# Patient Record
Sex: FEMALE | Race: WHITE | NOT HISPANIC OR LATINO | Employment: OTHER
[De-identification: names, ages, dates, MRNs, and addresses within clinical notes are randomized per-mention and may not be internally consistent; named-entity substitution may affect disease eponyms.]

---

## 2023-08-07 LAB
APPEARANCE, URINE: CLEAR
BILIRUBIN, URINE: NEGATIVE
BLOOD, URINE: NEGATIVE
COLOR, URINE: YELLOW
GLUCOSE, URINE: NEGATIVE MG/DL
KETONES, URINE: NEGATIVE MG/DL
LEUKOCYTE ESTERASE, URINE: NEGATIVE
MUCUS, URINE: NORMAL /LPF
NITRITE, URINE: NEGATIVE
PH, URINE: 7 (ref 5–8)
PROTEIN, URINE: NEGATIVE MG/DL
RBC, URINE: 3 /HPF (ref 0–5)
SPECIFIC GRAVITY, URINE: 1.03 (ref 1–1.03)
SQUAMOUS EPITHELIAL CELLS, URINE: 4 /HPF
UROBILINOGEN, URINE: <2 MG/DL (ref 0–1.9)
WBC, URINE: 1 /HPF (ref 0–5)

## 2023-08-09 LAB — URINE CULTURE: ABNORMAL

## 2023-10-19 ENCOUNTER — HOSPITAL ENCOUNTER (EMERGENCY)
Age: 73
End: 2023-10-19
Payer: COMMERCIAL

## 2023-10-19 ENCOUNTER — APPOINTMENT (OUTPATIENT)
Dept: RADIOLOGY | Facility: HOSPITAL | Age: 73
DRG: 062 | End: 2023-10-19
Payer: MEDICARE

## 2023-10-19 ENCOUNTER — HOSPITAL ENCOUNTER (INPATIENT)
Facility: HOSPITAL | Age: 73
LOS: 3 days | Discharge: HOME | DRG: 062 | End: 2023-10-22
Attending: STUDENT IN AN ORGANIZED HEALTH CARE EDUCATION/TRAINING PROGRAM | Admitting: INTERNAL MEDICINE
Payer: MEDICARE

## 2023-10-19 DIAGNOSIS — I50.20 HFREF (HEART FAILURE WITH REDUCED EJECTION FRACTION) (MULTI): ICD-10-CM

## 2023-10-19 DIAGNOSIS — G45.9 TIA (TRANSIENT ISCHEMIC ATTACK): ICD-10-CM

## 2023-10-19 DIAGNOSIS — I63.9 CEREBRAL INFARCTION, UNSPECIFIED (MULTI): ICD-10-CM

## 2023-10-19 DIAGNOSIS — R42 DIZZINESS: Primary | ICD-10-CM

## 2023-10-19 DIAGNOSIS — I63.20 CEREBROVASCULAR ACCIDENT (CVA) DUE TO OCCLUSION OF PRECEREBRAL ARTERY (MULTI): ICD-10-CM

## 2023-10-19 LAB
ALBUMIN SERPL BCP-MCNC: 3.5 G/DL (ref 3.4–5)
ALP SERPL-CCNC: 59 U/L (ref 33–136)
ALT SERPL W P-5'-P-CCNC: 9 U/L (ref 7–45)
ANION GAP SERPL CALC-SCNC: 15 MMOL/L (ref 10–20)
APTT PPP: 28 SECONDS (ref 27–38)
AST SERPL W P-5'-P-CCNC: 9 U/L (ref 9–39)
BASOPHILS # BLD AUTO: 0.05 X10*3/UL (ref 0–0.1)
BASOPHILS NFR BLD AUTO: 0.6 %
BILIRUB SERPL-MCNC: 0.3 MG/DL (ref 0–1.2)
BUN SERPL-MCNC: 14 MG/DL (ref 6–23)
CALCIUM SERPL-MCNC: 8.4 MG/DL (ref 8.6–10.3)
CARDIAC TROPONIN I PNL SERPL HS: 14 NG/L (ref 0–13)
CHLORIDE SERPL-SCNC: 99 MMOL/L (ref 98–107)
CO2 SERPL-SCNC: 27 MMOL/L (ref 21–32)
CREAT SERPL-MCNC: 0.68 MG/DL (ref 0.5–1.05)
EOSINOPHIL # BLD AUTO: 0.18 X10*3/UL (ref 0–0.4)
EOSINOPHIL NFR BLD AUTO: 2.1 %
ERYTHROCYTE [DISTWIDTH] IN BLOOD BY AUTOMATED COUNT: 14.9 % (ref 11.5–14.5)
GFR SERPL CREATININE-BSD FRML MDRD: >90 ML/MIN/1.73M*2
GLUCOSE BLD MANUAL STRIP-MCNC: 174 MG/DL (ref 74–99)
GLUCOSE SERPL-MCNC: 168 MG/DL (ref 74–99)
HCT VFR BLD AUTO: 38.2 % (ref 36–46)
HGB BLD-MCNC: 11.8 G/DL (ref 12–16)
IMM GRANULOCYTES # BLD AUTO: 0.03 X10*3/UL (ref 0–0.5)
IMM GRANULOCYTES NFR BLD AUTO: 0.4 % (ref 0–0.9)
INR PPP: 1.6 (ref 0.9–1.1)
LYMPHOCYTES # BLD AUTO: 2.52 X10*3/UL (ref 0.8–3)
LYMPHOCYTES NFR BLD AUTO: 29.6 %
MCH RBC QN AUTO: 26.8 PG (ref 26–34)
MCHC RBC AUTO-ENTMCNC: 30.9 G/DL (ref 32–36)
MCV RBC AUTO: 87 FL (ref 80–100)
MONOCYTES # BLD AUTO: 0.66 X10*3/UL (ref 0.05–0.8)
MONOCYTES NFR BLD AUTO: 7.8 %
NEUTROPHILS # BLD AUTO: 5.06 X10*3/UL (ref 1.6–5.5)
NEUTROPHILS NFR BLD AUTO: 59.5 %
NRBC BLD-RTO: 0 /100 WBCS (ref 0–0)
PLATELET # BLD AUTO: 292 X10*3/UL (ref 150–450)
PMV BLD AUTO: 10.2 FL (ref 7.5–11.5)
POCT GLUCOSE: 173 MG/DL (ref 74–99)
POTASSIUM SERPL-SCNC: 3.9 MMOL/L (ref 3.5–5.3)
PROT SERPL-MCNC: 7 G/DL (ref 6.4–8.2)
PROTHROMBIN TIME: 17.7 SECONDS (ref 9.8–12.8)
RBC # BLD AUTO: 4.41 X10*6/UL (ref 4–5.2)
SODIUM SERPL-SCNC: 137 MMOL/L (ref 136–145)
WBC # BLD AUTO: 8.5 X10*3/UL (ref 4.4–11.3)

## 2023-10-19 PROCEDURE — 2550000001 HC RX 255 CONTRASTS: Performed by: PHYSICIAN ASSISTANT

## 2023-10-19 PROCEDURE — 36415 COLL VENOUS BLD VENIPUNCTURE: CPT | Performed by: PHYSICIAN ASSISTANT

## 2023-10-19 PROCEDURE — 3E03317 INTRODUCTION OF OTHER THROMBOLYTIC INTO PERIPHERAL VEIN, PERCUTANEOUS APPROACH: ICD-10-PCS | Performed by: STUDENT IN AN ORGANIZED HEALTH CARE EDUCATION/TRAINING PROGRAM

## 2023-10-19 PROCEDURE — 2020000001 HC ICU ROOM DAILY

## 2023-10-19 PROCEDURE — 82947 ASSAY GLUCOSE BLOOD QUANT: CPT

## 2023-10-19 PROCEDURE — 70498 CT ANGIOGRAPHY NECK: CPT | Performed by: RADIOLOGY

## 2023-10-19 PROCEDURE — 70450 CT HEAD/BRAIN W/O DYE: CPT | Performed by: STUDENT IN AN ORGANIZED HEALTH CARE EDUCATION/TRAINING PROGRAM

## 2023-10-19 PROCEDURE — 85730 THROMBOPLASTIN TIME PARTIAL: CPT | Performed by: PHYSICIAN ASSISTANT

## 2023-10-19 PROCEDURE — 84075 ASSAY ALKALINE PHOSPHATASE: CPT | Performed by: PHYSICIAN ASSISTANT

## 2023-10-19 PROCEDURE — 85610 PROTHROMBIN TIME: CPT | Performed by: PHYSICIAN ASSISTANT

## 2023-10-19 PROCEDURE — 70450 CT HEAD/BRAIN W/O DYE: CPT | Mod: MG

## 2023-10-19 PROCEDURE — 70496 CT ANGIOGRAPHY HEAD: CPT | Performed by: RADIOLOGY

## 2023-10-19 PROCEDURE — 85025 COMPLETE CBC W/AUTO DIFF WBC: CPT | Performed by: PHYSICIAN ASSISTANT

## 2023-10-19 PROCEDURE — 70498 CT ANGIOGRAPHY NECK: CPT | Mod: MG

## 2023-10-19 PROCEDURE — 96374 THER/PROPH/DIAG INJ IV PUSH: CPT

## 2023-10-19 PROCEDURE — 70496 CT ANGIOGRAPHY HEAD: CPT | Mod: MG

## 2023-10-19 PROCEDURE — 99285 EMERGENCY DEPT VISIT HI MDM: CPT | Performed by: STUDENT IN AN ORGANIZED HEALTH CARE EDUCATION/TRAINING PROGRAM

## 2023-10-19 PROCEDURE — 2500000004 HC RX 250 GENERAL PHARMACY W/ HCPCS (ALT 636 FOR OP/ED): Performed by: STUDENT IN AN ORGANIZED HEALTH CARE EDUCATION/TRAINING PROGRAM

## 2023-10-19 PROCEDURE — 84484 ASSAY OF TROPONIN QUANT: CPT | Performed by: PHYSICIAN ASSISTANT

## 2023-10-19 RX ORDER — ATORVASTATIN CALCIUM 80 MG/1
80 TABLET, FILM COATED ORAL NIGHTLY
Status: DISCONTINUED | OUTPATIENT
Start: 2023-10-19 | End: 2023-10-22 | Stop reason: HOSPADM

## 2023-10-19 RX ORDER — ACETAMINOPHEN 325 MG/1
650 TABLET ORAL ONCE
Status: COMPLETED | OUTPATIENT
Start: 2023-10-19 | End: 2023-10-19

## 2023-10-19 RX ORDER — LABETALOL HYDROCHLORIDE 5 MG/ML
10 INJECTION, SOLUTION INTRAVENOUS EVERY 10 MIN PRN
Status: ACTIVE | OUTPATIENT
Start: 2023-10-19 | End: 2023-10-21

## 2023-10-19 RX ADMIN — Medication 25 MG: at 21:50

## 2023-10-19 RX ADMIN — IOHEXOL 90 ML: 350 INJECTION, SOLUTION INTRAVENOUS at 21:14

## 2023-10-19 RX ADMIN — ACETAMINOPHEN 650 MG: 325 TABLET ORAL at 22:26

## 2023-10-19 ASSESSMENT — LIFESTYLE VARIABLES
EVER FELT BAD OR GUILTY ABOUT YOUR DRINKING: NO
HAVE YOU EVER FELT YOU SHOULD CUT DOWN ON YOUR DRINKING: NO
EVER HAD A DRINK FIRST THING IN THE MORNING TO STEADY YOUR NERVES TO GET RID OF A HANGOVER: NO
HAVE PEOPLE ANNOYED YOU BY CRITICIZING YOUR DRINKING: NO
REASON UNABLE TO ASSESS: YES

## 2023-10-19 ASSESSMENT — COLUMBIA-SUICIDE SEVERITY RATING SCALE - C-SSRS
1. IN THE PAST MONTH, HAVE YOU WISHED YOU WERE DEAD OR WISHED YOU COULD GO TO SLEEP AND NOT WAKE UP?: NO
6. HAVE YOU EVER DONE ANYTHING, STARTED TO DO ANYTHING, OR PREPARED TO DO ANYTHING TO END YOUR LIFE?: NO
2. HAVE YOU ACTUALLY HAD ANY THOUGHTS OF KILLING YOURSELF?: NO

## 2023-10-19 ASSESSMENT — PAIN SCALES - GENERAL
PAINLEVEL_OUTOF10: 0 - NO PAIN
PAINLEVEL_OUTOF10: 0 - NO PAIN

## 2023-10-19 ASSESSMENT — PAIN - FUNCTIONAL ASSESSMENT
PAIN_FUNCTIONAL_ASSESSMENT: 0-10
PAIN_FUNCTIONAL_ASSESSMENT: 0-10

## 2023-10-20 ENCOUNTER — APPOINTMENT (OUTPATIENT)
Dept: RADIOLOGY | Facility: HOSPITAL | Age: 73
DRG: 062 | End: 2023-10-20
Payer: MEDICARE

## 2023-10-20 ENCOUNTER — APPOINTMENT (OUTPATIENT)
Dept: CARDIOLOGY | Facility: HOSPITAL | Age: 73
DRG: 062 | End: 2023-10-20
Payer: MEDICARE

## 2023-10-20 LAB
ALBUMIN SERPL BCP-MCNC: 3.2 G/DL (ref 3.4–5)
ANION GAP SERPL CALC-SCNC: 12 MMOL/L (ref 10–20)
APTT PPP: 30 SECONDS (ref 27–38)
BUN SERPL-MCNC: 12 MG/DL (ref 6–23)
CALCIUM SERPL-MCNC: 8.4 MG/DL (ref 8.6–10.3)
CHLORIDE SERPL-SCNC: 103 MMOL/L (ref 98–107)
CHOLEST SERPL-MCNC: 113 MG/DL (ref 0–199)
CHOLESTEROL/HDL RATIO: 2.8
CO2 SERPL-SCNC: 26 MMOL/L (ref 21–32)
CREAT SERPL-MCNC: 0.51 MG/DL (ref 0.5–1.05)
EJECTION FRACTION APICAL 4 CHAMBER: 47.7
ERYTHROCYTE [DISTWIDTH] IN BLOOD BY AUTOMATED COUNT: 15 % (ref 11.5–14.5)
EST. AVERAGE GLUCOSE BLD GHB EST-MCNC: 154 MG/DL
GFR SERPL CREATININE-BSD FRML MDRD: >90 ML/MIN/1.73M*2
GLUCOSE BLD MANUAL STRIP-MCNC: 158 MG/DL (ref 74–99)
GLUCOSE BLD MANUAL STRIP-MCNC: 163 MG/DL (ref 74–99)
GLUCOSE BLD MANUAL STRIP-MCNC: 184 MG/DL (ref 74–99)
GLUCOSE BLD MANUAL STRIP-MCNC: 204 MG/DL (ref 74–99)
GLUCOSE SERPL-MCNC: 159 MG/DL (ref 74–99)
HBA1C MFR BLD: 7 %
HCT VFR BLD AUTO: 38.1 % (ref 36–46)
HDLC SERPL-MCNC: 40.1 MG/DL
HGB BLD-MCNC: 11.7 G/DL (ref 12–16)
INR PPP: 1.6 (ref 0.9–1.1)
LACTATE SERPL-SCNC: 0.8 MMOL/L (ref 0.4–2)
LDLC SERPL CALC-MCNC: 60 MG/DL
MAGNESIUM SERPL-MCNC: 1.65 MG/DL (ref 1.6–2.4)
MCH RBC QN AUTO: 26.4 PG (ref 26–34)
MCHC RBC AUTO-ENTMCNC: 30.7 G/DL (ref 32–36)
MCV RBC AUTO: 86 FL (ref 80–100)
NON HDL CHOLESTEROL: 73 MG/DL (ref 0–149)
NRBC BLD-RTO: 0 /100 WBCS (ref 0–0)
PHOSPHATE SERPL-MCNC: 3.6 MG/DL (ref 2.5–4.9)
PLATELET # BLD AUTO: 286 X10*3/UL (ref 150–450)
PMV BLD AUTO: 10.6 FL (ref 7.5–11.5)
POTASSIUM SERPL-SCNC: 3.9 MMOL/L (ref 3.5–5.3)
PROTHROMBIN TIME: 17.8 SECONDS (ref 9.8–12.8)
RBC # BLD AUTO: 4.44 X10*6/UL (ref 4–5.2)
SODIUM SERPL-SCNC: 137 MMOL/L (ref 136–145)
TRIGL SERPL-MCNC: 66 MG/DL (ref 0–149)
TSH SERPL-ACNC: 1.21 MIU/L (ref 0.44–3.98)
VLDL: 13 MG/DL (ref 0–40)
WBC # BLD AUTO: 7.6 X10*3/UL (ref 4.4–11.3)

## 2023-10-20 PROCEDURE — 99222 1ST HOSP IP/OBS MODERATE 55: CPT | Performed by: PSYCHIATRY & NEUROLOGY

## 2023-10-20 PROCEDURE — 85027 COMPLETE CBC AUTOMATED: CPT

## 2023-10-20 PROCEDURE — 2500000001 HC RX 250 WO HCPCS SELF ADMINISTERED DRUGS (ALT 637 FOR MEDICARE OP)

## 2023-10-20 PROCEDURE — 2500000004 HC RX 250 GENERAL PHARMACY W/ HCPCS (ALT 636 FOR OP/ED)

## 2023-10-20 PROCEDURE — 83036 HEMOGLOBIN GLYCOSYLATED A1C: CPT | Mod: CMCLAB,GEALAB

## 2023-10-20 PROCEDURE — 83605 ASSAY OF LACTIC ACID: CPT

## 2023-10-20 PROCEDURE — 84443 ASSAY THYROID STIM HORMONE: CPT

## 2023-10-20 PROCEDURE — 2020000001 HC ICU ROOM DAILY

## 2023-10-20 PROCEDURE — 97165 OT EVAL LOW COMPLEX 30 MIN: CPT | Mod: GO

## 2023-10-20 PROCEDURE — 70551 MRI BRAIN STEM W/O DYE: CPT | Performed by: STUDENT IN AN ORGANIZED HEALTH CARE EDUCATION/TRAINING PROGRAM

## 2023-10-20 PROCEDURE — 99291 CRITICAL CARE FIRST HOUR: CPT

## 2023-10-20 PROCEDURE — 83735 ASSAY OF MAGNESIUM: CPT

## 2023-10-20 PROCEDURE — 82947 ASSAY GLUCOSE BLOOD QUANT: CPT

## 2023-10-20 PROCEDURE — 96372 THER/PROPH/DIAG INJ SC/IM: CPT

## 2023-10-20 PROCEDURE — 80061 LIPID PANEL: CPT

## 2023-10-20 PROCEDURE — 93306 TTE W/DOPPLER COMPLETE: CPT

## 2023-10-20 PROCEDURE — 2500000002 HC RX 250 W HCPCS SELF ADMINISTERED DRUGS (ALT 637 FOR MEDICARE OP, ALT 636 FOR OP/ED)

## 2023-10-20 PROCEDURE — 70450 CT HEAD/BRAIN W/O DYE: CPT | Performed by: RADIOLOGY

## 2023-10-20 PROCEDURE — 99233 SBSQ HOSP IP/OBS HIGH 50: CPT

## 2023-10-20 PROCEDURE — 80069 RENAL FUNCTION PANEL: CPT

## 2023-10-20 PROCEDURE — 36415 COLL VENOUS BLD VENIPUNCTURE: CPT

## 2023-10-20 PROCEDURE — 70551 MRI BRAIN STEM W/O DYE: CPT | Mod: MG

## 2023-10-20 PROCEDURE — 85610 PROTHROMBIN TIME: CPT

## 2023-10-20 PROCEDURE — 70450 CT HEAD/BRAIN W/O DYE: CPT | Mod: MG

## 2023-10-20 PROCEDURE — 93010 ELECTROCARDIOGRAM REPORT: CPT | Performed by: INTERNAL MEDICINE

## 2023-10-20 PROCEDURE — 93306 TTE W/DOPPLER COMPLETE: CPT | Performed by: INTERNAL MEDICINE

## 2023-10-20 RX ORDER — INSULIN LISPRO 100 [IU]/ML
0-10 INJECTION, SOLUTION INTRAVENOUS; SUBCUTANEOUS
Status: DISCONTINUED | OUTPATIENT
Start: 2023-10-20 | End: 2023-10-22 | Stop reason: HOSPADM

## 2023-10-20 RX ORDER — PAROXETINE HYDROCHLORIDE 20 MG/1
20 TABLET, FILM COATED ORAL NIGHTLY
COMMUNITY
Start: 2022-05-26

## 2023-10-20 RX ORDER — DEXTROSE MONOHYDRATE 100 MG/ML
0.3 INJECTION, SOLUTION INTRAVENOUS ONCE AS NEEDED
Status: DISCONTINUED | OUTPATIENT
Start: 2023-10-20 | End: 2023-10-22 | Stop reason: HOSPADM

## 2023-10-20 RX ORDER — PRAVASTATIN SODIUM 80 MG/1
1 TABLET ORAL NIGHTLY
COMMUNITY
Start: 2022-05-26 | End: 2023-10-22 | Stop reason: HOSPADM

## 2023-10-20 RX ORDER — ENOXAPARIN SODIUM 100 MG/ML
40 INJECTION SUBCUTANEOUS DAILY
Status: DISCONTINUED | OUTPATIENT
Start: 2023-10-20 | End: 2023-10-22 | Stop reason: HOSPADM

## 2023-10-20 RX ORDER — PIOGLITAZONEHYDROCHLORIDE 30 MG/1
30 TABLET ORAL EVERY 24 HOURS
COMMUNITY
Start: 2022-05-26

## 2023-10-20 RX ORDER — PAROXETINE HYDROCHLORIDE 20 MG/1
20 TABLET, FILM COATED ORAL NIGHTLY
Status: DISCONTINUED | OUTPATIENT
Start: 2023-10-20 | End: 2023-10-22 | Stop reason: HOSPADM

## 2023-10-20 RX ORDER — DEXTROSE 50 % IN WATER (D50W) INTRAVENOUS SYRINGE
25
Status: DISCONTINUED | OUTPATIENT
Start: 2023-10-20 | End: 2023-10-22 | Stop reason: HOSPADM

## 2023-10-20 RX ORDER — CALCIUM CARBONATE 200(500)MG
500 TABLET,CHEWABLE ORAL 4 TIMES DAILY PRN
Status: DISCONTINUED | OUTPATIENT
Start: 2023-10-20 | End: 2023-10-22 | Stop reason: HOSPADM

## 2023-10-20 RX ORDER — ACETAMINOPHEN 325 MG/1
650 TABLET ORAL EVERY 6 HOURS PRN
Status: DISCONTINUED | OUTPATIENT
Start: 2023-10-20 | End: 2023-10-22 | Stop reason: HOSPADM

## 2023-10-20 RX ORDER — CALCIUM CARBONATE 200(500)MG
500 TABLET,CHEWABLE ORAL DAILY
Status: DISCONTINUED | OUTPATIENT
Start: 2023-10-20 | End: 2023-10-20

## 2023-10-20 RX ORDER — ASPIRIN 81 MG/1
1 TABLET ORAL DAILY
COMMUNITY
Start: 2022-05-26 | End: 2023-10-22 | Stop reason: HOSPADM

## 2023-10-20 RX ORDER — CHOLECALCIFEROL (VITAMIN D3) 25 MCG
1 TABLET ORAL DAILY
COMMUNITY
Start: 2022-05-27

## 2023-10-20 RX ORDER — METFORMIN HYDROCHLORIDE 500 MG/1
500 TABLET ORAL EVERY MORNING
COMMUNITY

## 2023-10-20 RX ORDER — NAPROXEN SODIUM 220 MG/1
81 TABLET, FILM COATED ORAL DAILY
Status: DISCONTINUED | OUTPATIENT
Start: 2023-10-20 | End: 2023-10-22 | Stop reason: HOSPADM

## 2023-10-20 RX ORDER — ASPIRIN 325 MG
50000 TABLET, DELAYED RELEASE (ENTERIC COATED) ORAL
COMMUNITY
Start: 2022-05-29 | End: 2023-10-22 | Stop reason: HOSPADM

## 2023-10-20 RX ADMIN — ATORVASTATIN CALCIUM 80 MG: 80 TABLET, FILM COATED ORAL at 00:48

## 2023-10-20 RX ADMIN — INSULIN LISPRO 2 UNITS: 100 INJECTION, SOLUTION INTRAVENOUS; SUBCUTANEOUS at 13:23

## 2023-10-20 RX ADMIN — PERFLUTREN 2 ML: 6.52 INJECTION, SUSPENSION INTRAVENOUS at 11:26

## 2023-10-20 RX ADMIN — ATORVASTATIN CALCIUM 80 MG: 80 TABLET, FILM COATED ORAL at 22:23

## 2023-10-20 RX ADMIN — PAROXETINE 20 MG: 20 TABLET, FILM COATED ORAL at 00:48

## 2023-10-20 RX ADMIN — CALCIUM CARBONATE (ANTACID) CHEW TAB 500 MG 500 MG: 500 CHEW TAB at 04:38

## 2023-10-20 RX ADMIN — ENOXAPARIN SODIUM 40 MG: 40 INJECTION SUBCUTANEOUS at 22:23

## 2023-10-20 RX ADMIN — INSULIN LISPRO 4 UNITS: 100 INJECTION, SOLUTION INTRAVENOUS; SUBCUTANEOUS at 17:38

## 2023-10-20 RX ADMIN — INSULIN LISPRO 2 UNITS: 100 INJECTION, SOLUTION INTRAVENOUS; SUBCUTANEOUS at 08:57

## 2023-10-20 RX ADMIN — PAROXETINE 20 MG: 20 TABLET, FILM COATED ORAL at 22:24

## 2023-10-20 RX ADMIN — ASPIRIN 81 MG CHEWABLE TABLET 81 MG: 81 TABLET CHEWABLE at 22:24

## 2023-10-20 RX ADMIN — ACETAMINOPHEN 650 MG: 325 TABLET ORAL at 13:19

## 2023-10-20 SDOH — SOCIAL STABILITY: SOCIAL INSECURITY: DO YOU FEEL UNSAFE GOING BACK TO THE PLACE WHERE YOU ARE LIVING?: NO

## 2023-10-20 SDOH — SOCIAL STABILITY: SOCIAL INSECURITY: ARE THERE ANY APPARENT SIGNS OF INJURIES/BEHAVIORS THAT COULD BE RELATED TO ABUSE/NEGLECT?: NO

## 2023-10-20 SDOH — SOCIAL STABILITY: SOCIAL INSECURITY: WERE YOU ABLE TO COMPLETE ALL THE BEHAVIORAL HEALTH SCREENINGS?: YES

## 2023-10-20 SDOH — SOCIAL STABILITY: SOCIAL INSECURITY: DOES ANYONE TRY TO KEEP YOU FROM HAVING/CONTACTING OTHER FRIENDS OR DOING THINGS OUTSIDE YOUR HOME?: NO

## 2023-10-20 SDOH — SOCIAL STABILITY: SOCIAL INSECURITY: ABUSE: ADULT

## 2023-10-20 SDOH — SOCIAL STABILITY: SOCIAL INSECURITY: ARE YOU OR HAVE YOU BEEN THREATENED OR ABUSED PHYSICALLY, EMOTIONALLY, OR SEXUALLY BY ANYONE?: NO

## 2023-10-20 SDOH — SOCIAL STABILITY: SOCIAL INSECURITY: HAVE YOU HAD THOUGHTS OF HARMING ANYONE ELSE?: NO

## 2023-10-20 SDOH — SOCIAL STABILITY: SOCIAL INSECURITY: HAS ANYONE EVER THREATENED TO HURT YOUR FAMILY OR YOUR PETS?: NO

## 2023-10-20 SDOH — SOCIAL STABILITY: SOCIAL INSECURITY: DO YOU FEEL ANYONE HAS EXPLOITED OR TAKEN ADVANTAGE OF YOU FINANCIALLY OR OF YOUR PERSONAL PROPERTY?: NO

## 2023-10-20 ASSESSMENT — COGNITIVE AND FUNCTIONAL STATUS - GENERAL
HELP NEEDED FOR BATHING: A LITTLE
MOBILITY SCORE: 24
PATIENT BASELINE BEDBOUND: NO
DRESSING REGULAR LOWER BODY CLOTHING: A LITTLE
DAILY ACTIVITIY SCORE: 24
DAILY ACTIVITIY SCORE: 21
TOILETING: A LITTLE

## 2023-10-20 ASSESSMENT — ACTIVITIES OF DAILY LIVING (ADL)
PATIENT'S MEMORY ADEQUATE TO SAFELY COMPLETE DAILY ACTIVITIES?: YES
ADL_ASSISTANCE: INDEPENDENT
BATHING: INDEPENDENT
ADEQUATE_TO_COMPLETE_ADL: YES
WALKS IN HOME: INDEPENDENT
LACK_OF_TRANSPORTATION: NO
DRESSING YOURSELF: INDEPENDENT
FEEDING YOURSELF: INDEPENDENT
GROOMING: INDEPENDENT
HEARING - LEFT EAR: FUNCTIONAL
TOILETING: INDEPENDENT
ASSISTIVE_DEVICE: WALKER
HEARING - RIGHT EAR: FUNCTIONAL
JUDGMENT_ADEQUATE_SAFELY_COMPLETE_DAILY_ACTIVITIES: YES

## 2023-10-20 ASSESSMENT — PATIENT HEALTH QUESTIONNAIRE - PHQ9
SUM OF ALL RESPONSES TO PHQ9 QUESTIONS 1 & 2: 0
1. LITTLE INTEREST OR PLEASURE IN DOING THINGS: NOT AT ALL
2. FEELING DOWN, DEPRESSED OR HOPELESS: NOT AT ALL

## 2023-10-20 ASSESSMENT — PAIN SCALES - GENERAL
PAINLEVEL_OUTOF10: 0 - NO PAIN
PAINLEVEL_OUTOF10: 10 - WORST POSSIBLE PAIN
PAINLEVEL_OUTOF10: 0 - NO PAIN

## 2023-10-20 ASSESSMENT — LIFESTYLE VARIABLES
AUDIT-C TOTAL SCORE: 0
AUDIT-C TOTAL SCORE: 0
HOW MANY STANDARD DRINKS CONTAINING ALCOHOL DO YOU HAVE ON A TYPICAL DAY: PATIENT DOES NOT DRINK
SKIP TO QUESTIONS 9-10: 1
HOW OFTEN DO YOU HAVE A DRINK CONTAINING ALCOHOL: NEVER
HOW OFTEN DO YOU HAVE 6 OR MORE DRINKS ON ONE OCCASION: NEVER

## 2023-10-20 ASSESSMENT — PAIN - FUNCTIONAL ASSESSMENT: PAIN_FUNCTIONAL_ASSESSMENT: 0-10

## 2023-10-20 NOTE — HOSPITAL COURSE
Patient is a 72 year old female with PMHx relevant for T2DM, and HLD who presented to the ED with complaints of new onset dizziness, double vision, and expressive aphasia.  Symptoms started at 7:15pm at home, and she was brought to the ED.  In the ED she was noted to have ataxia with abnormal finger-to-nose testing and vertical nystagmus. NIH stroke scale was 3 with VAN negative.  CT head was negative for acute process and she developed right sided facial droop so she was given TNK @ 2150.  She was then transferred to the ICU for post-TNK observation and monitoring.  In the ICU on 10/20 her symptoms have significantly improved without complete resolution.  She continues to have blurry vision, improved from double vision.  Her ataxia with finger-to-nose has improved, she is speaking and mentating well, and she no longer has any facial droop.  MRI Brain showed acute/subacute lacunar infarct in the left thalamus, without evidence of hemorrhagic transformation. Neurology were following and recommended DAPT and HI statin and follow-up outpatient. Echo also showed new LV systolic dysfunction with an EF 45-50%, impaired relaxation pattern LV diastolic filling, and mild tricuspid regurgitation.  Cardiology was consulted for the findings of new heart failure with reduced ejection fraction, and additionally patient has been having recurring PVCs every 3-4 beats on the telemetry monitor.  They recommended starting on metoprolol XL 50 mg daily and Hyzaar 50-12.5 daily, and following up closely outpatient for CHF management and ischemia evaluation. On CTA neck patient was found with high grade 85% stenosis of right cervical ICA, she will have to follow up with vascular surgery after discharge.  On 10/22 she was stable to be discharged home.

## 2023-10-20 NOTE — CONSULTS
History Of Present Illness  Elsa Biggs is a 72 y.o. female presenting with acute lightheadedness, diplopia, and ataxia, some right sided facial droop.  Last known well: 0715.  Had stroke symptoms resolved at time of presentation: No  Past Medical History  Past Medical History:   Diagnosis Date    Cancer (CMS/HCC)     Diabetes mellitus (CMS/HCC)      Surgical History  Past Surgical History:   Procedure Laterality Date    BREAST SURGERY      FRACTURE SURGERY      JOINT REPLACEMENT      MR NECK ANGIO W AND WO IV CONTRAST  01/14/2022    MR NECK ANGIO W AND WO IV CONTRAST 1/14/2022 [facility]     Social History  Social History     Tobacco Use    Smoking status: Never    Smokeless tobacco: Never   Vaping Use    Vaping Use: Never used   Substance Use Topics    Alcohol use: Not Currently     Alcohol/week: 1.0 standard drink of alcohol     Types: 1 Standard drinks or equivalent per week    Drug use: Never     Allergies  Tetracycline  Home Medications  Medications Prior to Admission   Medication Sig Dispense Refill Last Dose    aspirin 81 mg EC tablet Take 1 tablet (81 mg) by mouth once daily.   10/18/2023    cholecalciferol (Vitamin D-3) 25 MCG (1000 UT) tablet Take 1 tablet (25 mcg) by mouth once daily.   10/18/2023    cholecalciferol (Vitamin D-3) 50,000 unit capsule Take 1 capsule (50,000 Units) by mouth 1 (one) time per week.   10/15/2023    PARoxetine (Paxil) 20 mg tablet Take 1 tablet (20 mg) by mouth once daily at bedtime.   10/18/2023    pioglitazone (Actos) 30 mg tablet Take 1 tablet (30 mg) by mouth once every 24 hours.   10/18/2023    pravastatin (Pravachol) 80 mg tablet Take 1 tablet (80 mg) by mouth once daily at bedtime.   10/18/2023    blood sugar diagnostic strip before meals and at bedtime. Use as instructed   Unknown    metFORMIN (Glucophage) 500 mg tablet Take 1 tablet (500 mg) by mouth 2 times a day with meals.   10/18/2023       Review of Systems  Neurological Exam  Mental Status  Awake,  alert and oriented to person, place and time. Recent and remote memory are intact. Speech is normal. Language is fluent with no aphasia. Attention and concentration are normal. Fund of knowledge is appropriate for level of education.    Cranial Nerves  CN II: Visual fields full to confrontation.  CN III, IV, VI: Extraocular movements intact bilaterally.   Right pupil: 3 mm. Round. Reactive to light.   Left pupil: 3 mm. Round. Reactive to light.  CN V: Facial sensation is normal.  CN VII: Full and symmetric facial movement.  CN VIII: Hearing is normal.  CN IX, X: Palate elevates symmetrically  CN XI: Shoulder shrug strength is normal.  CN XII: Tongue midline without atrophy or fasciculations.    Motor  Normal muscle bulk throughout. No fasciculations present. Normal muscle tone. No abnormal involuntary movements. Strength is 5/5 throughout all four extremities.    Sensory  Sensation is intact to light touch, pinprick, vibration and proprioception in all four extremities.    Reflexes                                            Right                      Left  Brachioradialis                    1+                         1+  Biceps                                 1+                         1+  Triceps                                1+                         1+  Patellar                                0                         0  Achilles                                0                         0  Right Plantar: downgoing  Left Plantar: downgoing    Coordination  Right: Finger-to-nose normal.Left: Finger-to-nose normal.    Gait    Deferred given that she has not been out of the hospital bed yet..    Physical Exam  Eyes:      Extraocular Movements: Extraocular movements intact.   Neurological:      Motor: Motor strength is normal.     Deep Tendon Reflexes:      Reflex Scores:       Tricep reflexes are 1+ on the right side and 1+ on the left side.       Bicep reflexes are 1+ on the right side and 1+ on the left side.       " Brachioradialis reflexes are 1+ on the right side and 1+ on the left side.       Patellar reflexes are 0 on the right side and 0 on the left side.       Achilles reflexes are 0 on the right side and 0 on the left side.  Psychiatric:         Speech: Speech normal.       Last Recorded Vitals  Blood pressure 129/64, pulse 84, temperature 36.4 °C (97.5 °F), temperature source Skin, resp. rate 22, height 1.6 m (5' 3\"), weight 117 kg (258 lb 6.1 oz), SpO2 95 %.        Relevant Results      NIH Stroke Scale  1A. Level of Consciousness: Alert, Keenly Responsive  1B. Ask Month and Age: Both Questions Right  1C. Blink Eyes & Squeeze Hands: Performs Both Tasks  2. Best Gaze: Normal  3. Visual: No Visual Loss  4. Facial Palsy: Minor Paralysis  5A. Motor - Left Arm: No Drift  5B. Motor - Right Arm: No Drift  6A. Motor - Left Leg: No Drift  6B. Motor - Right Leg: No Drift  7. Limb Ataxia: Absent  8. Sensory Loss: Normal  9. Best Language: Mild-to-Moderate Aphasia  10. Dysarthria: Normal  11. Extinction and Inattention: No Abnormality  NIH Stroke Scale: 2           Arabi Coma Scale  Best Eye Response: Spontaneous  Best Verbal Response: Oriented  Best Motor Response: Follows commands  Arabi Coma Scale Score: 15                No MRI head results found for the past 14 days  CT head wo IV contrast    Result Date: 10/20/2023  Interpreted By:  Scott Hightower, STUDY: CT HEAD WO IV CONTRAST;  10/20/2023 3:15 am   INDICATION: Signs/Symptoms:Worseing slurred speech, s/p TNK.   COMPARISON: 10/19/2023   ACCESSION NUMBER(S): [id number]   ORDERING CLINICIAN: PATTIE RUIZ   TECHNIQUE: Axial noncontrast CT images of the head. Sagittal and coronal reformats were provided.   FINDINGS: BRAIN: No acute intracranial hemorrhage. No mass effect or midline shift. Gray-white matter interfaces are preserved.Patchy white matter hypodensities which are nonspecific but likely related to microangiopathic white matter changes. Remote right " basal ganglia lacunar infarct. VENTRICLES and EXTRA-AXIAL SPACES: Prominent ventricles and extra-axial CSF spaces, reflecting parenchymal volume loss. EXTRACRANIAL SOFT TISSUES:  Within normal limits. PARANASAL SINUSES/MASTOIDS: The visualized paranasal sinuses and mastoid air cells are aerated. BONES AND ORBITS: No displaced skull fracture. No suspicious osseous lesion.  Orbits are within normal limits. OTHER FINDINGS: None.       1. No acute intracranial hemorrhage or mass effect.   Signed by: Scott Hightower 10/20/2023 3:43 AM Dictation workstation:   [id number]    CT head wo IV contrast    Result Date: 10/19/2023  Interpreted By:  Christiano Washburn, STUDY: CT HEAD WO IV CONTRAST;  10/19/2023 10:19 pm   INDICATION: Worsening right-sided facial droop.   COMPARISON: CT head without contrast 10/19/2023 at 9:04 p.m.   ACCESSION NUMBER(S): [id number]   ORDERING CLINICIAN: STACEY ESCOBEDO   TECHNIQUE: Noncontrast axial CT images of head were obtained with coronal and sagittal reconstructed images.   FINDINGS: BRAIN PARENCHYMA: There is redemonstration of a hypodensity in the right basal ganglia and subinsular white matter representing age-indeterminate lacunar infarct. There is unchanged moderate periventricular chronic microvascular ischemic change in mild pontine chronic microvascular ischemic change. No acute intraparenchymal hemorrhage. There is no parenchymal evidence of an acute large territory ischemic infarct at this time. There is no mass effect.   VENTRICLES and EXTRA-AXIAL SPACES: There is a small amount of residual intravascular contrast from previous CTA. No acute extra-axial or intraventricular hemorrhage. No effacement of cerebral sulci. Ventricles and sulci are age-concordant. Severe intracranial vascular calcifications are again noted.   PARANASAL SINUSES/MASTOIDS:  No hemorrhage or air-fluid levels within the visualized paranasal sinuses. The mastoids are well aerated.   CALVARIUM/ORBITS:  No  skull fracture.  The orbits and globes are intact to the extent visualized.   EXTRACRANIAL SOFT TISSUES: No discernible abnormality.       1. No parenchymal evidence of new or progressing large territory ischemic infarct. There is an unchanged age indeterminate lacunar infarct in the right basal ganglia and subinsular white matter. As previously recommended, MRI would better evaluate the acuity of this lesion in any other potential acute infarct responsible for patient's symptoms.   2. No acute intracranial hemorrhage.     MACRO: None.   Signed by: Christiano Washburn 10/19/2023 10:31 PM Dictation workstation:   [id number]   Transthoracic Echo (TTE) Complete    Result Date: 10/20/2023   [facility], [address] [place] [address]               Tel [phone] and Fax [phone] TRANSTHORACIC ECHOCARDIOGRAM REPORT  Patient Name:      RAFAL PFEIFFER        Reading Physician:    [id number] Virgil Childress MD Study Date:        10/20/2023           Ordering Provider:    [id number] CHEYANNE LOWE MRN/PID:           [id number]             Fellow: Accession#:        [id number]         Nurse:                Felicia Chakraborty RN Date of Birth/Age: 1950 / 72 years Sonographer:          Milagro Cueto RDCS Gender:            F                    Additional Staff: Height:            160.02 cm            Admit Date:           10/19/2023 Weight:            117.03 kg            Admission Status:     Inpatient -                                                               Routine BSA:               2.15 m2              Encounter#:           [id number]                                         Department Location:  [facility] Blood Pressure: 136 /87 mmHg Study Type:    TRANSTHORACIC ECHO (TTE) COMPLETE Diagnosis/ICD: Cerebral Infarction,  unspecified-I63.9 Indication:    Cerebrovascular Accident CPT Code:      Echo Complete w Full Doppler-04488 Patient History: Diabetes:          Yes Pertinent History: Hyperlipidemia. right sided facial droop, s/p TNK. Study Detail: The following Echo studies were performed: 2D, M-Mode, Doppler and               color flow. Technically challenging study due to poor acoustic               windows. Definity used as a contrast agent for endocardial border               definition and agitated saline used as a contrast agent for               intraseptal flow evaluation. Total contrast used for this               procedure was 1 mL via IV push.  PHYSICIAN INTERPRETATION: Left Ventricle: The left ventricular systolic function is mildly decreased, with an estimated ejection fraction of 45-50%. There are no regional wall motion abnormalities. The left ventricular cavity size is normal. Spectral Doppler shows an impaired relaxation pattern of left ventricular diastolic filling. Left Atrium: The left atrium is mildly dilated. Right Ventricle: The right ventricle is normal in size. There is normal right ventricular global systolic function. Right Atrium: The right atrium is mildly dilated. Aortic Valve: The aortic valve appears structurally normal. There is no evidence of aortic valve regurgitation. The peak instantaneous gradient of the aortic valve is 8.3 mmHg. The mean gradient of the aortic valve is 4.6 mmHg. Mitral Valve: The mitral valve is normal in structure. There is no evidence of mitral valve regurgitation. Tricuspid Valve: The tricuspid valve is structurally normal. There is mild tricuspid regurgitation. Pulmonic Valve: The pulmonic valve is not well visualized. The pulmonic valve regurgitation was not well visualized. Pericardium: There is no pericardial effusion noted. Aorta: The aortic root is normal. Pulmonary Artery: The tricuspid regurgitant velocity is 1.57 m/s, and with an estimated right atrial pressure of  10 mmHg, the estimated pulmonary artery pressure is normal with the RVSP at 19.8 mmHg. Systemic Veins: The inferior vena cava was not well visualized.  CONCLUSIONS:  1. Left ventricular systolic function is mildly decreased with a 45-50% estimated ejection fraction.  2. Spectral Doppler shows an impaired relaxation pattern of left ventricular diastolic filling.  3. There is mild tricuspid regurgitation.  4. Technically difficult bubble study unable to interpret. QUANTITATIVE DATA SUMMARY: 2D MEASUREMENTS:                           Normal Ranges: IVSd:          1.22 cm    (0.6-1.1cm) LVPWd:         1.15 cm    (0.6-1.1cm) LVIDd:         5.04 cm    (3.9-5.9cm) LVIDs:         3.78 cm LV Mass Index: 108.2 g/m2 LV % FS        25.1 % LA VOLUME:                              Normal Ranges: LA Vol A4C:       64.4 ml    (22+/-6mL/m2) LA Vol A2C:       66.6 ml LA Vol BP:        67.0 ml LA Vol Index A4C: 29.9 ml/m2 LA Vol Index A2C: 30.9 ml/m2 LA Vol Index BP:  31.1 ml/m2 LA Volume Index:  31.0 ml/m2 LA Vol A4C:       60.8 ml LA Vol A2C:       65.7 ml RA VOLUME BY A/L METHOD:                       Normal Ranges: RA Area A4C: 12.5 cm2 M-MODE MEASUREMENTS:                  Normal Ranges: Ao Root: 2.90 cm (2.0-3.7cm) LAs:     4.09 cm (2.7-4.0cm) LV SYSTOLIC FUNCTION BY 2D PLANIMETRY (MOD):                     Normal Ranges: EF-A4C View: 47.7 % (>=55%) EF-A2C View: 49.1 % EF-Biplane:  48.5 % LV DIASTOLIC FUNCTION:                        Normal Ranges: MV Peak E:    0.89 m/s (0.7-1.2 m/s) MV Peak A:    1.13 m/s (0.42-0.7 m/s) E/A Ratio:    0.79     (1.0-2.2) MV e'         0.08 m/s (>8.0) MV lateral e' 0.08 m/s MV medial e'  0.05 m/s E/e' Ratio:   11.17    (<8.0) MITRAL VALVE:                 Normal Ranges: MV DT: 256 msec (150-240msec) AORTIC VALVE:                                   Normal Ranges: AoV Vmax:                1.44 m/s (<=1.7m/s) AoV Peak P.3 mmHg (<20mmHg) AoV Mean P.6 mmHg (1.7-11.5mmHg)  LVOT Max Dread:            0.70 m/s (<=1.1m/s) AoV VTI:                 32.57 cm (18-25cm) LVOT VTI:                17.43 cm LVOT Diameter:           1.96 cm  (1.8-2.4cm) AoV Area, VTI:           1.62 cm2 (2.5-5.5cm2) AoV Area,Vmax:           1.47 cm2 (2.5-4.5cm2) AoV Dimensionless Index: 0.54  RIGHT VENTRICLE: RV s' 0.17 m/s TRICUSPID VALVE/RVSP:                             Normal Ranges: Peak TR Velocity: 1.57 m/s RV Syst Pressure: 19.8 mmHg (< 30mmHg) PULMONIC VALVE:                      Normal Ranges: PV Max Dread: 1.0 m/s  (0.6-0.9m/s) PV Max P.1 mmHg  [id number] Virgil Childress MD Electronically signed on 10/20/2023 at 11:51:15 AM  ** Final **          BNP   Date/Time Value Ref Range Status   2022 05:51 PM 39 0 - 99 pg/mL Final     Comment:     .  <100 pg/mL - Heart failure unlikely  100-299 pg/mL - Intermediate probability of acute heart  .               failure exacerbation. Correlate with clinical  .               context and patient history.    >=300 pg/mL - Heart Failure likely. Correlate with clinical  .               context and patient history.  BNP testing is performed using different testing   methodology at [facility] than at other   [facility]. Direct result comparisons should   only be made within the same method.          I have personally reviewed the following imaging results Transthoracic Echo (TTE) Complete    Result Date: 10/20/2023   [facility], [address] [place] [address]               Tel [phone] and Fax [phone] TRANSTHORACIC ECHOCARDIOGRAM REPORT  Patient Name:      RFAAL PFEIFFER        Reading Physician:    [id number] Virgil Childress MD Study Date:        10/20/2023           Ordering Provider:    [id number] CHEYANNE LOWE MRN/PID:           [id number]             Fellow: Accession#:        [id number]         Nurse:                 Felicia Chakraborty RN Date of Birth/Age: 1950 / 72 years Sonographer:          Milagro Cueto                                                               RDCS Gender:            F                    Additional Staff: Height:            160.02 cm            Admit Date:           10/19/2023 Weight:            117.03 kg            Admission Status:     Inpatient -                                                               Routine BSA:               2.15 m2              Encounter#:           [id number]                                         Department Location:  [facility] Blood Pressure: 136 /87 mmHg Study Type:    TRANSTHORACIC ECHO (TTE) COMPLETE Diagnosis/ICD: Cerebral Infarction, unspecified-I63.9 Indication:    Cerebrovascular Accident CPT Code:      Echo Complete w Full Doppler-60042 Patient History: Diabetes:          Yes Pertinent History: Hyperlipidemia. right sided facial droop, s/p TNK. Study Detail: The following Echo studies were performed: 2D, M-Mode, Doppler and               color flow. Technically challenging study due to poor acoustic               windows. Definity used as a contrast agent for endocardial border               definition and agitated saline used as a contrast agent for               intraseptal flow evaluation. Total contrast used for this               procedure was 1 mL via IV push.  PHYSICIAN INTERPRETATION: Left Ventricle: The left ventricular systolic function is mildly decreased, with an estimated ejection fraction of 45-50%. There are no regional wall motion abnormalities. The left ventricular cavity size is normal. Spectral Doppler shows an impaired relaxation pattern of left ventricular diastolic filling. Left Atrium: The left atrium is mildly dilated. Right Ventricle: The right ventricle is normal in size. There is normal right ventricular global systolic function. Right Atrium: The right atrium is mildly dilated. Aortic Valve: The aortic valve appears  structurally normal. There is no evidence of aortic valve regurgitation. The peak instantaneous gradient of the aortic valve is 8.3 mmHg. The mean gradient of the aortic valve is 4.6 mmHg. Mitral Valve: The mitral valve is normal in structure. There is no evidence of mitral valve regurgitation. Tricuspid Valve: The tricuspid valve is structurally normal. There is mild tricuspid regurgitation. Pulmonic Valve: The pulmonic valve is not well visualized. The pulmonic valve regurgitation was not well visualized. Pericardium: There is no pericardial effusion noted. Aorta: The aortic root is normal. Pulmonary Artery: The tricuspid regurgitant velocity is 1.57 m/s, and with an estimated right atrial pressure of 10 mmHg, the estimated pulmonary artery pressure is normal with the RVSP at 19.8 mmHg. Systemic Veins: The inferior vena cava was not well visualized.  CONCLUSIONS:  1. Left ventricular systolic function is mildly decreased with a 45-50% estimated ejection fraction.  2. Spectral Doppler shows an impaired relaxation pattern of left ventricular diastolic filling.  3. There is mild tricuspid regurgitation.  4. Technically difficult bubble study unable to interpret. QUANTITATIVE DATA SUMMARY: 2D MEASUREMENTS:                           Normal Ranges: IVSd:          1.22 cm    (0.6-1.1cm) LVPWd:         1.15 cm    (0.6-1.1cm) LVIDd:         5.04 cm    (3.9-5.9cm) LVIDs:         3.78 cm LV Mass Index: 108.2 g/m2 LV % FS        25.1 % LA VOLUME:                              Normal Ranges: LA Vol A4C:       64.4 ml    (22+/-6mL/m2) LA Vol A2C:       66.6 ml LA Vol BP:        67.0 ml LA Vol Index A4C: 29.9 ml/m2 LA Vol Index A2C: 30.9 ml/m2 LA Vol Index BP:  31.1 ml/m2 LA Volume Index:  31.0 ml/m2 LA Vol A4C:       60.8 ml LA Vol A2C:       65.7 ml RA VOLUME BY A/L METHOD:                       Normal Ranges: RA Area A4C: 12.5 cm2 M-MODE MEASUREMENTS:                  Normal Ranges: Ao Root: 2.90 cm (2.0-3.7cm) LAs:     4.09  cm (2.7-4.0cm) LV SYSTOLIC FUNCTION BY 2D PLANIMETRY (MOD):                     Normal Ranges: EF-A4C View: 47.7 % (>=55%) EF-A2C View: 49.1 % EF-Biplane:  48.5 % LV DIASTOLIC FUNCTION:                        Normal Ranges: MV Peak E:    0.89 m/s (0.7-1.2 m/s) MV Peak A:    1.13 m/s (0.42-0.7 m/s) E/A Ratio:    0.79     (1.0-2.2) MV e'         0.08 m/s (>8.0) MV lateral e' 0.08 m/s MV medial e'  0.05 m/s E/e' Ratio:   11.17    (<8.0) MITRAL VALVE:                 Normal Ranges: MV DT: 256 msec (150-240msec) AORTIC VALVE:                                   Normal Ranges: AoV Vmax:                1.44 m/s (<=1.7m/s) AoV Peak P.3 mmHg (<20mmHg) AoV Mean P.6 mmHg (1.7-11.5mmHg) LVOT Max Dread:            0.70 m/s (<=1.1m/s) AoV VTI:                 32.57 cm (18-25cm) LVOT VTI:                17.43 cm LVOT Diameter:           1.96 cm  (1.8-2.4cm) AoV Area, VTI:           1.62 cm2 (2.5-5.5cm2) AoV Area,Vmax:           1.47 cm2 (2.5-4.5cm2) AoV Dimensionless Index: 0.54  RIGHT VENTRICLE: RV s' 0.17 m/s TRICUSPID VALVE/RVSP:                             Normal Ranges: Peak TR Velocity: 1.57 m/s RV Syst Pressure: 19.8 mmHg (< 30mmHg) PULMONIC VALVE:                      Normal Ranges: PV Max Dread: 1.0 m/s  (0.6-0.9m/s) PV Max P.1 mmHg  [id number] Virgil Childress MD Electronically signed on 10/20/2023 at 11:51:15 AM  ** Final **     CT head wo IV contrast    Result Date: 10/20/2023  Interpreted By:  Scott Hightower, STUDY: CT HEAD WO IV CONTRAST;  10/20/2023 3:15 am   INDICATION: Signs/Symptoms:Worseing slurred speech, s/p TNK.   COMPARISON: 10/19/2023   ACCESSION NUMBER(S): [id number]   ORDERING CLINICIAN: PATTIE RUIZ   TECHNIQUE: Axial noncontrast CT images of the head. Sagittal and coronal reformats were provided.   FINDINGS: BRAIN: No acute intracranial hemorrhage. No mass effect or midline shift. Gray-white matter interfaces are preserved.Patchy white matter hypodensities which are  nonspecific but likely related to microangiopathic white matter changes. Remote right basal ganglia lacunar infarct. VENTRICLES and EXTRA-AXIAL SPACES: Prominent ventricles and extra-axial CSF spaces, reflecting parenchymal volume loss. EXTRACRANIAL SOFT TISSUES:  Within normal limits. PARANASAL SINUSES/MASTOIDS: The visualized paranasal sinuses and mastoid air cells are aerated. BONES AND ORBITS: No displaced skull fracture. No suspicious osseous lesion.  Orbits are within normal limits. OTHER FINDINGS: None.       1. No acute intracranial hemorrhage or mass effect.   Signed by: Scott Hightower 10/20/2023 3:43 AM Dictation workstation:   [id number]    CT head wo IV contrast    Result Date: 10/19/2023  Interpreted By:  Christiano Washburn, STUDY: CT HEAD WO IV CONTRAST;  10/19/2023 10:19 pm   INDICATION: Worsening right-sided facial droop.   COMPARISON: CT head without contrast 10/19/2023 at 9:04 p.m.   ACCESSION NUMBER(S): [id number]   ORDERING CLINICIAN: STACEY ESCOBEDO   TECHNIQUE: Noncontrast axial CT images of head were obtained with coronal and sagittal reconstructed images.   FINDINGS: BRAIN PARENCHYMA: There is redemonstration of a hypodensity in the right basal ganglia and subinsular white matter representing age-indeterminate lacunar infarct. There is unchanged moderate periventricular chronic microvascular ischemic change in mild pontine chronic microvascular ischemic change. No acute intraparenchymal hemorrhage. There is no parenchymal evidence of an acute large territory ischemic infarct at this time. There is no mass effect.   VENTRICLES and EXTRA-AXIAL SPACES: There is a small amount of residual intravascular contrast from previous CTA. No acute extra-axial or intraventricular hemorrhage. No effacement of cerebral sulci. Ventricles and sulci are age-concordant. Severe intracranial vascular calcifications are again noted.   PARANASAL SINUSES/MASTOIDS:  No hemorrhage or air-fluid levels within the  visualized paranasal sinuses. The mastoids are well aerated.   CALVARIUM/ORBITS:  No skull fracture.  The orbits and globes are intact to the extent visualized.   EXTRACRANIAL SOFT TISSUES: No discernible abnormality.       1. No parenchymal evidence of new or progressing large territory ischemic infarct. There is an unchanged age indeterminate lacunar infarct in the right basal ganglia and subinsular white matter. As previously recommended, MRI would better evaluate the acuity of this lesion in any other potential acute infarct responsible for patient's symptoms.   2. No acute intracranial hemorrhage.     MACRO: None.   Signed by: Christiano Washburn 10/19/2023 10:31 PM Dictation workstation:   [id number]    CT angio brain attack head w IV contrast and post procedure    Result Date: 10/19/2023  Interpreted By:  Shaunna Masters, STUDY: CT ANGIO BRAIN ATTACK HEAD W IV CONTRAST AND POST PROCEDURE; CT ANGIO BRAIN ATTACK NECK W IV CONTRAST AND POST PROCEDURE;  10/19/2023 9:09 pm   INDICATION: Signs/Symptoms:Stroke Evaluation with VAN Positive. Dizziness, dysarthria and vision changes.   COMPARISON: CT head without 10/19/2023   ACCESSION NUMBER(S): [id number]; [id number]   ORDERING CLINICIAN: STACEY ESCOBEDO   TECHNIQUE: 90 mL Omnipaque 350 was administered intravenously and axial images of the head and neck were acquired.  Coronal, sagittal, and 3-D reconstructions were provided for review.   FINDINGS: Low-attenuation nodule in the left lobe of the thyroid measuring up to 1.5 cm. Indeterminate partially imaged mediastinal lymph node measuring up to 10 mm.   CTA HEAD FINDINGS:   Anterior circulation: Dense atherosclerotic calcifications within the carotid siphons with moderate to severe a multifocal stenosis. Moderate narrowing within the proximal left M1 segment and appearance of focal high-grade stenosis within the proximal left superior division of the M2 MCA. The bilateral carotid terminals, bilateral proximal  anterior and middle cerebral arteries are normal.   Posterior circulation: Marked coarse calcifications with multifocal severe stenosis in the bilateral vertebral arteries. Fetal type origin of the left PCA. Otherwise the vertebrobasilar junction, basilar artery and proximal posterior cerebral arteries are normal.   CTA NECK FINDINGS:   Direct origin of the left vertebral artery.   Right carotid vessels: Atherosclerotic calcifications involving the origin of the right carotid artery. Moderate narrowing of the carotid bulb with dense atherosclerotic calcifications. Atherosclerotic changes extend into the proximal left cervical ICA with severe stenosis, approximately 85 % by NASCET criteria.   Left carotid vessels: The common carotid artery is normal. The carotid bifurcation is normal. Atherosclerotic calcifications of the proximal left cervical ICA with up to 57% stenosis by NASCET criteria.   Vertebral vessels:  The visualized segments of the cervical vertebral arteries are normal in caliber.         High-grade stenosis of the right cervical ICA up to 85% with moderate stenosis of the right carotid bifurcation.   Atherosclerotic calcifications of the bilateral carotid siphons with appearance of moderate to severe stenosis.   Atherosclerotic calcifications of the V4 segment/intradural vertebral arteries with high-grade stenosis.   Moderate narrowing of the left cervical ICA up to 57%.   Foci of moderate to severe focal stenosis within left M1 and M2 branches   MACRO: Shaunna Masters discussed the significance and urgency of this critical finding by telephone with  Dr. Abdalla on 10/19/2023 at 9:46 pm.  (**-RCF-**) Findings:  See findings.     Signed by: Shaunna Masters 10/19/2023 9:47 PM Dictation workstation:   [id number]    CT angio brain attack neck w IV contrast and post procedure    Result Date: 10/19/2023  Interpreted By:  Shaunna Masters, STUDY: CT ANGIO BRAIN ATTACK HEAD W IV CONTRAST AND POST PROCEDURE; CT  ANGIO BRAIN ATTACK NECK W IV CONTRAST AND POST PROCEDURE;  10/19/2023 9:09 pm   INDICATION: Signs/Symptoms:Stroke Evaluation with VAN Positive. Dizziness, dysarthria and vision changes.   COMPARISON: CT head without 10/19/2023   ACCESSION NUMBER(S): [id number]; [id number]   ORDERING CLINICIAN: STACEY ESCOBEDO   TECHNIQUE: 90 mL Omnipaque 350 was administered intravenously and axial images of the head and neck were acquired.  Coronal, sagittal, and 3-D reconstructions were provided for review.   FINDINGS: Low-attenuation nodule in the left lobe of the thyroid measuring up to 1.5 cm. Indeterminate partially imaged mediastinal lymph node measuring up to 10 mm.   CTA HEAD FINDINGS:   Anterior circulation: Dense atherosclerotic calcifications within the carotid siphons with moderate to severe a multifocal stenosis. Moderate narrowing within the proximal left M1 segment and appearance of focal high-grade stenosis within the proximal left superior division of the M2 MCA. The bilateral carotid terminals, bilateral proximal anterior and middle cerebral arteries are normal.   Posterior circulation: Marked coarse calcifications with multifocal severe stenosis in the bilateral vertebral arteries. Fetal type origin of the left PCA. Otherwise the vertebrobasilar junction, basilar artery and proximal posterior cerebral arteries are normal.   CTA NECK FINDINGS:   Direct origin of the left vertebral artery.   Right carotid vessels: Atherosclerotic calcifications involving the origin of the right carotid artery. Moderate narrowing of the carotid bulb with dense atherosclerotic calcifications. Atherosclerotic changes extend into the proximal left cervical ICA with severe stenosis, approximately 85 % by NASCET criteria.   Left carotid vessels: The common carotid artery is normal. The carotid bifurcation is normal. Atherosclerotic calcifications of the proximal left cervical ICA with up to 57% stenosis by NASCET criteria.   Vertebral  vessels:  The visualized segments of the cervical vertebral arteries are normal in caliber.         High-grade stenosis of the right cervical ICA up to 85% with moderate stenosis of the right carotid bifurcation.   Atherosclerotic calcifications of the bilateral carotid siphons with appearance of moderate to severe stenosis.   Atherosclerotic calcifications of the V4 segment/intradural vertebral arteries with high-grade stenosis.   Moderate narrowing of the left cervical ICA up to 57%.   Foci of moderate to severe focal stenosis within left M1 and M2 branches   MACRO: Shaunna Masters discussed the significance and urgency of this critical finding by telephone with  Dr. Abdalla on 10/19/2023 at 9:46 pm.  (**-RCF-**) Findings:  See findings.     Signed by: Shaunna Masters 10/19/2023 9:47 PM Dictation workstation:   [id number]    CT brain attack head wo IV contrast    Result Date: 10/19/2023  Interpreted By:  Shaunna Masters, STUDY: CT BRAIN ATTACK HEAD WO IV CONTRAST;  10/19/2023 9:05 pm   INDICATION: Signs/Symptoms:Stroke Evaluation.   COMPARISON: None.   ACCESSION NUMBER(S): [id number]   ORDERING CLINICIAN: STACEY ESCOBEDO   TECHNIQUE: Axial noncontrast CT images of the head.   FINDINGS: BRAIN PARENCHYMA: Mild-to-moderate non-specific white matter changes and generalized parenchymal volume loss. Nonspecific low-attenuation focus in the right basal ganglia and possibly sequela of remote lacunar infarct. No mass effect or midline shift. Atherosclerotic calcifications of the vertebral and carotid arteries noted.   HEMORRHAGE: No acute intracranial hemorrhage. VENTRICLES and EXTRA-AXIAL SPACES: Normal size. EXTRACRANIAL SOFT TISSUES: Within normal limits. PARANASAL SINUSES/MASTOIDS: The visualized paranasal sinuses and mastoid air cells are aerated. CALVARIUM: No depressed skull fracture. No destructive osseous lesion.   OTHER FINDINGS: None.       No acute intracranial hemorrhage or mass effect.   Age indeterminate  nonspecific white matter changes with probable remote right lacunar infarct however given lack of prior head CT imaging evaluation for chronicity is limited. MRI may be considered as clinically indicated for further evaluation of smaller hyperacute ischemic infarcts.   MACRO: Shaunna Masters discussed the significance and urgency of this critical finding by telephone with  Dr. Abdalla on 10/19/2023 at 9:22 pm.  (**-RCF-**) Findings:  See findings.   Signed by: Shaunna Masters 10/19/2023 9:24 PM Dictation workstation:   [id number]  . Reviewed.    Stroke Alert CT/MRI review: per ED note.  IV Thrombolysis IV Thrombolysis Checklist      IV Thrombolysis Given: Yes Thrombolysis Administration: administration time 2150.  Patient meets inclusion and exclusion criteria with expected benefits exceeding the risks of IV thrombolysis therapy or withholding therapy. Patient/ family understand potential risks & benefits and consent to IV Thrombolysis. Were there delays to thrombolysis administration?: no       Assessment/Plan   Principal Problem:    Dizziness      Type: Ischemic stroke  Subtype/etiology: not known  Vessels involved: not known  Neurological manifestations:  NIHSS (worst at presentation): 3   Diagnostic evaluation: done  Antiplatelet/antithrombotic plan for stroke prevention: as per hospitalist.  VTE prophylaxis: as per hospitalist.  Vascular Risk Factor modification goals:  Blood pressure goals: avoid hypotension SBP <100 that could worsen cerebral perfusion, Ischemic stroke- early permissive hypertension SBP < 220 mmHg with cautious inpatient lowering  Lipid Goals: education on healthy diet and statin therapy to maintain or achieve goal LDL-cholesterol < 70mg  Glucose Goals: early treatment of hyperglycemia to goal glucose 140-180 mg/dl with long-term goal A1c < 7%   Smoking Cessation and Education  Assessment for Rehabilitation needs   Patient and family education on signs and symptoms of stroke, calling 911,  healthy strategies for stroke prevention.    Impression: Given that she received the TNK and no focal neurological deficits at this time, I think her neurological prognosis is good, possible posterior circulation acute ischemic stroke or TIA.  Will review the MRI results when available.  Discussed in detail with the patient and daughter, at the bedside.       I spent 60 minutes in the professional and overall care of this patient.      Isaiah Jarrell MD

## 2023-10-20 NOTE — CARE PLAN
The patient's goals for the shift include      The clinical goals for the shift include No negative effects from TNK administration    Over the shift, the patient did not make progress toward the following goals. Barriers to progression include . Recommendations to address these barriers include .

## 2023-10-20 NOTE — PROGRESS NOTES
Elsa Biggs is a 72 y.o. female on day 1 of admission presenting with Dizziness.      Subjective   Ms. Biggs was seen sitting up in bed this morning in no acute distress.  She states that she feels significantly improved this morning, but that she still has some blurry vision, but she is not having any of the double vision that she had yesterday.  Her speech has come back to baseline as well and she does not have any facial drooping or weakness this morning.  She denies any headaches, chest pain, shortness of breath, nausea, vomiting, and numbness or weakness in any of her extremities.    Objective     Last Recorded Vitals  /64   Pulse 84   Temp 36.4 °C (97.5 °F) (Skin)   Resp 22   Wt 117 kg (258 lb 6.1 oz)   SpO2 95%   Intake/Output last 3 Shifts:    Intake/Output Summary (Last 24 hours) at 10/20/2023 1253  Last data filed at 10/20/2023 1000  Gross per 24 hour   Intake 240 ml   Output --   Net 240 ml       Admission Weight  Weight: 116 kg (255 lb) (10/19/23 2012)    Daily Weight  10/19/23 : 117 kg (258 lb 6.1 oz)    Image Results  Transthoracic Echo (TTE) Complete    Result Date: 10/20/2023  CONCLUSIONS:  1. Left ventricular systolic function is mildly decreased with a 45-50% estimated ejection fraction.  2. Spectral Doppler shows an impaired relaxation pattern of left ventricular diastolic filling.  3. There is mild tricuspid regurgitation.  4. Technically difficult bubble study unable to interpret.    CT head wo IV contrast    Result Date: 10/20/2023  1. No acute intracranial hemorrhage or mass effect.       CT head wo IV contrast    Result Date: 10/19/2023  1. No parenchymal evidence of new or progressing large territory ischemic infarct. There is an unchanged age indeterminate lacunar infarct in the right basal ganglia and subinsular white matter. As previously recommended, MRI would better evaluate the acuity of this lesion in any other potential acute infarct responsible for patient's symptoms.    2. No acute intracranial hemorrhage.         CT angio brain attack head w IV contrast and post procedure    Result Date: 10/19/2023  High-grade stenosis of the right cervical ICA up to 85% with moderate stenosis of the right carotid bifurcation.   Atherosclerotic calcifications of the bilateral carotid siphons with appearance of moderate to severe stenosis.   Atherosclerotic calcifications of the V4 segment/intradural vertebral arteries with high-grade stenosis.   Moderate narrowing of the left cervical ICA up to 57%.   Foci of moderate to severe focal stenosis within left M1 and M2 branches       CT angio brain attack neck w IV contrast and post procedure    Result Date: 10/19/2023  High-grade stenosis of the right cervical ICA up to 85% with moderate stenosis of the right carotid bifurcation.   Atherosclerotic calcifications of the bilateral carotid siphons with appearance of moderate to severe stenosis.   Atherosclerotic calcifications of the V4 segment/intradural vertebral arteries with high-grade stenosis.   Moderate narrowing of the left cervical ICA up to 57%.   Foci of moderate to severe focal stenosis within left M1 and M2 branches       CT brain attack head wo IV contrast    No acute intracranial hemorrhage or mass effect.   Age indeterminate nonspecific white matter changes with probable remote right lacunar infarct however given lack of prior head CT imaging evaluation for chronicity is limited. MRI may be considered as clinically indicated for further evaluation of smaller hyperacute ischemic infarcts.         Physical Exam  Constitutional:       General: She is not in acute distress.     Appearance: Normal appearance.   HENT:      Head: Normocephalic and atraumatic.   Eyes:      Extraocular Movements: Extraocular movements intact.      Conjunctiva/sclera: Conjunctivae normal.      Pupils: Pupils are equal, round, and reactive to light.      Comments: No visual field deficits   Cardiovascular:      Rate  and Rhythm: Normal rate and regular rhythm.      Heart sounds: No murmur heard.     No friction rub. No gallop.   Pulmonary:      Effort: Pulmonary effort is normal. No respiratory distress.      Breath sounds: Normal breath sounds. No stridor. No wheezing.   Abdominal:      General: Abdomen is flat. There is no distension.      Palpations: Abdomen is soft.      Tenderness: There is no abdominal tenderness.   Musculoskeletal:         General: Normal range of motion.      Comments: 5/5 strength in upper extremities bilaterally   Neurological:      General: No focal deficit present.      Mental Status: She is alert and oriented to person, place, and time.      Cranial Nerves: No cranial nerve deficit.      Motor: No weakness.      Comments: Finger-to-nose normal   CN II-XII intact  No visual field deficits         Relevant Results  Scheduled medications  aspirin, 81 mg, oral, Daily  atorvastatin, 80 mg, oral, Nightly  enoxaparin, 40 mg, subcutaneous, Daily  insulin lispro, 0-10 Units, subcutaneous, TID with meals  PARoxetine, 20 mg, oral, Nightly      Continuous medications     PRN medications  PRN medications: calcium carbonate, dextrose 10 % in water (D10W), dextrose, glucagon, labetaloL, oxygen  Results for orders placed or performed during the hospital encounter of 10/19/23 (from the past 24 hour(s))   POCT GLUCOSE   Result Value Ref Range    POCT Glucose 174 (H) 74 - 99 mg/dL   CBC and Auto Differential   Result Value Ref Range    WBC 8.5 4.4 - 11.3 x10*3/uL    nRBC 0.0 0.0 - 0.0 /100 WBCs    RBC 4.41 4.00 - 5.20 x10*6/uL    Hemoglobin 11.8 (L) 12.0 - 16.0 g/dL    Hematocrit 38.2 36.0 - 46.0 %    MCV 87 80 - 100 fL    MCH 26.8 26.0 - 34.0 pg    MCHC 30.9 (L) 32.0 - 36.0 g/dL    RDW 14.9 (H) 11.5 - 14.5 %    Platelets 292 150 - 450 x10*3/uL    MPV 10.2 7.5 - 11.5 fL    Neutrophils % 59.5 40.0 - 80.0 %    Immature Granulocytes %, Automated 0.4 0.0 - 0.9 %    Lymphocytes % 29.6 13.0 - 44.0 %    Monocytes % 7.8 2.0 -  10.0 %    Eosinophils % 2.1 0.0 - 6.0 %    Basophils % 0.6 0.0 - 2.0 %    Neutrophils Absolute 5.06 1.60 - 5.50 x10*3/uL    Immature Granulocytes Absolute, Automated 0.03 0.00 - 0.50 x10*3/uL    Lymphocytes Absolute 2.52 0.80 - 3.00 x10*3/uL    Monocytes Absolute 0.66 0.05 - 0.80 x10*3/uL    Eosinophils Absolute 0.18 0.00 - 0.40 x10*3/uL    Basophils Absolute 0.05 0.00 - 0.10 x10*3/uL   Comprehensive metabolic panel   Result Value Ref Range    Glucose 168 (H) 74 - 99 mg/dL    Sodium 137 136 - 145 mmol/L    Potassium 3.9 3.5 - 5.3 mmol/L    Chloride 99 98 - 107 mmol/L    Bicarbonate 27 21 - 32 mmol/L    Anion Gap 15 10 - 20 mmol/L    Urea Nitrogen 14 6 - 23 mg/dL    Creatinine 0.68 0.50 - 1.05 mg/dL    eGFR >90 >60 mL/min/1.73m*2    Calcium 8.4 (L) 8.6 - 10.3 mg/dL    Albumin 3.5 3.4 - 5.0 g/dL    Alkaline Phosphatase 59 33 - 136 U/L    Total Protein 7.0 6.4 - 8.2 g/dL    AST 9 9 - 39 U/L    Bilirubin, Total 0.3 0.0 - 1.2 mg/dL    ALT 9 7 - 45 U/L   Troponin I, High Sensitivity   Result Value Ref Range    Troponin I, High Sensitivity 14 (H) 0 - 13 ng/L   Protime-INR   Result Value Ref Range    Protime 17.7 (H) 9.8 - 12.8 seconds    INR 1.6 (H) 0.9 - 1.1   APTT   Result Value Ref Range    aPTT 28 27 - 38 seconds   POCT glucose   Result Value Ref Range    POCT Glucose 173 (A) 74 - 99 mg/dL   POCT GLUCOSE   Result Value Ref Range    POCT Glucose 158 (H) 74 - 99 mg/dL   Magnesium   Result Value Ref Range    Magnesium 1.65 1.60 - 2.40 mg/dL   Renal Function Panel   Result Value Ref Range    Glucose 159 (H) 74 - 99 mg/dL    Sodium 137 136 - 145 mmol/L    Potassium 3.9 3.5 - 5.3 mmol/L    Chloride 103 98 - 107 mmol/L    Bicarbonate 26 21 - 32 mmol/L    Anion Gap 12 10 - 20 mmol/L    Urea Nitrogen 12 6 - 23 mg/dL    Creatinine 0.51 0.50 - 1.05 mg/dL    eGFR >90 >60 mL/min/1.73m*2    Calcium 8.4 (L) 8.6 - 10.3 mg/dL    Phosphorus 3.6 2.5 - 4.9 mg/dL    Albumin 3.2 (L) 3.4 - 5.0 g/dL   CBC   Result Value Ref Range    WBC 7.6  4.4 - 11.3 x10*3/uL    nRBC 0.0 0.0 - 0.0 /100 WBCs    RBC 4.44 4.00 - 5.20 x10*6/uL    Hemoglobin 11.7 (L) 12.0 - 16.0 g/dL    Hematocrit 38.1 36.0 - 46.0 %    MCV 86 80 - 100 fL    MCH 26.4 26.0 - 34.0 pg    MCHC 30.7 (L) 32.0 - 36.0 g/dL    RDW 15.0 (H) 11.5 - 14.5 %    Platelets 286 150 - 450 x10*3/uL    MPV 10.6 7.5 - 11.5 fL   Coagulation Screen   Result Value Ref Range    Protime 17.8 (H) 9.8 - 12.8 seconds    INR 1.6 (H) 0.9 - 1.1    aPTT 30 27 - 38 seconds   Lactate   Result Value Ref Range    Lactate 0.8 0.4 - 2.0 mmol/L   Lipid Panel   Result Value Ref Range    Cholesterol 113 0 - 199 mg/dL    HDL-Cholesterol 40.1 mg/dL    Cholesterol/HDL Ratio 2.8     LDL Calculated 60 <=99 mg/dL    VLDL 13 0 - 40 mg/dL    Triglycerides 66 0 - 149 mg/dL    Non HDL Cholesterol 73 0 - 149 mg/dL   TSH   Result Value Ref Range    Thyroid Stimulating Hormone 1.21 0.44 - 3.98 mIU/L   POCT GLUCOSE   Result Value Ref Range    POCT Glucose 163 (H) 74 - 99 mg/dL   Transthoracic Echo (TTE) Complete   Result Value Ref Range    LV A4C EF 47.7    POCT GLUCOSE   Result Value Ref Range    POCT Glucose 184 (H) 74 - 99 mg/dL           Assessment/Plan   72-year-old female with a past medical history significant for depression, non-insulin-dependent diabetes mellitus, hyperlipidemia, left total hip arthroplasty [facility] ED with a chief complaint of new onset dizziness and visual disturbance.       Neurology  #Stroke-like symptoms  -NIH stroke scale in the ED: 3  -NIH stroke scale in the ICU: 3  -NIH stroke scale ICU @ 3:30:3   -Last well known: 7:15 pm  -TNK administered in the ED at @ 2150  -Initial CT head: Unremarkable  -CT head post NIHSS increase: Unchanged   -CT angio brain and neck: Demonstrated high-grade stenosis of the right cervical ICA up to 85% with moderate stenosis of the right carotid bifurcation there is moderate narrowing of the left cervical ICA up to 57%  -Place patient on telemetry/continuous cardiac monitoring s/p TNK  administration   -Echo: LV Systolic function 45-50%. Impaired relaxation pattern LV diastolic filling. Mild TR. Technically difficult bubble study unable to interpret.  -Lipid Panel and TSH unremarkable  -Neurology consulted  -Recommend followup with vascular surgery for severe high-grade 85% stenosis of the right cervical ICA  -PT/OT consulted  -On exam 10/20 patient's finger-to-nose ataxia has resolved, there is no facial droop, and she is speaking clearly  -NIH stroke scale 10/20: 0  -MRI Brain w/o pending  -Continue ASA 81mg @ 2200  -Atorvastatin 80mg  -Patient requires ICU monitoring until 2200 for post-TNK protocol   -Anticipate transfer to floor if patient continues to show improvement 24 hours post-TNK     Cardiovascular  - No active Issues      Pulmonary  - No active Issues      Gastrointestinal  - No active Issues      Renal/Urology  - No active Issues      Hematology/Oncology  - No active Issues      Infectious Disease  - No active Issues      Endocrine  #non insulin Dependent diabetes mellitus  -She takes metformin 500 mg twice daily at home  -SSI with goal glucose < 180     Musculoskeletal/Skin  - No active Issues      Psychiatric  # Depression  -We will continue Paxil     IVF: PRN   Electrolytes: Replete as needed   Nutrition: Regular diet  GI ppx: None  VTE prophylaxis: Resume Lovenox at 2200 (24hr post TNK)  Antibiotics: None  Lines/tubes: 1 peripheral IV  Code: Full Code      Disposition: Patient is a 72 y.o. female admitted for the management of TIA/CVA status post TNK @ 2150 10/19.  Requiring ICU monitoring for 24 hours post-TNK protocol. Anticipate transfer to floor following this period. Anticipated LOS < 48 hours.    Billy Zazueta MD

## 2023-10-20 NOTE — PROGRESS NOTES
Occupational Therapy    Evaluation    Patient Name: Elsa Biggs  MRN: [id number]  Today's Date: 10/20/2023  Time Calculation  Start Time: 1140  Stop Time: 1202  Time Calculation (min): 22 min    Assessment  IP OT Assessment  OT Assessment:  (73 y/o female admitted for TIA/dizziness. S/p TNK. Pt is pleasant and cooperative. Puts forth good effort towards task compeltion. Appears close to functional baseline.)  Prognosis: Good  Medical Staff Made Aware: Yes  End of Session Communication: Bedside nurse  End of Session Patient Position: Up in chair  Plan:  Treatment Interventions: ADL retraining, Functional transfer training  OT Frequency: 2 times per week  OT Discharge Recommendations: Low intensity level of continued care  OT - OK to Discharge: Yes    Subjective   Current Problem:  1. Dizziness  Transthoracic Echo (TTE) Complete    Transthoracic Echo (TTE) Complete      2. Cerebrovascular accident (CVA) due to occlusion of precerebral artery (CMS/HCC)  Transthoracic Echo (TTE) Complete    Transthoracic Echo (TTE) Complete      3. TIA (transient ischemic attack)  Transthoracic Echo (TTE) Complete    Transthoracic Echo (TTE) Complete      4. Cerebral infarction, unspecified (CMS/HCC)  Transthoracic Echo (TTE) Complete        General:  General  Reason for Referral: Impaired ADL  Referred By: Dr. Rocha  Past Medical History Relevant to Rehab: depression, non-insulin-dependent diabetes mellitus, hyperlipidemia, left total hip arthroplasty  Family/Caregiver Present: Yes  Caregiver Feedback: Dtr present during session  Prior to Session Communication: Bedside nurse  Patient Position Received: Bed, 3 rail up  General Comment: Pt reports symptoms have resolved and she is feeling much better. Also relays that she has been going to OP PT since knee sx a few months ago.  Precautions:  Medical Precautions: Fall precautions  Vital Signs:     Pain:  Pain Assessment  Pain Assessment: 0-10  Pain Score: 0 - No pain    Objective    Cognition:  Overall Cognitive Status: Within Functional Limits           Home Living:  Type of Home: House  Lives With: Spouse  Home Adaptive Equipment: Walker rolling or standard (Rollator)  Home Layout: One level  Home Access: Stairs to enter with rails  Entrance Stairs-Number of Steps: 1  Bathroom Shower/Tub: Tub/shower unit  Bathroom Equipment: Tub transfer bench, Grab bars in shower   Prior Function:  Level of [place]: Independent with ADLs and functional transfers  ADL Assistance: Independent (Uses sock aid)  Ambulatory Assistance: Needs assistance (Uses FWW and Rollator for distances)  Transfers: Assistive device  Gait: Assistive device     ADL:  ADL Comments:  (MIN A to fully manage socks and to thread BLE to don brief. Typically uses AE to assist with LB dressing.)  Activity Tolerance:  Endurance: Endurance does not limit participation in activity  Bed Mobility/Transfers: Bed Mobility  Bed Mobility: Yes  Bed Mobility 1  Bed Mobility 1: Supine to sitting  Level of Assistance 1: Minimum assistance  Bed Mobility Comments 1: HOB raised   and Transfers  Transfer: Yes  Transfer 1  Transfer From 1: Sit to  Transfer to 1: Stand  Transfer Device 1: Walker  Transfer Level of Assistance 1: Minimum assistance  Trials/Comments 1:  (Pt completed sit<>stand 2x. Cues for hand placement and slight retropulsion observed initially.)  Transfers 2  Trials/Comments 2:  (Pt able to take a few steps using FWW with CGA to transfer to chair. Ambulation distance limited by ICU lines.)       Vision: Vision - Basic Assessment  Current Vision: No visual deficits  Sensation:  Light Touch: No apparent deficits  Strength:  Strength Comments:  (BUE strength WFL)  Perception:  Inattention/Neglect: Appears intact  Coordination:  Movements are Fluid and Coordinated: Yes       Outcome Measures: [facility] Daily Activity  Putting on and taking off regular lower body clothing: A little  Bathing (including washing, rinsing, drying): A  little  Putting on and taking off regular upper body clothing: None  Toileting, which includes using toilet, bedpan or urinal: A little  Taking care of personal grooming such as brushing teeth: None  Eating Meals: None  Daily Activity - Total Score: 21      Education Documentation  Body Mechanics, taught by Milagro Correa OT at 10/20/2023 12:25 PM.  Learner: Family, Patient  Readiness: Acceptance  Method: Explanation  Response: Verbalizes Understanding    ADL Training, taught by Milagro Correa OT at 10/20/2023 12:25 PM.  Learner: Family, Patient  Readiness: Acceptance  Method: Explanation  Response: Verbalizes Understanding    Education Comments  No comments found.      Goals:   Encounter Problems       Encounter Problems (Active)       ADLs       Patient with complete lower body dressing with modified independent level of assistance donning and doffing all LE clothes  with PRN adaptive equipment.       Start:  10/20/23    Expected End:  11/03/23            Patient will complete toileting including hygiene clothing management/hygiene with supervision.       Start:  10/20/23    Expected End:  11/03/23               EXERCISE/STRENGTHENING       Pt will increase endurance to tolerate 15min of activity with no more than 1 rest break in order to increase ability to engage in ADL completion.        Start:  10/20/23    Expected End:  11/03/23               MOBILITY       Pt will demo increased functional mobility to tolerate tasks necessary to complete ADL routine with Sup.        Start:  10/20/23    Expected End:  11/03/23

## 2023-10-20 NOTE — ED PROVIDER NOTES
HPI   Chief Complaint   Patient presents with    Dizziness    Changes in vision       Is a 72-year-old female coming in for sudden onset dizziness and visual disturbances at 7:15 PM.      History provided by:  Patient and relative                      [place] Coma Scale Score: 15         NIH Stroke Scale: 3          Patient History   History reviewed. No pertinent past medical history.  Past Surgical History:   Procedure Laterality Date    MR NECK ANGIO W AND WO IV CONTRAST  1/14/2022    MR NECK ANGIO W AND WO IV CONTRAST 1/14/2022 [facility]     No family history on file.  Social History     Tobacco Use    Smoking status: Never    Smokeless tobacco: Never   Substance Use Topics    Alcohol use: Not on file    Drug use: Never       Physical Exam   ED Triage Vitals [10/19/23 2012]   Temp Heart Rate Resp BP   36 °C (96.8 °F) 94 18 163/64      SpO2 Temp src Heart Rate Source Patient Position   97 % -- Monitor Lying      BP Location FiO2 (%)     -- --       Physical Exam    ED Course & MDM   ED Course as of 10/19/23 2246   Thu Oct 19, 2023   2120 Spoke to radiology nothing acute on noncon CT [HD]   2121 High grade stenosis of bilateral vertebral arteries   No LVO [HD]   2140 Dr. Gomez if disabling recommend TNK  [HD]   2144 Patient believes symptoms are disabling. Long risk benefit conversation with patient and patient is amendable [HD]   2150 TNK at 2150 [HD]   2207 R facial droop getting worse [HD]   2211 CTH re-ordered [HD]      ED Course User Index  [HD] Stephany Abdalla DO         Diagnoses as of 10/19/23 2246   Dizziness   Cerebrovascular accident (CVA) due to occlusion of precerebral artery (CMS/HCC)       Medical Decision Making  History/Exam limitations: none.   Additional history was obtained from patient  And daughter.    HPI:       Elsa Biggs is a 72 y.o. with a history of aortic stenois and denies history of stroke.       Last Known Well Time: 1915         ------------------------------------------------------------------------------------------------------------------------------------------     Physical Exam:    ED Triage Vitals [10/19/23 2012]  Temp: 36 °C (96.8 °F)  Heart Rate: 94  Resp: 18  BP: 163/64  SpO2: 97 %  Temp src: n/a  Heart Rate Source: Monitor  Patient Position: Lying  BP Location: n/a  FiO2 (%): n/a     Gen: Not in acute distress  Head/Neck: NCAT  Eyes: Anicteric sclerae, noninjected conjunctivae  Nose: No rhinorrhea  Mouth:  MMM  Heart: Regular rate and rhythm w/ no murmurs, rubs, gallops  Lungs: CTAB w/o wheezes, rales, rhonchi, no increased work of breathing  Abdomen: Soft, NT/ND  Musculoskeletal: No deformities  Extremities: No edema.  Neurologic: Please see below for NIHSS.  Patient shows ataxia with abnormal finger-to-nose and has vertical nystagmus  Skin: No rashes noted  Psychological: Calm        Interval: Baseline  Time: 8:48 PM  Person Administering Scale: Tyler Devine PA-C     1a  Level of consciousness: 0=alert; keenly responsive  1b. LOC questions:  0=Performs both tasks correctly  1c. LOC commands: 0=Performs both tasks correctly  2.  Best Gaze: 1=partial gaze palsy  3. Visual: 0=No visual loss  4. Facial Palsy: 0=Normal symmetric movement  5a. Motor left arm: 0=No drift, limb holds 90 (or 45) degrees for full 10 seconds  5b.  Motor right arm: 0=No drift, limb holds 90 (or 45) degrees for full 10 seconds  6a. motor left le=No drift, limb holds 90 (or 45) degrees for full 10 seconds  6b  Motor right le=No drift, limb holds 90 (or 45) degrees for full 10 seconds  7. Limb Ataxia: 2=Present in two limbs  8.  Sensory: 0=Normal; no sensory loss  9. Best Language:  0=No aphasia, normal  10. Dysarthria: 0=Normal  11. Extinction and Inattention: 0=No abnormality    Total:   3        VAN: VAN: Negative      ------------------------------------------------------------------------------------------------------------------------------------------     Medical Decision Making: Is a 72-year-old female coming in for severe dizziness and visual disturbances that started at 1915.  Patient reports she is never had symptoms like this before in the past.  She is also noted to have a right-sided downward eye gaze.  Daughter who is at bedside states that this is not a normal appearance for her mother.  Patient does have severe ataxia on finger-nose.  She is unable to complete this well however has no other focal neurological deficits besides ataxia on ambulation.  She did not have any arm or leg drift or weakness.  Due to this stroke alert was paged and a CT was completed.  CT results were read to attending via radiology.  After discussion with both daughter and patient about receiving TNK they elected to receive the medication for potential improvement and agreed with the risk for possible life-threatening bleed or deterioration.  TNK was given and patient appeared to have worsening right-sided facial droop to the nurse.  A repeat CT was completed and there was no bleed.  Patient continues to have an NIH of 3.  She will be hospitalized in the intensive care unit for further evaluation and care. Please see further notes from attending.  Patient will continue to be monitored down here in the emergency room until transferred to the intensive care unit.  She does not have any other concerning symptoms and appears stable down here in the emergency room post TNK.            Independent Interpretation of Studies: I independently interpreted the CT head and see No obvious evidence of intracranial hemorrhage    Chronic Medical Conditions Significantly Affecting Care: unknown    Social Determinants of Health Significantly Affecting Care: none     External Records Reviewed: I reviewed recent and relevant outside records including: none      Discussion of Management with Other Providers:   I discussed the patient/results with: [facility] neurology and the admitting team      IV Thrombolysis:    Yes administration time see nursing note [unfilled]Were there delays to thrombolysis administration?: no    Procedure  [unfilled]              Procedure  Procedures     Tyler Devine PA-C  10/19/23 1375

## 2023-10-20 NOTE — ED TRIAGE NOTES
"Pt presented to the ED with c/o dizziness and changes in vision. Pt states this started a few hours ago when she was walking in her home. Describes changes in vision as double and \"like a line across my vision\". Pt has a hx of vertigo but not like this before, pt feels off balanced. No N/V at this time or HA. Vision is 20/20 on visual acuity test. No other complaints at this time.  " Pt has not had a flu shot. She recently moved to here

## 2023-10-20 NOTE — PROGRESS NOTES
10/20/23 1506   Discharge Planning   Living Arrangements Spouse/significant other   Support Systems Spouse/significant other;Family members;Friends/neighbors   Assistance Needed Patient is from home with her , A&O X3, on room air at baseline, is independent with ADLs, drives and uses a walker for ambulation.   Type of Residence Private residence   Number of Stairs to Enter Residence 1   Who is requesting discharge planning? Provider   Home or Post Acute Services None   Patient expects to be discharged to: Home no needs   Does the patient need discharge transport arranged? No   Patient Choice   Patient / Family choosing to utilize agency / facility established prior to hospitalization No

## 2023-10-21 LAB
ALBUMIN SERPL BCP-MCNC: 3.1 G/DL (ref 3.4–5)
ANION GAP SERPL CALC-SCNC: 11 MMOL/L (ref 10–20)
BUN SERPL-MCNC: 15 MG/DL (ref 6–23)
CALCIUM SERPL-MCNC: 8.5 MG/DL (ref 8.6–10.3)
CHLORIDE SERPL-SCNC: 103 MMOL/L (ref 98–107)
CO2 SERPL-SCNC: 28 MMOL/L (ref 21–32)
CREAT SERPL-MCNC: 0.64 MG/DL (ref 0.5–1.05)
ERYTHROCYTE [DISTWIDTH] IN BLOOD BY AUTOMATED COUNT: 14.7 % (ref 11.5–14.5)
GFR SERPL CREATININE-BSD FRML MDRD: >90 ML/MIN/1.73M*2
GLUCOSE BLD MANUAL STRIP-MCNC: 154 MG/DL (ref 74–99)
GLUCOSE BLD MANUAL STRIP-MCNC: 186 MG/DL (ref 74–99)
GLUCOSE BLD MANUAL STRIP-MCNC: 207 MG/DL (ref 74–99)
GLUCOSE SERPL-MCNC: 170 MG/DL (ref 74–99)
HCT VFR BLD AUTO: 39.5 % (ref 36–46)
HGB BLD-MCNC: 12.1 G/DL (ref 12–16)
MCH RBC QN AUTO: 26.3 PG (ref 26–34)
MCHC RBC AUTO-ENTMCNC: 30.6 G/DL (ref 32–36)
MCV RBC AUTO: 86 FL (ref 80–100)
NRBC BLD-RTO: 0 /100 WBCS (ref 0–0)
PHOSPHATE SERPL-MCNC: 3.8 MG/DL (ref 2.5–4.9)
PLATELET # BLD AUTO: 291 X10*3/UL (ref 150–450)
PMV BLD AUTO: 10.4 FL (ref 7.5–11.5)
POTASSIUM SERPL-SCNC: 3.9 MMOL/L (ref 3.5–5.3)
RBC # BLD AUTO: 4.6 X10*6/UL (ref 4–5.2)
SODIUM SERPL-SCNC: 138 MMOL/L (ref 136–145)
WBC # BLD AUTO: 7.2 X10*3/UL (ref 4.4–11.3)

## 2023-10-21 PROCEDURE — 85027 COMPLETE CBC AUTOMATED: CPT

## 2023-10-21 PROCEDURE — 99223 1ST HOSP IP/OBS HIGH 75: CPT | Performed by: NURSE PRACTITIONER

## 2023-10-21 PROCEDURE — 80069 RENAL FUNCTION PANEL: CPT

## 2023-10-21 PROCEDURE — 2500000001 HC RX 250 WO HCPCS SELF ADMINISTERED DRUGS (ALT 637 FOR MEDICARE OP)

## 2023-10-21 PROCEDURE — 96372 THER/PROPH/DIAG INJ SC/IM: CPT

## 2023-10-21 PROCEDURE — 82947 ASSAY GLUCOSE BLOOD QUANT: CPT

## 2023-10-21 PROCEDURE — 2500000004 HC RX 250 GENERAL PHARMACY W/ HCPCS (ALT 636 FOR OP/ED)

## 2023-10-21 PROCEDURE — 99233 SBSQ HOSP IP/OBS HIGH 50: CPT | Performed by: PSYCHIATRY & NEUROLOGY

## 2023-10-21 PROCEDURE — 1200000002 HC GENERAL ROOM WITH TELEMETRY DAILY

## 2023-10-21 PROCEDURE — 36415 COLL VENOUS BLD VENIPUNCTURE: CPT

## 2023-10-21 PROCEDURE — 2500000002 HC RX 250 W HCPCS SELF ADMINISTERED DRUGS (ALT 637 FOR MEDICARE OP, ALT 636 FOR OP/ED)

## 2023-10-21 RX ORDER — METOPROLOL TARTRATE 25 MG/1
25 TABLET, FILM COATED ORAL 2 TIMES DAILY
Status: DISCONTINUED | OUTPATIENT
Start: 2023-10-21 | End: 2023-10-22

## 2023-10-21 RX ADMIN — INSULIN LISPRO 4 UNITS: 100 INJECTION, SOLUTION INTRAVENOUS; SUBCUTANEOUS at 11:42

## 2023-10-21 RX ADMIN — INSULIN LISPRO 2 UNITS: 100 INJECTION, SOLUTION INTRAVENOUS; SUBCUTANEOUS at 08:05

## 2023-10-21 RX ADMIN — PAROXETINE 20 MG: 20 TABLET, FILM COATED ORAL at 21:18

## 2023-10-21 RX ADMIN — Medication 21 PERCENT: at 13:23

## 2023-10-21 RX ADMIN — METOPROLOL TARTRATE 25 MG: 25 TABLET, FILM COATED ORAL at 21:18

## 2023-10-21 RX ADMIN — METOPROLOL TARTRATE 25 MG: 25 TABLET, FILM COATED ORAL at 11:42

## 2023-10-21 RX ADMIN — INSULIN LISPRO 2 UNITS: 100 INJECTION, SOLUTION INTRAVENOUS; SUBCUTANEOUS at 17:27

## 2023-10-21 RX ADMIN — ATORVASTATIN CALCIUM 80 MG: 80 TABLET, FILM COATED ORAL at 21:18

## 2023-10-21 RX ADMIN — ENOXAPARIN SODIUM 40 MG: 40 INJECTION SUBCUTANEOUS at 21:18

## 2023-10-21 ASSESSMENT — PAIN SCALES - GENERAL
PAINLEVEL_OUTOF10: 0 - NO PAIN
PAINLEVEL_OUTOF10: 0 - NO PAIN

## 2023-10-21 ASSESSMENT — PAIN - FUNCTIONAL ASSESSMENT
PAIN_FUNCTIONAL_ASSESSMENT: 0-10
PAIN_FUNCTIONAL_ASSESSMENT: 0-10

## 2023-10-21 NOTE — PROGRESS NOTES
"Elsa Biggs is a 72 y.o. female on day 2 of admission presenting with Dizziness.    Subjective   Patient has no complaints.  She is s/p treatment of stroke symptoms, including diplopia, dizziness, and ataxic gait, and received TNK IV, now in the ICU for monitoring.       Objective     Last Recorded Vitals  Blood pressure (!) 155/96, pulse 83, temperature 36.3 °C (97.3 °F), resp. rate 18, height 1.6 m (5' 3\"), weight 116 kg (255 lb 11.7 oz), SpO2 97 %.    Physical Exam  Neurological Exam  Normal speech, eye movements, upper limbs have normal finger to nose testing.  Relevant Results    NIH Stroke Scale  1A. Level of Consciousness: Alert, Keenly Responsive  1B. Ask Month and Age: Both Questions Right  1C. Blink Eyes & Squeeze Hands: Performs Both Tasks  2. Best Gaze: Normal  3. Visual: No Visual Loss  4. Facial Palsy: Normal Symmetrical Movements  5A. Motor - Left Arm: No Drift  5B. Motor - Right Arm: No Drift  6A. Motor - Left Leg: No Drift  6B. Motor - Right Leg: No Drift  7. Limb Ataxia: Absent  8. Sensory Loss: Normal  9. Best Language: No Aphasia  10. Dysarthria: Normal  11. Extinction and Inattention: No Abnormality  NIH Stroke Scale: 0           Haile Coma Scale  Best Eye Response: Spontaneous  Best Verbal Response: Oriented  Best Motor Response: Follows commands  [place] Coma Scale Score: 15                   MR brain wo IV contrast    Result Date: 10/20/2023  Interpreted By:  Pj Cotto, STUDY: MR BRAIN WO IV CONTRAST;  10/20/2023 1:11 pm   INDICATION: Signs/Symptoms:stroke s/p TNK.   COMPARISON: Head CT, same day and 10/19/2023 and head and neck CTA, 10/19/2023   ACCESSION NUMBER(S): [id number]   ORDERING CLINICIAN: JONEL BEAUCHAMP   TECHNIQUE: Axial T2, FLAIR, DWI, gradient echo T2 and sagittal and coronal T1 weighted images of the brain were acquired.  Image quality is somewhat degraded by motion.   FINDINGS: CSF Spaces: The ventricles, sulci and basal cisterns are appropriate for the degree of " volume loss. No abnormal extra-axial collection.   Parenchyma: There is restricted diffusion and T2 hyperintense signal in the medial left thalamus, no other diffusion signal abnormality. Generalized parenchymal volume loss is unchanged. Additional nonspecific T2 hyperintense signal in the white matter is likely secondary to chronic small vessel ischemic disease. A chronic lacunar infarct in the right putamen, similar to previous. No evidence of intracranial hemorrhage. There is no mass effect or midline shift.   Paranasal Sinuses and Mastoids: Unremarkable.       Acute/subacute lacunar infarct in the left thalamus, no evidence of hemorrhagic transformation.   MACRO: None   Signed by: Pj Cotto 10/20/2023 1:21 PM Dictation workstation:   [id number]    Transthoracic Echo (TTE) Complete    Result Date: 10/20/2023   [facility], [address] [place] [address]               Tel [phone] and Fax [phone] TRANSTHORACIC ECHOCARDIOGRAM REPORT  Patient Name:      RAFAL PFEIFFER        Reading Physician:    [id number] Virgil Childress MD Study Date:        10/20/2023           Ordering Provider:    [id number] CHEYANNE LOWE MRN/PID:           [id number]             Fellow: Accession#:        [id number]         Nurse:                Felicia Chakraborty RN Date of Birth/Age: 1950 / 72 years Sonographer:          Milagro Cueto RDCS Gender:            F                    Additional Staff: Height:            160.02 cm            Admit Date:           10/19/2023 Weight:            117.03 kg            Admission Status:     Inpatient -                                                               Routine BSA:               2.15 m2              Encounter#:           [id number]                                          Department Location:  [facility] ICU Blood Pressure: 136 /87 mmHg Study Type:    TRANSTHORACIC ECHO (TTE) COMPLETE Diagnosis/ICD: Cerebral Infarction, unspecified-I63.9 Indication:    Cerebrovascular Accident CPT Code:      Echo Complete w Full Doppler-[id number] Patient History: Diabetes:          Yes Pertinent History: Hyperlipidemia. right sided facial droop, s/p TNK. Study Detail: The following Echo studies were performed: 2D, M-Mode, Doppler and               color flow. Technically challenging study due to poor acoustic               windows. Definity used as a contrast agent for endocardial border               definition and agitated saline used as a contrast agent for               intraseptal flow evaluation. Total contrast used for this               procedure was 1 mL via IV push.  PHYSICIAN INTERPRETATION: Left Ventricle: The left ventricular systolic function is mildly decreased, with an estimated ejection fraction of 45-50%. There are no regional wall motion abnormalities. The left ventricular cavity size is normal. Spectral Doppler shows an impaired relaxation pattern of left ventricular diastolic filling. Left Atrium: The left atrium is mildly dilated. Right Ventricle: The right ventricle is normal in size. There is normal right ventricular global systolic function. Right Atrium: The right atrium is mildly dilated. Aortic Valve: The aortic valve appears structurally normal. There is no evidence of aortic valve regurgitation. The peak instantaneous gradient of the aortic valve is 8.3 mmHg. The mean gradient of the aortic valve is 4.6 mmHg. Mitral Valve: The mitral valve is normal in structure. There is no evidence of mitral valve regurgitation. Tricuspid Valve: The tricuspid valve is structurally normal. There is mild tricuspid regurgitation. Pulmonic Valve: The pulmonic valve is not well visualized. The pulmonic valve regurgitation was not well visualized. Pericardium: There is no pericardial effusion noted.  Aorta: The aortic root is normal. Pulmonary Artery: The tricuspid regurgitant velocity is 1.57 m/s, and with an estimated right atrial pressure of 10 mmHg, the estimated pulmonary artery pressure is normal with the RVSP at 19.8 mmHg. Systemic Veins: The inferior vena cava was not well visualized.  CONCLUSIONS:  1. Left ventricular systolic function is mildly decreased with a 45-50% estimated ejection fraction.  2. Spectral Doppler shows an impaired relaxation pattern of left ventricular diastolic filling.  3. There is mild tricuspid regurgitation.  4. Technically difficult bubble study unable to interpret. QUANTITATIVE DATA SUMMARY: 2D MEASUREMENTS:                           Normal Ranges: IVSd:          1.22 cm    (0.6-1.1cm) LVPWd:         1.15 cm    (0.6-1.1cm) LVIDd:         5.04 cm    (3.9-5.9cm) LVIDs:         3.78 cm LV Mass Index: 108.2 g/m2 LV % FS        25.1 % LA VOLUME:                              Normal Ranges: LA Vol A4C:       64.4 ml    (22+/-6mL/m2) LA Vol A2C:       66.6 ml LA Vol BP:        67.0 ml LA Vol Index A4C: 29.9 ml/m2 LA Vol Index A2C: 30.9 ml/m2 LA Vol Index BP:  31.1 ml/m2 LA Volume Index:  31.0 ml/m2 LA Vol A4C:       60.8 ml LA Vol A2C:       65.7 ml RA VOLUME BY A/L METHOD:                       Normal Ranges: RA Area A4C: 12.5 cm2 M-MODE MEASUREMENTS:                  Normal Ranges: Ao Root: 2.90 cm (2.0-3.7cm) LAs:     4.09 cm (2.7-4.0cm) LV SYSTOLIC FUNCTION BY 2D PLANIMETRY (MOD):                     Normal Ranges: EF-A4C View: 47.7 % (>=55%) EF-A2C View: 49.1 % EF-Biplane:  48.5 % LV DIASTOLIC FUNCTION:                        Normal Ranges: MV Peak E:    0.89 m/s (0.7-1.2 m/s) MV Peak A:    1.13 m/s (0.42-0.7 m/s) E/A Ratio:    0.79     (1.0-2.2) MV e'         0.08 m/s (>8.0) MV lateral e' 0.08 m/s MV medial e'  0.05 m/s E/e' Ratio:   11.17    (<8.0) MITRAL VALVE:                 Normal Ranges: MV DT: 256 msec (150-240msec) AORTIC VALVE:                                   Normal  Ranges: AoV Vmax:                1.44 m/s (<=1.7m/s) AoV Peak P.3 mmHg (<20mmHg) AoV Mean P.6 mmHg (1.7-11.5mmHg) LVOT Max Dread:            0.70 m/s (<=1.1m/s) AoV VTI:                 32.57 cm (18-25cm) LVOT VTI:                17.43 cm LVOT Diameter:           1.96 cm  (1.8-2.4cm) AoV Area, VTI:           1.62 cm2 (2.5-5.5cm2) AoV Area,Vmax:           1.47 cm2 (2.5-4.5cm2) AoV Dimensionless Index: 0.54  RIGHT VENTRICLE: RV s' 0.17 m/s TRICUSPID VALVE/RVSP:                             Normal Ranges: Peak TR Velocity: 1.57 m/s RV Syst Pressure: 19.8 mmHg (< 30mmHg) PULMONIC VALVE:                      Normal Ranges: PV Max Dread: 1.0 m/s  (0.6-0.9m/s) PV Max P.1 mmHg  [id number] Virgil Childress MD Electronically signed on 10/20/2023 at 11:51:15 AM  ** Final **     CT head wo IV contrast    Result Date: 10/20/2023  Interpreted By:  Scott Hightower, STUDY: CT HEAD WO IV CONTRAST;  10/20/2023 3:15 am   INDICATION: Signs/Symptoms:Worseing slurred speech, s/p TNK.   COMPARISON: 10/19/2023   ACCESSION NUMBER(S): [id number]   ORDERING CLINICIAN: PATTIE RUIZ   TECHNIQUE: Axial noncontrast CT images of the head. Sagittal and coronal reformats were provided.   FINDINGS: BRAIN: No acute intracranial hemorrhage. No mass effect or midline shift. Gray-white matter interfaces are preserved.Patchy white matter hypodensities which are nonspecific but likely related to microangiopathic white matter changes. Remote right basal ganglia lacunar infarct. VENTRICLES and EXTRA-AXIAL SPACES: Prominent ventricles and extra-axial CSF spaces, reflecting parenchymal volume loss. EXTRACRANIAL SOFT TISSUES:  Within normal limits. PARANASAL SINUSES/MASTOIDS: The visualized paranasal sinuses and mastoid air cells are aerated. BONES AND ORBITS: No displaced skull fracture. No suspicious osseous lesion.  Orbits are within normal limits. OTHER FINDINGS: None.       1. No acute intracranial hemorrhage or mass  effect.   Signed by: Scott Hightower 10/20/2023 3:43 AM Dictation workstation:   [id number]    CT head wo IV contrast    Result Date: 10/19/2023  Interpreted By:  Christiano Washburn, STUDY: CT HEAD WO IV CONTRAST;  10/19/2023 10:19 pm   INDICATION: Worsening right-sided facial droop.   COMPARISON: CT head without contrast 10/19/2023 at 9:04 p.m.   ACCESSION NUMBER(S): [id number]   ORDERING CLINICIAN: STACEY ESCOBEDO   TECHNIQUE: Noncontrast axial CT images of head were obtained with coronal and sagittal reconstructed images.   FINDINGS: BRAIN PARENCHYMA: There is redemonstration of a hypodensity in the right basal ganglia and subinsular white matter representing age-indeterminate lacunar infarct. There is unchanged moderate periventricular chronic microvascular ischemic change in mild pontine chronic microvascular ischemic change. No acute intraparenchymal hemorrhage. There is no parenchymal evidence of an acute large territory ischemic infarct at this time. There is no mass effect.   VENTRICLES and EXTRA-AXIAL SPACES: There is a small amount of residual intravascular contrast from previous CTA. No acute extra-axial or intraventricular hemorrhage. No effacement of cerebral sulci. Ventricles and sulci are age-concordant. Severe intracranial vascular calcifications are again noted.   PARANASAL SINUSES/MASTOIDS:  No hemorrhage or air-fluid levels within the visualized paranasal sinuses. The mastoids are well aerated.   CALVARIUM/ORBITS:  No skull fracture.  The orbits and globes are intact to the extent visualized.   EXTRACRANIAL SOFT TISSUES: No discernible abnormality.       1. No parenchymal evidence of new or progressing large territory ischemic infarct. There is an unchanged age indeterminate lacunar infarct in the right basal ganglia and subinsular white matter. As previously recommended, MRI would better evaluate the acuity of this lesion in any other potential acute infarct responsible for patient's  symptoms.   2. No acute intracranial hemorrhage.     MACRO: None.   Signed by: Christiano Washbrun 10/19/2023 10:31 PM Dictation workstation:   [id number]    CT angio brain attack head w IV contrast and post procedure    Result Date: 10/19/2023  Interpreted By:  Shaunna Masters, STUDY: CT ANGIO BRAIN ATTACK HEAD W IV CONTRAST AND POST PROCEDURE; CT ANGIO BRAIN ATTACK NECK W IV CONTRAST AND POST PROCEDURE;  10/19/2023 9:09 pm   INDICATION: Signs/Symptoms:Stroke Evaluation with VAN Positive. Dizziness, dysarthria and vision changes.   COMPARISON: CT head without 10/19/2023   ACCESSION NUMBER(S): [id number]; [id number]   ORDERING CLINICIAN: STACEY ESCOBEDO   TECHNIQUE: 90 mL Omnipaque 350 was administered intravenously and axial images of the head and neck were acquired.  Coronal, sagittal, and 3-D reconstructions were provided for review.   FINDINGS: Low-attenuation nodule in the left lobe of the thyroid measuring up to 1.5 cm. Indeterminate partially imaged mediastinal lymph node measuring up to 10 mm.   CTA HEAD FINDINGS:   Anterior circulation: Dense atherosclerotic calcifications within the carotid siphons with moderate to severe a multifocal stenosis. Moderate narrowing within the proximal left M1 segment and appearance of focal high-grade stenosis within the proximal left superior division of the M2 MCA. The bilateral carotid terminals, bilateral proximal anterior and middle cerebral arteries are normal.   Posterior circulation: Marked coarse calcifications with multifocal severe stenosis in the bilateral vertebral arteries. Fetal type origin of the left PCA. Otherwise the vertebrobasilar junction, basilar artery and proximal posterior cerebral arteries are normal.   CTA NECK FINDINGS:   Direct origin of the left vertebral artery.   Right carotid vessels: Atherosclerotic calcifications involving the origin of the right carotid artery. Moderate narrowing of the carotid bulb with dense atherosclerotic  calcifications. Atherosclerotic changes extend into the proximal left cervical ICA with severe stenosis, approximately 85 % by NASCET criteria.   Left carotid vessels: The common carotid artery is normal. The carotid bifurcation is normal. Atherosclerotic calcifications of the proximal left cervical ICA with up to 57% stenosis by NASCET criteria.   Vertebral vessels:  The visualized segments of the cervical vertebral arteries are normal in caliber.         High-grade stenosis of the right cervical ICA up to 85% with moderate stenosis of the right carotid bifurcation.   Atherosclerotic calcifications of the bilateral carotid siphons with appearance of moderate to severe stenosis.   Atherosclerotic calcifications of the V4 segment/intradural vertebral arteries with high-grade stenosis.   Moderate narrowing of the left cervical ICA up to 57%.   Foci of moderate to severe focal stenosis within left M1 and M2 branches   MACRO: Shaunna Masters discussed the significance and urgency of this critical finding by telephone with  Dr. Abdalla on 10/19/2023 at 9:46 pm.  (**-RCF-**) Findings:  See findings.     Signed by: Shaunna Masters 10/19/2023 9:47 PM Dictation workstation:   [id number]    CT angio brain attack neck w IV contrast and post procedure    Result Date: 10/19/2023  Interpreted By:  Shaunna Masters, STUDY: CT ANGIO BRAIN ATTACK HEAD W IV CONTRAST AND POST PROCEDURE; CT ANGIO BRAIN ATTACK NECK W IV CONTRAST AND POST PROCEDURE;  10/19/2023 9:09 pm   INDICATION: Signs/Symptoms:Stroke Evaluation with VAN Positive. Dizziness, dysarthria and vision changes.   COMPARISON: CT head without 10/19/2023   ACCESSION NUMBER(S): [id number]; [id number]   ORDERING CLINICIAN: STACEY ESCOBEDO   TECHNIQUE: 90 mL Omnipaque 350 was administered intravenously and axial images of the head and neck were acquired.  Coronal, sagittal, and 3-D reconstructions were provided for review.   FINDINGS: Low-attenuation nodule in the left lobe of  the thyroid measuring up to 1.5 cm. Indeterminate partially imaged mediastinal lymph node measuring up to 10 mm.   CTA HEAD FINDINGS:   Anterior circulation: Dense atherosclerotic calcifications within the carotid siphons with moderate to severe a multifocal stenosis. Moderate narrowing within the proximal left M1 segment and appearance of focal high-grade stenosis within the proximal left superior division of the M2 MCA. The bilateral carotid terminals, bilateral proximal anterior and middle cerebral arteries are normal.   Posterior circulation: Marked coarse calcifications with multifocal severe stenosis in the bilateral vertebral arteries. Fetal type origin of the left PCA. Otherwise the vertebrobasilar junction, basilar artery and proximal posterior cerebral arteries are normal.   CTA NECK FINDINGS:   Direct origin of the left vertebral artery.   Right carotid vessels: Atherosclerotic calcifications involving the origin of the right carotid artery. Moderate narrowing of the carotid bulb with dense atherosclerotic calcifications. Atherosclerotic changes extend into the proximal left cervical ICA with severe stenosis, approximately 85 % by NASCET criteria.   Left carotid vessels: The common carotid artery is normal. The carotid bifurcation is normal. Atherosclerotic calcifications of the proximal left cervical ICA with up to 57% stenosis by NASCET criteria.   Vertebral vessels:  The visualized segments of the cervical vertebral arteries are normal in caliber.         High-grade stenosis of the right cervical ICA up to 85% with moderate stenosis of the right carotid bifurcation.   Atherosclerotic calcifications of the bilateral carotid siphons with appearance of moderate to severe stenosis.   Atherosclerotic calcifications of the V4 segment/intradural vertebral arteries with high-grade stenosis.   Moderate narrowing of the left cervical ICA up to 57%.   Foci of moderate to severe focal stenosis within left M1 and  M2 branches   MACRO: Shaunna Masters discussed the significance and urgency of this critical finding by telephone with  Dr. Abdalla on 10/19/2023 at 9:46 pm.  (**-RCF-**) Findings:  See findings.     Signed by: Shaunna Masters 10/19/2023 9:47 PM Dictation workstation:   [id number]    CT brain attack head wo IV contrast    Result Date: 10/19/2023  Interpreted By:  Shaunna Masters, STUDY: CT BRAIN ATTACK HEAD WO IV CONTRAST;  10/19/2023 9:05 pm   INDICATION: Signs/Symptoms:Stroke Evaluation.   COMPARISON: None.   ACCESSION NUMBER(S): [id number]   ORDERING CLINICIAN: STACEY ESCOBEDO   TECHNIQUE: Axial noncontrast CT images of the head.   FINDINGS: BRAIN PARENCHYMA: Mild-to-moderate non-specific white matter changes and generalized parenchymal volume loss. Nonspecific low-attenuation focus in the right basal ganglia and possibly sequela of remote lacunar infarct. No mass effect or midline shift. Atherosclerotic calcifications of the vertebral and carotid arteries noted.   HEMORRHAGE: No acute intracranial hemorrhage. VENTRICLES and EXTRA-AXIAL SPACES: Normal size. EXTRACRANIAL SOFT TISSUES: Within normal limits. PARANASAL SINUSES/MASTOIDS: The visualized paranasal sinuses and mastoid air cells are aerated. CALVARIUM: No depressed skull fracture. No destructive osseous lesion.   OTHER FINDINGS: None.       No acute intracranial hemorrhage or mass effect.   Age indeterminate nonspecific white matter changes with probable remote right lacunar infarct however given lack of prior head CT imaging evaluation for chronicity is limited. MRI may be considered as clinically indicated for further evaluation of smaller hyperacute ischemic infarcts.   MACRO: Shaunna Masters discussed the significance and urgency of this critical finding by telephone with  Dr. Abdalla on 10/19/2023 at 9:22 pm.  (**-RCF-**) Findings:  See findings.   Signed by: Shaunna Masters 10/19/2023 9:24 PM Dictation workstation:   [id number]                      Assessment/Plan   This patient currently has cardiac telemetry ordered; if you would like to modify or discontinue the telemetry order, click here to go to the orders activity to modify/discontinue the order.  Principal Problem:    Dizziness    Small medial left thalamic ischemic stroke, most likely small vessel type.  Good prognosis.  Continue current medication, to include DAPT and a statin, discharge any time, follow up with Zoya Alvarez PA-C or myself in the [place] office.         I spent 25 minutes in the professional and overall care of this patient.      Isaiah Jarrell MD

## 2023-10-21 NOTE — PROGRESS NOTES
"Elsa Biggs is a 72 y.o. female on day 2 of admission presenting with Dizziness.      Subjective   Ms. Biggs was seen sitting up in her chair this morning in no acute distress with no events overnight.  She states that she feels \"basically back to normal\", and that the blurry vision she endorsed yesterday has resolved.  She denies any headaches, lightheadedness, dizziness, chest pain, shortness of breath, nausea, vomiting, or abdominal pain this morning.    Objective     Last Recorded Vitals  BP (!) 155/96   Pulse 83   Temp 36.3 °C (97.3 °F)   Resp 18   Wt 116 kg (255 lb 11.7 oz)   SpO2 97%   Intake/Output last 3 Shifts:    Intake/Output Summary (Last 24 hours) at 10/21/2023 1417  Last data filed at 10/21/2023 0809  Gross per 24 hour   Intake 240 ml   Output --   Net 240 ml       Admission Weight  Weight: 116 kg (255 lb) (10/19/23 2012)    Daily Weight  10/21/23 : 116 kg (255 lb 11.7 oz)    Image Results  MR brain wo IV contrast    Result Date: 10/20/2023  Acute/subacute lacunar infarct in the left thalamus, no evidence of hemorrhagic transformation.       Transthoracic Echo (TTE) Complete    Result Date: 10/20/2023  CONCLUSIONS:  1. Left ventricular systolic function is mildly decreased with a 45-50% estimated ejection fraction.  2. Spectral Doppler shows an impaired relaxation pattern of left ventricular diastolic filling.  3. There is mild tricuspid regurgitation.  4. Technically difficult bubble study unable to interpret.     Physical Exam  Constitutional:       General: Sitting up comfortably in chair.     Appearance: Normal appearance.   HENT:      Head: Normocephalic and atraumatic.   Eyes:      Extraocular Movements: Extraocular movements intact.      Conjunctiva/sclera: Conjunctivae normal.      Pupils: Pupils are equal, round, and reactive to light.      Comments: No visual field deficits   Cardiovascular:      Rate and Rhythm: Normal rate and regular rhythm.      Heart sounds: No murmur heard.     " No friction rub. No gallop.   Pulmonary:      Effort: Pulmonary effort is normal. No respiratory distress.      Breath sounds: Normal breath sounds. No stridor. No wheezing.   Abdominal:      General: Abdomen is flat. There is no distension.      Palpations: Abdomen is soft.      Tenderness: There is no abdominal tenderness.   Musculoskeletal:         General: Normal range of motion.      Comments: 5/5 strength in upper extremities bilaterally   Neurological:      General: No focal deficit present.      Mental Status: She is alert and oriented to person, place, and time.      Cranial Nerves: No cranial nerve deficit.      Motor: No weakness.      Comments: Finger-to-nose normal   CN II-XII intact    Relevant Results  Scheduled medications  aspirin, 81 mg, oral, Daily  atorvastatin, 80 mg, oral, Nightly  enoxaparin, 40 mg, subcutaneous, Daily  insulin lispro, 0-10 Units, subcutaneous, TID with meals  metoprolol tartrate, 25 mg, oral, BID  PARoxetine, 20 mg, oral, Nightly      Continuous medications     PRN medications  PRN medications: acetaminophen, calcium carbonate, dextrose 10 % in water (D10W), dextrose, glucagon, labetaloL, oxygen  Results for orders placed or performed during the hospital encounter of 10/19/23 (from the past 24 hour(s))   POCT GLUCOSE   Result Value Ref Range    POCT Glucose 204 (H) 74 - 99 mg/dL   CBC   Result Value Ref Range    WBC 7.2 4.4 - 11.3 x10*3/uL    nRBC 0.0 0.0 - 0.0 /100 WBCs    RBC 4.60 4.00 - 5.20 x10*6/uL    Hemoglobin 12.1 12.0 - 16.0 g/dL    Hematocrit 39.5 36.0 - 46.0 %    MCV 86 80 - 100 fL    MCH 26.3 26.0 - 34.0 pg    MCHC 30.6 (L) 32.0 - 36.0 g/dL    RDW 14.7 (H) 11.5 - 14.5 %    Platelets 291 150 - 450 x10*3/uL    MPV 10.4 7.5 - 11.5 fL   Renal function panel   Result Value Ref Range    Glucose 170 (H) 74 - 99 mg/dL    Sodium 138 136 - 145 mmol/L    Potassium 3.9 3.5 - 5.3 mmol/L    Chloride 103 98 - 107 mmol/L    Bicarbonate 28 21 - 32 mmol/L    Anion Gap 11 10 - 20  mmol/L    Urea Nitrogen 15 6 - 23 mg/dL    Creatinine 0.64 0.50 - 1.05 mg/dL    eGFR >90 >60 mL/min/1.73m*2    Calcium 8.5 (L) 8.6 - 10.3 mg/dL    Phosphorus 3.8 2.5 - 4.9 mg/dL    Albumin 3.1 (L) 3.4 - 5.0 g/dL   POCT GLUCOSE   Result Value Ref Range    POCT Glucose 186 (H) 74 - 99 mg/dL   POCT GLUCOSE   Result Value Ref Range    POCT Glucose 207 (H) 74 - 99 mg/dL            Assessment/Plan   72-year-old female with a past medical history significant for depression, non-insulin-dependent diabetes mellitus, hyperlipidemia, left total hip arthroplasty [facility] ED with a chief complaint of new onset dizziness and visual disturbance.       Neurology  #Stroke-like symptoms  -NIH stroke scale in the ED: 3  -NIH stroke scale in the ICU: 3  -NIH stroke scale ICU @ 3:30:3   -Last well known: 7:15 pm  -TNK administered in the ED at @ 2150  -Initial CT head: Unremarkable  -CT head post NIHSS increase: Unchanged   -CT angio brain and neck: Demonstrated high-grade stenosis of the right cervical ICA up to 85% with moderate stenosis of the right carotid bifurcation there is moderate narrowing of the left cervical ICA up to 57%  -Place patient on telemetry/continuous cardiac monitoring s/p TNK administration   -Echo: LV Systolic function 45-50%. Impaired relaxation pattern LV diastolic filling. Mild TR. Technically difficult bubble study unable to interpret.  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      Cardiovascular  #Newly Diagnosed HFrEF  #PVCs  -Echo on admission demonstrated LV Systolic function 45-50% with impaired relaxation pattern of LV diastolic filling.  -No prior history of CHF  -Patient does endorse some dyspnea on exertion recently  -Patient has recurrent PVCs every 3-4 beats  -Cardiology consulted:   -Recommend starting Metoprolol 25mg BID   -Close outpatient followup for optimization of HFrEF, and potential ischemia evaluation     Pulmonary  - No active Issues      Gastrointestinal  - No active Issues      Renal/Urology  - No active Issues      Hematology/Oncology  - No active Issues      Infectious Disease  - No active Issues      Endocrine  #non insulin Dependent diabetes mellitus  -She takes metformin 500 mg twice daily at home  -SSI with goal glucose < 180     Musculoskeletal/Skin  - No active Issues      Psychiatric  # Depression  -We will continue Paxil     IVF: PRN   Electrolytes: Replete as needed   Nutrition: Regular diet  GI ppx: None  VTE prophylaxis: Lovenox  Antibiotics: None  Lines/tubes: 1 peripheral IV  Code: Full Code      Disposition: Patient is a 72 y.o. female admitted for the management of TIA/CVA status post TNK @ 2150 10/19.  Patient has completed 24 post-TNK ICU monitoring period, and is stable for transfer to floor. Anticipated LOS < 48 hours.       Billy Zazueta MD

## 2023-10-21 NOTE — CONSULTS
Consults  History Of Present Illness:    Elsa Biggs is a 72 y.o. female presenting with sudden onset dizziness and double vision s/p TNK in the ER.  MRI brain shows acute/subacute lacunar infarct in the left thalamus territory.  CTA brain/neck shows 85% MALATHI and 57% LICA.  Stroke symptoms have resolved since admit.  Denies having chest pain or pressure.  Does feel SOB with exertion that has been mildly progressive over the past 1 year.  Has mild LLE edema s/p knee replacement surgery and no edema in the RLE.  Telemetry reviewed for NSR with PVCs-- denies palpitations.  Has been hypertensive since admit with //124.  Received metoprolol first dose this morning with improved BP as well as reduced PVC burden.       Last Recorded Vitals:  Vitals:    10/21/23 1000 10/21/23 1100 10/21/23 1200 10/21/23 1323   BP: 145/65 (!) 164/124 (!) 155/96    BP Location:       Pulse: 90 81 83    Resp: 24 15 18    Temp:       TempSrc:       SpO2: 94% 95% 94% 97%   Weight:       Height:           Last Labs:  CBC - 10/21/2023:  5:23 AM  7.2 12.1 291    39.5      CMP - 10/21/2023:  5:23 AM  8.5 7.0 9 --- 0.3   3.8 3.1 9 59      PTT - 10/20/2023:  5:22 AM  1.6   17.8 30     Troponin I, High Sensitivity   Date/Time Value Ref Range Status   10/19/2023 09:40 PM 14 (H) 0 - 13 ng/L Final     Troponin I   Date/Time Value Ref Range Status   09/29/2022 08:40 PM 14 (H) 0 - 13 ng/L Final     Comment:     .  Less than 99th percentile of normal range cutoff-  Female and children under 18 years old <14 ng/L; Male <21 ng/L: Negative  Repeat testing should be performed if clinically indicated.   .  Female and children under 18 years old 14-50 ng/L; Male 21-50 ng/L:  Consistent with possible cardiac damage and possible increased clinical   risk. Serial measurements may help to assess extent of myocardial damage.   .  >50 ng/L: Consistent with cardiac damage, increased clinical risk and  myocardial infarction. Serial measurements may help  assess extent of   myocardial damage.   .   NOTE: Children less than 1 year old may have higher baseline troponin   levels and results should be interpreted in conjunction with the overall   clinical context.   .  NOTE: Troponin I testing is performed using a different   testing methodology at [facility] than at other   [facility]. Direct result comparisons should only   be made within the same method.     07/13/2022 05:35 AM 10 0 - 13 ng/L Final     Comment:     .  Less than 99th percentile of normal range cutoff-  Female and children under 18 years old <14 ng/L; Male <21 ng/L: Negative  Repeat testing should be performed if clinically indicated.   .  Female and children under 18 years old 14-50 ng/L; Male 21-50 ng/L:  Consistent with possible cardiac damage and possible increased clinical   risk. Serial measurements may help to assess extent of myocardial damage.   .  >50 ng/L: Consistent with cardiac damage, increased clinical risk and  myocardial infarction. Serial measurements may help assess extent of   myocardial damage.   .   NOTE: Children less than 1 year old may have higher baseline troponin   levels and results should be interpreted in conjunction with the overall   clinical context.   .  NOTE: Troponin I testing is performed using a different   testing methodology at [facility] than at other   [facility]. Direct result comparisons should only   be made within the same method.     07/12/2022 07:00 PM 9 0 - 13 ng/L Final     Comment:     .  Less than 99th percentile of normal range cutoff-  Female and children under 18 years old <14 ng/L; Male <21 ng/L: Negative  Repeat testing should be performed if clinically indicated.   .  Female and children under 18 years old 14-50 ng/L; Male 21-50 ng/L:  Consistent with possible cardiac damage and possible increased clinical   risk. Serial measurements may help to assess extent of myocardial damage.   .  >50 ng/L: Consistent  with cardiac damage, increased clinical risk and  myocardial infarction. Serial measurements may help assess extent of   myocardial damage.   .   NOTE: Children less than 1 year old may have higher baseline troponin   levels and results should be interpreted in conjunction with the overall   clinical context.   .  NOTE: Troponin I testing is performed using a different   testing methodology at [facility] [place] than at other   [facility] hospitals. Direct result comparisons should only   be made within the same method.       BNP   Date/Time Value Ref Range Status   07/12/2022 05:51 PM 39 0 - 99 pg/mL Final     Comment:     .  <100 pg/mL - Heart failure unlikely  100-299 pg/mL - Intermediate probability of acute heart  .               failure exacerbation. Correlate with clinical  .               context and patient history.    >=300 pg/mL - Heart Failure likely. Correlate with clinical  .               context and patient history.  BNP testing is performed using different testing   methodology at [facility] [place] than at other   [facility] hospitals. Direct result comparisons should   only be made within the same method.     01/03/2018 03:00 PM 63 0 - 99 pg/mL Final     Comment:     .  <100 pg/mL - Heart failure unlikely  100-299 pg/mL - Intermediate probability of acute heart  .               failure exacerbation. Correlate with clinical  .               context and patient history.    >=300 pg/mL - Heart Failure likely. Correlate with clinical  .               context and patient history.  BNP testing is performed using different testing   methodology at [facility] [place] than at other   [facility] hospitals. Direct result comparisons should   only be made within the same method.       Hemoglobin A1C   Date/Time Value Ref Range Status   10/20/2023 05:22 AM 7.0 (H) see below % Final   08/02/2018 03:17 AM 6.2 % Final     Comment:          Diagnosis of Diabetes-Adults   Non-Diabetic: < or = 5.6%   Increased  risk for developing diabetes: 5.7-6.4%   Diagnostic of diabetes: > or = 6.5%  .       Monitoring of Diabetes                Age (y)     Therapeutic Goal (%)   Adults:          >18           <7.0   Pediatrics:    13-18           <7.5                   7-12           <8.0                   0- 6            7.5-8.5   American Diabetes Association. Diabetes Care 33(S1), Jan 2010.     08/01/2018 11:52 AM 6.2 % Final     Comment:          Diagnosis of Diabetes-Adults   Non-Diabetic: < or = 5.6%   Increased risk for developing diabetes: 5.7-6.4%   Diagnostic of diabetes: > or = 6.5%  .       Monitoring of Diabetes                Age (y)     Therapeutic Goal (%)   Adults:          >18           <7.0   Pediatrics:    13-18           <7.5                   7-12           <8.0                   0- 6            7.5-8.5   American Diabetes Association. Diabetes Care 33(S1), Jan 2010.       LDL Calculated   Date/Time Value Ref Range Status   10/20/2023 05:22 AM 60 <=99 mg/dL Final     Comment:                                 Near   Borderline      AGE      Desirable  Optimal    High     High     Very High     0-19 Y     0 - 109     ---    110-129   >/= 130     ----    20-24 Y     0 - 119     ---    120-159   >/= 160     ----      >24 Y     0 -  99   100-129  130-159   160-189     >/=190       VLDL   Date/Time Value Ref Range Status   10/20/2023 05:22 AM 13 0 - 40 mg/dL Final      Last I/O:  I/O last 3 completed shifts:  In: 480 (4.1 mL/kg) [P.O.:480]  Out: 200 (1.7 mL/kg) [Urine:200 (0 mL/kg/hr)]  Weight: 116.4 kg     Past Cardiology Tests (Last 3 Years):  EKG:  NSR with PVCs   Echo:  Transthoracic Echo (TTE) Complete 10/20/2023  CONCLUSIONS:   1. Left ventricular systolic function is mildly decreased with a 45-50% estimated ejection fraction.   2. Spectral Doppler shows an impaired relaxation pattern of left ventricular diastolic filling.   3. There is mild tricuspid regurgitation.   4. Technically difficult bubble study unable  to interpret.     Stress Test:  Nuclear Stress Test 2/13/2023  IMPRESSION:  1. Normal stress myocardial perfusion imaging in response to  pharmacologic stress.  2. Mild left ventricular systolic dysfunction on post stress gated  imaging.       Past Medical History:  She has a past medical history of Cancer (CMS/HCC) and Diabetes mellitus (CMS/HCC)., dyslipidemia, depression     Past Surgical History:  She has a past surgical history that includes MR angio neck w and wo IV contrast (01/14/2022); Joint replacement; Fracture surgery; and Breast surgery. Left knee replacement surgery, left STEVO       Social History:  She reports that she has never smoked. She has never used smokeless tobacco. She reports that she does not currently use alcohol after a past usage of about 1.0 standard drink of alcohol per week. She reports that she does not use drugs.    Family History:  No family history on file.     Allergies:  Tetracycline    Inpatient Medications:  Scheduled medications   Medication Dose Route Frequency    aspirin  81 mg oral Daily    atorvastatin  80 mg oral Nightly    enoxaparin  40 mg subcutaneous Daily    insulin lispro  0-10 Units subcutaneous TID with meals    metoprolol tartrate  25 mg oral BID    PARoxetine  20 mg oral Nightly     PRN medications   Medication    acetaminophen    calcium carbonate    dextrose 10 % in water (D10W)    dextrose    glucagon    labetaloL    oxygen     Continuous Medications   Medication Dose Last Rate     Outpatient Medications:  Current Outpatient Medications   Medication Instructions    aspirin 81 mg EC tablet 1 tablet, oral, Daily    blood sugar diagnostic strip before meals and at bedtime. Use as instructed    cholecalciferol (Vitamin D-3) 25 MCG (1000 UT) tablet 1 tablet, oral, Daily    cholecalciferol (VITAMIN D-3) 50,000 Units, oral, Weekly    metFORMIN (GLUCOPHAGE) 500 mg, oral, 2 times daily with meals    PARoxetine (PAXIL) 20 mg, oral, Nightly    pioglitazone (ACTOS) 30  mg, oral, Every 24 hours    pravastatin (Pravachol) 80 mg tablet 1 tablet, oral, Nightly       Physical Exam:  Constitutional: Pleasant, Awake/Alert/Oriented to person place and time. No distress  Head: Atraumatic, Normocephalic  Eyes: EOMI. RIP  Neck: No JVD, +bruit right carotid   Cardiovascular: Regular rate and rhythm, S1, S2. No extra heart sounds or murmurs  Respiratory: Clear to auscultation bilaterally. No wheezing, rales or rhonchi. Good chest wall expansion  Abdomen: Soft, Nontender, Obese. Bowel sounds appreciated  Musculoskeletal: ROM intact. Muscle strength grossly intact upper and lower extremities 5/5.   Neurological: CNII-XII intact. Sensation grossly intact  Extremities: Warm and dry. No acute rashes and lesions, LLE trace edema   Psychiatric: Appropriate mood and affect       Assessment/Plan   Very pleasant 71 yo female from home with h/o depression, dyslipidemia, type 2 DM admitted with acute lacunar infarct in the left thalamus territory.  EF 45-50%.  Tele reviewed for NSR with intermittent PVCs.     Assessment:  Acute lacunar infarct  Hypertension, uncontrolled  Cardiomyopathy-- mildy reduced EF  PVCs  Severe MALATHI stenosis     Plan:  -Agree with initiation of BB therapy-- recommend Toprol XL 50mg daily  -May require additional antihypertensive treatment if BP remains >140/90-- consider starting lisinopril 10mg daily  -Continue low dose aspirin and HI statin therapy  -Defer DAPT recommendations to Neurology   -Will arrange for Cardiology follow up in 2 weeks for reassessment of symptoms +/- further ischemic evaluation as outpatient.   -Recommend Vascular surgery referral for evaluation/treatment recommendations of MALATHI stenosis   -Patient prefers to remain hospitalized for close monitoring x24hrs after initiation of BB therapy.  Will discuss with primary team.   Peripheral IV 10/19/23 20 G Right Antecubital (Active)   Site Assessment Clean;Dry;Intact 10/21/23 0809   Dressing Type Transparent  10/21/23 0809   Dressing Status Clean;Dry;Occlusive 10/21/23 0809   Number of days: 2       Code Status:  Full Code      ANITA Dhillon-CNP

## 2023-10-22 VITALS
HEIGHT: 63 IN | RESPIRATION RATE: 24 BRPM | DIASTOLIC BLOOD PRESSURE: 70 MMHG | HEART RATE: 72 BPM | TEMPERATURE: 97.9 F | BODY MASS INDEX: 46.05 KG/M2 | OXYGEN SATURATION: 98 % | WEIGHT: 259.92 LBS | SYSTOLIC BLOOD PRESSURE: 140 MMHG

## 2023-10-22 LAB
GLUCOSE BLD MANUAL STRIP-MCNC: 181 MG/DL (ref 74–99)
GLUCOSE BLD MANUAL STRIP-MCNC: 188 MG/DL (ref 74–99)

## 2023-10-22 PROCEDURE — 96372 THER/PROPH/DIAG INJ SC/IM: CPT

## 2023-10-22 PROCEDURE — 2500000001 HC RX 250 WO HCPCS SELF ADMINISTERED DRUGS (ALT 637 FOR MEDICARE OP)

## 2023-10-22 PROCEDURE — 2500000002 HC RX 250 W HCPCS SELF ADMINISTERED DRUGS (ALT 637 FOR MEDICARE OP, ALT 636 FOR OP/ED)

## 2023-10-22 PROCEDURE — 82947 ASSAY GLUCOSE BLOOD QUANT: CPT

## 2023-10-22 PROCEDURE — 99232 SBSQ HOSP IP/OBS MODERATE 35: CPT | Performed by: NURSE PRACTITIONER

## 2023-10-22 PROCEDURE — 99238 HOSP IP/OBS DSCHRG MGMT 30/<: CPT

## 2023-10-22 PROCEDURE — 2500000001 HC RX 250 WO HCPCS SELF ADMINISTERED DRUGS (ALT 637 FOR MEDICARE OP): Performed by: NURSE PRACTITIONER

## 2023-10-22 RX ORDER — METOPROLOL SUCCINATE 50 MG/1
50 TABLET, EXTENDED RELEASE ORAL DAILY
Qty: 30 TABLET | Refills: 1 | Status: SHIPPED | OUTPATIENT
Start: 2023-10-23 | End: 2023-11-09 | Stop reason: ALTCHOICE

## 2023-10-22 RX ORDER — METOPROLOL SUCCINATE 50 MG/1
50 TABLET, EXTENDED RELEASE ORAL DAILY
Status: DISCONTINUED | OUTPATIENT
Start: 2023-10-22 | End: 2023-10-22 | Stop reason: HOSPADM

## 2023-10-22 RX ORDER — CLOPIDOGREL BISULFATE 75 MG/1
75 TABLET ORAL DAILY
Qty: 30 TABLET | Refills: 1 | Status: SHIPPED | OUTPATIENT
Start: 2023-10-22

## 2023-10-22 RX ORDER — NAPROXEN SODIUM 220 MG/1
81 TABLET, FILM COATED ORAL DAILY
Qty: 30 TABLET | Refills: 1 | Status: SHIPPED | OUTPATIENT
Start: 2023-10-23 | End: 2023-12-27 | Stop reason: HOSPADM

## 2023-10-22 RX ORDER — LOSARTAN POTASSIUM AND HYDROCHLOROTHIAZIDE 12.5; 1 MG/1; MG/1
1 TABLET ORAL DAILY
Qty: 30 TABLET | Refills: 1 | Status: SHIPPED | OUTPATIENT
Start: 2023-10-22 | End: 2023-10-22 | Stop reason: SDUPTHER

## 2023-10-22 RX ORDER — ATORVASTATIN CALCIUM 80 MG/1
80 TABLET, FILM COATED ORAL NIGHTLY
Qty: 30 TABLET | Refills: 1 | Status: SHIPPED | OUTPATIENT
Start: 2023-10-22

## 2023-10-22 RX ORDER — LOSARTAN POTASSIUM AND HYDROCHLOROTHIAZIDE 12.5; 5 MG/1; MG/1
1 TABLET ORAL DAILY
Qty: 30 TABLET | Refills: 1 | Status: SHIPPED | OUTPATIENT
Start: 2023-10-22 | End: 2023-11-09 | Stop reason: ALTCHOICE

## 2023-10-22 RX ADMIN — INSULIN LISPRO 2 UNITS: 100 INJECTION, SOLUTION INTRAVENOUS; SUBCUTANEOUS at 13:17

## 2023-10-22 RX ADMIN — ASPIRIN 81 MG CHEWABLE TABLET 81 MG: 81 TABLET CHEWABLE at 09:00

## 2023-10-22 RX ADMIN — INSULIN LISPRO 2 UNITS: 100 INJECTION, SOLUTION INTRAVENOUS; SUBCUTANEOUS at 08:00

## 2023-10-22 RX ADMIN — METOPROLOL SUCCINATE 50 MG: 50 TABLET, EXTENDED RELEASE ORAL at 09:00

## 2023-10-22 RX ADMIN — Medication 21 PERCENT: at 11:06

## 2023-10-22 ASSESSMENT — PAIN SCALES - GENERAL: PAINLEVEL_OUTOF10: 0 - NO PAIN

## 2023-10-22 NOTE — DISCHARGE INSTRUCTIONS
Your symptoms were due to stroke. You are now stable enough to be discharged home.    The following recommendations are made for you at time of hospital discharge:  - Please take your home medications as instructed prior to hospitalization.   - The following changes to your home medications have been made during your hospital stay:   Please Stop the pravastatin     Please continue taking your aspirin 81 mg daily    Start taking:  > Atorvastatin 80 mg daily  > Plavix 75 mg daily  > Hyzaar 50-12.5 daily  > Toprol XL 50 mg daily     - Please follow up with cardiology in 2 weeks you will receive a call to schedule your appointment    - Please call to schedule an appointment with vascular surgery within 2 weeks    - please call to schedule an appointment with neurology within 1 month    - Please follow-up with your primary care provider within 5-7 days from time of discharge. Please call your PCP's office to schedule an appointment. Likely will need to bring photo ID and insurance card to your appointment.   - If you experience any worsening symptoms or any new acute concerns arise, please contact your primary care provider to discuss and possibly arrange an appointment. If you cannot get in touch with provider or severe symptoms are present, please return to nearest emergency room/urgent care for evaluation and treatment.

## 2023-10-22 NOTE — DISCHARGE SUMMARY
Discharge Diagnosis  Dizziness    Issues Requiring Follow-Up  Follow up with cardiology in 2 weeks     Discharge Meds     Your medication list        START taking these medications        Instructions Last Dose Given Next Dose Due   aspirin 81 mg chewable tablet  Start taking on: October 23, 2023  Replaces: aspirin 81 mg EC tablet      Chew 1 tablet (81 mg) once daily. Do not start before October 23, 2023.       atorvastatin 80 mg tablet  Commonly known as: Lipitor      Take 1 tablet (80 mg) by mouth once daily at bedtime.       clopidogrel 75 mg tablet  Commonly known as: Plavix      Take 1 tablet (75 mg) by mouth once daily.       losartan-hydrochlorothiazide 50-12.5 mg tablet  Commonly known as: Hyzaar      Take 1 tablet by mouth once daily.       metoprolol succinate XL 50 mg 24 hr tablet  Commonly known as: Toprol-XL  Start taking on: October 23, 2023      Take 1 tablet (50 mg) by mouth once daily. Do not crush or chew. Do not start before October 23, 2023.              CHANGE how you take these medications        Instructions Last Dose Given Next Dose Due   cholecalciferol 25 MCG (1000 UT) tablet  Commonly known as: Vitamin D-3  What changed: Another medication with the same name was removed. Continue taking this medication, and follow the directions you see here.                  CONTINUE taking these medications        Instructions Last Dose Given Next Dose Due   blood sugar diagnostic strip           metFORMIN 500 mg tablet  Commonly known as: Glucophage           PARoxetine 20 mg tablet  Commonly known as: Paxil           pioglitazone 30 mg tablet  Commonly known as: Actos                  STOP taking these medications      aspirin 81 mg EC tablet  Replaced by: aspirin 81 mg chewable tablet        pravastatin 80 mg tablet  Commonly known as: Pravachol                  Where to Get Your Medications        These medications were sent to [facility] [id number] [place] [address]  [id number] [facility]. [address], [place] [address]      Phone: [phone]   aspirin 81 mg chewable tablet  atorvastatin 80 mg tablet  clopidogrel 75 mg tablet  losartan-hydrochlorothiazide 50-12.5 mg tablet  metoprolol succinate XL 50 mg 24 hr tablet         Test Results Pending At Discharge  Pending Labs       No current pending labs.            Hospital Course  Patient is a 72 year old female with PMHx relevant for T2DM, and HLD who presented to the ED with complaints of new onset dizziness, double vision, and expressive aphasia.  Symptoms started at 7:15pm at home, and she was brought to the ED.  In the ED she was noted to have ataxia with abnormal finger-to-nose testing and vertical nystagmus. NIH stroke scale was 3 with VAN negative.  CT head was negative for acute process and she developed right sided facial droop so she was given TNK @ 2150.  She was then transferred to the ICU for post-TNK observation and monitoring.  In the ICU on 10/20 her symptoms have significantly improved without complete resolution.  She continues to have blurry vision, improved from double vision.  Her ataxia with finger-to-nose has improved, she is speaking and mentating well, and she no longer has any facial droop.  MRI Brain showed acute/subacute lacunar infarct in the left thalamus, without evidence of hemorrhagic transformation. Neurology were following and recommended DAPT and HI statin and follow-up outpatient. Echo also showed new LV systolic dysfunction with an EF 45-50%, impaired relaxation pattern LV diastolic filling, and mild tricuspid regurgitation.  Cardiology was consulted for the findings of new heart failure with reduced ejection fraction, and additionally patient has been having recurring PVCs every 3-4 beats on the telemetry monitor.  They recommended starting on metoprolol XL 50 mg daily and Hyzaar 50-12.5 daily, and following up closely outpatient for CHF management and ischemia evaluation. On CTA neck patient was found with high grade  85% stenosis of right cervical ICA, she will have to follow up with vascular surgery after discharge.  On 10/22 she was stable to be discharged home.     Pertinent Physical Exam At Time of Discharge  Physical Exam  Constitutional:       General: Sitting up comfortably in chair.     Appearance: Normal appearance.   HENT:      Head: Normocephalic and atraumatic.   Eyes:      Extraocular Movements: Extraocular movements intact.      Conjunctiva/sclera: Conjunctivae normal.      Pupils: Pupils are equal, round, and reactive to light.      Comments: No visual field deficits   Cardiovascular:      Rate and Rhythm: Normal rate and regular rhythm.      Heart sounds: No murmur heard.     No friction rub. No gallop.   Pulmonary:      Effort: Pulmonary effort is normal. No respiratory distress.      Breath sounds: Normal breath sounds. No stridor. No wheezing.   Abdominal:      General: Abdomen is flat. There is no distension.      Palpations: Abdomen is soft.      Tenderness: There is no abdominal tenderness.   Musculoskeletal:         General: Normal range of motion.      Comments: 5/5 strength in upper extremities bilaterally   Neurological:      General: No focal deficit present.      Mental Status: She is alert and oriented to person, place, and time.      Cranial Nerves: No cranial nerve deficit.      Motor: No weakness.      Comments: Finger-to-nose normal   CN II-XII intact  Outpatient Follow-Up  Future Appointments   Date Time Provider Department Center   3/27/2024  9:45 AM [facility]         Stephanie Navarro MD

## 2023-10-22 NOTE — CARE PLAN
The patient's goals for the shift include      The clinical goals for the shift include Pt will walk the halls without injury

## 2023-10-22 NOTE — PROGRESS NOTES
Subjective Data:  Feeling well today  Denies chest pain, SOB, or palpitations     Overnight Events:    PVC frequency decreased over 24hrs  BP improved      Objective Data:  Last Recorded Vitals:  Vitals:    10/22/23 0300 10/22/23 0400 10/22/23 0500 10/22/23 0554   BP: 147/85 140/68 150/74    Pulse: 76 75 77    Resp: 23 23 22    Temp:   36.6 °C (97.9 °F)    TempSrc:   Temporal    SpO2: 92% 93% 93%    Weight:    118 kg (259 lb 14.8 oz)   Height:           Last Labs:  CBC - 10/21/2023:  5:23 AM  7.2 12.1 291    39.5      CMP - 10/21/2023:  5:23 AM  8.5 7.0 9 --- 0.3   3.8 3.1 9 59      PTT - 10/20/2023:  5:22 AM  1.6   17.8 30     TROPHS   Date/Time Value Ref Range Status   10/19/2023 09:40 PM 14 0 - 13 ng/L Final   09/29/2022 08:40 PM 14 0 - 13 ng/L Final     Comment:     .  Less than 99th percentile of normal range cutoff-  Female and children under 18 years old <14 ng/L; Male <21 ng/L: Negative  Repeat testing should be performed if clinically indicated.   .  Female and children under 18 years old 14-50 ng/L; Male 21-50 ng/L:  Consistent with possible cardiac damage and possible increased clinical   risk. Serial measurements may help to assess extent of myocardial damage.   .  >50 ng/L: Consistent with cardiac damage, increased clinical risk and  myocardial infarction. Serial measurements may help assess extent of   myocardial damage.   .   NOTE: Children less than 1 year old may have higher baseline troponin   levels and results should be interpreted in conjunction with the overall   clinical context.   .  NOTE: Troponin I testing is performed using a different   testing methodology at [facility] than at other   [facility] hospitals. Direct result comparisons should only   be made within the same method.     07/13/2022 05:35 AM 10 0 - 13 ng/L Final     Comment:     .  Less than 99th percentile of normal range cutoff-  Female and children under 18 years old <14 ng/L; Male <21 ng/L: Negative  Repeat testing  should be performed if clinically indicated.   .  Female and children under 18 years old 14-50 ng/L; Male 21-50 ng/L:  Consistent with possible cardiac damage and possible increased clinical   risk. Serial measurements may help to assess extent of myocardial damage.   .  >50 ng/L: Consistent with cardiac damage, increased clinical risk and  myocardial infarction. Serial measurements may help assess extent of   myocardial damage.   .   NOTE: Children less than 1 year old may have higher baseline troponin   levels and results should be interpreted in conjunction with the overall   clinical context.   .  NOTE: Troponin I testing is performed using a different   testing methodology at [facility] than at other   [facility] hospitals. Direct result comparisons should only   be made within the same method.     07/12/2022 07:00 PM 9 0 - 13 ng/L Final     Comment:     .  Less than 99th percentile of normal range cutoff-  Female and children under 18 years old <14 ng/L; Male <21 ng/L: Negative  Repeat testing should be performed if clinically indicated.   .  Female and children under 18 years old 14-50 ng/L; Male 21-50 ng/L:  Consistent with possible cardiac damage and possible increased clinical   risk. Serial measurements may help to assess extent of myocardial damage.   .  >50 ng/L: Consistent with cardiac damage, increased clinical risk and  myocardial infarction. Serial measurements may help assess extent of   myocardial damage.   .   NOTE: Children less than 1 year old may have higher baseline troponin   levels and results should be interpreted in conjunction with the overall   clinical context.   .  NOTE: Troponin I testing is performed using a different   testing methodology at [facility] than at other   [facility]. Direct result comparisons should only   be made within the same method.       BNP   Date/Time Value Ref Range Status   07/12/2022 05:51 PM 39 0 - 99 pg/mL Final     Comment:      .  <100 pg/mL - Heart failure unlikely  100-299 pg/mL - Intermediate probability of acute heart  .               failure exacerbation. Correlate with clinical  .               context and patient history.    >=300 pg/mL - Heart Failure likely. Correlate with clinical  .               context and patient history.  BNP testing is performed using different testing   methodology at [facility] than at other   [facility]. Direct result comparisons should   only be made within the same method.     01/03/2018 03:00 PM 63 0 - 99 pg/mL Final     Comment:     .  <100 pg/mL - Heart failure unlikely  100-299 pg/mL - Intermediate probability of acute heart  .               failure exacerbation. Correlate with clinical  .               context and patient history.    >=300 pg/mL - Heart Failure likely. Correlate with clinical  .               context and patient history.  BNP testing is performed using different testing   methodology at [facility] than at other   [facility] hospitals. Direct result comparisons should   only be made within the same method.       HGBA1C   Date/Time Value Ref Range Status   10/20/2023 05:22 AM 7.0 see below % Final   08/02/2018 03:17 AM 6.2 % Final     Comment:          Diagnosis of Diabetes-Adults   Non-Diabetic: < or = 5.6%   Increased risk for developing diabetes: 5.7-6.4%   Diagnostic of diabetes: > or = 6.5%  .       Monitoring of Diabetes                Age (y)     Therapeutic Goal (%)   Adults:          >18           <7.0   Pediatrics:    13-18           <7.5                   7-12           <8.0                   0- 6            7.5-8.5   American Diabetes Association. Diabetes Care 33(S1), Jan 2010.     08/01/2018 11:52 AM 6.2 % Final     Comment:          Diagnosis of Diabetes-Adults   Non-Diabetic: < or = 5.6%   Increased risk for developing diabetes: 5.7-6.4%   Diagnostic of diabetes: > or = 6.5%  .       Monitoring of Diabetes                Age (y)      Therapeutic Goal (%)   Adults:          >18           <7.0   Pediatrics:    13-18           <7.5                   7-12           <8.0                   0- 6            7.5-8.5   American Diabetes Association. Diabetes Care 33(S1), Jan 2010.       LDLCALC   Date/Time Value Ref Range Status   10/20/2023 05:22 AM 60 <=99 mg/dL Final     Comment:                                 Near   Borderline      AGE      Desirable  Optimal    High     High     Very High     0-19 Y     0 - 109     ---    110-129   >/= 130     ----    20-24 Y     0 - 119     ---    120-159   >/= 160     ----      >24 Y     0 -  99   100-129  130-159   160-189     >/=190       VLDL   Date/Time Value Ref Range Status   10/20/2023 05:22 AM 13 0 - 40 mg/dL Final      Last I/O:  I/O last 3 completed shifts:  In: 240 (2 mL/kg) [P.O.:240]  Out: - (0 mL/kg)   Weight: 117.9 kg     Past Cardiology Tests (Last 3 Years):  EKG:  NSR with PVCs   Echo:  Transthoracic Echo (TTE) Complete 10/20/2023  CONCLUSIONS:   1. Left ventricular systolic function is mildly decreased with a 45-50% estimated ejection fraction.   2. Spectral Doppler shows an impaired relaxation pattern of left ventricular diastolic filling.   3. There is mild tricuspid regurgitation.   4. Technically difficult bubble study unable to interpret.     Stress Test:  Nuclear Stress Test 2/13/2023  IMPRESSION:  1. Normal stress myocardial perfusion imaging in response to  pharmacologic stress.  2. Mild left ventricular systolic dysfunction on post stress gated  imaging.         Inpatient Medications:  Scheduled medications   Medication Dose Route Frequency    aspirin  81 mg oral Daily    atorvastatin  80 mg oral Nightly    enoxaparin  40 mg subcutaneous Daily    insulin lispro  0-10 Units subcutaneous TID with meals    losartan 100 mg, hydroCHLOROthiazide 12.5 mg for Hyzaar 100/12.5   oral Daily    metoprolol succinate XL  50 mg oral Daily    PARoxetine  20 mg oral Nightly     PRN medications    Medication    acetaminophen    calcium carbonate    dextrose 10 % in water (D10W)    dextrose    glucagon    oxygen     Continuous Medications   Medication Dose Last Rate       Physical Exam:  Constitutional: Pleasant, Awake/Alert/Oriented to person place and time. No distress  Head: Atraumatic, Normocephalic  Eyes: EOMI. RIP  Neck: No JVD, +bruit right carotid   Cardiovascular: Regular rate and rhythm, S1, S2. No extra heart sounds or murmurs  Respiratory: Clear to auscultation bilaterally. No wheezing, rales or rhonchi. Good chest wall expansion  Abdomen: Soft, Nontender, Obese. Bowel sounds appreciated  Musculoskeletal: ROM intact. Muscle strength grossly intact upper and lower extremities 5/5.   Neurological: CNII-XII intact. Sensation grossly intact  Extremities: Warm and dry. No acute rashes and lesions, LLE trace edema   Psychiatric: Appropriate mood and affect     Assessment/Plan   Very pleasant 71 yo female from home with h/o depression, dyslipidemia, type 2 DM admitted with acute lacunar infarct in the left thalamus territory.  EF 45-50%.  Tele reviewed for NSR with intermittent PVCs.      Assessment:  Acute lacunar infarct  Hypertension, uncontrolled  Cardiomyopathy-- mildy reduced EF 45-50%   PVCs  Severe MALATHI stenosis      Plan:  -Continue toprol XL 50mg daily  -Add losartan-hydrochlorothiazide 50-12.5mg daily   -Continue low dose aspirin and HI statin therapy  -Defer DAPT recommendations to Neurology   -Will call to arrange for Cardiology follow up in 2 weeks for reassessment of symptoms +/- further ischemic evaluation as outpatient.   -Recommend Vascular surgery referral for evaluation/treatment recommendations of MALATHI stenosis     Peripheral IV 10/19/23 20 G Right Antecubital (Active)   Site Assessment Clean;Dry;Intact 10/22/23 0000   Dressing Status Clean;Dry;Occlusive 10/22/23 0000   Number of days: 3       Code Status:  Full Code      Malinda Posada, ANITA-CNP

## 2023-11-01 NOTE — DOCUMENTATION CLARIFICATION NOTE
"    PATIENT:               RAFAL PFEIFFER  ACCT #:                  [id number]  MRN:                       [id number]  :                       1950  ADMIT DATE:       10/19/2023 7:57 PM  DISCH DATE:        10/22/2023 3:53 PM  RESPONDING PROVIDER #:        [id number]          PROVIDER RESPONSE TEXT:    Chronic Systolic Congestive Heart Failure    CDI QUERY TEXT:    [facility]_CHF    Instruction:    Based on your assessment of the patient and the clinical information, please provide the requested documentation by clicking on the appropriate radio button and enter any additional information if prompted.    Question: Please further clarify the type and acuity of congestive heart failure noted this admission      When answering this query, please exercise your independent professional judgment. The fact that a question is being asked, does not imply that any particular answer is desired or expected.    The patient's clinical indicators include:  Clinical Information: 72 year old female presents with new onset dizziness and visual changes. Work-up reveals TIA/CVA, received TNK in ED and admitted to ICU.    Clinical Indicators:    DPN 10/20: \"Echo also showed new LV systolic dysfunction with an EF 45-50%, impaired relaxation pattern LV diastolic filling, and mild tricuspid regurgitation.  Cardiology was consulted for the findings of new heart failure with reduced ejection fraction, and additionally patient has been having recurring PVCs every 3-4 beats on the telemetry monitor.  They recommended starting on metoprolol XL 50 mg daily and Hyzaar 50-12.5 daily, and following up closely outpatient for CHF management and ischemia evaluation. \"    ICU note 10/21: \" Cardiovascular   #Newly Diagnosed HFrEF\"    Attestation 10/22/23 M. Abas: ?Reduced EF 45% with frequent PVCs, start metoprolol, consult cardiology. ?      Treatment: Cardiac consult, Neuro consult, Critical Care, ECHO, po Metoprolol started    Risk Factors: current " TIA/CVA  Options provided:  -- Acute Systolic Congestive Heart Failure  -- Chronic Systolic Congestive Heart Failure  -- Acute Diastolic Congestive Heart Failure  -- Acute on Chronic Diastolic Congestive Heart Failure  -- Acute combined systolic/diastolic Congestive Heart Failure  -- Acute on Chronic combined systolic/diastolic Congestive Heart Failure  -- Other - I will add my own diagnosis  -- Refer to Clinical Documentation Reviewer    Query created by: Lea Griggs on 10/25/2023 10:24 AM      Electronically signed by:  KAREN BECK MD 11/1/2023 1:23 PM

## 2023-11-01 NOTE — DOCUMENTATION CLARIFICATION NOTE
"    PATIENT:               RAFAL PFEIFFER  ACCT #:                  [id number]  MRN:                       [id number]  :                       1950  ADMIT DATE:       10/19/2023 7:57 PM  DISCH DATE:        10/22/2023 3:53 PM  RESPONDING PROVIDER #:        [id number]          PROVIDER RESPONSE TEXT:    Morbid obesity    CDI QUERY TEXT:    [facility]_BMI High    Instruction:    Based on your assessment of the patient and the clinical information, please provide the requested documentation by clicking on the appropriate radio button and enter any additional information if prompted.    Question: Is there a diagnosis associated with a BMI of 45.77 appropriate for this patient      When answering this query, please exercise your independent professional judgment. The fact that a question is being asked, does not imply that any particular answer is desired or expected.    The patient's clinical indicators include:  Clinical Information: 72 year old female presents with dizziness, vision changes and facial droop. Work-up reveals TIA/CVA, TNK administered and admitted to ICU.    Clinical Indicators:    BMI: 45.77   HEIGHT:  5'3\"   as of 10/19/23    Treatment:  PT/OT consult, SLP evaluation,  uses assistive devices (walker)    Risk Factors:  current TIA/CVA,   DM  Options provided:  -- Overweight  -- Obesity  -- Morbid obesity  -- Other - I will add my own diagnosis  -- Refer to Clinical Documentation Reviewer    Query created by: Lea Griggs on 10/20/2023 11:09 AM      Electronically signed by:  KAREN BECK MD 2023 1:23 PM          "

## 2023-11-01 NOTE — DOCUMENTATION CLARIFICATION NOTE
"    PATIENT:               RAFAL PFEIFFER  ACCT #:                  [id number]  MRN:                       [id number]  :                       1950  ADMIT DATE:       10/19/2023 7:57 PM  DISCH DATE:        10/22/2023 3:53 PM  RESPONDING PROVIDER #:        [id number]          PROVIDER RESPONSE TEXT:    CVA ruled in for this admission    CDI QUERY TEXT:    [facility]_Diagnosis Ruled Out In    Instruction:    Based on your assessment of the patient and the clinical information, please provide the requested documentation by clicking on the appropriate radio button and enter any additional information if prompted.    Question: Please further clarify the diagnosis of TIA/CVA as    When answering this query, please exercise your independent professional judgment. The fact that a question is being asked, does not imply that any particular answer is desired or expected.    The patient's clinical indicators include:  Clinical Information: 72 year old female presents with dizziness, visual changes and noted facial droop.  Work-up reveals TIA/CVA,  TNK administered in ED and admitted to ICU.    Clinical Indicators:    H&P:  \"She received TNK as the patient appeared to have worsening right-sided facial droop...  She was then transferred to the ICU for further management of a TIA status post TNK administration.\"  --\"CT angio brain and neck: Demonstrated high-grade stenosis of the right cervical ICA up to 85% with moderate stenosis of the right carotid bifurcation there is moderate narrowing of the left cervical ICA up to 57%\"  --\" admitted for the management of TIA/CVA status post TNK.\"      Treatment: Neuro consult, TNK administration, permissive HTN, Critical Care unit with neuro checks    Risk Factors:  Hyperlipidemia, carotid stenosis revealed on CT  Options provided:  -- CVA ruled out after workup  -- CVA ruled in for this admission  -- TIA  ruled out after workup  -- TIA ruled in for this admission  -- Other - I will add my own " diagnosis  -- Refer to Clinical Documentation Reviewer    Query created by: Lea Griggs on 10/20/2023 10:59 AM      Electronically signed by:  KAREN BECK MD 11/1/2023 1:23 PM

## 2023-11-09 ENCOUNTER — OFFICE VISIT (OUTPATIENT)
Dept: CARDIOLOGY | Facility: HOSPITAL | Age: 73
End: 2023-11-09
Payer: MEDICARE

## 2023-11-09 VITALS — HEART RATE: 86 BPM | OXYGEN SATURATION: 94 % | SYSTOLIC BLOOD PRESSURE: 102 MMHG | DIASTOLIC BLOOD PRESSURE: 67 MMHG

## 2023-11-09 DIAGNOSIS — I63.9 CEREBROVASCULAR ACCIDENT (CVA), UNSPECIFIED MECHANISM (MULTI): Primary | ICD-10-CM

## 2023-11-09 DIAGNOSIS — I49.3 PVC (PREMATURE VENTRICULAR CONTRACTION): ICD-10-CM

## 2023-11-09 DIAGNOSIS — I65.21 STENOSIS OF RIGHT CAROTID ARTERY: ICD-10-CM

## 2023-11-09 PROBLEM — Z85.3 PERSONAL HISTORY OF MALIGNANT NEOPLASM OF BREAST: Status: ACTIVE | Noted: 2022-05-26

## 2023-11-09 PROBLEM — G47.00 INSOMNIA, UNSPECIFIED: Status: ACTIVE | Noted: 2022-11-21

## 2023-11-09 PROBLEM — E66.01 MORBID (SEVERE) OBESITY DUE TO EXCESS CALORIES (MULTI): Status: ACTIVE | Noted: 2022-11-21

## 2023-11-09 PROBLEM — Z79.82 LONG TERM (CURRENT) USE OF ASPIRIN: Status: ACTIVE | Noted: 2022-11-21

## 2023-11-09 PROBLEM — S72.002A HIP FRACTURE, LEFT (MULTI): Status: ACTIVE | Noted: 2023-11-09

## 2023-11-09 PROBLEM — F41.9 ANXIETY DISORDER, UNSPECIFIED: Status: ACTIVE | Noted: 2022-05-26

## 2023-11-09 PROBLEM — F33.9 MAJOR DEPRESSIVE DISORDER, RECURRENT, UNSPECIFIED (CMS-HCC): Status: ACTIVE | Noted: 2022-05-26

## 2023-11-09 PROBLEM — E11.9 TYPE 2 DIABETES MELLITUS WITHOUT COMPLICATIONS (MULTI): Status: ACTIVE | Noted: 2022-05-26

## 2023-11-09 PROBLEM — Z87.891 PERSONAL HISTORY OF NICOTINE DEPENDENCE: Status: ACTIVE | Noted: 2022-11-21

## 2023-11-09 PROBLEM — K59.00 CONSTIPATION, UNSPECIFIED: Status: ACTIVE | Noted: 2022-11-21

## 2023-11-09 PROBLEM — E78.1 PURE HYPERGLYCERIDEMIA: Status: ACTIVE | Noted: 2022-11-21

## 2023-11-09 PROBLEM — E55.9 VITAMIN D DEFICIENCY, UNSPECIFIED: Status: ACTIVE | Noted: 2022-11-21

## 2023-11-09 PROBLEM — F32.A DEPRESSION, UNSPECIFIED: Status: ACTIVE | Noted: 2022-11-21

## 2023-11-09 PROBLEM — Z79.84 LONG TERM (CURRENT) USE OF ORAL HYPOGLYCEMIC DRUGS: Status: ACTIVE | Noted: 2022-11-21

## 2023-11-09 PROBLEM — K21.9 GASTROESOPHAGEAL REFLUX DISEASE WITHOUT ESOPHAGITIS: Status: ACTIVE | Noted: 2022-11-21

## 2023-11-09 PROBLEM — E78.5 HYPERLIPIDEMIA, UNSPECIFIED: Status: ACTIVE | Noted: 2022-05-26

## 2023-11-09 PROBLEM — I10 ESSENTIAL (PRIMARY) HYPERTENSION: Status: ACTIVE | Noted: 2022-11-21

## 2023-11-09 PROBLEM — Z96.653 PRESENCE OF ARTIFICIAL KNEE JOINT, BILATERAL: Status: ACTIVE | Noted: 2022-05-26

## 2023-11-09 PROCEDURE — 99214 OFFICE O/P EST MOD 30 MIN: CPT | Performed by: NURSE PRACTITIONER

## 2023-11-09 PROCEDURE — 1126F AMNT PAIN NOTED NONE PRSNT: CPT | Performed by: NURSE PRACTITIONER

## 2023-11-09 PROCEDURE — 99214 OFFICE O/P EST MOD 30 MIN: CPT | Mod: PO | Performed by: NURSE PRACTITIONER

## 2023-11-09 PROCEDURE — 1036F TOBACCO NON-USER: CPT | Performed by: NURSE PRACTITIONER

## 2023-11-09 PROCEDURE — 3078F DIAST BP <80 MM HG: CPT | Performed by: NURSE PRACTITIONER

## 2023-11-09 PROCEDURE — 1111F DSCHRG MED/CURRENT MED MERGE: CPT | Performed by: NURSE PRACTITIONER

## 2023-11-09 PROCEDURE — 3074F SYST BP LT 130 MM HG: CPT | Performed by: NURSE PRACTITIONER

## 2023-11-09 PROCEDURE — 3048F LDL-C <100 MG/DL: CPT | Performed by: NURSE PRACTITIONER

## 2023-11-09 PROCEDURE — 1159F MED LIST DOCD IN RCRD: CPT | Performed by: NURSE PRACTITIONER

## 2023-11-09 PROCEDURE — 3051F HG A1C>EQUAL 7.0%<8.0%: CPT | Performed by: NURSE PRACTITIONER

## 2023-11-09 RX ORDER — LANCETS 33 GAUGE
EACH MISCELLANEOUS
COMMUNITY
Start: 2023-01-04

## 2023-11-09 RX ORDER — FLUTICASONE PROPIONATE 50 MCG
1 SPRAY, SUSPENSION (ML) NASAL 2 TIMES DAILY
COMMUNITY
Start: 2022-11-18 | End: 2023-11-09

## 2023-11-09 RX ORDER — EZETIMIBE 10 MG/1
10 TABLET ORAL DAILY
COMMUNITY
Start: 2023-10-27

## 2023-11-09 ASSESSMENT — ENCOUNTER SYMPTOMS: DEPRESSION: 0

## 2023-11-10 NOTE — PROGRESS NOTES
Chief Complaint:   Hospital Follow-up (Stroke.  States since starting new medications from the hospital she has full body weakness.)     History Of Present Illness:    Elsa Biggs is a 72 y.o. female presenting for follow up s/p recent hospitalization for acute ischemic stroke.  Was admitted from 10/19-10/22/2023 with acute ischemic stroke in the left thalamus territory.  Initially presented to the ER on 10/19 with dizziness, double vision, expressive aphasia, and right side facial droop s/p TNK.  Symptoms resolved hours after receiving TNK.  Patient doing well since discharge-- feels generally weak with increased fatigue and mild lightheadedness at times.  BP was elevated during admit, and she was started on losartan-hydrochlorothiazide as well as metoprolol.  She has not taken these meds in 2 days as he BP has been running low at home (100/60's).  Denies symptoms of chest pain, SOB, or palpitations.  Was found to have frequent PVCs on telemetry during admit and EF 45-50%.  PVC frequency reduced after starting metoprolol.      Last Recorded Vitals:  Vitals:    11/09/23 1416   BP: 102/67   Pulse: 86   SpO2: 94%       Past Medical History:  Obesity  Hypertension  Dyslipidemia  Depression  Type 2 DM  Left thalamus territory Stroke 10/2023  MALATHI 85% (CTA neck 10/2023)      Past Surgical History:  She has a past surgical history that includes MR angio neck w and wo IV contrast (01/14/2022); Joint replacement; Fracture surgery; and Breast surgery.      Social History:  She reports that she quit smoking about 40 years ago. Her smoking use included cigarettes. She smoked an average of 1 pack per day. She has never used smokeless tobacco. She reports current alcohol use of about 1.0 standard drink of alcohol per week. She reports that she does not use drugs.    Family History:  No family history on file.     Allergies:  Tetracycline    Outpatient Medications:  Current Outpatient Medications   Medication Instructions     aspirin 81 mg, oral, Daily    atorvastatin (LIPITOR) 80 mg, oral, Nightly    blood sugar diagnostic strip before meals and at bedtime. Use as instructed    cholecalciferol (Vitamin D-3) 25 MCG (1000 UT) tablet 1 tablet, oral, Daily    clopidogrel (PLAVIX) 75 mg, oral, Daily    ezetimibe (ZETIA) 10 mg, oral, Daily    metFORMIN (GLUCOPHAGE) 500 mg, oral, 2 times daily with meals    OneTouch Delica Plus Lancet 33 gauge misc USE TO TAKE BLOOD SUGAR 1-2 TIMES PER DAY    PARoxetine (PAXIL) 20 mg, oral, Nightly    pioglitazone (ACTOS) 30 mg, oral, Every 24 hours       Physical Exam:  Constitutional: Pleasant, Awake/Alert/Oriented to person place and time. No distress  Head: Atraumatic, Normocephalic  Eyes: EOMI. RIP  Neck: No JVD  Cardiovascular: Regular rate and rhythm, S1, S2. No extra heart sounds or murmurs  Respiratory: Clear to auscultation bilaterally. No wheezing, rales or rhonchi. Good chest wall expansion  Abdomen: Soft, Nontender, Obese. Bowel sounds appreciated  Musculoskeletal: ROM intact. Muscle strength grossly intact upper and lower extremities 5/5.   Neurological: CNII-XII intact. Sensation grossly intact  Extremities: Warm and dry. No acute rashes and lesions  Psychiatric: Appropriate mood and affect       Last Labs:  CBC -  Lab Results   Component Value Date    WBC 7.2 10/21/2023    HGB 12.1 10/21/2023    HCT 39.5 10/21/2023    MCV 86 10/21/2023     10/21/2023       CMP -  Lab Results   Component Value Date    CALCIUM 8.5 (L) 10/21/2023    PHOS 3.8 10/21/2023    PROT 7.0 10/19/2023    ALBUMIN 3.1 (L) 10/21/2023    AST 9 10/19/2023    ALT 9 10/19/2023    ALKPHOS 59 10/19/2023    BILITOT 0.3 10/19/2023       LIPID PANEL -   Lab Results   Component Value Date    CHOL 113 10/20/2023    TRIG 66 10/20/2023    HDL 40.1 10/20/2023    CHHDL 2.8 10/20/2023    VLDL 13 10/20/2023    NHDL 73 10/20/2023       RENAL FUNCTION PANEL -   Lab Results   Component Value Date    GLUCOSE 170 (H) 10/21/2023      10/21/2023    K 3.9 10/21/2023     10/21/2023    CO2 28 10/21/2023    ANIONGAP 11 10/21/2023    BUN 15 10/21/2023    CREATININE 0.64 10/21/2023    CALCIUM 8.5 (L) 10/21/2023    PHOS 3.8 10/21/2023    ALBUMIN 3.1 (L) 10/21/2023        Lab Results   Component Value Date    BNP 39 07/12/2022    HGBA1C 7.0 (H) 10/20/2023       Last Cardiology Tests:  ECG:  ECG 10/19/2023  NSR with PVCs in bigeminy, 1AVB     Echo:  10/20/2023  CONCLUSIONS:   1. Left ventricular systolic function is mildly decreased with a 45-50% estimated ejection fraction.   2. Spectral Doppler shows an impaired relaxation pattern of left ventricular diastolic filling.   3. There is mild tricuspid regurgitation.   4. Technically difficult bubble study unable to interpret.    8/2/2018  CONCLUSIONS:   1. The left ventricular systolic function is normal with a 60% estimated ejection fraction.   2. Spectral Doppler shows an impaired relaxation pattern of left ventricular diastolic filling.   3. There is concentric left ventricular hypertrophy.   4. The right ventricle is normal in size. There is normal right ventricular global systolic function. The right ventricle is normal in size. There is normal right ventricular global systolic function.   5. Technically difficult study.      Stress Test:  4/15/2021  IMPRESSION:  1. Normal stress myocardial perfusion imaging in response to  pharmacologic stress.  2. Mild left ventricular systolic dysfunction on post stress gated  imaging.      Lab review: I have personally reviewed the laboratory result(s)   Diagnostic review: I have personally reviewed the result(s) of the Echocardiogram .     Assessment/Plan   Very pleasant 71 yo female from home with h/o obesity, hypertension, dyslipidemia, depression, type 2 DM, recently admitted in October with acute ischemic stroke in the left thalamus territory.  CTA neck also shows 85% MALATHI.  Echocardiogram during admit showed mildly reduced LVSF 50% with frequent PVCs on  telemetry-- patient asymptomatic.  Started on BB as well as losartan-hydrochlorothiazide during admit, however, now feeling lightheaded with relative hypotension.     Plan:  -Recommend further ambulatory cardiac monitoring for assessment of possible atrial arrythmia as well as for assessment of PVC burden.  -Hold losartan-hydrochlorothiazide and metoprolol for now, but consider resuming low dose BB pending zio results  -Continue asa, plavix, HI statin therapy  -Referral placed for vascular surgery to discuss treatment of severe MALATHI stenosis   -Follow up in 5-6wks to discuss zio results       ANITA Dhillon-CNP

## 2023-11-17 ENCOUNTER — HOSPITAL ENCOUNTER (OUTPATIENT)
Dept: RADIOLOGY | Facility: HOSPITAL | Age: 73
Discharge: HOME | End: 2023-11-17
Payer: MEDICARE

## 2023-11-17 DIAGNOSIS — Z12.31 ENCOUNTER FOR SCREENING MAMMOGRAM FOR MALIGNANT NEOPLASM OF BREAST: ICD-10-CM

## 2023-11-17 PROCEDURE — 77067 SCR MAMMO BI INCL CAD: CPT

## 2023-11-17 PROCEDURE — 77063 BREAST TOMOSYNTHESIS BI: CPT

## 2023-11-21 ENCOUNTER — HOSPITAL ENCOUNTER (OUTPATIENT)
Dept: CARDIOLOGY | Facility: HOSPITAL | Age: 73
Discharge: HOME | End: 2023-11-21
Payer: MEDICARE

## 2023-11-21 DIAGNOSIS — I63.9 CEREBROVASCULAR ACCIDENT (CVA), UNSPECIFIED MECHANISM (MULTI): ICD-10-CM

## 2023-11-21 DIAGNOSIS — I49.3 PVC (PREMATURE VENTRICULAR CONTRACTION): ICD-10-CM

## 2023-11-21 PROCEDURE — 93248 EXT ECG>7D<15D REV&INTERPJ: CPT | Performed by: INTERNAL MEDICINE

## 2023-11-21 PROCEDURE — 93246 EXT ECG>7D<15D RECORDING: CPT

## 2023-11-22 ENCOUNTER — APPOINTMENT (OUTPATIENT)
Dept: ORTHOPEDIC SURGERY | Facility: CLINIC | Age: 73
End: 2023-11-22
Payer: MEDICARE

## 2023-11-22 ENCOUNTER — APPOINTMENT (OUTPATIENT)
Dept: RADIOLOGY | Facility: CLINIC | Age: 73
End: 2023-11-22
Payer: COMMERCIAL

## 2023-12-05 ENCOUNTER — HOSPITAL ENCOUNTER (OUTPATIENT)
Dept: RADIOLOGY | Facility: HOSPITAL | Age: 73
Discharge: HOME | End: 2023-12-05
Payer: MEDICARE

## 2023-12-05 DIAGNOSIS — S72.92XD CLOSED FRACTURE OF LEFT FEMUR WITH ROUTINE HEALING, UNSPECIFIED FRACTURE MORPHOLOGY, UNSPECIFIED PORTION OF FEMUR, SUBSEQUENT ENCOUNTER: ICD-10-CM

## 2023-12-05 PROCEDURE — 73552 X-RAY EXAM OF FEMUR 2/>: CPT | Mod: LEFT SIDE | Performed by: RADIOLOGY

## 2023-12-05 PROCEDURE — 73552 X-RAY EXAM OF FEMUR 2/>: CPT | Mod: LT

## 2023-12-11 ENCOUNTER — OFFICE VISIT (OUTPATIENT)
Dept: VASCULAR SURGERY | Facility: CLINIC | Age: 73
End: 2023-12-11
Payer: MEDICARE

## 2023-12-11 ENCOUNTER — APPOINTMENT (OUTPATIENT)
Dept: VASCULAR SURGERY | Facility: HOSPITAL | Age: 73
End: 2023-12-11
Payer: MEDICARE

## 2023-12-11 VITALS
HEART RATE: 44 BPM | SYSTOLIC BLOOD PRESSURE: 85 MMHG | DIASTOLIC BLOOD PRESSURE: 40 MMHG | WEIGHT: 259 LBS | BODY MASS INDEX: 45.88 KG/M2

## 2023-12-11 DIAGNOSIS — R09.89 CAROTID BRUIT, UNSPECIFIED LATERALITY: Primary | ICD-10-CM

## 2023-12-11 PROCEDURE — 3078F DIAST BP <80 MM HG: CPT | Performed by: INTERNAL MEDICINE

## 2023-12-11 PROCEDURE — 1036F TOBACCO NON-USER: CPT | Performed by: INTERNAL MEDICINE

## 2023-12-11 PROCEDURE — 1159F MED LIST DOCD IN RCRD: CPT | Performed by: INTERNAL MEDICINE

## 2023-12-11 PROCEDURE — 3051F HG A1C>EQUAL 7.0%<8.0%: CPT | Performed by: INTERNAL MEDICINE

## 2023-12-11 PROCEDURE — 99212 OFFICE O/P EST SF 10 MIN: CPT | Performed by: INTERNAL MEDICINE

## 2023-12-11 PROCEDURE — 1126F AMNT PAIN NOTED NONE PRSNT: CPT | Performed by: INTERNAL MEDICINE

## 2023-12-11 PROCEDURE — 3074F SYST BP LT 130 MM HG: CPT | Performed by: INTERNAL MEDICINE

## 2023-12-11 PROCEDURE — 3048F LDL-C <100 MG/DL: CPT | Performed by: INTERNAL MEDICINE

## 2023-12-11 ASSESSMENT — ENCOUNTER SYMPTOMS
ALLERGIC/IMMUNOLOGIC NEGATIVE: 1
HEMATOLOGIC/LYMPHATIC NEGATIVE: 1
CARDIOVASCULAR NEGATIVE: 1
RESPIRATORY NEGATIVE: 1
ENDOCRINE NEGATIVE: 1
GASTROINTESTINAL NEGATIVE: 1
CONSTITUTIONAL NEGATIVE: 1
NEUROLOGICAL NEGATIVE: 1
PSYCHIATRIC NEGATIVE: 1
EYES NEGATIVE: 1
MUSCULOSKELETAL NEGATIVE: 1

## 2023-12-11 NOTE — PROGRESS NOTES
Subjective   Patient ID: Elsa Biggs is a 73 y.o. female who presents for New Patient Visit (Carotid).  HPI  73 year old female with a past medical history of  HLD, HTN, diabetes mellitus, obesity and stroke that presents to the office for initial consult for carotid stenosis. She was admitted in October with acute ischemic stroke in the left thalamus territory.  CTA neck also shows 85% MALATHI. Her symptoms that presented her to the hospital were dizziness, double vision, expressive aphasia and right sided facial droop- symptoms resolved after receiving TNK.  Was admitted from 10/19-10/22/2023 with acute ischemic stroke in the left thalamus territory. She has not had any episodes since last admission.     Vascular testing:  CTA neck 10/23: High-grade stenosis of the right cervical ICA up to 85% with moderate  stenosis of the right carotid bifurcation.    Review of Systems   Constitutional: Negative.    HENT: Negative.     Eyes: Negative.    Respiratory: Negative.     Cardiovascular: Negative.    Gastrointestinal: Negative.    Endocrine: Negative.    Genitourinary: Negative.    Musculoskeletal: Negative.    Skin: Negative.    Allergic/Immunologic: Negative.    Neurological: Negative.    Hematological: Negative.    Psychiatric/Behavioral: Negative.         Objective   Physical Exam  Constitutional:       Appearance: She is obese.   Eyes:      Pupils: Pupils are equal, round, and reactive to light.   Neck:      Vascular: Carotid bruit present.   Abdominal:      General: Bowel sounds are normal.   Musculoskeletal:         General: Normal range of motion.   Skin:     General: Skin is warm and dry.      Capillary Refill: Capillary refill takes less than 2 seconds.   Neurological:      General: No focal deficit present.      Mental Status: She is alert.         Assessment/Plan   73 year old female with a past medical history of  HLD, HTN, diabetes mellitus, obesity and stroke that presents to the office for initial consult  for carotid stenosis.     Plan:  Will obtain a carotid artery ultrasound. Will have you follow up with Dr. Victor after testing.  CTA neck shows 85% stenosis on right ICA  Will send CTA disc to [facility] to evaluate for TCAR VS. Endart.  Continue ASA, statin and Plavix            Yudi Shore, ANITA-CNP 12/11/23 1:37 PM

## 2023-12-13 ENCOUNTER — HOSPITAL ENCOUNTER (OUTPATIENT)
Dept: CARDIOLOGY | Facility: HOSPITAL | Age: 73
Discharge: HOME | End: 2023-12-13
Payer: MEDICARE

## 2023-12-13 ENCOUNTER — OFFICE VISIT (OUTPATIENT)
Dept: ORTHOPEDIC SURGERY | Facility: CLINIC | Age: 73
End: 2023-12-13
Payer: MEDICARE

## 2023-12-13 DIAGNOSIS — S72.92XD CLOSED FRACTURE OF LEFT FEMUR WITH ROUTINE HEALING, UNSPECIFIED FRACTURE MORPHOLOGY, UNSPECIFIED PORTION OF FEMUR, SUBSEQUENT ENCOUNTER: ICD-10-CM

## 2023-12-13 PROCEDURE — 99213 OFFICE O/P EST LOW 20 MIN: CPT | Performed by: ORTHOPAEDIC SURGERY

## 2023-12-13 PROCEDURE — 1036F TOBACCO NON-USER: CPT | Performed by: ORTHOPAEDIC SURGERY

## 2023-12-13 PROCEDURE — 3051F HG A1C>EQUAL 7.0%<8.0%: CPT | Performed by: ORTHOPAEDIC SURGERY

## 2023-12-13 PROCEDURE — 3048F LDL-C <100 MG/DL: CPT | Performed by: ORTHOPAEDIC SURGERY

## 2023-12-13 PROCEDURE — 1159F MED LIST DOCD IN RCRD: CPT | Performed by: ORTHOPAEDIC SURGERY

## 2023-12-13 PROCEDURE — 1126F AMNT PAIN NOTED NONE PRSNT: CPT | Performed by: ORTHOPAEDIC SURGERY

## 2023-12-13 PROCEDURE — 93005 ELECTROCARDIOGRAM TRACING: CPT

## 2023-12-13 NOTE — PROGRESS NOTES
Elsa Biggs is  post-op from ORIF left intraprostatic femur fracture with intramedullary nail on 10/30/2022. she is doing well at this point.  Pain is well controlled  Denies fevers or chills.  Denies drainage from the wound.  she reports no additional symptoms or concerns. No shortness of breath or chest pain. No calf swelling or pain.    The patients full medical history, surgical history, medications, allergies, family, medical history, social history, and a complete 14 point review of systems is documented in the medical record on the signed, scanned medical intake sheet or reviewed in the history of present illness. Review of systems otherwise negative    Past Medical History:   Diagnosis Date    Cancer (CMS/[facility])     Diabetes mellitus (CMS/[facility])        Medication Documentation Review Audit       Reviewed by Naveen Stoll MA (Medical Assistant) on 12/13/23 at 1323      Medication Order Taking? Sig Documenting Provider Last Dose Status   aspirin 81 mg chewable tablet [id number] No Chew 1 tablet (81 mg) once daily. Do not start before October 23, 2023. Stephanie Navarro MD Taking Active   atorvastatin (Lipitor) 80 mg tablet [id number] No Take 1 tablet (80 mg) by mouth once daily at bedtime. Stephanie Navarro MD Taking Active   blood sugar diagnostic strip [id number] No before meals and at bedtime. Use as instructed Historical Provider, MD Taking Active   cholecalciferol (Vitamin D-3) 25 MCG (1000 UT) tablet [id number] No Take 1 tablet (25 mcg) by mouth once daily. Historical Provider, MD Taking Active   clopidogrel (Plavix) 75 mg tablet [id number] No Take 1 tablet (75 mg) by mouth once daily. Stephanie Navarro MD Taking Active   ezetimibe (Zetia) 10 mg tablet [id number] No Take 1 tablet (10 mg) by mouth once daily. Historical Provider, MD Taking Active   metFORMIN (Glucophage) 500 mg tablet [id number] No Take 1 tablet (500 mg) by mouth 2 times a day with meals. Historical Provider, MD Taking Active   OneTouch Delica  Plus Lancet 33 gauge misc [id number] No USE TO TAKE BLOOD SUGAR 1-2 TIMES PER DAY Historical Provider, MD Taking Active   PARoxetine (Paxil) 20 mg tablet [id number] No Take 1 tablet (20 mg) by mouth once daily at bedtime. Historical Provider, MD Taking Active   pioglitazone (Actos) 30 mg tablet [id number] No Take 1 tablet (30 mg) by mouth once every 24 hours. Historical Provider, MD Taking Active                    Allergies   Allergen Reactions    Tetracycline Rash       Social History     Socioeconomic History    Marital status:      Spouse name: Not on file    Number of children: Not on file    Years of education: Not on file    Highest education level: Not on file   Occupational History    Not on file   Tobacco Use    Smoking status: Former     Packs/day: 1     Types: Cigarettes     Quit date:      Years since quittin.9    Smokeless tobacco: Never   Vaping Use    Vaping Use: Never used   Substance and Sexual Activity    Alcohol use: Yes     Alcohol/week: 1.0 standard drink of alcohol     Types: 1 Standard drinks or equivalent per week    Drug use: Never    Sexual activity: Defer   Other Topics Concern    Not on file   Social History Narrative    Not on file     Social Determinants of Health     Financial Resource Strain: Low Risk  (10/20/2023)    Overall Financial Resource Strain (CARDIA)     Difficulty of Paying Living Expenses: Not hard at all   Food Insecurity: Not on file   Transportation Needs: No Transportation Needs (10/20/2023)    PRAPARE - Transportation     Lack of Transportation (Medical): No     Lack of Transportation (Non-Medical): No   Physical Activity: Not on file   Stress: Not on file   Social Connections: Not on file   Intimate Partner Violence: Not on file   Housing Stability: Low Risk  (10/20/2023)    Housing Stability Vital Sign     Unable to Pay for Housing in the Last Year: No     Number of Places Lived in the Last Year: 1     Unstable Housing in the Last Year: No       Past  Surgical History:   Procedure Laterality Date    BREAST SURGERY      FRACTURE SURGERY      JOINT REPLACEMENT      MR NECK ANGIO W AND WO IV CONTRAST  01/14/2022    MR NECK ANGIO W AND WO IV CONTRAST 1/14/2022 [facility]       Gen: The patient is alert and oriented ×3, is in no acute distress, and appear their stated age and weight.    Psychiatric: Mood and affect are appropriate.    Eyes: Sclera are white, and pupils are round and symmetric.    ENT: Mucous membranes are moist.     Neck: Supple. Thyroid is midline.    Respiratory: Respirations are nonlabored, chest rise is symmetric.    Cardiac: Rate is regular by palpation of distal pulses.     Abdomen: Nondistended.    Integument: No obvious cutaneous lesions are noted. No signs of lymphangitis. No signs of systemic edema.  side: left lower extremity :  her  surgical incisions are healing well, without evidence of erythema, fluctuance, drainage, or infection.  The skin around the incision is intact.  Distally neurovascular exam is stable.  There is appropriate tenderness to palpation in the naina-incisional area. No calf swelling or tenderness to palpation.      I personally reviewed multiple views of left femur were obtained in the office today demonstrate maintenance of reduction, interval healing, and a stable position of the hardware.      Elsa Biggs is a 73 y.o. female patient status post  ORIF left intraprostatic femur fracture with intramedullary nail on 10/30/2022.    I went over her x-rays in detail today.   she is WBAT of the side: left lower extremity. ~He/she~ is range of motion as tolerated of the side: left lower extremity.  I stressed the importance of physical therapy on overall functional outcome.  She does have pain over the distal hardware.  I told her that we can electively remove this.  At this point I would like her to wait as she has 85% blockage of her internal carotid artery and is post to be getting stented in the next month or  2 I feel like this is more important than her elective hardware removal and once this is taken care of then we can readdress the hardware removal I answered all patient's questions he agrees with treatment plan.  I will see her back in Follow-up 6 month(s)with repeat 2 views of the left femur to discuss potential hardware removal        Noel Treviño    Department of Orthopaedic Trauma Surgery

## 2023-12-24 ENCOUNTER — APPOINTMENT (OUTPATIENT)
Dept: RADIOLOGY | Facility: HOSPITAL | Age: 73
DRG: 069 | End: 2023-12-24
Payer: MEDICARE

## 2023-12-24 ENCOUNTER — HOSPITAL ENCOUNTER (INPATIENT)
Facility: HOSPITAL | Age: 73
LOS: 1 days | Discharge: HOME | DRG: 069 | End: 2023-12-27
Attending: STUDENT IN AN ORGANIZED HEALTH CARE EDUCATION/TRAINING PROGRAM | Admitting: INTERNAL MEDICINE
Payer: MEDICARE

## 2023-12-24 DIAGNOSIS — I47.10 SVT (SUPRAVENTRICULAR TACHYCARDIA) (CMS-HCC): ICD-10-CM

## 2023-12-24 DIAGNOSIS — I42.8 OTHER CARDIOMYOPATHY (MULTI): ICD-10-CM

## 2023-12-24 DIAGNOSIS — G45.9 TIA (TRANSIENT ISCHEMIC ATTACK): Primary | ICD-10-CM

## 2023-12-24 DIAGNOSIS — R06.02 SHORTNESS OF BREATH ON EXERTION: ICD-10-CM

## 2023-12-24 DIAGNOSIS — R09.89 OTHER SPECIFIED SYMPTOMS AND SIGNS INVOLVING THE CIRCULATORY AND RESPIRATORY SYSTEMS: ICD-10-CM

## 2023-12-24 DIAGNOSIS — I63.6 CEREBRAL INFARCTION DUE TO CEREBRAL VENOUS THROMBOSIS, NONPYOGENIC (MULTI): ICD-10-CM

## 2023-12-24 DIAGNOSIS — Z86.73 HISTORY OF LACUNAR CEREBROVASCULAR ACCIDENT (CVA): ICD-10-CM

## 2023-12-24 DIAGNOSIS — I48.3 TYPICAL ATRIAL FLUTTER (MULTI): ICD-10-CM

## 2023-12-24 LAB
ALBUMIN SERPL BCP-MCNC: 3.2 G/DL (ref 3.4–5)
ALP SERPL-CCNC: 48 U/L (ref 33–136)
ALT SERPL W P-5'-P-CCNC: 14 U/L (ref 7–45)
ANION GAP BLDV CALCULATED.4IONS-SCNC: 6 MMOL/L (ref 10–25)
ANION GAP SERPL CALC-SCNC: 13 MMOL/L (ref 10–20)
AST SERPL W P-5'-P-CCNC: 19 U/L (ref 9–39)
BASE EXCESS BLDV CALC-SCNC: 1.5 MMOL/L (ref -2–3)
BASOPHILS # BLD AUTO: 0.03 X10*3/UL (ref 0–0.1)
BASOPHILS NFR BLD AUTO: 0.3 %
BILIRUB SERPL-MCNC: 0.5 MG/DL (ref 0–1.2)
BNP SERPL-MCNC: 308 PG/ML (ref 0–99)
BODY TEMPERATURE: 37 DEGREES CELSIUS
BUN SERPL-MCNC: 12 MG/DL (ref 6–23)
CA-I BLDV-SCNC: 1.14 MMOL/L (ref 1.1–1.33)
CALCIUM SERPL-MCNC: 8.2 MG/DL (ref 8.6–10.3)
CARDIAC TROPONIN I PNL SERPL HS: 15 NG/L (ref 0–13)
CARDIAC TROPONIN I PNL SERPL HS: 30 NG/L (ref 0–13)
CARDIAC TROPONIN I PNL SERPL HS: 40 NG/L (ref 0–13)
CHLORIDE BLDV-SCNC: 105 MMOL/L (ref 98–107)
CHLORIDE SERPL-SCNC: 102 MMOL/L (ref 98–107)
CO2 SERPL-SCNC: 23 MMOL/L (ref 21–32)
CREAT SERPL-MCNC: 0.62 MG/DL (ref 0.5–1.05)
EOSINOPHIL # BLD AUTO: 0.01 X10*3/UL (ref 0–0.4)
EOSINOPHIL NFR BLD AUTO: 0.1 %
ERYTHROCYTE [DISTWIDTH] IN BLOOD BY AUTOMATED COUNT: 16 % (ref 11.5–14.5)
FLUAV RNA RESP QL NAA+PROBE: NOT DETECTED
FLUBV RNA RESP QL NAA+PROBE: NOT DETECTED
GFR SERPL CREATININE-BSD FRML MDRD: >90 ML/MIN/1.73M*2
GLUCOSE BLDV-MCNC: 191 MG/DL (ref 74–99)
GLUCOSE SERPL-MCNC: 185 MG/DL (ref 74–99)
HCO3 BLDV-SCNC: 26.6 MMOL/L (ref 22–26)
HCT VFR BLD AUTO: 34.4 % (ref 36–46)
HCT VFR BLD EST: 31 % (ref 36–46)
HGB BLD-MCNC: 10.8 G/DL (ref 12–16)
HGB BLDV-MCNC: 10.2 G/DL (ref 12–16)
IMM GRANULOCYTES # BLD AUTO: 0.03 X10*3/UL (ref 0–0.5)
IMM GRANULOCYTES NFR BLD AUTO: 0.3 % (ref 0–0.9)
INHALED O2 CONCENTRATION: 28 %
INR PPP: 2 (ref 0.9–1.1)
LACTATE BLDV-SCNC: 0.9 MMOL/L (ref 0.4–2)
LYMPHOCYTES # BLD AUTO: 1.03 X10*3/UL (ref 0.8–3)
LYMPHOCYTES NFR BLD AUTO: 10.5 %
MAGNESIUM SERPL-MCNC: 1.55 MG/DL (ref 1.6–2.4)
MCH RBC QN AUTO: 27 PG (ref 26–34)
MCHC RBC AUTO-ENTMCNC: 31.4 G/DL (ref 32–36)
MCV RBC AUTO: 86 FL (ref 80–100)
MONOCYTES # BLD AUTO: 0.63 X10*3/UL (ref 0.05–0.8)
MONOCYTES NFR BLD AUTO: 6.4 %
NEUTROPHILS # BLD AUTO: 8.1 X10*3/UL (ref 1.6–5.5)
NEUTROPHILS NFR BLD AUTO: 82.4 %
NRBC BLD-RTO: 0 /100 WBCS (ref 0–0)
OXYHGB MFR BLDV: 94.6 % (ref 45–75)
PCO2 BLDV: 43 MM HG (ref 41–51)
PH BLDV: 7.4 PH (ref 7.33–7.43)
PLATELET # BLD AUTO: 262 X10*3/UL (ref 150–450)
PO2 BLDV: 78 MM HG (ref 35–45)
POTASSIUM BLDV-SCNC: 3.9 MMOL/L (ref 3.5–5.3)
POTASSIUM SERPL-SCNC: 4.5 MMOL/L (ref 3.5–5.3)
PROT SERPL-MCNC: 6.6 G/DL (ref 6.4–8.2)
PROTHROMBIN TIME: 23 SECONDS (ref 9.8–12.8)
RBC # BLD AUTO: 4 X10*6/UL (ref 4–5.2)
RSV RNA RESP QL NAA+PROBE: NOT DETECTED
SAO2 % BLDV: 97 % (ref 45–75)
SARS-COV-2 RNA RESP QL NAA+PROBE: NOT DETECTED
SODIUM BLDV-SCNC: 134 MMOL/L (ref 136–145)
SODIUM SERPL-SCNC: 133 MMOL/L (ref 136–145)
WBC # BLD AUTO: 9.8 X10*3/UL (ref 4.4–11.3)

## 2023-12-24 PROCEDURE — 36415 COLL VENOUS BLD VENIPUNCTURE: CPT | Performed by: STUDENT IN AN ORGANIZED HEALTH CARE EDUCATION/TRAINING PROGRAM

## 2023-12-24 PROCEDURE — 84132 ASSAY OF SERUM POTASSIUM: CPT | Performed by: STUDENT IN AN ORGANIZED HEALTH CARE EDUCATION/TRAINING PROGRAM

## 2023-12-24 PROCEDURE — 2500000004 HC RX 250 GENERAL PHARMACY W/ HCPCS (ALT 636 FOR OP/ED)

## 2023-12-24 PROCEDURE — 87634 RSV DNA/RNA AMP PROBE: CPT | Performed by: STUDENT IN AN ORGANIZED HEALTH CARE EDUCATION/TRAINING PROGRAM

## 2023-12-24 PROCEDURE — 83735 ASSAY OF MAGNESIUM: CPT | Performed by: STUDENT IN AN ORGANIZED HEALTH CARE EDUCATION/TRAINING PROGRAM

## 2023-12-24 PROCEDURE — 71275 CT ANGIOGRAPHY CHEST: CPT | Performed by: STUDENT IN AN ORGANIZED HEALTH CARE EDUCATION/TRAINING PROGRAM

## 2023-12-24 PROCEDURE — 71275 CT ANGIOGRAPHY CHEST: CPT

## 2023-12-24 PROCEDURE — 85025 COMPLETE CBC W/AUTO DIFF WBC: CPT | Performed by: STUDENT IN AN ORGANIZED HEALTH CARE EDUCATION/TRAINING PROGRAM

## 2023-12-24 PROCEDURE — 87636 SARSCOV2 & INF A&B AMP PRB: CPT | Performed by: STUDENT IN AN ORGANIZED HEALTH CARE EDUCATION/TRAINING PROGRAM

## 2023-12-24 PROCEDURE — 85610 PROTHROMBIN TIME: CPT | Performed by: STUDENT IN AN ORGANIZED HEALTH CARE EDUCATION/TRAINING PROGRAM

## 2023-12-24 PROCEDURE — 84484 ASSAY OF TROPONIN QUANT: CPT | Performed by: STUDENT IN AN ORGANIZED HEALTH CARE EDUCATION/TRAINING PROGRAM

## 2023-12-24 PROCEDURE — 84075 ASSAY ALKALINE PHOSPHATASE: CPT | Performed by: STUDENT IN AN ORGANIZED HEALTH CARE EDUCATION/TRAINING PROGRAM

## 2023-12-24 PROCEDURE — 70450 CT HEAD/BRAIN W/O DYE: CPT | Performed by: RADIOLOGY

## 2023-12-24 PROCEDURE — 71045 X-RAY EXAM CHEST 1 VIEW: CPT

## 2023-12-24 PROCEDURE — 70498 CT ANGIOGRAPHY NECK: CPT | Performed by: STUDENT IN AN ORGANIZED HEALTH CARE EDUCATION/TRAINING PROGRAM

## 2023-12-24 PROCEDURE — 96365 THER/PROPH/DIAG IV INF INIT: CPT | Mod: 59

## 2023-12-24 PROCEDURE — 96360 HYDRATION IV INFUSION INIT: CPT | Mod: 59

## 2023-12-24 PROCEDURE — 70496 CT ANGIOGRAPHY HEAD: CPT

## 2023-12-24 PROCEDURE — 74177 CT ABD & PELVIS W/CONTRAST: CPT

## 2023-12-24 PROCEDURE — 99291 CRITICAL CARE FIRST HOUR: CPT | Mod: 25 | Performed by: STUDENT IN AN ORGANIZED HEALTH CARE EDUCATION/TRAINING PROGRAM

## 2023-12-24 PROCEDURE — 74177 CT ABD & PELVIS W/CONTRAST: CPT | Performed by: STUDENT IN AN ORGANIZED HEALTH CARE EDUCATION/TRAINING PROGRAM

## 2023-12-24 PROCEDURE — 70450 CT HEAD/BRAIN W/O DYE: CPT

## 2023-12-24 PROCEDURE — 70496 CT ANGIOGRAPHY HEAD: CPT | Performed by: STUDENT IN AN ORGANIZED HEALTH CARE EDUCATION/TRAINING PROGRAM

## 2023-12-24 PROCEDURE — 2550000001 HC RX 255 CONTRASTS: Performed by: STUDENT IN AN ORGANIZED HEALTH CARE EDUCATION/TRAINING PROGRAM

## 2023-12-24 PROCEDURE — 71045 X-RAY EXAM CHEST 1 VIEW: CPT | Performed by: STUDENT IN AN ORGANIZED HEALTH CARE EDUCATION/TRAINING PROGRAM

## 2023-12-24 PROCEDURE — 83880 ASSAY OF NATRIURETIC PEPTIDE: CPT | Performed by: STUDENT IN AN ORGANIZED HEALTH CARE EDUCATION/TRAINING PROGRAM

## 2023-12-24 PROCEDURE — 2500000004 HC RX 250 GENERAL PHARMACY W/ HCPCS (ALT 636 FOR OP/ED): Performed by: STUDENT IN AN ORGANIZED HEALTH CARE EDUCATION/TRAINING PROGRAM

## 2023-12-24 PROCEDURE — 70498 CT ANGIOGRAPHY NECK: CPT

## 2023-12-24 RX ORDER — ONDANSETRON HYDROCHLORIDE 2 MG/ML
INJECTION, SOLUTION INTRAVENOUS
Status: COMPLETED
Start: 2023-12-24 | End: 2023-12-24

## 2023-12-24 RX ORDER — ADENOSINE 3 MG/ML
INJECTION, SOLUTION INTRAVENOUS
Status: COMPLETED
Start: 2023-12-24 | End: 2023-12-24

## 2023-12-24 RX ORDER — MAGNESIUM SULFATE HEPTAHYDRATE 40 MG/ML
2 INJECTION, SOLUTION INTRAVENOUS ONCE
Status: COMPLETED | OUTPATIENT
Start: 2023-12-24 | End: 2023-12-24

## 2023-12-24 RX ADMIN — SODIUM CHLORIDE, POTASSIUM CHLORIDE, SODIUM LACTATE AND CALCIUM CHLORIDE 1000 ML: 600; 310; 30; 20 INJECTION, SOLUTION INTRAVENOUS at 20:38

## 2023-12-24 RX ADMIN — IOHEXOL 50 ML: 350 INJECTION, SOLUTION INTRAVENOUS at 19:31

## 2023-12-24 RX ADMIN — MAGNESIUM SULFATE HEPTAHYDRATE 2 G: 40 INJECTION, SOLUTION INTRAVENOUS at 20:27

## 2023-12-24 RX ADMIN — ONDANSETRON: 2 INJECTION, SOLUTION INTRAMUSCULAR; INTRAVENOUS at 19:30

## 2023-12-24 RX ADMIN — IOHEXOL 75 ML: 350 INJECTION, SOLUTION INTRAVENOUS at 22:05

## 2023-12-24 RX ADMIN — ADENOSINE 18 MG: 3 INJECTION INTRAVENOUS at 19:46

## 2023-12-24 ASSESSMENT — PAIN - FUNCTIONAL ASSESSMENT: PAIN_FUNCTIONAL_ASSESSMENT: 0-10

## 2023-12-25 PROBLEM — Z86.73 HISTORY OF LACUNAR CEREBROVASCULAR ACCIDENT (CVA): Status: ACTIVE | Noted: 2023-12-25

## 2023-12-25 PROBLEM — I48.92 ATRIAL FLUTTER (MULTI): Status: ACTIVE | Noted: 2023-12-25

## 2023-12-25 PROBLEM — I65.23 BILATERAL CAROTID ARTERY STENOSIS: Status: ACTIVE | Noted: 2023-12-25

## 2023-12-25 PROBLEM — G45.9 TIA (TRANSIENT ISCHEMIC ATTACK): Status: ACTIVE | Noted: 2023-12-25

## 2023-12-25 LAB
ALBUMIN SERPL BCP-MCNC: 3 G/DL (ref 3.4–5)
ALP SERPL-CCNC: 45 U/L (ref 33–136)
ALT SERPL W P-5'-P-CCNC: 14 U/L (ref 7–45)
APPEARANCE UR: CLEAR
AST SERPL W P-5'-P-CCNC: 13 U/L (ref 9–39)
BILIRUB DIRECT SERPL-MCNC: 0.1 MG/DL (ref 0–0.3)
BILIRUB SERPL-MCNC: 0.4 MG/DL (ref 0–1.2)
BILIRUB UR STRIP.AUTO-MCNC: NEGATIVE MG/DL
BNP SERPL-MCNC: 589 PG/ML (ref 0–99)
CARDIAC TROPONIN I PNL SERPL HS: 51 NG/L (ref 0–13)
COLOR UR: YELLOW
GLUCOSE BLD MANUAL STRIP-MCNC: 138 MG/DL (ref 74–99)
GLUCOSE BLD MANUAL STRIP-MCNC: 156 MG/DL (ref 74–99)
GLUCOSE BLD MANUAL STRIP-MCNC: 157 MG/DL (ref 74–99)
GLUCOSE BLD MANUAL STRIP-MCNC: 171 MG/DL (ref 74–99)
GLUCOSE UR STRIP.AUTO-MCNC: NEGATIVE MG/DL
KETONES UR STRIP.AUTO-MCNC: ABNORMAL MG/DL
LEUKOCYTE ESTERASE UR QL STRIP.AUTO: NEGATIVE
NITRITE UR QL STRIP.AUTO: NEGATIVE
PH UR STRIP.AUTO: 5 [PH]
PROT SERPL-MCNC: 6.2 G/DL (ref 6.4–8.2)
PROT UR STRIP.AUTO-MCNC: ABNORMAL MG/DL
RBC # UR STRIP.AUTO: ABNORMAL /UL
RBC #/AREA URNS AUTO: ABNORMAL /HPF
SP GR UR STRIP.AUTO: 1.06
UROBILINOGEN UR STRIP.AUTO-MCNC: <2 MG/DL
WBC #/AREA URNS AUTO: ABNORMAL /HPF

## 2023-12-25 PROCEDURE — 82947 ASSAY GLUCOSE BLOOD QUANT: CPT

## 2023-12-25 PROCEDURE — G0378 HOSPITAL OBSERVATION PER HR: HCPCS

## 2023-12-25 PROCEDURE — 84484 ASSAY OF TROPONIN QUANT: CPT | Performed by: STUDENT IN AN ORGANIZED HEALTH CARE EDUCATION/TRAINING PROGRAM

## 2023-12-25 PROCEDURE — 96372 THER/PROPH/DIAG INJ SC/IM: CPT | Performed by: INTERNAL MEDICINE

## 2023-12-25 PROCEDURE — 97162 PT EVAL MOD COMPLEX 30 MIN: CPT | Mod: GP,KX | Performed by: PHYSICAL THERAPIST

## 2023-12-25 PROCEDURE — 2500000004 HC RX 250 GENERAL PHARMACY W/ HCPCS (ALT 636 FOR OP/ED): Performed by: INTERNAL MEDICINE

## 2023-12-25 PROCEDURE — 2500000001 HC RX 250 WO HCPCS SELF ADMINISTERED DRUGS (ALT 637 FOR MEDICARE OP): Performed by: INTERNAL MEDICINE

## 2023-12-25 PROCEDURE — 36415 COLL VENOUS BLD VENIPUNCTURE: CPT | Performed by: INTERNAL MEDICINE

## 2023-12-25 PROCEDURE — 99222 1ST HOSP IP/OBS MODERATE 55: CPT | Performed by: INTERNAL MEDICINE

## 2023-12-25 PROCEDURE — 83880 ASSAY OF NATRIURETIC PEPTIDE: CPT | Performed by: INTERNAL MEDICINE

## 2023-12-25 PROCEDURE — 36415 COLL VENOUS BLD VENIPUNCTURE: CPT | Performed by: STUDENT IN AN ORGANIZED HEALTH CARE EDUCATION/TRAINING PROGRAM

## 2023-12-25 PROCEDURE — 81001 URINALYSIS AUTO W/SCOPE: CPT | Performed by: STUDENT IN AN ORGANIZED HEALTH CARE EDUCATION/TRAINING PROGRAM

## 2023-12-25 PROCEDURE — 2500000002 HC RX 250 W HCPCS SELF ADMINISTERED DRUGS (ALT 637 FOR MEDICARE OP, ALT 636 FOR OP/ED): Performed by: INTERNAL MEDICINE

## 2023-12-25 PROCEDURE — 87040 BLOOD CULTURE FOR BACTERIA: CPT | Mod: PORLAB | Performed by: INTERNAL MEDICINE

## 2023-12-25 PROCEDURE — 99223 1ST HOSP IP/OBS HIGH 75: CPT | Performed by: INTERNAL MEDICINE

## 2023-12-25 PROCEDURE — 80076 HEPATIC FUNCTION PANEL: CPT | Performed by: INTERNAL MEDICINE

## 2023-12-25 RX ORDER — CLOPIDOGREL BISULFATE 75 MG/1
75 TABLET ORAL DAILY
Status: DISCONTINUED | OUTPATIENT
Start: 2023-12-25 | End: 2023-12-27 | Stop reason: HOSPADM

## 2023-12-25 RX ORDER — NAPROXEN SODIUM 220 MG/1
81 TABLET, FILM COATED ORAL DAILY
Status: DISCONTINUED | OUTPATIENT
Start: 2023-12-25 | End: 2023-12-26

## 2023-12-25 RX ORDER — ONDANSETRON HYDROCHLORIDE 2 MG/ML
4 INJECTION, SOLUTION INTRAVENOUS EVERY 6 HOURS PRN
Status: DISCONTINUED | OUTPATIENT
Start: 2023-12-25 | End: 2023-12-27 | Stop reason: HOSPADM

## 2023-12-25 RX ORDER — ENOXAPARIN SODIUM 100 MG/ML
40 INJECTION SUBCUTANEOUS EVERY 12 HOURS SCHEDULED
Status: DISCONTINUED | OUTPATIENT
Start: 2023-12-25 | End: 2023-12-26

## 2023-12-25 RX ORDER — INSULIN LISPRO 100 [IU]/ML
0-5 INJECTION, SOLUTION INTRAVENOUS; SUBCUTANEOUS
Status: DISCONTINUED | OUTPATIENT
Start: 2023-12-25 | End: 2023-12-27 | Stop reason: HOSPADM

## 2023-12-25 RX ORDER — ACETAMINOPHEN 325 MG/1
650 TABLET ORAL EVERY 4 HOURS PRN
Status: DISCONTINUED | OUTPATIENT
Start: 2023-12-25 | End: 2023-12-27 | Stop reason: HOSPADM

## 2023-12-25 RX ORDER — EZETIMIBE 10 MG/1
10 TABLET ORAL DAILY
Status: DISCONTINUED | OUTPATIENT
Start: 2023-12-25 | End: 2023-12-27 | Stop reason: HOSPADM

## 2023-12-25 RX ORDER — METOPROLOL TARTRATE 25 MG/1
25 TABLET, FILM COATED ORAL 2 TIMES DAILY
Status: DISCONTINUED | OUTPATIENT
Start: 2023-12-25 | End: 2023-12-27 | Stop reason: HOSPADM

## 2023-12-25 RX ORDER — PAROXETINE HYDROCHLORIDE 20 MG/1
20 TABLET, FILM COATED ORAL NIGHTLY
Status: DISCONTINUED | OUTPATIENT
Start: 2023-12-25 | End: 2023-12-27 | Stop reason: HOSPADM

## 2023-12-25 RX ORDER — DEXTROSE MONOHYDRATE 100 MG/ML
0.3 INJECTION, SOLUTION INTRAVENOUS ONCE AS NEEDED
Status: DISCONTINUED | OUTPATIENT
Start: 2023-12-25 | End: 2023-12-27 | Stop reason: HOSPADM

## 2023-12-25 RX ORDER — AMINOPHYLLINE 25 MG/ML
125 INJECTION, SOLUTION INTRAVENOUS AS NEEDED
Status: CANCELLED | OUTPATIENT
Start: 2023-12-25

## 2023-12-25 RX ORDER — ATORVASTATIN CALCIUM 40 MG/1
80 TABLET, FILM COATED ORAL NIGHTLY
Status: DISCONTINUED | OUTPATIENT
Start: 2023-12-25 | End: 2023-12-27 | Stop reason: HOSPADM

## 2023-12-25 RX ORDER — DEXTROSE 50 % IN WATER (D50W) INTRAVENOUS SYRINGE
25
Status: DISCONTINUED | OUTPATIENT
Start: 2023-12-25 | End: 2023-12-27 | Stop reason: HOSPADM

## 2023-12-25 RX ADMIN — CLOPIDOGREL 75 MG: 75 TABLET ORAL at 08:13

## 2023-12-25 RX ADMIN — PAROXETINE 20 MG: 20 TABLET, FILM COATED ORAL at 03:42

## 2023-12-25 RX ADMIN — INSULIN LISPRO 1 UNITS: 100 INJECTION, SOLUTION INTRAVENOUS; SUBCUTANEOUS at 22:39

## 2023-12-25 RX ADMIN — ATORVASTATIN CALCIUM 80 MG: 40 TABLET, FILM COATED ORAL at 03:42

## 2023-12-25 RX ADMIN — ASPIRIN 81 MG: 81 TABLET, CHEWABLE ORAL at 08:13

## 2023-12-25 RX ADMIN — ENOXAPARIN SODIUM 40 MG: 40 INJECTION SUBCUTANEOUS at 08:12

## 2023-12-25 RX ADMIN — EZETIMIBE 10 MG: 10 TABLET ORAL at 08:13

## 2023-12-25 RX ADMIN — METOPROLOL TARTRATE 25 MG: 25 TABLET, FILM COATED ORAL at 22:31

## 2023-12-25 RX ADMIN — ATORVASTATIN CALCIUM 80 MG: 40 TABLET, FILM COATED ORAL at 22:31

## 2023-12-25 RX ADMIN — PAROXETINE 20 MG: 20 TABLET, FILM COATED ORAL at 22:31

## 2023-12-25 RX ADMIN — ENOXAPARIN SODIUM 40 MG: 40 INJECTION SUBCUTANEOUS at 22:31

## 2023-12-25 RX ADMIN — METOPROLOL TARTRATE 25 MG: 25 TABLET, FILM COATED ORAL at 10:28

## 2023-12-25 RX ADMIN — ENOXAPARIN SODIUM 40 MG: 40 INJECTION SUBCUTANEOUS at 03:42

## 2023-12-25 SDOH — SOCIAL STABILITY: SOCIAL INSECURITY: WITHIN THE LAST YEAR, HAVE YOU BEEN AFRAID OF YOUR PARTNER OR EX-PARTNER?: NO

## 2023-12-25 SDOH — SOCIAL STABILITY: SOCIAL INSECURITY: HAVE YOU HAD THOUGHTS OF HARMING ANYONE ELSE?: NO

## 2023-12-25 SDOH — SOCIAL STABILITY: SOCIAL NETWORK: HOW OFTEN DO YOU ATTEND CHURCH OR RELIGIOUS SERVICES?: PATIENT DECLINED

## 2023-12-25 SDOH — SOCIAL STABILITY: SOCIAL INSECURITY: ARE YOU OR HAVE YOU BEEN THREATENED OR ABUSED PHYSICALLY, EMOTIONALLY, OR SEXUALLY BY ANYONE?: NO

## 2023-12-25 SDOH — SOCIAL STABILITY: SOCIAL NETWORK: ARE YOU MARRIED, WIDOWED, DIVORCED, SEPARATED, NEVER MARRIED, OR LIVING WITH A PARTNER?: PATIENT DECLINED

## 2023-12-25 SDOH — SOCIAL STABILITY: SOCIAL INSECURITY: WITHIN THE LAST YEAR, HAVE YOU BEEN HUMILIATED OR EMOTIONALLY ABUSED IN OTHER WAYS BY YOUR PARTNER OR EX-PARTNER?: NO

## 2023-12-25 SDOH — SOCIAL STABILITY: SOCIAL INSECURITY: HAS ANYONE EVER THREATENED TO HURT YOUR FAMILY OR YOUR PETS?: NO

## 2023-12-25 SDOH — HEALTH STABILITY: PHYSICAL HEALTH: ON AVERAGE, HOW MANY DAYS PER WEEK DO YOU ENGAGE IN MODERATE TO STRENUOUS EXERCISE (LIKE A BRISK WALK)?: 0 DAYS

## 2023-12-25 SDOH — SOCIAL STABILITY: SOCIAL INSECURITY: DOES ANYONE TRY TO KEEP YOU FROM HAVING/CONTACTING OTHER FRIENDS OR DOING THINGS OUTSIDE YOUR HOME?: NO

## 2023-12-25 SDOH — HEALTH STABILITY: MENTAL HEALTH
STRESS IS WHEN SOMEONE FEELS TENSE, NERVOUS, ANXIOUS, OR CAN'T SLEEP AT NIGHT BECAUSE THEIR MIND IS TROUBLED. HOW STRESSED ARE YOU?: PATIENT DECLINED

## 2023-12-25 SDOH — ECONOMIC STABILITY: INCOME INSECURITY: IN THE LAST 12 MONTHS, WAS THERE A TIME WHEN YOU WERE NOT ABLE TO PAY THE MORTGAGE OR RENT ON TIME?: PATIENT DECLINED

## 2023-12-25 SDOH — SOCIAL STABILITY: SOCIAL INSECURITY: ABUSE: ADULT

## 2023-12-25 SDOH — ECONOMIC STABILITY: FOOD INSECURITY: WITHIN THE PAST 12 MONTHS, YOU WORRIED THAT YOUR FOOD WOULD RUN OUT BEFORE YOU GOT MONEY TO BUY MORE.: PATIENT DECLINED

## 2023-12-25 SDOH — HEALTH STABILITY: MENTAL HEALTH: HOW MANY STANDARD DRINKS CONTAINING ALCOHOL DO YOU HAVE ON A TYPICAL DAY?: PATIENT DOES NOT DRINK

## 2023-12-25 SDOH — SOCIAL STABILITY: SOCIAL INSECURITY
WITHIN THE LAST YEAR, HAVE TO BEEN RAPED OR FORCED TO HAVE ANY KIND OF SEXUAL ACTIVITY BY YOUR PARTNER OR EX-PARTNER?: NO

## 2023-12-25 SDOH — SOCIAL STABILITY: SOCIAL INSECURITY: DO YOU FEEL UNSAFE GOING BACK TO THE PLACE WHERE YOU ARE LIVING?: NO

## 2023-12-25 SDOH — SOCIAL STABILITY: SOCIAL NETWORK
DO YOU BELONG TO ANY CLUBS OR ORGANIZATIONS SUCH AS CHURCH GROUPS UNIONS, FRATERNAL OR ATHLETIC GROUPS, OR SCHOOL GROUPS?: PATIENT DECLINED

## 2023-12-25 SDOH — SOCIAL STABILITY: SOCIAL INSECURITY: ARE THERE ANY APPARENT SIGNS OF INJURIES/BEHAVIORS THAT COULD BE RELATED TO ABUSE/NEGLECT?: NO

## 2023-12-25 SDOH — SOCIAL STABILITY: SOCIAL INSECURITY
WITHIN THE LAST YEAR, HAVE YOU BEEN KICKED, HIT, SLAPPED, OR OTHERWISE PHYSICALLY HURT BY YOUR PARTNER OR EX-PARTNER?: NO

## 2023-12-25 SDOH — SOCIAL STABILITY: SOCIAL INSECURITY: WERE YOU ABLE TO COMPLETE ALL THE BEHAVIORAL HEALTH SCREENINGS?: YES

## 2023-12-25 SDOH — HEALTH STABILITY: PHYSICAL HEALTH: ON AVERAGE, HOW MANY MINUTES DO YOU ENGAGE IN EXERCISE AT THIS LEVEL?: 0 MIN

## 2023-12-25 SDOH — HEALTH STABILITY: MENTAL HEALTH: HOW OFTEN DO YOU HAVE 6 OR MORE DRINKS ON ONE OCCASION?: NEVER

## 2023-12-25 SDOH — SOCIAL STABILITY: SOCIAL NETWORK: HOW OFTEN DO YOU ATTENT MEETINGS OF THE CLUB OR ORGANIZATION YOU BELONG TO?: PATIENT DECLINED

## 2023-12-25 SDOH — SOCIAL STABILITY: SOCIAL NETWORK: IN A TYPICAL WEEK, HOW MANY TIMES DO YOU TALK ON THE PHONE WITH FAMILY, FRIENDS, OR NEIGHBORS?: THREE TIMES A WEEK

## 2023-12-25 SDOH — SOCIAL STABILITY: SOCIAL INSECURITY: DO YOU FEEL ANYONE HAS EXPLOITED OR TAKEN ADVANTAGE OF YOU FINANCIALLY OR OF YOUR PERSONAL PROPERTY?: NO

## 2023-12-25 SDOH — ECONOMIC STABILITY: HOUSING INSECURITY
IN THE LAST 12 MONTHS, WAS THERE A TIME WHEN YOU DID NOT HAVE A STEADY PLACE TO SLEEP OR SLEPT IN A SHELTER (INCLUDING NOW)?: PATIENT DECLINED

## 2023-12-25 SDOH — ECONOMIC STABILITY: INCOME INSECURITY: HOW HARD IS IT FOR YOU TO PAY FOR THE VERY BASICS LIKE FOOD, HOUSING, MEDICAL CARE, AND HEATING?: PATIENT DECLINED

## 2023-12-25 SDOH — ECONOMIC STABILITY: TRANSPORTATION INSECURITY
IN THE PAST 12 MONTHS, HAS LACK OF TRANSPORTATION KEPT YOU FROM MEETINGS, WORK, OR FROM GETTING THINGS NEEDED FOR DAILY LIVING?: PATIENT DECLINED

## 2023-12-25 SDOH — ECONOMIC STABILITY: TRANSPORTATION INSECURITY
IN THE PAST 12 MONTHS, HAS THE LACK OF TRANSPORTATION KEPT YOU FROM MEDICAL APPOINTMENTS OR FROM GETTING MEDICATIONS?: PATIENT DECLINED

## 2023-12-25 SDOH — SOCIAL STABILITY: SOCIAL NETWORK: HOW OFTEN DO YOU GET TOGETHER WITH FRIENDS OR RELATIVES?: ONCE A WEEK

## 2023-12-25 SDOH — ECONOMIC STABILITY: INCOME INSECURITY
IN THE PAST 12 MONTHS, HAS THE ELECTRIC, GAS, OIL, OR WATER COMPANY THREATENED TO SHUT OFF SERVICE IN YOUR HOME?: PATIENT DECLINED

## 2023-12-25 SDOH — ECONOMIC STABILITY: FOOD INSECURITY: WITHIN THE PAST 12 MONTHS, THE FOOD YOU BOUGHT JUST DIDN'T LAST AND YOU DIDN'T HAVE MONEY TO GET MORE.: PATIENT DECLINED

## 2023-12-25 ASSESSMENT — ACTIVITIES OF DAILY LIVING (ADL)
TOILETING: NEEDS ASSISTANCE
ASSISTIVE_DEVICE: WHEELCHAIR;WALKER
ASSISTIVE_DEVICE: EYEGLASSES;WALKER;WHEELCHAIR
HEARING - LEFT EAR: FUNCTIONAL
GROOMING: NEEDS ASSISTANCE
DRESSING YOURSELF: NEEDS ASSISTANCE
HEARING - RIGHT EAR: FUNCTIONAL
PATIENT'S MEMORY ADEQUATE TO SAFELY COMPLETE DAILY ACTIVITIES?: YES
JUDGMENT_ADEQUATE_SAFELY_COMPLETE_DAILY_ACTIVITIES: YES
WALKS IN HOME: NEEDS ASSISTANCE
ADEQUATE_TO_COMPLETE_ADL: YES
WALKS IN HOME: NEEDS ASSISTANCE
GROOMING: NEEDS ASSISTANCE
PATIENT'S MEMORY ADEQUATE TO SAFELY COMPLETE DAILY ACTIVITIES?: YES
TOILETING: NEEDS ASSISTANCE
JUDGMENT_ADEQUATE_SAFELY_COMPLETE_DAILY_ACTIVITIES: YES
BATHING: INDEPENDENT
HEARING - LEFT EAR: FUNCTIONAL
HEARING - RIGHT EAR: FUNCTIONAL
ADEQUATE_TO_COMPLETE_ADL: YES
BATHING: INDEPENDENT
DRESSING YOURSELF: NEEDS ASSISTANCE

## 2023-12-25 ASSESSMENT — PAIN SCALES - GENERAL: PAINLEVEL_OUTOF10: 0 - NO PAIN

## 2023-12-25 ASSESSMENT — COGNITIVE AND FUNCTIONAL STATUS - GENERAL
HELP NEEDED FOR BATHING: A LOT
CLIMB 3 TO 5 STEPS WITH RAILING: TOTAL
DRESSING REGULAR UPPER BODY CLOTHING: A LITTLE
STANDING UP FROM CHAIR USING ARMS: A LOT
MOVING FROM LYING ON BACK TO SITTING ON SIDE OF FLAT BED WITH BEDRAILS: A LITTLE
MOBILITY SCORE: 13
CLIMB 3 TO 5 STEPS WITH RAILING: TOTAL
TOILETING: A LITTLE
WALKING IN HOSPITAL ROOM: A LOT
DRESSING REGULAR LOWER BODY CLOTHING: A LOT
TURNING FROM BACK TO SIDE WHILE IN FLAT BAD: A LITTLE
DAILY ACTIVITIY SCORE: 17
MOBILITY SCORE: 11
MOVING TO AND FROM BED TO CHAIR: A LOT
STANDING UP FROM CHAIR USING ARMS: A LOT
PERSONAL GROOMING: A LITTLE
MOVING TO AND FROM BED TO CHAIR: A LOT
MOVING FROM LYING ON BACK TO SITTING ON SIDE OF FLAT BED WITH BEDRAILS: A LOT
TURNING FROM BACK TO SIDE WHILE IN FLAT BAD: A LOT
WALKING IN HOSPITAL ROOM: A LOT

## 2023-12-25 ASSESSMENT — LIFESTYLE VARIABLES
HOW MANY STANDARD DRINKS CONTAINING ALCOHOL DO YOU HAVE ON A TYPICAL DAY: PATIENT DOES NOT DRINK
AUDIT-C TOTAL SCORE: 0
SUBSTANCE_ABUSE_PAST_12_MONTHS: NO
PRESCIPTION_ABUSE_PAST_12_MONTHS: NO
SKIP TO QUESTIONS 9-10: 1
HOW OFTEN DO YOU HAVE 6 OR MORE DRINKS ON ONE OCCASION: NEVER
HOW OFTEN DO YOU HAVE A DRINK CONTAINING ALCOHOL: NEVER
PRESCIPTION_ABUSE_PAST_12_MONTHS: NO
HOW OFTEN DO YOU HAVE 6 OR MORE DRINKS ON ONE OCCASION: NEVER
SUBSTANCE_ABUSE_PAST_12_MONTHS: NO
AUDIT-C TOTAL SCORE: 0
SUBSTANCE_ABUSE_PAST_12_MONTHS: NO
HOW MANY STANDARD DRINKS CONTAINING ALCOHOL DO YOU HAVE ON A TYPICAL DAY: PATIENT DOES NOT DRINK
PRESCIPTION_ABUSE_PAST_12_MONTHS: NO

## 2023-12-25 ASSESSMENT — ENCOUNTER SYMPTOMS
FATIGUE: 1
FEVER: 0
ABDOMINAL DISTENTION: 0
NUMBNESS: 0
CONFUSION: 1
FACIAL ASYMMETRY: 0
HEADACHES: 0
CHEST TIGHTNESS: 0
CHILLS: 0
CHOKING: 0
DIZZINESS: 0
PALPITATIONS: 0
DIAPHORESIS: 0
LIGHT-HEADEDNESS: 0
ABDOMINAL PAIN: 0
COUGH: 0
SHORTNESS OF BREATH: 0
SPEECH DIFFICULTY: 0
DIFFICULTY URINATING: 0

## 2023-12-25 ASSESSMENT — PATIENT HEALTH QUESTIONNAIRE - PHQ9
SUM OF ALL RESPONSES TO PHQ9 QUESTIONS 1 & 2: 0
1. LITTLE INTEREST OR PLEASURE IN DOING THINGS: NOT AT ALL
2. FEELING DOWN, DEPRESSED OR HOPELESS: NOT AT ALL
2. FEELING DOWN, DEPRESSED OR HOPELESS: NOT AT ALL
SUM OF ALL RESPONSES TO PHQ9 QUESTIONS 1 & 2: 0
1. LITTLE INTEREST OR PLEASURE IN DOING THINGS: NOT AT ALL

## 2023-12-25 ASSESSMENT — COLUMBIA-SUICIDE SEVERITY RATING SCALE - C-SSRS
2. HAVE YOU ACTUALLY HAD ANY THOUGHTS OF KILLING YOURSELF?: NO
6. HAVE YOU EVER DONE ANYTHING, STARTED TO DO ANYTHING, OR PREPARED TO DO ANYTHING TO END YOUR LIFE?: NO
1. IN THE PAST MONTH, HAVE YOU WISHED YOU WERE DEAD OR WISHED YOU COULD GO TO SLEEP AND NOT WAKE UP?: NO

## 2023-12-25 ASSESSMENT — PAIN - FUNCTIONAL ASSESSMENT: PAIN_FUNCTIONAL_ASSESSMENT: 0-10

## 2023-12-25 NOTE — ED PROCEDURE NOTE
Procedure  Critical Care    Performed by: Stephany Johnson MD  Authorized by: Terrie Verma DO    Critical care provider statement:     Critical care time (minutes):  65    Critical care was necessary to treat or prevent imminent or life-threatening deterioration of the following conditions:  CNS failure or compromise and cardiac failure    Critical care was time spent personally by me on the following activities:  Blood draw for specimens, development of treatment plan with patient or surrogate, discussions with consultants, discussions with primary provider, evaluation of patient's response to treatment, examination of patient, obtaining history from patient or surrogate, ordering and performing treatments and interventions, ordering and review of laboratory studies, ordering and review of radiographic studies, pulse oximetry, re-evaluation of patient's condition and review of old charts    I assumed direction of critical care for this patient from another provider in my specialty: no                 Stephany Johnson MD  12/25/23 5076     6

## 2023-12-25 NOTE — H&P
"History Of Present Illness  Elsa Biggs is a 73 y.o. female with a PMHx of Carotid stenosis (MALATHI stenosis 85% on CTA neck 10/29/2023), CVA (10/19/2023; acute ischemic stroke of L thalamus region) resolved with TNK, DM II, HLD, HTN, obesity, depression/anxiety presenting with stroke like symptoms. EMS was called by pt's  after pt was unable to stand up. EMS noting sx's of slurred/difficulty with speech and L arm weakness/pronator drift, all of which improved by time of arrival to . Additional as's of HA and nausea with a few episodes of emesis. Pt reports significant fatigue \"like I could not stay awake\" during Sx duration yesterday. Previous Vascular surgery consult (12/11/2023) regarding known bilateral carotid stenosis (R ICA 85% and L ICA 57%). Order for Carotid US was placed at that time with planned follow-up to discuss possible TCAR vs Endart.     12/25: Today, pt reports resolution of original sx's. Residual LLE weakness related to L TKA years prior. Denies knowledge of previous Afib or Atrial flutter. Cardiology (Dr. Irene) consulted, suspecting recurrent Atrial flutter as potential etiology of decreased EF and reccurent CVA's. Recommending DOAC's pending Neuro approval. Also started metoprolol. Orders for STAT Carotid Duplex placed with vascular consult with Dr. Kayleigh chavira.      ED course of action:   Vitals: Temp: (96.5-100.6) 96.4; HR: () 66; RR: (14-21) 16; BP ()/(50-92) 104/54; SpO2 (85-98%) initially required 3L --> 2 L NC, currently stable at 91% on RA.   -Negative for Influenza A/B, RSV, and COVID  -Hyperglycemia: 185  -Electrolytes: hyponatremia (133), hypomagnesemia (1.55) and hypocalcemia (8.2); otherwise wnl   -Hypoalbuminemia 3.2  -LFT's wnl (AST 19 to 13; ALT 14) and normal Alk Phos (14)  -Normal Renal function (BUN/Cr of 12/0.62)  -Troponin (high-sensitivity): 15 --> 30 --> 40 --> 51  - --> 589  -PT/INR (23/2)  VBG: pH 7.4 with pCO2 43 and Bicarb " 26.6  -Anemia: Hgb 10.8. Normal WBC (9.8) and Platelet count (262)   ECG: SVT ('s)/Atrial flutter with initial lack of response to adenosine  - Spontaneous conversion back to NSR ('s)  Echo (10/19/2023): mildly decreased LV systolic function with EF of 45-50%. Impaired relaxation pattern of LV diastolic filling and mild tricuspid regurgitation.   CT angio chest for PE: Non-diagnostic eval for PE distal to the lobar level due to extensive respiratory motion artifact  CT ABD/Pelvis with IV contrast Contrast excreted from kidneys into urinary bladder with accumulation into vaginal vault.   -Could related to urination on table and refluc of contrast opacified urine into th vagina   -No direct evidence of vesicovaginal fistrula or air in urinary bladder; however limited given beam artifact by L STEVO.   Interval increase in size and multiple prominent and mildly enlarged L para-aortic and L common/external iliac LN   CXR Cardiomegaly without evidence of acute disease in chest  CT angio brain/head without IV contrast:   No acute large vessel intracranial/extracranial occlusion   Unchanged hemodynamic significant stenosis of L carotid bulb (60%), R carotid bulb (80-90%), and proximal postbulbar R ICA (70%).  Similar probable high-grade stenosis at origin of R common carotid  Similar high-grade stenosis of bilateral intracranial vertebral arteries, unchanged multifocal moderate to high-grade bilateral intracranial ICA stenosis with similar moderate narrowing of L M1 MCA.   CT head/brain without IV contrast: No acute ICH or acute territorial infarct    Past Medical History  She has a past medical history of Cancer (CMS/HCC) and Diabetes mellitus (CMS/HCC).    Surgical History  She has a past surgical history that includes MR angio neck w and wo IV contrast (01/14/2022); Joint replacement; Fracture surgery; and Breast surgery.     Social History  She reports that she quit smoking about 41 years ago. Her smoking use  included cigarettes. She smoked an average of 1 pack per day. She has never used smokeless tobacco. She reports current alcohol use of about 1.0 standard drink of alcohol per week. She reports that she does not use drugs.    Family History  No family history on file.     Allergies  Tetracycline    Review of Systems   Constitutional:  Positive for fatigue. Negative for chills, diaphoresis and fever.   Respiratory:  Negative for cough, choking, chest tightness and shortness of breath.    Cardiovascular:  Negative for chest pain and palpitations.   Gastrointestinal:  Negative for abdominal distention and abdominal pain.   Genitourinary:  Negative for difficulty urinating.   Neurological:  Negative for dizziness, syncope, facial asymmetry, speech difficulty, light-headedness, numbness and headaches.   Psychiatric/Behavioral:  Positive for confusion (about prior day's events).         Physical Exam  Constitutional:       General: She is awake. She is not in acute distress.     Appearance: She is overweight. She is not ill-appearing or toxic-appearing.   HENT:      Head: Normocephalic and atraumatic.   Eyes:      General: Lids are normal.      Extraocular Movements: Extraocular movements intact.      Conjunctiva/sclera: Conjunctivae normal.   Cardiovascular:      Rate and Rhythm: Normal rate.      Comments: Regular rate and gonzalo  Pulmonary:      Effort: Pulmonary effort is normal. No respiratory distress.      Breath sounds: Normal breath sounds.   Abdominal:      General: There is no distension.      Tenderness: There is no abdominal tenderness.   Musculoskeletal:      Cervical back: Normal range of motion.      Right lower leg: No edema.      Left lower leg: No edema.   Skin:     General: Skin is warm.      Comments: PIV in R hand and L foot   Neurological:      General: No focal deficit present.      Mental Status: She is alert and oriented to person, place, and time.      Cranial Nerves: No facial asymmetry.       Motor: No weakness or pronator drift.   Psychiatric:         Attention and Perception: Attention normal.         Mood and Affect: Mood normal.         Speech: Speech normal.         Behavior: Behavior normal. Behavior is cooperative.         Thought Content: Thought content normal.          Last Recorded Vitals  /63 (BP Location: Right arm, Patient Position: Lying)   Pulse 92   Temp 36.2 °C (97.1 °F) (Temporal)   Resp 16   Wt 115 kg (254 lb 3.1 oz)   SpO2 92%     Relevant Results  Scheduled medications  aspirin, 81 mg, oral, Daily  atorvastatin, 80 mg, oral, Nightly  clopidogrel, 75 mg, oral, Daily  enoxaparin, 40 mg, subcutaneous, q12h ALEXANDER  ezetimibe, 10 mg, oral, Daily  insulin lispro, 0-5 Units, subcutaneous, With meals & nightly  metoprolol tartrate, 25 mg, oral, BID  PARoxetine, 20 mg, oral, Nightly      Continuous medications     PRN medications  PRN medications: acetaminophen, dextrose 10 % in water (D10W), dextrose, glucagon, ondansetron    Results for orders placed or performed during the hospital encounter of 12/24/23 (from the past 24 hour(s))   Troponin I, High Sensitivity   Result Value Ref Range    Troponin I, High Sensitivity 15 (H) 0 - 13 ng/L   Comprehensive metabolic panel   Result Value Ref Range    Glucose 185 (H) 74 - 99 mg/dL    Sodium 133 (L) 136 - 145 mmol/L    Potassium 4.5 3.5 - 5.3 mmol/L    Chloride 102 98 - 107 mmol/L    Bicarbonate 23 21 - 32 mmol/L    Anion Gap 13 10 - 20 mmol/L    Urea Nitrogen 12 6 - 23 mg/dL    Creatinine 0.62 0.50 - 1.05 mg/dL    eGFR >90 >60 mL/min/1.73m*2    Calcium 8.2 (L) 8.6 - 10.3 mg/dL    Albumin 3.2 (L) 3.4 - 5.0 g/dL    Alkaline Phosphatase 48 33 - 136 U/L    Total Protein 6.6 6.4 - 8.2 g/dL    AST 19 9 - 39 U/L    Bilirubin, Total 0.5 0.0 - 1.2 mg/dL    ALT 14 7 - 45 U/L   CBC and Auto Differential   Result Value Ref Range    WBC 9.8 4.4 - 11.3 x10*3/uL    nRBC 0.0 0.0 - 0.0 /100 WBCs    RBC 4.00 4.00 - 5.20 x10*6/uL    Hemoglobin 10.8 (L) 12.0  - 16.0 g/dL    Hematocrit 34.4 (L) 36.0 - 46.0 %    MCV 86 80 - 100 fL    MCH 27.0 26.0 - 34.0 pg    MCHC 31.4 (L) 32.0 - 36.0 g/dL    RDW 16.0 (H) 11.5 - 14.5 %    Platelets 262 150 - 450 x10*3/uL    Neutrophils % 82.4 40.0 - 80.0 %    Immature Granulocytes %, Automated 0.3 0.0 - 0.9 %    Lymphocytes % 10.5 13.0 - 44.0 %    Monocytes % 6.4 2.0 - 10.0 %    Eosinophils % 0.1 0.0 - 6.0 %    Basophils % 0.3 0.0 - 2.0 %    Neutrophils Absolute 8.10 (H) 1.60 - 5.50 x10*3/uL    Immature Granulocytes Absolute, Automated 0.03 0.00 - 0.50 x10*3/uL    Lymphocytes Absolute 1.03 0.80 - 3.00 x10*3/uL    Monocytes Absolute 0.63 0.05 - 0.80 x10*3/uL    Eosinophils Absolute 0.01 0.00 - 0.40 x10*3/uL    Basophils Absolute 0.03 0.00 - 0.10 x10*3/uL   Protime-INR   Result Value Ref Range    Protime 23.0 (H) 9.8 - 12.8 seconds    INR 2.0 (H) 0.9 - 1.1   Magnesium   Result Value Ref Range    Magnesium 1.55 (L) 1.60 - 2.40 mg/dL   B-type natriuretic peptide   Result Value Ref Range     (H) 0 - 99 pg/mL   Sars-CoV-2 PCR, Symptomatic   Result Value Ref Range    Coronavirus 2019, PCR Not Detected Not Detected   Influenza A, and B PCR   Result Value Ref Range    Flu A Result Not Detected Not Detected    Flu B Result Not Detected Not Detected   RSV PCR   Result Value Ref Range    RSV PCR Not Detected Not Detected   Troponin I, High Sensitivity   Result Value Ref Range    Troponin I, High Sensitivity 30 (H) 0 - 13 ng/L   Blood Gas Venous Full Panel   Result Value Ref Range    POCT pH, Venous 7.40 7.33 - 7.43 pH    POCT pCO2, Venous 43 41 - 51 mm Hg    POCT pO2, Venous 78 (H) 35 - 45 mm Hg    POCT SO2, Venous 97 (H) 45 - 75 %    POCT Oxy Hemoglobin, Venous 94.6 (H) 45.0 - 75.0 %    POCT Hematocrit Calculated, Venous 31.0 (L) 36.0 - 46.0 %    POCT Sodium, Venous 134 (L) 136 - 145 mmol/L    POCT Potassium, Venous 3.9 3.5 - 5.3 mmol/L    POCT Chloride, Venous 105 98 - 107 mmol/L    POCT Ionized [place], Venous 1.14 1.10 - 1.33 mmol/L     POCT Glucose, Venous 191 (H) 74 - 99 mg/dL    POCT Lactate, Venous 0.9 0.4 - 2.0 mmol/L    POCT Base Excess, Venous 1.5 -2.0 - 3.0 mmol/L    POCT HCO3 Calculated, Venous 26.6 (H) 22.0 - 26.0 mmol/L    POCT Hemoglobin, Venous 10.2 (L) 12.0 - 16.0 g/dL    POCT Anion Gap, Venous 6.0 (L) 10.0 - 25.0 mmol/L    Patient Temperature 37.0 degrees Celsius    FiO2 28 %   Troponin I, High Sensitivity   Result Value Ref Range    Troponin I, High Sensitivity 40 (H) 0 - 13 ng/L   Troponin I, High Sensitivity   Result Value Ref Range    Troponin I, High Sensitivity 51 (HH) 0 - 13 ng/L   Urinalysis with Reflex Microscopic   Result Value Ref Range    Color, Urine Yellow Straw, Yellow    Appearance, Urine Clear Clear    Specific Gravity, Urine 1.056 (N) 1.005 - 1.035    pH, Urine 5.0 5.0, 5.5, 6.0, 6.5, 7.0, 7.5, 8.0    Protein, Urine 30 (1+) (N) NEGATIVE mg/dL    Glucose, Urine NEGATIVE NEGATIVE mg/dL    Blood, Urine SMALL (1+) (A) NEGATIVE    Ketones, Urine 5 (TRACE) (A) NEGATIVE mg/dL    Bilirubin, Urine NEGATIVE NEGATIVE    Urobilinogen, Urine <2.0 <2.0 mg/dL    Nitrite, Urine NEGATIVE NEGATIVE    Leukocyte Esterase, Urine NEGATIVE NEGATIVE   Microscopic Only, Urine   Result Value Ref Range    WBC, Urine 1-5 1-5, NONE /HPF    RBC, Urine 6-10 (A) NONE, 1-2, 3-5 /HPF   Blood Culture    Specimen: Peripheral Venipuncture; Blood culture   Result Value Ref Range    Blood Culture Loaded on Instrument - Culture in progress    Blood Culture    Specimen: Peripheral Venipuncture; Blood culture   Result Value Ref Range    Blood Culture Loaded on Instrument - Culture in progress    Hepatic Function Panel   Result Value Ref Range    Albumin 3.0 (L) 3.4 - 5.0 g/dL    Bilirubin, Total 0.4 0.0 - 1.2 mg/dL    Bilirubin, Direct 0.1 0.0 - 0.3 mg/dL    Alkaline Phosphatase 45 33 - 136 U/L    ALT 14 7 - 45 U/L    AST 13 9 - 39 U/L    Total Protein 6.2 (L) 6.4 - 8.2 g/dL   B-Type Natriuretic Peptide   Result Value Ref Range     (H) 0 - 99 pg/mL    POCT GLUCOSE   Result Value Ref Range    POCT Glucose 138 (H) 74 - 99 mg/dL   POCT GLUCOSE   Result Value Ref Range    POCT Glucose 157 (H) 74 - 99 mg/dL     CT abdomen pelvis w IV contrast    Result Date: 12/24/2023  Interpreted By:  Christiano Washburn, STUDY: CT ABDOMEN PELVIS W IV CONTRAST;  12/24/2023 10:04 pm   INDICATION: Signs/Symptoms:vomiting fever.   COMPARISON: 09/29/2022   ACCESSION NUMBER(S): [id number]   ORDERING CLINICIAN: CHEYANNE PROCTOR   TECHNIQUE: Contiguous axial images of the abdomen and pelvis were obtained after the intravenous administration of 75 mL Omnipaque 350 contrast. Coronal and sagittal reformatted images were reconstructed from the axial data.   FINDINGS: LOWER CHEST: Please see separate report     ABDOMEN/PELVIS:   ABDOMINAL WALL: There is a small fat containing umbilical hernia.   LIVER: No significant parenchymal abnormality.   BILE DUCTS: No significant intrahepatic or extrahepatic dilatation.   GALLBLADDER: Surgically absent.   PANCREAS: Moderate fatty atrophy. No acute abnormality.   SPLEEN: No significant abnormality. Splenule.   ADRENALS: Unchanged 1 cm left adrenal nodule, most likely representing an adenoma.   KIDNEYS, URETERS, BLADDER: There are right renal cysts measuring 1.2 cm and 1 cm. No hydroureteronephrosis. The urinary bladder is suboptimally evaluated due to beam hardening artifact but otherwise appears within normal limits. There is no gas in the urinary bladder lumen.   REPRODUCTIVE ORGANS: There is a tiny focus of gas in the endometrial canal. There is contrast accumulation within the vagina and extending through the vaginal introitus externally. No direct evidence of a vesicovaginal fistula.   VESSELS: Mild aortic atherosclerosis without AAA.   RETROPERITONEUM/LYMPH NODES: There are multiple left para-aortic retroperitoneal lymph nodes that have increased in size from prior study. For example a 0.7 cm lymph node previously measured 0.4 cm, 1.1 cm lymph node  previously measured 0.6 cm. Interval enlargement of left common iliac lymph node measuring 1.2 cm vs 0.8 cm; interval enlargement of 1.3 cm left external iliac lymph node (previously 0.7 cm), 1.5 cm left external iliac lymph node (previously 1.1 cm). No acute retroperitoneal abnormality.   BOWEL/MESENTERY/PERITONEUM:  No inflammatory bowel wall thickening or dilatation. Normal appendix.   No ascites, free air, or fluid collection.     MUSCULOSKELETAL: No acute osseous abnormality. Left total hip arthroplasty again with heterotopic ossification along the anterior shaft and left adductor muscles. Unchanged chronic compression fracture involving the upper L4 endplate with minimal depression.       1. Contrast excretion from the kidneys into the urinary bladder with accumulation of contrast in the vaginal vault as well. This could relate to urination on the table and reflux of contrast opacified urine into the vagina. Additionally, there is no direct evidence of a vesicovaginal fistula and no air in the urinary bladder; that said, evaluation is limited due to beam hardening artifact caused by left total hip arthroplasty. A new tiny focus of gas in the endometrial canal is also noted of indeterminate significance. Integration of clinical, laboratory, and imaging findings and gynecology consultation is recommended. If warranted, a CT cystogram would be of further diagnostic value.   2. Interval increase in size of multiple prominent and mildly enlarged left para-aortic and left common/external iliac lymph nodes of indeterminate etiology. No evidence of locoregional infection or malignancy to explain this.     MACRO: None.   Signed by: Christiano Washburn 12/24/2023 11:12 PM Dictation workstation:   [id number]    CT angio chest for pulmonary embolism    Result Date: 12/24/2023  Interpreted By:  Christiano Washburn, STUDY: CT ANGIO CHEST FOR PULMONARY EMBOLISM;  12/24/2023 10:04 pm   INDICATION:  Signs/Symptoms:hypoxia/tachycardia.   COMPARISON: 09/29/2022   ACCESSION NUMBER(S): [id number]   ORDERING CLINICIAN: CHEYANNE PROCTOR   TECHNIQUE: Contiguous axial images of the chest were obtained after the intravenous administration of 75 mL Omnipaque 350 contrast using angiographic PE protocol. Coronal and sagittal reformatted images were reconstructed from the axial data. MIP images were created on an independent workstation and reviewed.   FINDINGS: MEDIASTINUM AND LYMPH NODES:  The esophagus appears within normal limits. Stable borderline enlarged left paratracheal lymph node. No new enlarged or suspicious lymph nodes. No pneumomediastinum.   VESSELS:  Normal caliber aorta without dissection. Mild aortic atherosclerosis. Nondiagnostic evaluation for pulmonary embolism distal to the lobar level due to extensive respiratory motion artifact. Otherwise no acute pulmonary embolism.   HEART: Mild cardiomegaly.  Severe three-vessel coronary artery calcifications. No significant pericardial effusion.   LUNG, AIRWAYS, AND PLEURA:  No consolidation, pulmonary edema, pleural effusion or pneumothorax.   OSSEOUS STRUCTURES: No acute osseous abnormality. There are flowing osteophytes of the thoracic spine with preserved disc heights likely related to diffuse idiopathic skeletal hyperostosis.   CHEST WALL SOFT TISSUES: No discernible abnormality.   UPPER ABDOMEN/OTHER: Please see separate report.       Nondiagnostic evaluation for pulmonary embolism distal to the lobar level due to extensive respiratory motion artifact. Otherwise no acute pulmonary embolism.   No other acute cardiopulmonary process.   Mild cardiomegaly with severe three-vessel coronary artery calcification.     MACRO: None.   Signed by: Christiano Washburn 12/24/2023 10:59 PM Dictation workstation:   [id number]    XR chest 1 view    Result Date: 12/24/2023  Interpreted By:  Christiano Washburn, STUDY: XR CHEST 1 VIEW;  12/24/2023 10:01 pm   INDICATION:  Signs/Symptoms:fever.   COMPARISON: None.   ACCESSION NUMBER(S): [id number]   ORDERING CLINICIAN: CHEYANNE PROCTOR   FINDINGS:     Cardiomegaly. No consolidation, pleural effusion or pneumothorax.         Cardiomegaly without evidence of acute disease in the chest.     MACRO: None.   Signed by: Christiano Washburn 12/24/2023 10:56 PM Dictation workstation:   [id number]    CT angio brain attack head w IV contrast and post procedure    Result Date: 12/24/2023  Interpreted By:  Christiano Washburn, STUDY: CT ANGIO BRAIN ATTACK HEAD W IV CONTRAST AND POST PROCEDURE; CT ANGIO BRAIN ATTACK NECK W IV CONTRAST AND POST PROCEDURE;  12/24/2023 7:41 pm   INDICATION: Signs/Symptoms:slurred speech, left facial droop; Signs/Symptoms:slurred speech left facial droop   COMPARISON: 12/24/2023 noncontrast CT head.   ACCESSION NUMBER(S): [id number]; [id number]   ORDERING CLINICIAN: CHEYANNE PROCTOR   TECHNIQUE: Following IV contrast administration of 50 mL Omnipaque 350, a CT angiography of the head and neck was performed. MIPS and 3D reconstructions of the [place] of [place] and neck were created on an independent workstation and reviewed.   FINDINGS: NON-CONTRAST HEAD CT:     (Please refer to separate report).     CTA NECK:   Four-vessel aortic arch anatomy, with the left vertebral artery arising directly from the aortic arch.   LEFT VERTEBRAL ARTERY: Multifocal high-grade stenosis of the left V1 vertebral artery are noted. There is also at least moderate stenosis at its origin due to atherosclerosis. The V1 segment regains normal caliber 2-3 cm proximal to entering the transverse foramen. There, there is no significant stenosis of the left extracranial vertebral artery.   LEFT COMMON/INTERNAL CAROTID ARTERY: There is a 60% stenosis of the left carotid bulb due to combination calcified plaque (axial image 459/816, series 7). Thereafter, there is no hemodynamically significant stenosis in the neck.   RIGHT VERTEBRAL ARTERY: Scattered  calcific atherosclerosis without hemodynamically significant stenosis, occlusion, or dissection.   RIGHT COMMON/INTERNAL CAROTID ARTERY: There is likely a hemodynamically significant stenosis at the origin of the right common carotid artery there is difficult to quantify due to extensive artifact from calcific atherosclerosis, image noise, and beam hardening from adjacent contrast bolus. There is an 80-90% stenosis of the right carotid bulb due to thick calcified soft plaque and ~ 70% stenosis of the proximal post bulb are right ICA due to thick calcified plaque, unchanged.     The neck soft tissues show no evidence of mass, fluid collection, or enlarged lymph nodes. There is no acute osseous abnormality.       CTA HEAD:   ANTERIOR CIRCULATION: No aneurysm.   - Internal Carotid Arteries: Unchanged diffuse calcific atherosclerosis bilateral resulting in multifocal moderate to high-grade stenosis, greatest on the left lacerum segment and left supraclinoid segment. No carotid occlusion.   - Middle Cerebral Arteries: There is redemonstration of moderate narrowing at the origin of the left M1 MCA. Otherwise, no hemodynamically significant stenosis or occlusion.   - Anterior Cerebral Arteries:  No hemodynamically significant stenosis or occlusion.     POSTERIOR CIRCULATION: No aneurysm.   - Intracranial Vertebral Arteries: There is redemonstration of high-grade stenoses of the intracranial vertebral arteries.   - Basilar Artery:  No hemodynamically significant stenosis or occlusion.   - Posterior Cerebral Arteries: Fetal origin of the left posterior cerebral artery. No hemodynamically significant stenosis or occlusion.   No arteriovenous malformation is visualized. No pathologic intracranial enhancement or discrete mass. The dural venous sinuses are patent.   MIPS and 3D reconstructions confirm the above findings.       1. No acute large vessel intracranial or extracranial occlusion.   2. Unchanged hemodynamic significant  stenosis of the left carotid bulb (60%) and right carotid bulb (80-90%) and proximal postbulbar right ICA (70%). There is also similar probable high-grade stenosis at the origin of the right common carotid artery.   3. Similar high-grade stenosis of the bilateral intracranial vertebral arteries, unchanged multifocal moderate to high-grade bilateral intracranial ICA stenosis as detailed above, and similar moderate narrowing of the left M1 MCA.   MACRO: None.   Signed by: Christiano Washburn 12/24/2023 8:18 PM Dictation workstation:   [id number]    CT angio brain attack neck w IV contrast and post procedure    Result Date: 12/24/2023  Interpreted By:  Christiano Washburn, STUDY: CT ANGIO BRAIN ATTACK HEAD W IV CONTRAST AND POST PROCEDURE; CT ANGIO BRAIN ATTACK NECK W IV CONTRAST AND POST PROCEDURE;  12/24/2023 7:41 pm   INDICATION: Signs/Symptoms:slurred speech, left facial droop; Signs/Symptoms:slurred speech left facial droop   COMPARISON: 12/24/2023 noncontrast CT head.   ACCESSION NUMBER(S): [id number]; [id number]   ORDERING CLINICIAN: CHEYANNE PROCTOR   TECHNIQUE: Following IV contrast administration of 50 mL Omnipaque 350, a CT angiography of the head and neck was performed. MIPS and 3D reconstructions of the [place] of [place] and neck were created on an independent workstation and reviewed.   FINDINGS: NON-CONTRAST HEAD CT:     (Please refer to separate report).     CTA NECK:   Four-vessel aortic arch anatomy, with the left vertebral artery arising directly from the aortic arch.   LEFT VERTEBRAL ARTERY: Multifocal high-grade stenosis of the left V1 vertebral artery are noted. There is also at least moderate stenosis at its origin due to atherosclerosis. The V1 segment regains normal caliber 2-3 cm proximal to entering the transverse foramen. There, there is no significant stenosis of the left extracranial vertebral artery.   LEFT COMMON/INTERNAL CAROTID ARTERY: There is a 60% stenosis of the left carotid bulb due  to combination calcified plaque (axial image 459/816, series 7). Thereafter, there is no hemodynamically significant stenosis in the neck.   RIGHT VERTEBRAL ARTERY: Scattered calcific atherosclerosis without hemodynamically significant stenosis, occlusion, or dissection.   RIGHT COMMON/INTERNAL CAROTID ARTERY: There is likely a hemodynamically significant stenosis at the origin of the right common carotid artery there is difficult to quantify due to extensive artifact from calcific atherosclerosis, image noise, and beam hardening from adjacent contrast bolus. There is an 80-90% stenosis of the right carotid bulb due to thick calcified soft plaque and ~ 70% stenosis of the proximal post bulb are right ICA due to thick calcified plaque, unchanged.     The neck soft tissues show no evidence of mass, fluid collection, or enlarged lymph nodes. There is no acute osseous abnormality.       CTA HEAD:   ANTERIOR CIRCULATION: No aneurysm.   - Internal Carotid Arteries: Unchanged diffuse calcific atherosclerosis bilateral resulting in multifocal moderate to high-grade stenosis, greatest on the left lacerum segment and left supraclinoid segment. No carotid occlusion.   - Middle Cerebral Arteries: There is redemonstration of moderate narrowing at the origin of the left M1 MCA. Otherwise, no hemodynamically significant stenosis or occlusion.   - Anterior Cerebral Arteries:  No hemodynamically significant stenosis or occlusion.     POSTERIOR CIRCULATION: No aneurysm.   - Intracranial Vertebral Arteries: There is redemonstration of high-grade stenoses of the intracranial vertebral arteries.   - Basilar Artery:  No hemodynamically significant stenosis or occlusion.   - Posterior Cerebral Arteries: Fetal origin of the left posterior cerebral artery. No hemodynamically significant stenosis or occlusion.   No arteriovenous malformation is visualized. No pathologic intracranial enhancement or discrete mass. The dural venous sinuses are  patent.   MIPS and 3D reconstructions confirm the above findings.       1. No acute large vessel intracranial or extracranial occlusion.   2. Unchanged hemodynamic significant stenosis of the left carotid bulb (60%) and right carotid bulb (80-90%) and proximal postbulbar right ICA (70%). There is also similar probable high-grade stenosis at the origin of the right common carotid artery.   3. Similar high-grade stenosis of the bilateral intracranial vertebral arteries, unchanged multifocal moderate to high-grade bilateral intracranial ICA stenosis as detailed above, and similar moderate narrowing of the left M1 MCA.   MACRO: None.   Signed by: Christiano Washburn 12/24/2023 8:18 PM Dictation workstation:   [id number]    CT brain attack head wo IV contrast    Result Date: 12/24/2023  Interpreted By:  Hannah Kurtz, STUDY: CT BRAIN ATTACK HEAD WO IV CONTRAST  12/24/2023 7:27 pm   INDICATION: Signs/Symptoms:slurred speech   COMPARISON: CT head 10/20/2023.   ACCESSION NUMBER(S): [id number]   ORDERING CLINICIAN: CHEYANNE PROCTOR   TECHNIQUE: Serial, axial CT images of the brain were obtained without IV contrast. Coronal and sagittal reformatted images were performed.   FINDINGS: There is streak artifact from the patient's dental amalgam. There is motion artifact. The ventricles, cisterns and sulci are prominent, consistent with diffuse volume loss.  There are areas of nonspecific white matter hypodensity, which are probably age-related or microvascular in nature. The gray-white matter differentiation is intact and there is no evidence of acute territorial infarct.  No mass effect or midline shift is seen.  There is no hemorrhage.  No extraaxial fluid collection. No air-fluid levels at the visualized paranasal sinuses. The mastoid air cells are clear. No depressed calvarial fracture.       1.  No acute intracranial hemorrhage or acute territorial infarct.     MACRO: Hannah Kurtz discussed the significance and urgency  of this critical finding by telephone with  CHEYANNE PROCTOR on 12/24/2023 at 7:34 pm.  (**-RCF-**) Findings:  See findings.     Signed by: Hannah Kurtz 12/24/2023 7:35 PM Dictation workstation:   [id number]    ECG 12 lead    Result Date: 12/13/2023  Sinus rhythm with frequent Premature ventricular complexes Prolonged QT Abnormal ECG When compared with ECG of 19-OCT-2023 22:41, Previous ECG has undetermined rhythm, needs review Questionable change in QRS axis    XR femur left 2+ views    Result Date: 12/6/2023  Interpreted By:  Isaias Oliveira, STUDY: XR FEMUR LEFT 2+ VIEWS; ;  12/5/2023 12:45 pm   INDICATION: Signs/Symptoms:s/p Fracture.   COMPARISON: 05/10/2022   ACCESSION NUMBER(S): [id number]   ORDERING CLINICIAN: SAMARA GLYNN   FINDINGS: Left femur, two views   Lateral plate and screw fixation of a mid femoral fracture deformity. Left total hip arthroplasty in place. Extensive intertrochanteric lesion about the proximal femur. Left total knee arthroplasty as well.       Redemonstration of extensive postsurgical change about the left femur without evidence of hardware failure or malalignment. Extensive heterotopic ossification about the proximal femur.     MACRO: None   Signed by: Isaias Oliveira 12/6/2023 7:12 PM Dictation workstation:   [id number]      Assessment/Plan   Principal Problem:    TIA (transient ischemic attack)  Active Problems:    Type 2 diabetes mellitus without complications (CMS/HCC)    Hyperlipidemia, unspecified    Essential (primary) hypertension    Bilateral carotid artery stenosis    Atrial flutter (CMS/HCC)    History of lacunar cerebrovascular accident (CVA)    Appears returned to baseline   Suspect combination of bilateral carotid stenosis and atrial flutter/SVT contributing to decreased EF and recurrent CVA's.   STAT Carotid US order placed with Vascular surgery consult/follow-up appreciated  MR angio head/neck without contrast  MR brain without contrast neuroquadrant  protocol  Cardiology consulted, recommending AC with DOAC's pending Neuro approval   Metoprolol tartrate 25 mg daily started   Continue other medications as indicated above  Telemetry, daily labs, vitals, and regular neuro checks per stroke protocol  DVT prophylaxis   Per pt request, information about pt's condition relayed to her POA (daughter, Annette) in pt's prescence   PT/OT eval   Pending results above, consideration for transfer to Main campus for [facility] vs [facility] (Vascular surgery) or home in 24-48  hours.   Refer to order list for complete plan  Check telemetry, daily AM labs, vitals, and regular neuro checks per stroke protocol       Chrissy Wolf    Shared visit with student  Patient seen and examined  Note amended and addended  See my orders for complete plan

## 2023-12-25 NOTE — PROGRESS NOTES
"Physical Therapy    Physical Therapy Evaluation    Patient Name: Elsa Biggs  MRN: [id number]  Today's Date: 12/25/2023   Time Calculation  Start Time: 1010  Stop Time: 1030  Time Calculation (min): 20 min    Assessment/Plan   PT Assessment  PT Assessment Results: Decreased strength, Decreased range of motion, Decreased endurance, Decreased mobility, Decreased safety awareness  Rehab Prognosis: Good  Medical Staff Made Aware: Yes            Subjective   General Visit Information:  General  Reason for Referral: Impaired mobility  Referred By: Dr. JOHNATHAN Atkins DO  Past Medical History Relevant to Rehab: 73 y.o. female presenting with PMHx sig for recent admission at [facility] with CVA, Carotid artery stenosis (Was supposed to see Dr. Victor as outpatient) presented as a stroke alert.  Family/Caregiver Present: No  Caregiver Feedback: Pt. feels \"fuzzy\".  Prior to Session Communication: Bedside nurse, PCT/NA/CTA (RN)  Preferred Learning Style: visual, verbal, kinesthetic  Home Living:     Prior Level of Function:     Precautions:  Fall risk.           Objective   Pain:  Pain Assessment  Pain Assessment: 0-10  Pain Score: 0 - No pain  Cognition:  A and O x3     General Assessments:  R hip ~90% limited; all else LE limited by ~30%.      Pt. amb. few steps to chair with FWW and mod Ax1     Bed mobility: max Ax1.    Transfers: max Ax2. Pt. Plops into chair from stand.     Gait: Pt. Amb. Few steps to chair with FWW and max Ax2.        Strength  Strength Comments: All 4/5 except: R hip 2+/5 flexion.         Outcome Measures:  [facility] Basic Mobility  Turning from your back to your side while in a flat bed without using bedrails: A lot  Moving from lying on your back to sitting on the side of a flat bed without using bedrails: A lot  Moving to and from bed to chair (including a wheelchair): A lot  Standing up from a chair using your arms (e.g. wheelchair or bedside chair): A lot  To walk in hospital room: A lot  Climbing 3-5 " steps with railing: Total  Basic Mobility - Total Score: 11    Encounter Problems       Encounter Problems (Active)       PT Problem       Patient will ambulate 50 distance using walker with contact guard assist       Start:  12/25/23    Expected End:  01/15/24            Patient will perform chair to and from bed transfer with contact guard assist       Start:  12/25/23    Expected End:  01/15/24            Patient will perform sit to supine on bed with contact guard assist demonstrating control       Start:  12/25/23    Expected End:  01/15/24                   Education Documentation  No documentation found.  Education Comments  No comments found.

## 2023-12-25 NOTE — ED PROVIDER NOTES
Limitations to History: Dysarthria  External Records Reviewed: Discharge summary from 10/22/2023  Independent Historians: EMS    HPI  Elsa Biggs is a 73 y.o. female  is presenting as a stroke alert.  Per EMS, patient has had stuttering difficulty speaking and some intermittent left arm weakness.  Patient's  called because she was unable to stand up.  Per EMS they did notice some difficulty speaking and left arm drift however they noted by the time they arrived to the ER the symptoms had improved.  Patient is endorsing a headache as well as nausea and has vomited a few times.  Per EMS, patient recently had a stroke a few months ago.  Further history limited due to altered mental status.    PMH  Past Medical History:   Diagnosis Date    Cancer (CMS/[facility], located within [facility])     Diabetes mellitus (CMS/[facility], located within [facility])        Meds  Current Outpatient Medications   Medication Instructions    aspirin 81 mg, oral, Daily    atorvastatin (LIPITOR) 80 mg, oral, Nightly    blood sugar diagnostic strip before meals and at bedtime. Use as instructed    cholecalciferol (Vitamin D-3) 25 MCG (1000 UT) tablet 1 tablet, oral, Daily    clopidogrel (PLAVIX) 75 mg, oral, Daily    ezetimibe (ZETIA) 10 mg, oral, Daily    metFORMIN (GLUCOPHAGE) 500 mg, oral, 2 times daily with meals    OneTouch Delica Plus Lancet 33 gauge misc USE TO TAKE BLOOD SUGAR 1-2 TIMES PER DAY    PARoxetine (PAXIL) 20 mg, oral, Nightly    pioglitazone (ACTOS) 30 mg, oral, Every 24 hours       Allergies  Allergies   Allergen Reactions    Tetracycline Rash        SHx  Social History     Tobacco Use    Smoking status: Former     Packs/day: 1     Types: Cigarettes     Quit date:      Years since quittin.0    Smokeless tobacco: Never   Vaping Use    Vaping Use: Never used   Substance Use Topics    Alcohol use: Yes     Alcohol/week: 1.0 standard drink of alcohol     Types: 1 Standard drinks or equivalent per week    Drug use: Never  "      ------------------------------------------------------------------------------------------------------------------------------------------    /53   Pulse 101   Temp 38.1 °C (100.6 °F)   Resp 20   Ht 1.6 m (5' 3\")   Wt 115 kg (253 lb 1.4 oz)   SpO2 97%   BMI 44.83 kg/m²     Physical Exam  Constitutional:       Appearance: She is obese. She is ill-appearing.   HENT:      Head: Normocephalic and atraumatic.      Mouth/Throat:      Mouth: Mucous membranes are moist.   Eyes:      Extraocular Movements: Extraocular movements intact.      Conjunctiva/sclera: Conjunctivae normal.      Pupils: Pupils are equal, round, and reactive to light.   Cardiovascular:      Rate and Rhythm: Regular rhythm. Tachycardia present.      Heart sounds: No murmur heard.     No gallop.   Pulmonary:      Effort: Pulmonary effort is normal. No respiratory distress.      Breath sounds: Normal breath sounds. No wheezing.   Abdominal:      General: There is no distension.      Palpations: Abdomen is soft.      Tenderness: There is no abdominal tenderness. There is no guarding.   Musculoskeletal:         General: No swelling or signs of injury. Normal range of motion.   Skin:     General: Skin is warm and dry.      Findings: No lesion or rash.   Neurological:      Mental Status: She is alert.      Motor: Motor function is intact. No weakness.      Coordination: Coordination is intact.      Comments: Dysarthria   Psychiatric:         Mood and Affect: Mood normal.         Judgment: Judgment normal.          ------------------------------------------------------------------------------------------------------------------------------------------    Medical Decision Making: Patient is a 73-year-old female presenting as a stroke alert.  On arrival, patient does have some dysarthria as well as mild left arm drift.  NIH is 2 but she is Van positive and patient was taken for CT CTA.  She is tachycardic with a heart rate of 160.  CT head " showed no acute hemorrhage.  Blood pressure is stable.  Patient will be given fluids.  EKG showed SVT and patient was given 6 of adenosine.  She had transient response however her heart rate resumed to about 140.  She remained in SVT and she was given 12 mg of adenosine.  Patient's heart rate came down to about 110 and she will be given additional liter of fluids.  Patient was reportedly febrile as well with a temperature of 38.1 and she did develop some hypoxia with a oxygen saturation of 88%.  She was placed on 2 L nasal cannula with improvement in her oxygenation.  Patient may have an infectious source of her symptoms causing reemergence of some of her stroke symptoms that she had a few months ago.  She is not a TNK candidate as she did have a stroke 2 months ago and received TNK at that time.  On reevaluation, patient's symptoms had completely resolved and she is speaking clearly with no left arm drift.  Patient's stuttering symptoms may be due to TIA versus infection causing recrudescence.  The second EKG showed sinus rhythm without acute ischemic changes.  CTA head and neck showed unchanged stenosis of the left carotid bulb and right carotid bulb and proximal postbulbar right ICA as well as similar high-grade stenosis of the origin of the right common carotid artery.  She also was found to have similar high-grade stenosis of the bilateral intracranial vertebral arteries which is unchanged and multifocal moderate to high-grade bilateral intracranial ICA stenosis and similar moderate narrowing of the left M1 MCA.  Given patient's hypoxia and tachycardia a CT PE was ordered to rule out blood clot as well as evaluate for pneumonia of the lungs.  Initial lab work notable for mildly elevated troponin of 15.  Delta troponin was sent.  This is likely in the setting of SVT.  Patient has mild anemia with hemoglobin of 10.8.  COVID and flu and RSV swabs were negative. Chest x-ray showed cardiomegaly but no acute process.   BNP is mildly elevated to 308 and delta troponin did increase to 40.  UA showed no signs of infection.  CT of the abdomen and pelvis showed findings possibly consistent with a vesicovaginal fistula however this is unclear and there is no abscess presentation.  UA is negative and there is decrease suspicion for fistula.  It also showed increased size in multiple periaortic lymph nodes.  CT PE showed no evidence of pulmonary embolism.  At this point in time, patient remained neuro intact.  I did discuss the patient with Dr. Rasmussen from [facility] neurology who recommended typical TIA workup.  Unclear the cause of patient's fever as well as her SVT most likely some kind of infectious process is ongoing however unable to determine where this may be coming from.  Patient will be admitted for further workup and management.  Findings and plan were discussed with the patient and patient was admitted in stable condition.    Independent EKG interpretation:  Regular rhythm, rate 161 bpm, left axis deviation, QTc 452 ms, no ST segment elevations or depressions.  EKG consistent with SVT.  EKG to: Sinus tachycardia, rate 112 bpm, left axis deviation, QTc 447 ms, no ST segment elevations or depressions.  Not concerning for STEMI.    Discussed with: Transfer center, Dr. Rasmussen, Dr. Wilbert Johnson MD  Emergency Medicine       Setphany Johnson MD  12/25/23 2554

## 2023-12-25 NOTE — CONSULTS
Inpatient consult to Cardiology  Consult performed by: Robert Irene MD  Consult ordered by: Maxime Atkins DO  Reason for consult: atrial flutter        History Of Present Illness:    Elsa Biggs is a 73 y.o. female presenting with PMHx sig for recent admission at [facility] with CVA, Carotid artery stenosis (Was supposed to see Dr. Victor as outpatient) presented as a stroke alert.  Per EMS, patient has had stuttering difficulty speaking and some intermittent left arm weakness.  Patient's  called because she was unable to stand up.  Per EMS they did notice some difficulty speaking and left arm drift however they noted by the time they arrived to the ER the symptoms had improved.  Patient is endorsing a headache as well as nausea and has vomited a few times.  Per EMS, patient recently had a stroke a few months ago.  Further history limited due to altered mental status.   In ER patient was noted to be in SVT/atrial flutter - didnty respond to adenosine and subsequently spontaneously converted into NSR. Echo from recent admission with decreased EF 45%. Last stress test in 2021 was negative and EF was normal then. She also has sig Carotid artery stenosis and supposed to see Dr. Victor as outpatient     Last Recorded Vitals:  Vitals:    12/25/23 0027 12/25/23 0219 12/25/23 0317 12/25/23 0713   BP: 112/54 120/50 101/69 125/76   BP Location:   Left arm Left arm   Patient Position:   Lying Lying   Pulse: 101 85 89 88   Resp: 16 14 16 16   Temp: 37.2 °C (99 °F)  35.8 °C (96.5 °F) 36.6 °C (97.8 °F)   TempSrc:   Temporal Temporal   SpO2: 97% 96% 91% 90%   Weight:   115 kg (254 lb 3.1 oz)    Height:           Last Labs:  CBC - 12/24/2023:  7:55 PM  9.8 10.8 262    34.4      CMP - 12/24/2023:  7:55 PM  8.2 6.2 13 --- 0.4   3.8 3.0 14 45      PTT - 10/20/2023:  5:22 AM  2.0   23.0 30     Troponin I, High Sensitivity   Date/Time Value Ref Range Status   12/25/2023 12:06 AM 51 (HH) 0 - 13 ng/L Final   12/24/2023 10:16  PM 40 (H) 0 - 13 ng/L Final   12/24/2023 09:19 PM 30 (H) 0 - 13 ng/L Final     BNP   Date/Time Value Ref Range Status   12/25/2023 02:18  (H) 0 - 99 pg/mL Final   12/24/2023 07:55  (H) 0 - 99 pg/mL Final     Hemoglobin A1C   Date/Time Value Ref Range Status   10/20/2023 05:22 AM 7.0 (H) see below % Final   08/02/2018 03:17 AM 6.2 % Final     Comment:          Diagnosis of Diabetes-Adults   Non-Diabetic: < or = 5.6%   Increased risk for developing diabetes: 5.7-6.4%   Diagnostic of diabetes: > or = 6.5%  .       Monitoring of Diabetes                Age (y)     Therapeutic Goal (%)   Adults:          >18           <7.0   Pediatrics:    13-18           <7.5                   7-12           <8.0                   0- 6            7.5-8.5   American Diabetes Association. Diabetes Care 33(S1), Jan 2010.     08/01/2018 11:52 AM 6.2 % Final     Comment:          Diagnosis of Diabetes-Adults   Non-Diabetic: < or = 5.6%   Increased risk for developing diabetes: 5.7-6.4%   Diagnostic of diabetes: > or = 6.5%  .       Monitoring of Diabetes                Age (y)     Therapeutic Goal (%)   Adults:          >18           <7.0   Pediatrics:    13-18           <7.5                   7-12           <8.0                   0- 6            7.5-8.5   American Diabetes Association. Diabetes Care 33(S1), Jan 2010.       LDL Calculated   Date/Time Value Ref Range Status   10/20/2023 05:22 AM 60 <=99 mg/dL Final     Comment:                                 Near   Borderline      AGE      Desirable  Optimal    High     High     Very High     0-19 Y     0 - 109     ---    110-129   >/= 130     ----    20-24 Y     0 - 119     ---    120-159   >/= 160     ----      >24 Y     0 -  99   100-129  130-159   160-189     >/=190       VLDL   Date/Time Value Ref Range Status   10/20/2023 05:22 AM 13 0 - 40 mg/dL Final      Last I/O:  No intake/output data recorded.    Past Cardiology Tests (Last 3 Years):  EKG:  ECG 12 lead 12/13/2023  "(Preliminary)      ECG 12 Lead     Echo:  Transthoracic Echo (TTE) Complete 10/20/2023    Ejection Fractions:  No results found for: \"EF\"  Cath:  No results found for this or any previous visit from the past 1095 days.    Stress Test:  Nuclear Stress Test 04/15/2021    Cardiac Imaging:  No results found for this or any previous visit from the past 1095 days.      Past Medical History:  She has a past medical history of Cancer (CMS/HCC) and Diabetes mellitus (CMS/HCC).    Past Surgical History:  She has a past surgical history that includes MR angio neck w and wo IV contrast (01/14/2022); Joint replacement; Fracture surgery; and Breast surgery.      Social History:  She reports that she quit smoking about 41 years ago. Her smoking use included cigarettes. She smoked an average of 1 pack per day. She has never used smokeless tobacco. She reports current alcohol use of about 1.0 standard drink of alcohol per week. She reports that she does not use drugs.    Family History:  No family history on file.     Allergies:  Tetracycline    Inpatient Medications:  Scheduled medications   Medication Dose Route Frequency    aspirin  81 mg oral Daily    atorvastatin  80 mg oral Nightly    clopidogrel  75 mg oral Daily    enoxaparin  40 mg subcutaneous q12h ALEXANDER    ezetimibe  10 mg oral Daily    insulin lispro  0-5 Units subcutaneous With meals & nightly    PARoxetine  20 mg oral Nightly     PRN medications   Medication    acetaminophen    dextrose 10 % in water (D10W)    dextrose    glucagon    ondansetron     Continuous Medications   Medication Dose Last Rate     Outpatient Medications:  Current Outpatient Medications   Medication Instructions    aspirin 81 mg, oral, Daily    atorvastatin (LIPITOR) 80 mg, oral, Nightly    blood sugar diagnostic strip before meals and at bedtime. Use as instructed    cholecalciferol (Vitamin D-3) 25 MCG (1000 UT) tablet 1 tablet, oral, Daily    clopidogrel (PLAVIX) 75 mg, oral, Daily    ezetimibe " (ZETIA) 10 mg, oral, Daily    metFORMIN (GLUCOPHAGE) 500 mg, oral, 2 times daily with meals    OneTouch Delica Plus Lancet 33 gauge misc USE TO TAKE BLOOD SUGAR 1-2 TIMES PER DAY    PARoxetine (PAXIL) 20 mg, oral, Nightly    pioglitazone (ACTOS) 30 mg, oral, Every 24 hours       Physical Exam:  Constitutional:       Appearance: Healthy appearance.   Eyes:      Pupils: Pupils are equal, round, and reactive to light.   HENT:      Nose: Nose normal.   Pulmonary:      Effort: Pulmonary effort is normal.      Breath sounds: Normal breath sounds.   Cardiovascular:      PMI at left midclavicular line. Normal rate. Irregular rhythm. Normal S1 with normal intensity. S2 with normal intensity.       Murmurs: There is no murmur.      No gallop.    Abdominal:      General: Bowel sounds are normal.   Musculoskeletal: Normal range of motion.      Cervical back: Normal range of motion and neck supple. Skin:     General: Skin is warm and dry.   Neurological:      Mental Status: Alert and oriented to person, place and time.           Assessment/Plan   1) SVT/Atrial flutter   I suspect she is having recurrent atrial flutter which can explain her decreasein EF and recurrent CVAs  Recommend anticoagulation with DOACs if ok with Neurology  Start Metoprolol  2) Carotid artery stenosis  Needs to be seen by Dr. Victor for CEA  3) Decreased EF  I suspect due to atrial flutter  But check stress test  Peripheral IV 12/24/23 22 G Left;Anterior Foot (Active)   Site Assessment Clean;Dry;Intact 12/25/23 0000   Dressing Status Clean;Dry 12/25/23 0000   Number of days: 1       Peripheral IV 12/24/23 20 G Right;Anterior Forearm (Active)   Site Assessment Clean;Dry;Intact 12/25/23 0000   Dressing Status Clean;Dry 12/25/23 0000   Number of days: 1       Code Status:  Full Code    I spent 30 minutes in the professional and overall care of this patient.        Robert Irene MD

## 2023-12-26 ENCOUNTER — APPOINTMENT (OUTPATIENT)
Dept: CARDIOLOGY | Facility: HOSPITAL | Age: 73
DRG: 069 | End: 2023-12-26
Payer: MEDICARE

## 2023-12-26 ENCOUNTER — APPOINTMENT (OUTPATIENT)
Dept: RADIOLOGY | Facility: HOSPITAL | Age: 73
DRG: 069 | End: 2023-12-26
Payer: MEDICARE

## 2023-12-26 ENCOUNTER — APPOINTMENT (OUTPATIENT)
Dept: VASCULAR MEDICINE | Facility: HOSPITAL | Age: 73
DRG: 069 | End: 2023-12-26
Payer: MEDICARE

## 2023-12-26 LAB
ANION GAP SERPL CALC-SCNC: 11 MMOL/L (ref 10–20)
BASOPHILS # BLD AUTO: 0.02 X10*3/UL (ref 0–0.1)
BASOPHILS NFR BLD AUTO: 0.2 %
BUN SERPL-MCNC: 15 MG/DL (ref 6–23)
CALCIUM SERPL-MCNC: 8.1 MG/DL (ref 8.6–10.3)
CHLORIDE SERPL-SCNC: 103 MMOL/L (ref 98–107)
CO2 SERPL-SCNC: 24 MMOL/L (ref 21–32)
CREAT SERPL-MCNC: 0.53 MG/DL (ref 0.5–1.05)
EOSINOPHIL # BLD AUTO: 0.13 X10*3/UL (ref 0–0.4)
EOSINOPHIL NFR BLD AUTO: 1.6 %
ERYTHROCYTE [DISTWIDTH] IN BLOOD BY AUTOMATED COUNT: 16.1 % (ref 11.5–14.5)
GFR SERPL CREATININE-BSD FRML MDRD: >90 ML/MIN/1.73M*2
GLUCOSE BLD MANUAL STRIP-MCNC: 135 MG/DL (ref 74–99)
GLUCOSE BLD MANUAL STRIP-MCNC: 149 MG/DL (ref 74–99)
GLUCOSE BLD MANUAL STRIP-MCNC: 237 MG/DL (ref 74–99)
GLUCOSE SERPL-MCNC: 117 MG/DL (ref 74–99)
HCT VFR BLD AUTO: 30.8 % (ref 36–46)
HGB BLD-MCNC: 9.6 G/DL (ref 12–16)
IMM GRANULOCYTES # BLD AUTO: 0.03 X10*3/UL (ref 0–0.5)
IMM GRANULOCYTES NFR BLD AUTO: 0.4 % (ref 0–0.9)
LYMPHOCYTES # BLD AUTO: 1.93 X10*3/UL (ref 0.8–3)
LYMPHOCYTES NFR BLD AUTO: 24 %
MAGNESIUM SERPL-MCNC: 1.64 MG/DL (ref 1.6–2.4)
MCH RBC QN AUTO: 27.1 PG (ref 26–34)
MCHC RBC AUTO-ENTMCNC: 31.2 G/DL (ref 32–36)
MCV RBC AUTO: 87 FL (ref 80–100)
MONOCYTES # BLD AUTO: 0.89 X10*3/UL (ref 0.05–0.8)
MONOCYTES NFR BLD AUTO: 11.1 %
NEUTROPHILS # BLD AUTO: 5.03 X10*3/UL (ref 1.6–5.5)
NEUTROPHILS NFR BLD AUTO: 62.7 %
NRBC BLD-RTO: 0 /100 WBCS (ref 0–0)
PLATELET # BLD AUTO: 254 X10*3/UL (ref 150–450)
POTASSIUM SERPL-SCNC: 3.7 MMOL/L (ref 3.5–5.3)
RBC # BLD AUTO: 3.54 X10*6/UL (ref 4–5.2)
SODIUM SERPL-SCNC: 134 MMOL/L (ref 136–145)
WBC # BLD AUTO: 8 X10*3/UL (ref 4.4–11.3)

## 2023-12-26 PROCEDURE — 99233 SBSQ HOSP IP/OBS HIGH 50: CPT | Performed by: INTERNAL MEDICINE

## 2023-12-26 PROCEDURE — A9502 TC99M TETROFOSMIN: HCPCS | Performed by: INTERNAL MEDICINE

## 2023-12-26 PROCEDURE — 78452 HT MUSCLE IMAGE SPECT MULT: CPT | Performed by: INTERNAL MEDICINE

## 2023-12-26 PROCEDURE — 1200000002 HC GENERAL ROOM WITH TELEMETRY DAILY

## 2023-12-26 PROCEDURE — 2500000001 HC RX 250 WO HCPCS SELF ADMINISTERED DRUGS (ALT 637 FOR MEDICARE OP): Performed by: INTERNAL MEDICINE

## 2023-12-26 PROCEDURE — 93016 CV STRESS TEST SUPVJ ONLY: CPT | Performed by: INTERNAL MEDICINE

## 2023-12-26 PROCEDURE — 85025 COMPLETE CBC W/AUTO DIFF WBC: CPT | Performed by: INTERNAL MEDICINE

## 2023-12-26 PROCEDURE — 36415 COLL VENOUS BLD VENIPUNCTURE: CPT | Performed by: INTERNAL MEDICINE

## 2023-12-26 PROCEDURE — 93005 ELECTROCARDIOGRAM TRACING: CPT

## 2023-12-26 PROCEDURE — 83735 ASSAY OF MAGNESIUM: CPT | Performed by: INTERNAL MEDICINE

## 2023-12-26 PROCEDURE — 82947 ASSAY GLUCOSE BLOOD QUANT: CPT

## 2023-12-26 PROCEDURE — 80048 BASIC METABOLIC PNL TOTAL CA: CPT | Performed by: INTERNAL MEDICINE

## 2023-12-26 PROCEDURE — 97166 OT EVAL MOD COMPLEX 45 MIN: CPT | Mod: GO | Performed by: OCCUPATIONAL THERAPIST

## 2023-12-26 PROCEDURE — 97530 THERAPEUTIC ACTIVITIES: CPT | Mod: CQ,GP

## 2023-12-26 PROCEDURE — 93017 CV STRESS TEST TRACING ONLY: CPT

## 2023-12-26 PROCEDURE — 3430000001 HC RX 343 DIAGNOSTIC RADIOPHARMACEUTICALS: Performed by: INTERNAL MEDICINE

## 2023-12-26 PROCEDURE — 93880 EXTRACRANIAL BILAT STUDY: CPT | Performed by: SURGERY

## 2023-12-26 PROCEDURE — 70544 MR ANGIOGRAPHY HEAD W/O DYE: CPT

## 2023-12-26 PROCEDURE — 70551 MRI BRAIN STEM W/O DYE: CPT

## 2023-12-26 PROCEDURE — 2500000004 HC RX 250 GENERAL PHARMACY W/ HCPCS (ALT 636 FOR OP/ED): Performed by: INTERNAL MEDICINE

## 2023-12-26 PROCEDURE — 78452 HT MUSCLE IMAGE SPECT MULT: CPT

## 2023-12-26 PROCEDURE — 70544 MR ANGIOGRAPHY HEAD W/O DYE: CPT | Performed by: RADIOLOGY

## 2023-12-26 PROCEDURE — 70551 MRI BRAIN STEM W/O DYE: CPT | Performed by: RADIOLOGY

## 2023-12-26 PROCEDURE — 99232 SBSQ HOSP IP/OBS MODERATE 35: CPT | Performed by: NURSE PRACTITIONER

## 2023-12-26 PROCEDURE — 70547 MR ANGIOGRAPHY NECK W/O DYE: CPT

## 2023-12-26 PROCEDURE — 93880 EXTRACRANIAL BILAT STUDY: CPT

## 2023-12-26 PROCEDURE — 94760 N-INVAS EAR/PLS OXIMETRY 1: CPT

## 2023-12-26 PROCEDURE — 70547 MR ANGIOGRAPHY NECK W/O DYE: CPT | Performed by: RADIOLOGY

## 2023-12-26 RX ORDER — NYSTATIN 100000 [USP'U]/G
1 POWDER TOPICAL 3 TIMES DAILY
Status: DISCONTINUED | OUTPATIENT
Start: 2023-12-26 | End: 2023-12-27 | Stop reason: HOSPADM

## 2023-12-26 RX ORDER — REGADENOSON 0.08 MG/ML
0.4 INJECTION, SOLUTION INTRAVENOUS
Status: COMPLETED | OUTPATIENT
Start: 2023-12-26 | End: 2023-12-26

## 2023-12-26 RX ADMIN — REGADENOSON 0.4 MG: 0.08 INJECTION, SOLUTION INTRAVENOUS at 11:02

## 2023-12-26 RX ADMIN — NYSTATIN 1 APPLICATION: 100000 POWDER TOPICAL at 20:46

## 2023-12-26 RX ADMIN — METOPROLOL TARTRATE 25 MG: 25 TABLET, FILM COATED ORAL at 20:44

## 2023-12-26 RX ADMIN — TETROFOSMIN 10 MILLICURIE: 0.23 INJECTION, POWDER, LYOPHILIZED, FOR SOLUTION INTRAVENOUS at 09:00

## 2023-12-26 RX ADMIN — NYSTATIN 1 APPLICATION: 100000 POWDER TOPICAL at 13:12

## 2023-12-26 RX ADMIN — ATORVASTATIN CALCIUM 80 MG: 40 TABLET, FILM COATED ORAL at 20:44

## 2023-12-26 RX ADMIN — TETROFOSMIN 30 MILLICURIE: 0.23 INJECTION, POWDER, LYOPHILIZED, FOR SOLUTION INTRAVENOUS at 11:00

## 2023-12-26 RX ADMIN — PAROXETINE 20 MG: 20 TABLET, FILM COATED ORAL at 20:44

## 2023-12-26 RX ADMIN — INSULIN LISPRO 2 UNITS: 100 INJECTION, SOLUTION INTRAVENOUS; SUBCUTANEOUS at 16:36

## 2023-12-26 ASSESSMENT — COGNITIVE AND FUNCTIONAL STATUS - GENERAL
HELP NEEDED FOR BATHING: A LITTLE
CLIMB 3 TO 5 STEPS WITH RAILING: TOTAL
WALKING IN HOSPITAL ROOM: A LOT
MOVING FROM LYING ON BACK TO SITTING ON SIDE OF FLAT BED WITH BEDRAILS: A LITTLE
DRESSING REGULAR LOWER BODY CLOTHING: A LITTLE
TOILETING: A LITTLE
TURNING FROM BACK TO SIDE WHILE IN FLAT BAD: A LITTLE
TURNING FROM BACK TO SIDE WHILE IN FLAT BAD: A LITTLE
STANDING UP FROM CHAIR USING ARMS: A LITTLE
DRESSING REGULAR LOWER BODY CLOTHING: A LITTLE
MOVING FROM LYING ON BACK TO SITTING ON SIDE OF FLAT BED WITH BEDRAILS: A LITTLE
DAILY ACTIVITIY SCORE: 21
CLIMB 3 TO 5 STEPS WITH RAILING: TOTAL
DAILY ACTIVITIY SCORE: 21
STANDING UP FROM CHAIR USING ARMS: A LITTLE
TOILETING: A LITTLE
WALKING IN HOSPITAL ROOM: A LOT
MOVING TO AND FROM BED TO CHAIR: A LITTLE
MOBILITY SCORE: 15
MOVING TO AND FROM BED TO CHAIR: A LITTLE
MOBILITY SCORE: 15
HELP NEEDED FOR BATHING: A LITTLE

## 2023-12-26 ASSESSMENT — PAIN SCALES - GENERAL
PAINLEVEL_OUTOF10: 0 - NO PAIN

## 2023-12-26 ASSESSMENT — PAIN - FUNCTIONAL ASSESSMENT
PAIN_FUNCTIONAL_ASSESSMENT: 0-10

## 2023-12-26 NOTE — PROGRESS NOTES
Occupational Therapy    Evaluation    Patient Name: Elsa Biggs  MRN: [id number]  Today's Date: 12/26/2023  Time Calculation  Start Time: 1451  Stop Time: 1459  Time Calculation (min): 8 min        Assessment:  OT Assessment: Pt. would benefit from continued O.T. to improve mobility and strength to prior level  Prognosis: Good  End of Session Patient Position: Up in chair, Alarm on  Prognosis: Good  Plan:  Treatment Interventions: ADL retraining, Functional transfer training, Endurance training  OT Frequency: 3 times per week  OT Discharge Recommendations: Low intensity level of continued care  OT - OK to Discharge: Yes  Treatment Interventions: ADL retraining, Functional transfer training, Endurance training    Subjective   Current Problem:  1. TIA (transient ischemic attack)  Vascular US carotid artery duplex bilateral    Vascular US carotid artery duplex bilateral      2. SVT (supraventricular tachycardia)        3. Other cardiomyopathy (CMS/HCC)  Nuclear Stress Test    Nuclear Stress Test    Cardiology Interpretation Of Nuclear Stress - See Other Report For Nuclear Portion    Cardiology Interpretation Of Nuclear Stress - See Other Report For Nuclear Portion      4. Shortness of breath on exertion  Nuclear Stress Test    Nuclear Stress Test    Cardiology Interpretation Of Nuclear Stress - See Other Report For Nuclear Portion    Cardiology Interpretation Of Nuclear Stress - See Other Report For Nuclear Portion      5. Cerebral infarction due to cerebral venous thrombosis, nonpyogenic (CMS/HCC)  Vascular US carotid artery duplex bilateral    Vascular US carotid artery duplex bilateral      6. Other specified symptoms and signs involving the circulatory and respiratory systems  Vascular US carotid artery duplex bilateral        General:  General  Reason for Referral: r/o CVA /TIA  Family/Caregiver Present: Yes (dtr. present at end of eval.)  Patient Position Received: Up in chair, Alarm on  General Comment: pt.  denies any pain, numbness or tingling or strrength deficits  Precautions: fall precautions      Vital Signs: see nurses notes      Pain:  Pain Assessment  Pain Assessment: 0-10  Pain Score: 0 - No pain    Objective   Cognition:  Orientation Level: Oriented X4           Home Living:  Type of Home: House  Lives With: Spouse  Home Adaptive Equipment: Walker rolling or standard  Home Layout: One level  Home Access:  (1 step)  Prior Function:  Level of [place]: Independent with ADLs and functional transfers, Independent with homemaking with ambulation  IADL History:  Homemaking Responsibilities: Yes  Meal Prep Responsibility: Primary  ADL:  LE Dressing Assistance:  (uses sock aide)  Toileting Assistance with Device: Other (Comment)  Toileting Deficit:  (CGA to/from bathroom with FWW)  Activity Tolerance:  Endurance: Tolerates 10 - 20 min exercise with multiple rests  Bed Mobility/Transfers:      Transfers  Transfer: Yes (Mod.A sit-to-stand from chair, but pt. states that she uses a lift chair at home)      Ambulation/Gait Training:  Ambulation/Gait Training  Ambulation/Gait Training Performed: Yes  Vision: WFL   Sensation: no c/o numbness or tingling      Strength:  Strength Comments: bilat. UEs 4+/5  Perception:  Inattention/Neglect: Appears intact  Coordination:  Movements are Fluid and Coordinated: Yes   Hand Function:  Gross Grasp: Functional  Outcome Measures:[facility] Daily Activity  Putting on and taking off regular lower body clothing: A little  Bathing (including washing, rinsing, drying): A little  Putting on and taking off regular upper body clothing: None  Toileting, which includes using toilet, bedpan or urinal: A little  Taking care of personal grooming such as brushing teeth: None  Eating Meals: None  Daily Activity - Total Score: 21        Education Documentation  ADL Training, taught by Jewell Ribera OT at 12/26/2023  3:24 PM.  Learner: Patient  Readiness: Acceptance  Method:  Demonstration  Response: Verbalizes Understanding  Comment: trfr. training    Education Comments  No comments found.          Goals:  Encounter Problems       Encounter Problems (Active)       MOBILITY       Safe, supervised func. mobility with FWW for toileting  (Progressing)       Start:  12/26/23    Expected End:  01/02/24               TRANSFERS       Demo. safe, supervised func. trfrs. with FWW  (Progressing)       Start:  12/26/23    Expected End:  01/02/24

## 2023-12-26 NOTE — PROGRESS NOTES
Physical Therapy  Physical Therapy Treatment    Patient Name: Elsa Biggs  MRN: [id number]  Today's Date: 12/26/2023  Time Calculation  Start Time: 1358  Stop Time: 1415  Time Calculation (min): 17 min     Assessment/Plan   OP PT Plan  Treatment/Interventions: Education/ Instruction, Gait training, Self care/ home management, Therapeutic activities, Therapeutic exercises  PT Plan: Skilled PT (Low level/intensity therapy after discharge)  PT Frequency: 4 times per week  Duration: 5-7days  Rehab Potential: Good  Plan of Care Agreement: Patient    General Visit Information:   PT  Visit  PT Received On: 12/26/23  Response to Previous Treatment: Patient with no complaints from previous session.    Reason for Referral: Impaired mobility  Room: 2012       Objective   Pain:  Pt has no c/o pain    Cognition:  Cognition  Orientation Level: Oriented X4    Activity Tolerance:  Activity Tolerance  Endurance: Tolerates 10 - 20 min exercise with multiple rests    Treatments:  1x10 reps B LAQ, AROM     Bed Mobility  Supine to sitting: Bev  Scooting: Bev    Ambulation/Gait Training  3 feet from bed to chair with FWW, CGA    Transfers  Sit to stand: CGA  Stand to sit: CGA  Transfer Device 2: Walker      Other Activity  Other Activity Performed:  (chair following tx)    Outcome Measures:  [facility] Basic Mobility  Turning from your back to your side while in a flat bed without using bedrails: A little  Moving from lying on your back to sitting on the side of a flat bed without using bedrails: A little  Moving to and from bed to chair (including a wheelchair): A little  Standing up from a chair using your arms (e.g. wheelchair or bedside chair): A little  To walk in hospital room: A lot  Climbing 3-5 steps with railing: Total  Basic Mobility - Total Score: 15    Education Documentation  Mobility Training, taught by Antonio Stoner PTA at 12/26/2023  2:39 PM.  Learner: Patient  Readiness: Acceptance  Method: Explanation,  Demonstration  Response: Needs Reinforcement    Education Comments  No comments found.        OP EDUCATION:       Encounter Problems       Encounter Problems (Active)       PT Problem       Patient will ambulate 50 distance using walker with contact guard assist (Progressing)       Start:  12/25/23    Expected End:  01/15/24         Goal Note       Patient will ambulate 50 distance using walker with contact guard assist              Patient will perform chair to and from bed transfer with contact guard assist (Progressing)       Start:  12/25/23    Expected End:  01/15/24         Goal Note       Patient will perform chair to and from bed transfer with contact guard assist              Patient will perform sit to supine on bed with contact guard assist demonstrating control (Progressing)       Start:  12/25/23    Expected End:  01/15/24         Goal Note       Patient will perform sit to supine on bed with contact guard assist demonstrating control                 Pain - Adult

## 2023-12-26 NOTE — PROGRESS NOTES
History Of Present Illness:    Elsa Biggs is a 73 y.o. female presenting with PMHx sig for recent admission at [facility] with CVA, Carotid artery stenosis (Was supposed to see Dr. Victor as outpatient) presented as a stroke alert.  Per EMS, patient has had stuttering difficulty speaking and some intermittent left arm weakness.  Patient's  called because she was unable to stand up.  Per EMS they did notice some difficulty speaking and left arm drift however they noted by the time they arrived to the ER the symptoms had improved.  Patient is endorsing a headache as well as nausea and has vomited a few times.  Per EMS, patient recently had a stroke a few months ago.  Further history limited due to altered mental status.   In ER patient was noted to be in SVT/atrial flutter - didnty respond to adenosine and subsequently spontaneously converted into NSR. Echo from recent admission with decreased EF 45%. Last stress test in 2021 was negative and EF was normal then. She also has sig Carotid artery stenosis and supposed to see Dr. Victor as outpatient    Subjective Data:  12/26/23:  Seen while downstairs for stress test.  She denies any chest pain/pressure or SOB.  Intermittent nausea with sitting up - she states this is chronic and has h/o vertigo. Not currently on telemetry.      Overnight Events:    None     Objective Data:  Last Recorded Vitals:  Vitals:    12/25/23 1516 12/25/23 1921 12/25/23 2311 12/26/23 0309   BP: 114/63 112/68 132/63 124/59   BP Location: Right arm Left arm Left arm Left arm   Patient Position: Lying Lying Lying Lying   Pulse: 92 85 69 88   Resp: 16 20 18 18   Temp: 36.2 °C (97.1 °F) 36 °C (96.8 °F) 35.7 °C (96.2 °F) 35.6 °C (96.1 °F)   TempSrc: Temporal Temporal Temporal Temporal   SpO2: 92% 90% 90% 90%   Weight:    125 kg (275 lb 12.7 oz)   Height:           Last Labs:  Results from last 7 days   Lab Units 12/26/23  0507 12/24/23 1955   WBC AUTO x10*3/uL 8.0 9.8   HEMOGLOBIN g/dL  9.6* 10.8*   HEMATOCRIT % 30.8* 34.4*   PLATELETS AUTO x10*3/uL 254 262     Results from last 7 days   Lab Units 12/26/23  0507 12/25/23  0218 12/24/23 1955   SODIUM mmol/L 134*  --  133*   POTASSIUM mmol/L 3.7  --  4.5   CHLORIDE mmol/L 103  --  102   CO2 mmol/L 24  --  23   BUN mg/dL 15  --  12   CREATININE mg/dL 0.53  --  0.62   CALCIUM mg/dL 8.1*  --  8.2*   PROTEIN TOTAL g/dL  --  6.2* 6.6   BILIRUBIN TOTAL mg/dL  --  0.4 0.5   ALK PHOS U/L  --  45 48   ALT U/L  --  14 14   AST U/L  --  13 19   GLUCOSE mg/dL 117*  --  185*     PTT - 10/20/2023:  5:22 AM  2.0   23.0 30     TROPHS   Date/Time Value Ref Range Status   12/25/2023 12:06 AM 51 0 - 13 ng/L Final   12/24/2023 10:16 PM 40 0 - 13 ng/L Final   12/24/2023 09:19 PM 30 0 - 13 ng/L Final     BNP   Date/Time Value Ref Range Status   12/25/2023 02:18  0 - 99 pg/mL Final   12/24/2023 07:55  0 - 99 pg/mL Final     HGBA1C   Date/Time Value Ref Range Status   10/20/2023 05:22 AM 7.0 see below % Final   08/02/2018 03:17 AM 6.2 % Final     Comment:          Diagnosis of Diabetes-Adults   Non-Diabetic: < or = 5.6%   Increased risk for developing diabetes: 5.7-6.4%   Diagnostic of diabetes: > or = 6.5%  .       Monitoring of Diabetes                Age (y)     Therapeutic Goal (%)   Adults:          >18           <7.0   Pediatrics:    13-18           <7.5                   7-12           <8.0                   0- 6            7.5-8.5   American Diabetes Association. Diabetes Care 33(S1), Jan 2010.     08/01/2018 11:52 AM 6.2 % Final     Comment:          Diagnosis of Diabetes-Adults   Non-Diabetic: < or = 5.6%   Increased risk for developing diabetes: 5.7-6.4%   Diagnostic of diabetes: > or = 6.5%  .       Monitoring of Diabetes                Age (y)     Therapeutic Goal (%)   Adults:          >18           <7.0   Pediatrics:    13-18           <7.5                   7-12           <8.0                   0- 6            7.5-8.5   American Diabetes  Association. Diabetes Care 33(S1), Jan 2010.       LDLCALC   Date/Time Value Ref Range Status   10/20/2023 05:22 AM 60 <=99 mg/dL Final     Comment:                                 Near   Borderline      AGE      Desirable  Optimal    High     High     Very High     0-19 Y     0 - 109     ---    110-129   >/= 130     ----    20-24 Y     0 - 119     ---    120-159   >/= 160     ----      >24 Y     0 -  99   100-129  130-159   160-189     >/=190       VLDL   Date/Time Value Ref Range Status   10/20/2023 05:22 AM 13 0 - 40 mg/dL Final      Last I/O:  I/O last 3 completed shifts:  In: 150 (1.2 mL/kg) [P.O.:150]  Out: 600 (4.8 mL/kg) [Urine:600 (0.1 mL/kg/hr)]  Weight: 125.1 kg     Echo:  Transthoracic Echo (TTE) Complete 10/20/2023   1. Left ventricular systolic function is mildly decreased with a 45-50% estimated ejection fraction.   2. Spectral Doppler shows an impaired relaxation pattern of left ventricular diastolic filling.   3. There is mild tricuspid regurgitation.   4. Technically difficult bubble study unable to interpret.    Stress Test:  PENDING    Inpatient Medications:  Scheduled medications   Medication Dose Route Frequency    aspirin  81 mg oral Daily    atorvastatin  80 mg oral Nightly    clopidogrel  75 mg oral Daily    enoxaparin  40 mg subcutaneous q12h ALEXANDER    ezetimibe  10 mg oral Daily    insulin lispro  0-5 Units subcutaneous With meals & nightly    metoprolol tartrate  25 mg oral BID    nystatin  1 Application Topical TID    PARoxetine  20 mg oral Nightly     PRN medications   Medication    acetaminophen    dextrose 10 % in water (D10W)    dextrose    glucagon    ondansetron     Continuous Medications   Medication Dose Last Rate       Physical Exam:  Vitals reviewed.   Constitutional:       Appearance: Not in distress.   Neck:      Vascular: No carotid bruit.   Pulmonary:      Effort: Pulmonary effort is normal.      Breath sounds: Normal breath sounds.   Cardiovascular:      PMI at left  midclavicular line. Normal rate. Regular rhythm. S1 with normal intensity. S2 with normal intensity.       Murmurs: There is no murmur.   Edema:     Peripheral edema present.     Pretibial: bilateral trace edema of the pretibial area.     Ankle: bilateral trace edema of the ankle.  Abdominal:      General: Bowel sounds are normal.   Neurological:      Mental Status: Alert and oriented to person, place and time.           Assessment/Plan   1) SVT/Atrial flutter   I suspect she is having recurrent atrial flutter which can explain her decreasein EF and recurrent CVAs  Recommend anticoagulation with DOACs if ok with Neurology  Start Metoprolol    12/26/23: Not currently on telemetry as she is off the unit for stress test.  Echo Oct 2023 with LVEF 45%.  Continue metoprolol tartrate.  Neurology consult pending. As above, recommend anticoagulation with DOACs if ok with Neurology    2) Carotid artery stenosis  Needs to be seen by Dr. Victor for CEA    12/26/23: Vascular consult pending.  Currently on DAPT, statin and ezetimibe.     3) Decreased EF  I suspect due to atrial flutter  But check stress test    12/26/23: Stress test pending.  She has trace BLE edema on exam.  Continue metoprolol tartrate.     4. TIA  Management per hospitalist.    MRA head/neck and MRI brain pending      Code Status:  Full Code      [place], APRN-CNP

## 2023-12-26 NOTE — PROGRESS NOTES
"Elsa Biggs is a 73 y.o. female on day 0 of admission presenting with TIA (transient ischemic attack).      Subjective   History Of Present Illness  Elsa Biggs is a 73 y.o. female with a PMHx of Carotid stenosis (MALATHI stenosis 85% on CTA neck 10/29/2023), CVA (10/19/2023; acute ischemic stroke of L thalamus region) resolved with TNK, DM II, HLD, HTN, obesity, depression/anxiety presenting with stroke like symptoms. EMS was called by pt's  after pt was unable to stand up. EMS noting sx's of slurred/difficulty with speech and L arm weakness/pronator drift, all of which improved by time of arrival to . Additional as's of HA and nausea with a few episodes of emesis. Pt reports significant fatigue \"like I could not stay awake\" during Sx duration yesterday. Previous Vascular surgery consult (12/11/2023) regarding known bilateral carotid stenosis (R ICA 85% and L ICA 57%). Order for Carotid US was placed at that time with planned follow-up to discuss possible TCAR vs Endart.       ED course of action:   Vitals: Temp: (96.5-100.6) 96.4; HR: () 66; RR: (14-21) 16; BP ()/(50-92) 104/54; SpO2 (85-98%) initially required 3L --> 2 L NC, currently stable at 91% on RA.   -Negative for Influenza A/B, RSV, and COVID  -Hyperglycemia: 185  -Electrolytes: hyponatremia (133), hypomagnesemia (1.55) and hypocalcemia (8.2); otherwise wnl   -Hypoalbuminemia 3.2  -LFT's wnl (AST 19 to 13; ALT 14) and normal Alk Phos (14)  -Normal Renal function (BUN/Cr of 12/0.62)  -Troponin (high-sensitivity): 15 --> 30 --> 40 --> 51  - --> 589  -PT/INR (23/2)  VBG: pH 7.4 with pCO2 43 and Bicarb 26.6  -Anemia: Hgb 10.8. Normal WBC (9.8) and Platelet count (262)   ECG: SVT ('s)/Atrial flutter with initial lack of response to adenosine  - Spontaneous conversion back to NSR ('s)  Echo (10/19/2023): mildly decreased LV systolic function with EF of 45-50%. Impaired relaxation pattern of LV diastolic filling and mild " tricuspid regurgitation.   CT angio chest for PE: Non-diagnostic eval for PE distal to the lobar level due to extensive respiratory motion artifact  CT ABD/Pelvis with IV contrast Contrast excreted from kidneys into urinary bladder with accumulation into vaginal vault.   -Could related to urination on table and refluc of contrast opacified urine into th vagina   -No direct evidence of vesicovaginal fistrula or air in urinary bladder; however limited given beam artifact by L STEVO.   Interval increase in size and multiple prominent and mildly enlarged L para-aortic and L common/external iliac LN   CXR Cardiomegaly without evidence of acute disease in chest  CT angio brain/head without IV contrast:   No acute large vessel intracranial/extracranial occlusion   Unchanged hemodynamic significant stenosis of L carotid bulb (60%), R carotid bulb (80-90%), and proximal postbulbar R ICA (70%).  Similar probable high-grade stenosis at origin of R common carotid  Similar high-grade stenosis of bilateral intracranial vertebral arteries, unchanged multifocal moderate to high-grade bilateral intracranial ICA stenosis with similar moderate narrowing of L M1 MCA.   CT head/brain without IV contrast: No acute ICH or acute territorial infarct    12/25: Today, pt reports resolution of original sx's. Residual LLE weakness related to L TKA years prior. Denies knowledge of previous Afib or Atrial flutter. Cardiology (Dr. Irene) consulted, suspecting recurrent Atrial flutter as potential etiology of decreased EF and reccurent CVA's. Recommending DOAC's pending Neuro approval. Also started metoprolol. Orders for STAT Carotid Duplex placed with vascular consult with Dr. Victor appreciated.      12/26: Pt seen .. No acute events overnight. Vitals and other labs stable/fairly unchanged. Continued slight decline in hgb (10.8 to 9.6; baseline 12.6-14.2). Similar occurrence during original stroke (10/2023).   Imaging orders remain pending including  STAT Carotid Vascular US, MR angio head/neck w/o contrast, MR brain w/o contrast, and NM Stress Test. Vascular surgery consult appreciated regarding surgical course of action (TCAR vs Endart). Otherwise pt remains stable.    Addendum: Discussed with cardiology vascular today.  Since no acute stroke no need for urgent intervention can treat medically with outpatient follow-up regarding her vascular stenosis is of carotids and posterior circulation.  Because of concern of paroxysmal atrial flutter cardiology is recommending full dose anticoagulation.  If stress test is negative we will start apixaban 5 mg p.o. twice daily and continue Plavix 75 mg daily.  Awaiting stress test results to decide on discharge disposition.     Review of Systems       Objective     Last Recorded Vitals  /59 (BP Location: Left arm, Patient Position: Lying)   Pulse 88   Temp 35.6 °C (96.1 °F) (Temporal)   Resp 18   Wt 125 kg (275 lb 12.7 oz)   SpO2 90%     Physical Exam    EXAM:    Constitutional: Patient resting comfortably    Head and facial: No gross abnormalities    Neck: Neck supple    Lungs: Lungs clear to auscultation    Heart: Heart rate regular soft systolic murmur normal gonzalo    Abdomen: Abdomen obese no tenderness    Pelvis/: No flank tenderness noted no suprapubic tenderness    Extremities: Some edema of the extremities minimal    Neurologic: Currently no focal neurologic deficits    Dermatologic: No rashes    Psychiatric/behavioral: Pleasant appropriate awaiting final disposition likely home.  Concerned about the cost of apixaban.        Lab Results  Results from last 7 days   Lab Units 12/26/23  0507 12/24/23 1955   WBC AUTO x10*3/uL 8.0 9.8   HEMOGLOBIN g/dL 9.6* 10.8*   HEMATOCRIT % 30.8* 34.4*   PLATELETS AUTO x10*3/uL 254 262       Results from last 7 days   Lab Units 12/26/23  0507 12/25/23  0218 12/24/23 1955   SODIUM mmol/L 134*  --  133*   POTASSIUM mmol/L 3.7  --  4.5   CHLORIDE mmol/L 103  --  102    CO2 mmol/L 24  --  23   BUN mg/dL 15  --  12   CREATININE mg/dL 0.53  --  0.62   CALCIUM mg/dL 8.1*  --  8.2*   PROTEIN TOTAL g/dL  --  6.2* 6.6   BILIRUBIN TOTAL mg/dL  --  0.4 0.5   ALK PHOS U/L  --  45 48   ALT U/L  --  14 14   AST U/L  --  13 19   GLUCOSE mg/dL 117*  --  185*       Image Results  CT abdomen pelvis w IV contrast    Result Date: 12/24/2023  Interpreted By:  Christiano Washburn, STUDY: CT ABDOMEN PELVIS W IV CONTRAST;  12/24/2023 10:04 pm   INDICATION: Signs/Symptoms:vomiting fever.   COMPARISON: 09/29/2022   ACCESSION NUMBER(S): [id number]   ORDERING CLINICIAN: CHEYANNE PROCTOR   TECHNIQUE: Contiguous axial images of the abdomen and pelvis were obtained after the intravenous administration of 75 mL Omnipaque 350 contrast. Coronal and sagittal reformatted images were reconstructed from the axial data.   FINDINGS: LOWER CHEST: Please see separate report     ABDOMEN/PELVIS:   ABDOMINAL WALL: There is a small fat containing umbilical hernia.   LIVER: No significant parenchymal abnormality.   BILE DUCTS: No significant intrahepatic or extrahepatic dilatation.   GALLBLADDER: Surgically absent.   PANCREAS: Moderate fatty atrophy. No acute abnormality.   SPLEEN: No significant abnormality. Splenule.   ADRENALS: Unchanged 1 cm left adrenal nodule, most likely representing an adenoma.   KIDNEYS, URETERS, BLADDER: There are right renal cysts measuring 1.2 cm and 1 cm. No hydroureteronephrosis. The urinary bladder is suboptimally evaluated due to beam hardening artifact but otherwise appears within normal limits. There is no gas in the urinary bladder lumen.   REPRODUCTIVE ORGANS: There is a tiny focus of gas in the endometrial canal. There is contrast accumulation within the vagina and extending through the vaginal introitus externally. No direct evidence of a vesicovaginal fistula.   VESSELS: Mild aortic atherosclerosis without AAA.   RETROPERITONEUM/LYMPH NODES: There are multiple left para-aortic  retroperitoneal lymph nodes that have increased in size from prior study. For example a 0.7 cm lymph node previously measured 0.4 cm, 1.1 cm lymph node previously measured 0.6 cm. Interval enlargement of left common iliac lymph node measuring 1.2 cm vs 0.8 cm; interval enlargement of 1.3 cm left external iliac lymph node (previously 0.7 cm), 1.5 cm left external iliac lymph node (previously 1.1 cm). No acute retroperitoneal abnormality.   BOWEL/MESENTERY/PERITONEUM:  No inflammatory bowel wall thickening or dilatation. Normal appendix.   No ascites, free air, or fluid collection.     MUSCULOSKELETAL: No acute osseous abnormality. Left total hip arthroplasty again with heterotopic ossification along the anterior shaft and left adductor muscles. Unchanged chronic compression fracture involving the upper L4 endplate with minimal depression.       1. Contrast excretion from the kidneys into the urinary bladder with accumulation of contrast in the vaginal vault as well. This could relate to urination on the table and reflux of contrast opacified urine into the vagina. Additionally, there is no direct evidence of a vesicovaginal fistula and no air in the urinary bladder; that said, evaluation is limited due to beam hardening artifact caused by left total hip arthroplasty. A new tiny focus of gas in the endometrial canal is also noted of indeterminate significance. Integration of clinical, laboratory, and imaging findings and gynecology consultation is recommended. If warranted, a CT cystogram would be of further diagnostic value.   2. Interval increase in size of multiple prominent and mildly enlarged left para-aortic and left common/external iliac lymph nodes of indeterminate etiology. No evidence of locoregional infection or malignancy to explain this.     MACRO: None.   Signed by: Christiano Washburn 12/24/2023 11:12 PM Dictation workstation:   [id number]    CT angio chest for pulmonary embolism    Result Date:  12/24/2023  Interpreted By:  Christiano Washburn, STUDY: CT ANGIO CHEST FOR PULMONARY EMBOLISM;  12/24/2023 10:04 pm   INDICATION: Signs/Symptoms:hypoxia/tachycardia.   COMPARISON: 09/29/2022   ACCESSION NUMBER(S): [id number]   ORDERING CLINICIAN: CHEYANNE PROCTOR   TECHNIQUE: Contiguous axial images of the chest were obtained after the intravenous administration of 75 mL Omnipaque 350 contrast using angiographic PE protocol. Coronal and sagittal reformatted images were reconstructed from the axial data. MIP images were created on an independent workstation and reviewed.   FINDINGS: MEDIASTINUM AND LYMPH NODES:  The esophagus appears within normal limits. Stable borderline enlarged left paratracheal lymph node. No new enlarged or suspicious lymph nodes. No pneumomediastinum.   VESSELS:  Normal caliber aorta without dissection. Mild aortic atherosclerosis. Nondiagnostic evaluation for pulmonary embolism distal to the lobar level due to extensive respiratory motion artifact. Otherwise no acute pulmonary embolism.   HEART: Mild cardiomegaly.  Severe three-vessel coronary artery calcifications. No significant pericardial effusion.   LUNG, AIRWAYS, AND PLEURA:  No consolidation, pulmonary edema, pleural effusion or pneumothorax.   OSSEOUS STRUCTURES: No acute osseous abnormality. There are flowing osteophytes of the thoracic spine with preserved disc heights likely related to diffuse idiopathic skeletal hyperostosis.   CHEST WALL SOFT TISSUES: No discernible abnormality.   UPPER ABDOMEN/OTHER: Please see separate report.       Nondiagnostic evaluation for pulmonary embolism distal to the lobar level due to extensive respiratory motion artifact. Otherwise no acute pulmonary embolism.   No other acute cardiopulmonary process.   Mild cardiomegaly with severe three-vessel coronary artery calcification.     MACRO: None.   Signed by: Christiano Washburn 12/24/2023 10:59 PM Dictation workstation:   [id number]    XR chest 1  view    Result Date: 12/24/2023  Interpreted By:  Christiano Washburn, STUDY: XR CHEST 1 VIEW;  12/24/2023 10:01 pm   INDICATION: Signs/Symptoms:fever.   COMPARISON: None.   ACCESSION NUMBER(S): [id number]   ORDERING CLINICIAN: CHEYANNE PROCTOR   FINDINGS:     Cardiomegaly. No consolidation, pleural effusion or pneumothorax.         Cardiomegaly without evidence of acute disease in the chest.     MACRO: None.   Signed by: Christiano Washburn 12/24/2023 10:56 PM Dictation workstation:   [id number]    CT angio brain attack head w IV contrast and post procedure    Result Date: 12/24/2023  Interpreted By:  Christiano Washburn, STUDY: CT ANGIO BRAIN ATTACK HEAD W IV CONTRAST AND POST PROCEDURE; CT ANGIO BRAIN ATTACK NECK W IV CONTRAST AND POST PROCEDURE;  12/24/2023 7:41 pm   INDICATION: Signs/Symptoms:slurred speech, left facial droop; Signs/Symptoms:slurred speech left facial droop   COMPARISON: 12/24/2023 noncontrast CT head.   ACCESSION NUMBER(S): [id number]; [id number]   ORDERING CLINICIAN: CHEYANNE PROCTOR   TECHNIQUE: Following IV contrast administration of 50 mL Omnipaque 350, a CT angiography of the head and neck was performed. MIPS and 3D reconstructions of the [place] of [place] and neck were created on an independent workstation and reviewed.   FINDINGS: NON-CONTRAST HEAD CT:     (Please refer to separate report).     CTA NECK:   Four-vessel aortic arch anatomy, with the left vertebral artery arising directly from the aortic arch.   LEFT VERTEBRAL ARTERY: Multifocal high-grade stenosis of the left V1 vertebral artery are noted. There is also at least moderate stenosis at its origin due to atherosclerosis. The V1 segment regains normal caliber 2-3 cm proximal to entering the transverse foramen. There, there is no significant stenosis of the left extracranial vertebral artery.   LEFT COMMON/INTERNAL CAROTID ARTERY: There is a 60% stenosis of the left carotid bulb due to combination calcified plaque (axial image  459/816, series 7). Thereafter, there is no hemodynamically significant stenosis in the neck.   RIGHT VERTEBRAL ARTERY: Scattered calcific atherosclerosis without hemodynamically significant stenosis, occlusion, or dissection.   RIGHT COMMON/INTERNAL CAROTID ARTERY: There is likely a hemodynamically significant stenosis at the origin of the right common carotid artery there is difficult to quantify due to extensive artifact from calcific atherosclerosis, image noise, and beam hardening from adjacent contrast bolus. There is an 80-90% stenosis of the right carotid bulb due to thick calcified soft plaque and ~ 70% stenosis of the proximal post bulb are right ICA due to thick calcified plaque, unchanged.     The neck soft tissues show no evidence of mass, fluid collection, or enlarged lymph nodes. There is no acute osseous abnormality.       CTA HEAD:   ANTERIOR CIRCULATION: No aneurysm.   - Internal Carotid Arteries: Unchanged diffuse calcific atherosclerosis bilateral resulting in multifocal moderate to high-grade stenosis, greatest on the left lacerum segment and left supraclinoid segment. No carotid occlusion.   - Middle Cerebral Arteries: There is redemonstration of moderate narrowing at the origin of the left M1 MCA. Otherwise, no hemodynamically significant stenosis or occlusion.   - Anterior Cerebral Arteries:  No hemodynamically significant stenosis or occlusion.     POSTERIOR CIRCULATION: No aneurysm.   - Intracranial Vertebral Arteries: There is redemonstration of high-grade stenoses of the intracranial vertebral arteries.   - Basilar Artery:  No hemodynamically significant stenosis or occlusion.   - Posterior Cerebral Arteries: Fetal origin of the left posterior cerebral artery. No hemodynamically significant stenosis or occlusion.   No arteriovenous malformation is visualized. No pathologic intracranial enhancement or discrete mass. The dural venous sinuses are patent.   MIPS and 3D reconstructions confirm  the above findings.       1. No acute large vessel intracranial or extracranial occlusion.   2. Unchanged hemodynamic significant stenosis of the left carotid bulb (60%) and right carotid bulb (80-90%) and proximal postbulbar right ICA (70%). There is also similar probable high-grade stenosis at the origin of the right common carotid artery.   3. Similar high-grade stenosis of the bilateral intracranial vertebral arteries, unchanged multifocal moderate to high-grade bilateral intracranial ICA stenosis as detailed above, and similar moderate narrowing of the left M1 MCA.   MACRO: None.   Signed by: Christiano Washburn 12/24/2023 8:18 PM Dictation workstation:   [id number]    CT angio brain attack neck w IV contrast and post procedure    Result Date: 12/24/2023  Interpreted By:  Christiano Washburn, STUDY: CT ANGIO BRAIN ATTACK HEAD W IV CONTRAST AND POST PROCEDURE; CT ANGIO BRAIN ATTACK NECK W IV CONTRAST AND POST PROCEDURE;  12/24/2023 7:41 pm   INDICATION: Signs/Symptoms:slurred speech, left facial droop; Signs/Symptoms:slurred speech left facial droop   COMPARISON: 12/24/2023 noncontrast CT head.   ACCESSION NUMBER(S): [id number]; [id number]   ORDERING CLINICIAN: CHEYANNE PROCTOR   TECHNIQUE: Following IV contrast administration of 50 mL Omnipaque 350, a CT angiography of the head and neck was performed. MIPS and 3D reconstructions of the [place] of [place] and neck were created on an independent workstation and reviewed.   FINDINGS: NON-CONTRAST HEAD CT:     (Please refer to separate report).     CTA NECK:   Four-vessel aortic arch anatomy, with the left vertebral artery arising directly from the aortic arch.   LEFT VERTEBRAL ARTERY: Multifocal high-grade stenosis of the left V1 vertebral artery are noted. There is also at least moderate stenosis at its origin due to atherosclerosis. The V1 segment regains normal caliber 2-3 cm proximal to entering the transverse foramen. There, there is no significant stenosis of  the left extracranial vertebral artery.   LEFT COMMON/INTERNAL CAROTID ARTERY: There is a 60% stenosis of the left carotid bulb due to combination calcified plaque (axial image 459/816, series 7). Thereafter, there is no hemodynamically significant stenosis in the neck.   RIGHT VERTEBRAL ARTERY: Scattered calcific atherosclerosis without hemodynamically significant stenosis, occlusion, or dissection.   RIGHT COMMON/INTERNAL CAROTID ARTERY: There is likely a hemodynamically significant stenosis at the origin of the right common carotid artery there is difficult to quantify due to extensive artifact from calcific atherosclerosis, image noise, and beam hardening from adjacent contrast bolus. There is an 80-90% stenosis of the right carotid bulb due to thick calcified soft plaque and ~ 70% stenosis of the proximal post bulb are right ICA due to thick calcified plaque, unchanged.     The neck soft tissues show no evidence of mass, fluid collection, or enlarged lymph nodes. There is no acute osseous abnormality.       CTA HEAD:   ANTERIOR CIRCULATION: No aneurysm.   - Internal Carotid Arteries: Unchanged diffuse calcific atherosclerosis bilateral resulting in multifocal moderate to high-grade stenosis, greatest on the left lacerum segment and left supraclinoid segment. No carotid occlusion.   - Middle Cerebral Arteries: There is redemonstration of moderate narrowing at the origin of the left M1 MCA. Otherwise, no hemodynamically significant stenosis or occlusion.   - Anterior Cerebral Arteries:  No hemodynamically significant stenosis or occlusion.     POSTERIOR CIRCULATION: No aneurysm.   - Intracranial Vertebral Arteries: There is redemonstration of high-grade stenoses of the intracranial vertebral arteries.   - Basilar Artery:  No hemodynamically significant stenosis or occlusion.   - Posterior Cerebral Arteries: Fetal origin of the left posterior cerebral artery. No hemodynamically significant stenosis or occlusion.    No arteriovenous malformation is visualized. No pathologic intracranial enhancement or discrete mass. The dural venous sinuses are patent.   MIPS and 3D reconstructions confirm the above findings.       1. No acute large vessel intracranial or extracranial occlusion.   2. Unchanged hemodynamic significant stenosis of the left carotid bulb (60%) and right carotid bulb (80-90%) and proximal postbulbar right ICA (70%). There is also similar probable high-grade stenosis at the origin of the right common carotid artery.   3. Similar high-grade stenosis of the bilateral intracranial vertebral arteries, unchanged multifocal moderate to high-grade bilateral intracranial ICA stenosis as detailed above, and similar moderate narrowing of the left M1 MCA.   MACRO: None.   Signed by: Christiano Washburn 12/24/2023 8:18 PM Dictation workstation:   [id number]    CT brain attack head wo IV contrast    Result Date: 12/24/2023  Interpreted By:  Hannah Kurtz, STUDY: CT BRAIN ATTACK HEAD WO IV CONTRAST  12/24/2023 7:27 pm   INDICATION: Signs/Symptoms:slurred speech   COMPARISON: CT head 10/20/2023.   ACCESSION NUMBER(S): [id number]   ORDERING CLINICIAN: CHEYANNE PROCTOR   TECHNIQUE: Serial, axial CT images of the brain were obtained without IV contrast. Coronal and sagittal reformatted images were performed.   FINDINGS: There is streak artifact from the patient's dental amalgam. There is motion artifact. The ventricles, cisterns and sulci are prominent, consistent with diffuse volume loss.  There are areas of nonspecific white matter hypodensity, which are probably age-related or microvascular in nature. The gray-white matter differentiation is intact and there is no evidence of acute territorial infarct.  No mass effect or midline shift is seen.  There is no hemorrhage.  No extraaxial fluid collection. No air-fluid levels at the visualized paranasal sinuses. The mastoid air cells are clear. No depressed calvarial fracture.       1.   No acute intracranial hemorrhage or acute territorial infarct.     MACRO: Hannah Kurtz discussed the significance and urgency of this critical finding by telephone with  CHEYANNE PROCTOR on 12/24/2023 at 7:34 pm.  (**-RCF-**) Findings:  See findings.     Signed by: Hannah Kurtz 12/24/2023 7:35 PM Dictation workstation:   [id number]    ECG 12 lead    Result Date: 12/13/2023  Sinus rhythm with frequent Premature ventricular complexes Prolonged QT Abnormal ECG When compared with ECG of 19-OCT-2023 22:41, Previous ECG has undetermined rhythm, needs review Questionable change in QRS axis    XR femur left 2+ views    Result Date: 12/6/2023  Interpreted By:  Isaias Oliveira, STUDY: XR FEMUR LEFT 2+ VIEWS; ;  12/5/2023 12:45 pm   INDICATION: Signs/Symptoms:s/p Fracture.   COMPARISON: 05/10/2022   ACCESSION NUMBER(S): [id number]   ORDERING CLINICIAN: SAMARA GLYNN   FINDINGS: Left femur, two views   Lateral plate and screw fixation of a mid femoral fracture deformity. Left total hip arthroplasty in place. Extensive intertrochanteric lesion about the proximal femur. Left total knee arthroplasty as well.       Redemonstration of extensive postsurgical change about the left femur without evidence of hardware failure or malalignment. Extensive heterotopic ossification about the proximal femur.     MACRO: None   Signed by: Isaias Oliveira 12/6/2023 7:12 PM Dictation workstation:   [id number]       Medications  Scheduled medications:  aspirin, 81 mg, oral, Daily  atorvastatin, 80 mg, oral, Nightly  clopidogrel, 75 mg, oral, Daily  enoxaparin, 40 mg, subcutaneous, q12h ALEXANDER  ezetimibe, 10 mg, oral, Daily  insulin lispro, 0-5 Units, subcutaneous, With meals & nightly  metoprolol tartrate, 25 mg, oral, BID  PARoxetine, 20 mg, oral, Nightly      Continuous medications:     PRN medications:  PRN medications: acetaminophen, dextrose 10 % in water (D10W), dextrose, glucagon, ondansetron            Code Status  Full Code      Assessment/Plan   This patient currently has cardiac telemetry ordered; if you would like to modify or discontinue the telemetry order, click here to go to the orders activity to modify/discontinue the order.    Principal Problem:    TIA (transient ischemic attack)  Active Problems:    Type 2 diabetes mellitus without complications (CMS/HCC)    Hyperlipidemia, unspecified    Essential (primary) hypertension    Bilateral carotid artery stenosis    Atrial flutter (CMS/HCC)    History of lacunar cerebrovascular accident (CVA)    Appears returned to baseline   Suspect combination of bilateral carotid stenosis and atrial flutter/SVT contributing to reduced EF and recurrent CVA's.   .Imaging orders placed yesterday (12/25) remain pending:   -STAT Carotid US order placed with Vascular surgery consult/follow-up appreciated  -Nuclear stress test per Cardiology  -MR angio head/neck without contrast  -MR brain without contrast neuroquadrant protocol    Cardiology consulted, recommending AC with DOAC's   Continue Metoprolol tartrate 25 mg daily started (12/25) and other medications indicated above.   Telemetry, daily labs, vitals, and regular neuro checks per stroke protocol  DVT prophylaxis   Previous update provided to daughter/POA (Annette) yesterday (12/25) via phone call with pt present   PT/OT eval   Continue outpatient evaluation for (Vascular surgery)  - (TCAR vs Endart).  See orders for complete plan              Chrissy Wolf  Shared visit with student  Patient seen and examined  Note amended and addended  See my orders for complete plan

## 2023-12-26 NOTE — CONSULTS
Wound Care Consult     Visit Date: 12/26/2023      Patient Name: Elsa Biggs         MRN: [id number]           YOB: 1950     Patient just returned to unit, wound consult was placed for moisture associated dermatitis to breast and abdominal fold. Nystatin order in place. No open wounds, no consult indicated at this time.     Therapeutic surface: Patient on Ophelia Dream Air with turn and reposition system in place.     Nursing updated, continue pressure injury preventions, nursing to follow provider orders and re-consult wound RN if needed.    Please contact me with questions or changes in patient condition.  Cristy Mcwilliams RN  [facility]   [phone]

## 2023-12-26 NOTE — PROGRESS NOTES
Physical Therapy    Physical Therapy Treatment    Patient Name: Elsa Biggs  MRN: [id number]  Today's Date: 12/26/2023          Assessment/Plan         PT Plan  Treatment/Interventions: Education/ Instruction, Gait training, Self care/ home management, Therapeutic activities, Therapeutic exercises  PT Plan: Skilled PT (Low level/intensity therapy after discharge)  PT Frequency: 4 times per week  Rehab Potential: Good  Plan of Care: Patient seen for follow-up treatment today by PTA, more appropriate for 4x/week. Frequency updated, recommend Low intensity level therapy after discharge.                 Encounter Problems       Encounter Problems (Active)       PT Problem       Patient will ambulate 50 distance using walker with contact guard assist       Start:  12/25/23    Expected End:  01/15/24            Patient will perform chair to and from bed transfer with contact guard assist       Start:  12/25/23    Expected End:  01/15/24            Patient will perform sit to supine on bed with contact guard assist demonstrating control       Start:  12/25/23    Expected End:  01/15/24               Pain - Adult

## 2023-12-26 NOTE — PROGRESS NOTES
12/26/23 1235   Discharge Planning   Living Arrangements Spouse/significant other   Support Systems Spouse/significant other   Assistance Needed Yes   Type of Residence Private residence   Home or Post Acute Services None   Patient expects to be discharged to: Home   Does the patient need discharge transport arranged? Yes   RoundTrip coordination needed? Yes     Met with patient at bedside, introduced self and role as RN TCC. Patient lives at home with spouse. She states she is generally independent in her own care with a walker. Patient admitted for possible TIA/ stress test results pending, Cardiology on consult. PT saw patient and recorded AMPAC of 11, discussed SNF. Patient unsure if SNF is needed as she states she was weak due to laying in bed, will see how she does with therapy again. Will follow up with her later to discuss further. TCC following.     UPDATE: PT worked with patient again, recommend LOW level therapies, discussed at bedside with patient again regarding discharge planning, HHC offered and declined. Patient would like to discharge home. Dr Azar updated.

## 2023-12-27 ENCOUNTER — APPOINTMENT (OUTPATIENT)
Dept: CARDIOLOGY | Facility: HOSPITAL | Age: 73
DRG: 069 | End: 2023-12-27
Payer: MEDICARE

## 2023-12-27 VITALS
SYSTOLIC BLOOD PRESSURE: 117 MMHG | HEIGHT: 63 IN | HEART RATE: 58 BPM | DIASTOLIC BLOOD PRESSURE: 57 MMHG | TEMPERATURE: 96.7 F | WEIGHT: 275.3 LBS | RESPIRATION RATE: 18 BRPM | BODY MASS INDEX: 48.78 KG/M2 | OXYGEN SATURATION: 94 %

## 2023-12-27 LAB
GLUCOSE BLD MANUAL STRIP-MCNC: 159 MG/DL (ref 74–99)
GLUCOSE BLD MANUAL STRIP-MCNC: 214 MG/DL (ref 74–99)

## 2023-12-27 PROCEDURE — 2500000002 HC RX 250 W HCPCS SELF ADMINISTERED DRUGS (ALT 637 FOR MEDICARE OP, ALT 636 FOR OP/ED): Performed by: INTERNAL MEDICINE

## 2023-12-27 PROCEDURE — 99232 SBSQ HOSP IP/OBS MODERATE 35: CPT | Performed by: NURSE PRACTITIONER

## 2023-12-27 PROCEDURE — 93005 ELECTROCARDIOGRAM TRACING: CPT

## 2023-12-27 PROCEDURE — 99239 HOSP IP/OBS DSCHRG MGMT >30: CPT | Performed by: INTERNAL MEDICINE

## 2023-12-27 PROCEDURE — 97530 THERAPEUTIC ACTIVITIES: CPT | Mod: GO,CO

## 2023-12-27 PROCEDURE — 82947 ASSAY GLUCOSE BLOOD QUANT: CPT

## 2023-12-27 PROCEDURE — 97116 GAIT TRAINING THERAPY: CPT | Mod: CQ,GP

## 2023-12-27 PROCEDURE — 2500000001 HC RX 250 WO HCPCS SELF ADMINISTERED DRUGS (ALT 637 FOR MEDICARE OP): Performed by: INTERNAL MEDICINE

## 2023-12-27 RX ORDER — ACETAMINOPHEN 325 MG/1
650 TABLET ORAL EVERY 4 HOURS PRN
Qty: 30 TABLET | Refills: 1 | Status: SHIPPED | OUTPATIENT
Start: 2023-12-27

## 2023-12-27 RX ADMIN — EZETIMIBE 10 MG: 10 TABLET ORAL at 08:27

## 2023-12-27 RX ADMIN — METOPROLOL TARTRATE 25 MG: 25 TABLET, FILM COATED ORAL at 08:27

## 2023-12-27 RX ADMIN — NYSTATIN 1 APPLICATION: 100000 POWDER TOPICAL at 08:27

## 2023-12-27 RX ADMIN — INSULIN LISPRO 1 UNITS: 100 INJECTION, SOLUTION INTRAVENOUS; SUBCUTANEOUS at 08:27

## 2023-12-27 RX ADMIN — INSULIN LISPRO 2 UNITS: 100 INJECTION, SOLUTION INTRAVENOUS; SUBCUTANEOUS at 11:54

## 2023-12-27 RX ADMIN — CLOPIDOGREL 75 MG: 75 TABLET ORAL at 08:27

## 2023-12-27 ASSESSMENT — COGNITIVE AND FUNCTIONAL STATUS - GENERAL
MOBILITY SCORE: 16
DRESSING REGULAR UPPER BODY CLOTHING: A LITTLE
STANDING UP FROM CHAIR USING ARMS: A LITTLE
MOVING FROM LYING ON BACK TO SITTING ON SIDE OF FLAT BED WITH BEDRAILS: A LITTLE
HELP NEEDED FOR BATHING: A LOT
DAILY ACTIVITIY SCORE: 18
MOVING TO AND FROM BED TO CHAIR: A LITTLE
CLIMB 3 TO 5 STEPS WITH RAILING: TOTAL
TOILETING: A LITTLE
WALKING IN HOSPITAL ROOM: A LITTLE
DRESSING REGULAR LOWER BODY CLOTHING: A LOT
TURNING FROM BACK TO SIDE WHILE IN FLAT BAD: A LITTLE

## 2023-12-27 ASSESSMENT — PAIN SCALES - GENERAL
PAINLEVEL_OUTOF10: 0 - NO PAIN
PAINLEVEL_OUTOF10: 0 - NO PAIN

## 2023-12-27 ASSESSMENT — PAIN - FUNCTIONAL ASSESSMENT
PAIN_FUNCTIONAL_ASSESSMENT: 0-10
PAIN_FUNCTIONAL_ASSESSMENT: 0-10

## 2023-12-27 NOTE — CARE PLAN
The patient's goals for the shift include      The clinical goals for the shift include pt will have no neuro changes this shift

## 2023-12-27 NOTE — PROGRESS NOTES
Physical Therapy  Physical Therapy Treatment    Patient Name: Elsa Biggs  MRN: [id number]  Today's Date: 12/27/2023  Time Calculation  Start Time: 1018  Stop Time: 1041  Time Calculation (min): 23 min     Assessment/Plan   OP PT Plan  Treatment/Interventions: Education/ Instruction, Gait training, Self care/ home management, Therapeutic activities, Therapeutic exercises  PT Plan: Skilled PT (Low level/intensity therapy after discharge)  PT Frequency: 4 times per week  Duration: 5-7days  Rehab Potential: Good  Plan of Care Agreement: Patient    General Visit Information:   PT  Visit  PT Received On: 12/27/23  Response to Previous Treatment: Patient with no complaints from previous session.    Reason for Referral: r/o CVA /TIA  Prior to Session Communication: Bedside nurse, PCT/NA/CTA  Room: 2012    Objective   Pain:  Pt had no c/o pain    Cognition:  Oriented X4    Activity Tolerance:  Activity Tolerance  Endurance: Tolerates 10 - 20 min exercise with multiple rests    Treatments:  Bed Mobility  Supine to sitting: Bev  Scooting: CGA    Ambulation/Gait Training  35 feet x1 rep with FWW, CGA  No buckling or gross LOB    Transfers  Sit to stand: Bev  Stand to sit: CGA  Transfer Device: Rolling Walker    Pt remained sitting in bedside chair following tx. Alarm on and call light in reach.     Outcome Measures:  [facility] Basic Mobility  Turning from your back to your side while in a flat bed without using bedrails: A little  Moving from lying on your back to sitting on the side of a flat bed without using bedrails: A little  Moving to and from bed to chair (including a wheelchair): A little  Standing up from a chair using your arms (e.g. wheelchair or bedside chair): A little  To walk in hospital room: A little  Climbing 3-5 steps with railing: Total  Basic Mobility - Total Score: 16    Education Documentation  Mobility Training, taught by Antonio Stoner PTA at 12/27/2023 11:47 AM.  Learner: Patient  Readiness:  Acceptance  Method: Explanation, Demonstration  Response: Verbalizes Understanding, Demonstrated Understanding    Education Comments  No comments found.        OP EDUCATION:       Encounter Problems       Encounter Problems (Active)       PT Problem       Patient will ambulate 50 distance using walker with contact guard assist (Progressing)       Start:  12/25/23    Expected End:  01/15/24         Goal Note       Patient will ambulate 50 distance using walker with contact guard assist              Patient will perform chair to and from bed transfer with contact guard assist (Progressing)       Start:  12/25/23    Expected End:  01/15/24         Goal Note       Patient will perform chair to and from bed transfer with contact guard assist              Patient will perform sit to supine on bed with contact guard assist demonstrating control (Progressing)       Start:  12/25/23    Expected End:  01/15/24         Goal Note       Patient will perform sit to supine on bed with contact guard assist demonstrating control                 Pain - Adult

## 2023-12-27 NOTE — PROGRESS NOTES
Occupational Therapy    OT Treatment    Patient Name: Elsa Biggs  MRN: [id number]  Today's Date: 12/27/2023  Time Calculation  Start Time: 1420  Stop Time: 1445  Time Calculation (min): 25 min         Assessment:  End of Session Communication: Bedside nurse  End of Session Patient Position: Up in chair (call light and phone in reach)       Plan:  Treatment Interventions: Endurance training, Functional transfer training    Subjective     Current Problem:  Patient Active Problem List   Diagnosis    Dizziness    Type 2 diabetes mellitus without complications (CMS/HCC)    Presence of artificial knee joint, bilateral    Personal history of malignant neoplasm of breast    Major depressive disorder, recurrent, unspecified (CMS/HCC)    Hyperlipidemia, unspecified    Hip fracture, left (CMS/HCC)    Anxiety disorder, unspecified    Vitamin D deficiency, unspecified    Pure hyperglyceridemia    Personal history of nicotine dependence    Morbid (severe) obesity due to excess calories (CMS/HCC)    Long term (current) use of oral hypoglycemic drugs    Long term (current) use of aspirin    Insomnia, unspecified    Gastroesophageal reflux disease without esophagitis    Essential (primary) hypertension    Depression, unspecified    Constipation, unspecified    TIA (transient ischemic attack)    Bilateral carotid artery stenosis    Atrial flutter (CMS/HCC)    History of lacunar cerebrovascular accident (CVA)       General:     Prior to Session Communication: Bedside nurse (ok to treat)  Patient Position Received: Up in chair, Alarm off, not on at start of session  General Comment: pt. cooperative eager to return home. Pt. is ARAM with sit to stand from chair reports she has lift chair at home and uses it frequently. she was able to increase functional mobilityout of room no loss of balance, cued to walk within walker.  Standing tolerance up to 2 mins. then patient fatigued. pt. reports needed x-wide FWW for home issued today.            Pain:  Pain Assessment  Pain Assessment: 0-10  Pain Score: 0 - No pain  Objective      Activities of Daily Living:  pt. Up in chair already dressed for home.                       Functional Standing Tolerance:  Time: 2-3 mins standing  for functional mobility using FWW  Activity: functional household distande performed.    Bed Mobility/Transfers:    Transfers  Transfer: Yes  Transfer 1  Transfer From 1: Chair with arms to  Transfer to 1: Chair with arms  Technique 1: Sit to stand, Stand to sit  Transfer Device 1: Walker (FWW)  Transfer Level of Assistance 1: Minimum assistance  Trials/Comments 1: pt. struggles rising from chair reports she has a lift chair at home  Transfers 2  Transfer From 2: Sit to, Stand to  Transfer to 2: Sit, Stand  Technique 2: Sit to stand, Stand to sit  Transfer Device 2: Walker (bariatric FWW pt. fitted for appropriat size)  Transfer Level of Assistance 2: Contact guard                     Outcome Measures:[facility] Daily Activity  Putting on and taking off regular lower body clothing: A lot  Bathing (including washing, rinsing, drying): A lot  Putting on and taking off regular upper body clothing: A little  Toileting, which includes using toilet, bedpan or urinal: A little  Taking care of personal grooming such as brushing teeth: None  Eating Meals: None  Daily Activity - Total Score: 18  Education Documentation  Body Mechanics, taught by MOHSEN Patterson at 12/27/2023  3:05 PM.  Learner: Patient  Readiness: Acceptance  Method: Explanation, Demonstration  Response: Verbalizes Understanding, Demonstrated Understanding, Needs Reinforcement    Education Comments  No comments found.        EDUCATION:       Goals:  Encounter Problems       Encounter Problems (Active)       MOBILITY       Safe, supervised func. mobility with FWW for toileting  (Progressing)       Start:  12/26/23    Expected End:  01/02/24               TRANSFERS       Demo. safe, supervised func. trfrs. with FWW   (Progressing)       Start:  12/26/23    Expected End:  01/02/24

## 2023-12-27 NOTE — DISCHARGE SUMMARY
Discharge Diagnosis  TIA (transient ischemic attack)    Principal Problem:    TIA (transient ischemic attack)  Active Problems:    Type 2 diabetes mellitus without complications (CMS/HCC)    Hyperlipidemia, unspecified    Essential (primary) hypertension    Bilateral carotid artery stenosis    Atrial flutter (CMS/HCC)    History of lacunar cerebrovascular accident (CVA)    Issues Requiring Follow-Up  Important to follow-up outpatient with neurology, vascular surgery and cardiology.  Discharged on apixaban and Plavix.    Discharge Meds     Your medication list        START taking these medications        Instructions Last Dose Given Next Dose Due   acetaminophen 325 mg tablet  Commonly known as: Tylenol      Take 2 tablets (650 mg) by mouth every 4 hours if needed for mild pain (1 - 3), moderate pain (4 - 6) or fever (temp greater than 38.0 C).       apixaban 5 mg tablet  Commonly known as: Eliquis      Take 1 tablet (5 mg) by mouth 2 times a day.              CONTINUE taking these medications        Instructions Last Dose Given Next Dose Due   atorvastatin 80 mg tablet  Commonly known as: Lipitor      Take 1 tablet (80 mg) by mouth once daily at bedtime.       cholecalciferol 25 MCG (1000 UT) tablet  Commonly known as: Vitamin D-3           clopidogrel 75 mg tablet  Commonly known as: Plavix      Take 1 tablet (75 mg) by mouth once daily.       ezetimibe 10 mg tablet  Commonly known as: Zetia           metFORMIN 500 mg tablet  Commonly known as: Glucophage           OneTouch Delica Plus Lancet 33 gauge misc  Generic drug: lancets           PARoxetine 20 mg tablet  Commonly known as: Paxil           pioglitazone 30 mg tablet  Commonly known as: Actos                  STOP taking these medications      aspirin 81 mg chewable tablet        blood sugar diagnostic strip                  Where to Get Your Medications        These medications were sent to [facility] - [address]  "[address] [address]      Phone: [phone]   acetaminophen 325 mg tablet  apixaban 5 mg tablet         Test Results Pending At Discharge  Pending Labs       Order Current Status    Blood Culture Preliminary result    Blood Culture Preliminary result            Hospital Course   History Of Present Illness  Elsa Biggs is a 73 y.o. female with a PMHx of Carotid stenosis (MALATHI stenosis 85% on CTA neck 10/29/2023), CVA (10/19/2023; acute ischemic stroke of L thalamus region) resolved with TNK, DM II, HLD, HTN, obesity, depression/anxiety presenting with stroke like symptoms. EMS was called by pt's  after pt was unable to stand up. EMS noting sx's of slurred/difficulty with speech and L arm weakness/pronator drift, all of which improved by time of arrival to . Additional as's of HA and nausea with a few episodes of emesis. Pt reports significant fatigue \"like I could not stay awake\" during Sx duration yesterday. Previous Vascular surgery consult (12/11/2023) regarding known bilateral carotid stenosis (R ICA 85% and L ICA 57%). Order for Carotid US was placed at that time with planned follow-up to discuss possible TCAR vs Endart.       ED course of action:   Vitals: Temp: (96.5-100.6) 96.4; HR: () 66; RR: (14-21) 16; BP ()/(50-92) 104/54; SpO2 (85-98%) initially required 3L --> 2 L NC, currently stable at 91% on RA.   -Negative for Influenza A/B, RSV, and COVID  -Hyperglycemia: 185  -Electrolytes: hyponatremia (133), hypomagnesemia (1.55) and hypocalcemia (8.2); otherwise wnl   -Hypoalbuminemia 3.2  -LFT's wnl (AST 19 to 13; ALT 14) and normal Alk Phos (14)  -Normal Renal function (BUN/Cr of 12/0.62)  -Troponin (high-sensitivity): 15 --> 30 --> 40 --> 51  - --> 589  -PT/INR (23/2)  VBG: pH 7.4 with pCO2 43 and Bicarb 26.6  -Anemia: Hgb 10.8. Normal WBC (9.8) and Platelet count (262)   ECG: SVT ('s)/Atrial flutter with initial lack of response to adenosine  - Spontaneous " conversion back to NSR ('s)  Echo (10/19/2023): mildly decreased LV systolic function with EF of 45-50%. Impaired relaxation pattern of LV diastolic filling and mild tricuspid regurgitation.   CT angio chest for PE: Non-diagnostic eval for PE distal to the lobar level due to extensive respiratory motion artifact  CT ABD/Pelvis with IV contrast Contrast excreted from kidneys into urinary bladder with accumulation into vaginal vault.   -Could related to urination on table and refluc of contrast opacified urine into th vagina   -No direct evidence of vesicovaginal fistrula or air in urinary bladder; however limited given beam artifact by L STEVO.   Interval increase in size and multiple prominent and mildly enlarged L para-aortic and L common/external iliac LN   CXR Cardiomegaly without evidence of acute disease in chest  CT angio brain/head without IV contrast:   No acute large vessel intracranial/extracranial occlusion   Unchanged hemodynamic significant stenosis of L carotid bulb (60%), R carotid bulb (80-90%), and proximal postbulbar R ICA (70%).  Similar probable high-grade stenosis at origin of R common carotid  Similar high-grade stenosis of bilateral intracranial vertebral arteries, unchanged multifocal moderate to high-grade bilateral intracranial ICA stenosis with similar moderate narrowing of L M1 MCA.   CT head/brain without IV contrast: No acute ICH or acute territorial infarct     12/25: Today, pt reports resolution of original sx's. Residual LLE weakness related to L TKA years prior. Denies knowledge of previous Afib or Atrial flutter. Cardiology (Dr. Irene) consulted, suspecting recurrent Atrial flutter as potential etiology of decreased EF and reccurent CVA's. Recommending DOAC's pending Neuro approval. Also started metoprolol. Orders for STAT Carotid Duplex placed with vascular consult with Dr. Victor appreciated.       12/26: Pt seen .. No acute events overnight. Vitals and other labs  stable/fairly unchanged. Continued slight decline in hgb (10.8 to 9.6; baseline 12.6-14.2). Similar occurrence during original stroke (10/2023).   Imaging orders remain pending including STAT Carotid Vascular US, MR angio head/neck w/o contrast, MR brain w/o contrast, and NM Stress Test. Vascular surgery consult appreciated regarding surgical course of action (TCAR vs Endart). Otherwise pt remains stable.     Addendum: Discussed with cardiology vascular today.  Since no acute stroke no need for urgent intervention can treat medically with outpatient follow-up regarding her vascular stenosis is of carotids and posterior circulation.  Because of concern of paroxysmal atrial flutter cardiology is recommending full dose anticoagulation.  If stress test is negative we will start apixaban 5 mg p.o. twice daily and continue Plavix 75 mg daily.  Awaiting stress test results to decide on discharge disposition.        12/27: Pt seen .. No acute events overnight. Contacted POA (daughter, Annette) per pt request regarding negative evidence of stroke on yesterday's MRI. Per daughter, pt's mental status is not at baseline, with similar behavior to prior CVA (10/2023). Plan to review yesterday's MRI with Radiology.   Carotid Vascular US (12/26) : Stenosis in proximal MALATHI (50-69%) and LICA (<50%) with turbulent flow on R. Patent flow in bilateral external carotid and vertebral arteries. Turbulent flow in bilateral subclavian arteries w/o significant stenosis.   Inconclusive interpretation on yesterday's NM Stress test (12/26) showing mild anterior wall attenuation from possible soft tissue attenuation or ischemia in LAD territory.  Discussed case with cardiology who since patient had no real ischemic symptoms we will defer any further ischemic workup to outpatient if they feel necessary.  At this time since patient was discharged on Plavix and aspirin ongoing from her previous hospitalization we will continue her Plavix along with  the apixaban but not continue the aspirin.  Patient is feeling back to normal.  Did review MRI of the brain as well as MRA of head and neck with radiology.  Patient will continue outpatient evaluation for cervical and vertebral vascular stenosis with vascular surgery.     Case discussed with patient and daughter over the phone at bedside.     See after visit summary for complete plan     35 minutes spent in the care of this patient.    Pertinent Physical Exam At Time of Discharge  Physical Exam  See today's progress note for more physical exam findings.  Outpatient Follow-Up  Future Appointments   Date Time Provider Department Center   1/5/2024  1:00 PM POR VASC 7 PORVSC [facility] [place]   1/11/2024  1:15 PM Andrew Victor DO [id number] [facility]   1/22/2024  9:30 AM Brian Garrett MD [id number] [facility]   2/1/2024  2:45 PM Robert Irene MD [id number] [facility]   3/26/2024  9:40 AM Oswald Isabel MD [id number] [facility]   6/26/2024  1:30 PM Noel Treviño MD [id number] Renaldo Azar MD

## 2023-12-27 NOTE — PROGRESS NOTES
"Elsa Biggs is a 73 y.o. female on day 1 of admission presenting with TIA (transient ischemic attack).      Subjective   History Of Present Illness  Elsa Biggs is a 73 y.o. female with a PMHx of Carotid stenosis (MALATHI stenosis 85% on CTA neck 10/29/2023), CVA (10/19/2023; acute ischemic stroke of L thalamus region) resolved with TNK, DM II, HLD, HTN, obesity, depression/anxiety presenting with stroke like symptoms. EMS was called by pt's  after pt was unable to stand up. EMS noting sx's of slurred/difficulty with speech and L arm weakness/pronator drift, all of which improved by time of arrival to . Additional as's of HA and nausea with a few episodes of emesis. Pt reports significant fatigue \"like I could not stay awake\" during Sx duration yesterday. Previous Vascular surgery consult (12/11/2023) regarding known bilateral carotid stenosis (R ICA 85% and L ICA 57%). Order for Carotid US was placed at that time with planned follow-up to discuss possible TCAR vs Endart.       ED course of action:   Vitals: Temp: (96.5-100.6) 96.4; HR: () 66; RR: (14-21) 16; BP ()/(50-92) 104/54; SpO2 (85-98%) initially required 3L --> 2 L NC, currently stable at 91% on RA.   -Negative for Influenza A/B, RSV, and COVID  -Hyperglycemia: 185  -Electrolytes: hyponatremia (133), hypomagnesemia (1.55) and hypocalcemia (8.2); otherwise wnl   -Hypoalbuminemia 3.2  -LFT's wnl (AST 19 to 13; ALT 14) and normal Alk Phos (14)  -Normal Renal function (BUN/Cr of 12/0.62)  -Troponin (high-sensitivity): 15 --> 30 --> 40 --> 51  - --> 589  -PT/INR (23/2)  VBG: pH 7.4 with pCO2 43 and Bicarb 26.6  -Anemia: Hgb 10.8. Normal WBC (9.8) and Platelet count (262)   ECG: SVT ('s)/Atrial flutter with initial lack of response to adenosine  - Spontaneous conversion back to NSR ('s)  Echo (10/19/2023): mildly decreased LV systolic function with EF of 45-50%. Impaired relaxation pattern of LV diastolic filling and mild " tricuspid regurgitation.   CT angio chest for PE: Non-diagnostic eval for PE distal to the lobar level due to extensive respiratory motion artifact  CT ABD/Pelvis with IV contrast Contrast excreted from kidneys into urinary bladder with accumulation into vaginal vault.   -Could related to urination on table and refluc of contrast opacified urine into th vagina   -No direct evidence of vesicovaginal fistrula or air in urinary bladder; however limited given beam artifact by L STEVO.   Interval increase in size and multiple prominent and mildly enlarged L para-aortic and L common/external iliac LN   CXR Cardiomegaly without evidence of acute disease in chest  CT angio brain/head without IV contrast:   No acute large vessel intracranial/extracranial occlusion   Unchanged hemodynamic significant stenosis of L carotid bulb (60%), R carotid bulb (80-90%), and proximal postbulbar R ICA (70%).  Similar probable high-grade stenosis at origin of R common carotid  Similar high-grade stenosis of bilateral intracranial vertebral arteries, unchanged multifocal moderate to high-grade bilateral intracranial ICA stenosis with similar moderate narrowing of L M1 MCA.   CT head/brain without IV contrast: No acute ICH or acute territorial infarct    12/25: Today, pt reports resolution of original sx's. Residual LLE weakness related to L TKA years prior. Denies knowledge of previous Afib or Atrial flutter. Cardiology (Dr. Irene) consulted, suspecting recurrent Atrial flutter as potential etiology of decreased EF and reccurent CVA's. Recommending DOAC's pending Neuro approval. Also started metoprolol. Orders for STAT Carotid Duplex placed with vascular consult with Dr. Victor appreciated.      12/26: Pt seen .. No acute events overnight. Vitals and other labs stable/fairly unchanged. Continued slight decline in hgb (10.8 to 9.6; baseline 12.6-14.2). Similar occurrence during original stroke (10/2023).   Imaging orders remain pending including  STAT Carotid Vascular US, MR angio head/neck w/o contrast, MR brain w/o contrast, and NM Stress Test. Vascular surgery consult appreciated regarding surgical course of action (TCAR vs Endart). Otherwise pt remains stable.    Addendum: Discussed with cardiology vascular today.  Since no acute stroke no need for urgent intervention can treat medically with outpatient follow-up regarding her vascular stenosis is of carotids and posterior circulation.  Because of concern of paroxysmal atrial flutter cardiology is recommending full dose anticoagulation.  If stress test is negative we will start apixaban 5 mg p.o. twice daily and continue Plavix 75 mg daily.  Awaiting stress test results to decide on discharge disposition.     12/27: Pt seen .. No acute events overnight. Contacted POA (daughter, Annette) per pt request regarding negative evidence of stroke on yesterday's MRI. Per daughter, pt's mental status is not at baseline, with similar behavior to prior CVA (10/2023). Plan to review yesterday's MRI with Radiology.   Carotid Vascular US (12/26) : Stenosis in proximal MALATHI (50-69%) and LICA (<50%) with turbulent flow on R. Patent flow in bilateral external carotid and vertebral arteries. Turbulent flow in bilateral subclavian arteries w/o significant stenosis.   Inconclusive interpretation on yesterday's NM Stress test (12/26) showing mild anterior wall attenuation from possible soft tissue attenuation or ischemia in LAD territory.  Discussed case with cardiology who since patient had no real ischemic symptoms we will defer any further ischemic workup to outpatient if they feel necessary.  At this time since patient was discharged on Plavix and aspirin ongoing from her previous hospitalization we will continue her Plavix along with the apixaban but not continue the aspirin.  Patient is feeling back to normal.  Did review MRI of the brain as well as MRA of head and neck with radiology.  Patient will continue outpatient  evaluation for cervical and vertebral vascular stenosis with vascular surgery.    Case discussed with patient and daughter over the phone at bedside.    See after visit summary for complete plan    35 minutes spent in the care of this patient.  Review of Systems       Objective     Last Recorded Vitals  /67 (Patient Position: Lying)   Pulse 80   Temp 36.3 °C (97.4 °F) (Temporal)   Resp 18   Wt 125 kg (275 lb 4.8 oz)   SpO2 91%     Physical Exam    EXAM:    Constitutional: Patient resting comfortably    Head and facial: No gross abnormalities    Neck: Neck supple    Lungs: Lungs clear to auscultation    Heart: Heart rate regular soft systolic murmur normal gonzalo    Abdomen: Abdomen obese no tenderness    Pelvis/: No flank tenderness noted no suprapubic tenderness    Extremities: Some edema of the extremities minimal    Neurologic: Currently no focal neurologic deficits    Dermatologic: No rashes    Psychiatric/behavioral: Pleasant appropriate awaiting final disposition likely home.  Concerned about the cost of apixaban.        Lab Results  Results from last 7 days   Lab Units 12/26/23  0507 12/24/23 1955   WBC AUTO x10*3/uL 8.0 9.8   HEMOGLOBIN g/dL 9.6* 10.8*   HEMATOCRIT % 30.8* 34.4*   PLATELETS AUTO x10*3/uL 254 262         Results from last 7 days   Lab Units 12/26/23  0507 12/25/23 0218 12/24/23 1955   SODIUM mmol/L 134*  --  133*   POTASSIUM mmol/L 3.7  --  4.5   CHLORIDE mmol/L 103  --  102   CO2 mmol/L 24  --  23   BUN mg/dL 15  --  12   CREATININE mg/dL 0.53  --  0.62   CALCIUM mg/dL 8.1*  --  8.2*   PROTEIN TOTAL g/dL  --  6.2* 6.6   BILIRUBIN TOTAL mg/dL  --  0.4 0.5   ALK PHOS U/L  --  45 48   ALT U/L  --  14 14   AST U/L  --  13 19   GLUCOSE mg/dL 117*  --  185*         Image Results  CT abdomen pelvis w IV contrast    Result Date: 12/24/2023  Interpreted By:  Christiano Washburn, STUDY: CT ABDOMEN PELVIS W IV CONTRAST;  12/24/2023 10:04 pm   INDICATION: Signs/Symptoms:vomiting fever.    COMPARISON: 09/29/2022   ACCESSION NUMBER(S): [id number]   ORDERING CLINICIAN: CHEYANNE PROCTOR   TECHNIQUE: Contiguous axial images of the abdomen and pelvis were obtained after the intravenous administration of 75 mL Omnipaque 350 contrast. Coronal and sagittal reformatted images were reconstructed from the axial data.   FINDINGS: LOWER CHEST: Please see separate report     ABDOMEN/PELVIS:   ABDOMINAL WALL: There is a small fat containing umbilical hernia.   LIVER: No significant parenchymal abnormality.   BILE DUCTS: No significant intrahepatic or extrahepatic dilatation.   GALLBLADDER: Surgically absent.   PANCREAS: Moderate fatty atrophy. No acute abnormality.   SPLEEN: No significant abnormality. Splenule.   ADRENALS: Unchanged 1 cm left adrenal nodule, most likely representing an adenoma.   KIDNEYS, URETERS, BLADDER: There are right renal cysts measuring 1.2 cm and 1 cm. No hydroureteronephrosis. The urinary bladder is suboptimally evaluated due to beam hardening artifact but otherwise appears within normal limits. There is no gas in the urinary bladder lumen.   REPRODUCTIVE ORGANS: There is a tiny focus of gas in the endometrial canal. There is contrast accumulation within the vagina and extending through the vaginal introitus externally. No direct evidence of a vesicovaginal fistula.   VESSELS: Mild aortic atherosclerosis without AAA.   RETROPERITONEUM/LYMPH NODES: There are multiple left para-aortic retroperitoneal lymph nodes that have increased in size from prior study. For example a 0.7 cm lymph node previously measured 0.4 cm, 1.1 cm lymph node previously measured 0.6 cm. Interval enlargement of left common iliac lymph node measuring 1.2 cm vs 0.8 cm; interval enlargement of 1.3 cm left external iliac lymph node (previously 0.7 cm), 1.5 cm left external iliac lymph node (previously 1.1 cm). No acute retroperitoneal abnormality.   BOWEL/MESENTERY/PERITONEUM:  No inflammatory bowel wall thickening or  dilatation. Normal appendix.   No ascites, free air, or fluid collection.     MUSCULOSKELETAL: No acute osseous abnormality. Left total hip arthroplasty again with heterotopic ossification along the anterior shaft and left adductor muscles. Unchanged chronic compression fracture involving the upper L4 endplate with minimal depression.       1. Contrast excretion from the kidneys into the urinary bladder with accumulation of contrast in the vaginal vault as well. This could relate to urination on the table and reflux of contrast opacified urine into the vagina. Additionally, there is no direct evidence of a vesicovaginal fistula and no air in the urinary bladder; that said, evaluation is limited due to beam hardening artifact caused by left total hip arthroplasty. A new tiny focus of gas in the endometrial canal is also noted of indeterminate significance. Integration of clinical, laboratory, and imaging findings and gynecology consultation is recommended. If warranted, a CT cystogram would be of further diagnostic value.   2. Interval increase in size of multiple prominent and mildly enlarged left para-aortic and left common/external iliac lymph nodes of indeterminate etiology. No evidence of locoregional infection or malignancy to explain this.     MACRO: None.   Signed by: Christiano Washburn 12/24/2023 11:12 PM Dictation workstation:   [id number]    CT angio chest for pulmonary embolism    Result Date: 12/24/2023  Interpreted By:  Christiano Washburn, STUDY: CT ANGIO CHEST FOR PULMONARY EMBOLISM;  12/24/2023 10:04 pm   INDICATION: Signs/Symptoms:hypoxia/tachycardia.   COMPARISON: 09/29/2022   ACCESSION NUMBER(S): [id number]   ORDERING CLINICIAN: CHEYANNE PROCTOR   TECHNIQUE: Contiguous axial images of the chest were obtained after the intravenous administration of 75 mL Omnipaque 350 contrast using angiographic PE protocol. Coronal and sagittal reformatted images were reconstructed from the axial data. MIP images  were created on an independent workstation and reviewed.   FINDINGS: MEDIASTINUM AND LYMPH NODES:  The esophagus appears within normal limits. Stable borderline enlarged left paratracheal lymph node. No new enlarged or suspicious lymph nodes. No pneumomediastinum.   VESSELS:  Normal caliber aorta without dissection. Mild aortic atherosclerosis. Nondiagnostic evaluation for pulmonary embolism distal to the lobar level due to extensive respiratory motion artifact. Otherwise no acute pulmonary embolism.   HEART: Mild cardiomegaly.  Severe three-vessel coronary artery calcifications. No significant pericardial effusion.   LUNG, AIRWAYS, AND PLEURA:  No consolidation, pulmonary edema, pleural effusion or pneumothorax.   OSSEOUS STRUCTURES: No acute osseous abnormality. There are flowing osteophytes of the thoracic spine with preserved disc heights likely related to diffuse idiopathic skeletal hyperostosis.   CHEST WALL SOFT TISSUES: No discernible abnormality.   UPPER ABDOMEN/OTHER: Please see separate report.       Nondiagnostic evaluation for pulmonary embolism distal to the lobar level due to extensive respiratory motion artifact. Otherwise no acute pulmonary embolism.   No other acute cardiopulmonary process.   Mild cardiomegaly with severe three-vessel coronary artery calcification.     MACRO: None.   Signed by: Christiano Washburn 12/24/2023 10:59 PM Dictation workstation:   [id number]    XR chest 1 view    Result Date: 12/24/2023  Interpreted By:  Christiano Washburn, STUDY: XR CHEST 1 VIEW;  12/24/2023 10:01 pm   INDICATION: Signs/Symptoms:fever.   COMPARISON: None.   ACCESSION NUMBER(S): [id number]   ORDERING CLINICIAN: CHEYANNE PROCTOR   FINDINGS:     Cardiomegaly. No consolidation, pleural effusion or pneumothorax.         Cardiomegaly without evidence of acute disease in the chest.     MACRO: None.   Signed by: Christiano Washburn 12/24/2023 10:56 PM Dictation workstation:   [id number]    CT angio brain attack  head w IV contrast and post procedure    Result Date: 12/24/2023  Interpreted By:  Christiano Washburn, STUDY: CT ANGIO BRAIN ATTACK HEAD W IV CONTRAST AND POST PROCEDURE; CT ANGIO BRAIN ATTACK NECK W IV CONTRAST AND POST PROCEDURE;  12/24/2023 7:41 pm   INDICATION: Signs/Symptoms:slurred speech, left facial droop; Signs/Symptoms:slurred speech left facial droop   COMPARISON: 12/24/2023 noncontrast CT head.   ACCESSION NUMBER(S): [id number]; [id number]   ORDERING CLINICIAN: CHEYANNE PROCTOR   TECHNIQUE: Following IV contrast administration of 50 mL Omnipaque 350, a CT angiography of the head and neck was performed. MIPS and 3D reconstructions of the [place] of [place] and neck were created on an independent workstation and reviewed.   FINDINGS: NON-CONTRAST HEAD CT:     (Please refer to separate report).     CTA NECK:   Four-vessel aortic arch anatomy, with the left vertebral artery arising directly from the aortic arch.   LEFT VERTEBRAL ARTERY: Multifocal high-grade stenosis of the left V1 vertebral artery are noted. There is also at least moderate stenosis at its origin due to atherosclerosis. The V1 segment regains normal caliber 2-3 cm proximal to entering the transverse foramen. There, there is no significant stenosis of the left extracranial vertebral artery.   LEFT COMMON/INTERNAL CAROTID ARTERY: There is a 60% stenosis of the left carotid bulb due to combination calcified plaque (axial image 459/816, series 7). Thereafter, there is no hemodynamically significant stenosis in the neck.   RIGHT VERTEBRAL ARTERY: Scattered calcific atherosclerosis without hemodynamically significant stenosis, occlusion, or dissection.   RIGHT COMMON/INTERNAL CAROTID ARTERY: There is likely a hemodynamically significant stenosis at the origin of the right common carotid artery there is difficult to quantify due to extensive artifact from calcific atherosclerosis, image noise, and beam hardening from adjacent contrast bolus. There is  an 80-90% stenosis of the right carotid bulb due to thick calcified soft plaque and ~ 70% stenosis of the proximal post bulb are right ICA due to thick calcified plaque, unchanged.     The neck soft tissues show no evidence of mass, fluid collection, or enlarged lymph nodes. There is no acute osseous abnormality.       CTA HEAD:   ANTERIOR CIRCULATION: No aneurysm.   - Internal Carotid Arteries: Unchanged diffuse calcific atherosclerosis bilateral resulting in multifocal moderate to high-grade stenosis, greatest on the left lacerum segment and left supraclinoid segment. No carotid occlusion.   - Middle Cerebral Arteries: There is redemonstration of moderate narrowing at the origin of the left M1 MCA. Otherwise, no hemodynamically significant stenosis or occlusion.   - Anterior Cerebral Arteries:  No hemodynamically significant stenosis or occlusion.     POSTERIOR CIRCULATION: No aneurysm.   - Intracranial Vertebral Arteries: There is redemonstration of high-grade stenoses of the intracranial vertebral arteries.   - Basilar Artery:  No hemodynamically significant stenosis or occlusion.   - Posterior Cerebral Arteries: Fetal origin of the left posterior cerebral artery. No hemodynamically significant stenosis or occlusion.   No arteriovenous malformation is visualized. No pathologic intracranial enhancement or discrete mass. The dural venous sinuses are patent.   MIPS and 3D reconstructions confirm the above findings.       1. No acute large vessel intracranial or extracranial occlusion.   2. Unchanged hemodynamic significant stenosis of the left carotid bulb (60%) and right carotid bulb (80-90%) and proximal postbulbar right ICA (70%). There is also similar probable high-grade stenosis at the origin of the right common carotid artery.   3. Similar high-grade stenosis of the bilateral intracranial vertebral arteries, unchanged multifocal moderate to high-grade bilateral intracranial ICA stenosis as detailed above, and  similar moderate narrowing of the left M1 MCA.   MACRO: None.   Signed by: Christiano Washburn 12/24/2023 8:18 PM Dictation workstation:   [id number]    CT angio brain attack neck w IV contrast and post procedure    Result Date: 12/24/2023  Interpreted By:  Christiano Washburn, STUDY: CT ANGIO BRAIN ATTACK HEAD W IV CONTRAST AND POST PROCEDURE; CT ANGIO BRAIN ATTACK NECK W IV CONTRAST AND POST PROCEDURE;  12/24/2023 7:41 pm   INDICATION: Signs/Symptoms:slurred speech, left facial droop; Signs/Symptoms:slurred speech left facial droop   COMPARISON: 12/24/2023 noncontrast CT head.   ACCESSION NUMBER(S): [id number]; [id number]   ORDERING CLINICIAN: CHEYANNE PROCTOR   TECHNIQUE: Following IV contrast administration of 50 mL Omnipaque 350, a CT angiography of the head and neck was performed. MIPS and 3D reconstructions of the [place] of [place] and neck were created on an independent workstation and reviewed.   FINDINGS: NON-CONTRAST HEAD CT:     (Please refer to separate report).     CTA NECK:   Four-vessel aortic arch anatomy, with the left vertebral artery arising directly from the aortic arch.   LEFT VERTEBRAL ARTERY: Multifocal high-grade stenosis of the left V1 vertebral artery are noted. There is also at least moderate stenosis at its origin due to atherosclerosis. The V1 segment regains normal caliber 2-3 cm proximal to entering the transverse foramen. There, there is no significant stenosis of the left extracranial vertebral artery.   LEFT COMMON/INTERNAL CAROTID ARTERY: There is a 60% stenosis of the left carotid bulb due to combination calcified plaque (axial image 459/816, series 7). Thereafter, there is no hemodynamically significant stenosis in the neck.   RIGHT VERTEBRAL ARTERY: Scattered calcific atherosclerosis without hemodynamically significant stenosis, occlusion, or dissection.   RIGHT COMMON/INTERNAL CAROTID ARTERY: There is likely a hemodynamically significant stenosis at the origin of the right common  carotid artery there is difficult to quantify due to extensive artifact from calcific atherosclerosis, image noise, and beam hardening from adjacent contrast bolus. There is an 80-90% stenosis of the right carotid bulb due to thick calcified soft plaque and ~ 70% stenosis of the proximal post bulb are right ICA due to thick calcified plaque, unchanged.     The neck soft tissues show no evidence of mass, fluid collection, or enlarged lymph nodes. There is no acute osseous abnormality.       CTA HEAD:   ANTERIOR CIRCULATION: No aneurysm.   - Internal Carotid Arteries: Unchanged diffuse calcific atherosclerosis bilateral resulting in multifocal moderate to high-grade stenosis, greatest on the left lacerum segment and left supraclinoid segment. No carotid occlusion.   - Middle Cerebral Arteries: There is redemonstration of moderate narrowing at the origin of the left M1 MCA. Otherwise, no hemodynamically significant stenosis or occlusion.   - Anterior Cerebral Arteries:  No hemodynamically significant stenosis or occlusion.     POSTERIOR CIRCULATION: No aneurysm.   - Intracranial Vertebral Arteries: There is redemonstration of high-grade stenoses of the intracranial vertebral arteries.   - Basilar Artery:  No hemodynamically significant stenosis or occlusion.   - Posterior Cerebral Arteries: Fetal origin of the left posterior cerebral artery. No hemodynamically significant stenosis or occlusion.   No arteriovenous malformation is visualized. No pathologic intracranial enhancement or discrete mass. The dural venous sinuses are patent.   MIPS and 3D reconstructions confirm the above findings.       1. No acute large vessel intracranial or extracranial occlusion.   2. Unchanged hemodynamic significant stenosis of the left carotid bulb (60%) and right carotid bulb (80-90%) and proximal postbulbar right ICA (70%). There is also similar probable high-grade stenosis at the origin of the right common carotid artery.   3.  Similar high-grade stenosis of the bilateral intracranial vertebral arteries, unchanged multifocal moderate to high-grade bilateral intracranial ICA stenosis as detailed above, and similar moderate narrowing of the left M1 MCA.   MACRO: None.   Signed by: Christiano Washburn 12/24/2023 8:18 PM Dictation workstation:   [id number]    CT brain attack head wo IV contrast    Result Date: 12/24/2023  Interpreted By:  Hannah Kurtz, STUDY: CT BRAIN ATTACK HEAD WO IV CONTRAST  12/24/2023 7:27 pm   INDICATION: Signs/Symptoms:slurred speech   COMPARISON: CT head 10/20/2023.   ACCESSION NUMBER(S): [id number]   ORDERING CLINICIAN: CHEYANNE PROCTOR   TECHNIQUE: Serial, axial CT images of the brain were obtained without IV contrast. Coronal and sagittal reformatted images were performed.   FINDINGS: There is streak artifact from the patient's dental amalgam. There is motion artifact. The ventricles, cisterns and sulci are prominent, consistent with diffuse volume loss.  There are areas of nonspecific white matter hypodensity, which are probably age-related or microvascular in nature. The gray-white matter differentiation is intact and there is no evidence of acute territorial infarct.  No mass effect or midline shift is seen.  There is no hemorrhage.  No extraaxial fluid collection. No air-fluid levels at the visualized paranasal sinuses. The mastoid air cells are clear. No depressed calvarial fracture.       1.  No acute intracranial hemorrhage or acute territorial infarct.     MACRO: Hannah Kurtz discussed the significance and urgency of this critical finding by telephone with  CHEYANNE PROCTOR on 12/24/2023 at 7:34 pm.  (**-RCF-**) Findings:  See findings.     Signed by: Hannah Kurtz 12/24/2023 7:35 PM Dictation workstation:   [id number]    ECG 12 lead    Result Date: 12/13/2023  Sinus rhythm with frequent Premature ventricular complexes Prolonged QT Abnormal ECG When compared with ECG of 19-OCT-2023 22:41, Previous ECG  has undetermined rhythm, needs review Questionable change in QRS axis    XR femur left 2+ views    Result Date: 12/6/2023  Interpreted By:  Isaias Oliveira, STUDY: XR FEMUR LEFT 2+ VIEWS; ;  12/5/2023 12:45 pm   INDICATION: Signs/Symptoms:s/p Fracture.   COMPARISON: 05/10/2022   ACCESSION NUMBER(S): [id number]   ORDERING CLINICIAN: SAMARA GLYNN   FINDINGS: Left femur, two views   Lateral plate and screw fixation of a mid femoral fracture deformity. Left total hip arthroplasty in place. Extensive intertrochanteric lesion about the proximal femur. Left total knee arthroplasty as well.       Redemonstration of extensive postsurgical change about the left femur without evidence of hardware failure or malalignment. Extensive heterotopic ossification about the proximal femur.     MACRO: None   Signed by: Isaias Oliveira 12/6/2023 7:12 PM Dictation workstation:   [id number]       Medications  Scheduled medications:  [Held by provider] apixaban, 5 mg, oral, BID  atorvastatin, 80 mg, oral, Nightly  clopidogrel, 75 mg, oral, Daily  ezetimibe, 10 mg, oral, Daily  insulin lispro, 0-5 Units, subcutaneous, With meals & nightly  metoprolol tartrate, 25 mg, oral, BID  nystatin, 1 Application, Topical, TID  PARoxetine, 20 mg, oral, Nightly      Continuous medications:     PRN medications:  PRN medications: acetaminophen, dextrose 10 % in water (D10W), dextrose, glucagon, ondansetron            Code Status  Full Code     Assessment/Plan   This patient currently has cardiac telemetry ordered; if you would like to modify or discontinue the telemetry order, click here to go to the orders activity to modify/discontinue the order.    Principal Problem:    TIA (transient ischemic attack)  Active Problems:    Type 2 diabetes mellitus without complications (CMS/HCC)    Hyperlipidemia, unspecified    Essential (primary) hypertension    Bilateral carotid artery stenosis    Atrial flutter (CMS/HCC)    History of lacunar cerebrovascular  accident (CVA)    Appears returned to baseline   Suspect combination of bilateral carotid stenosis and atrial flutter/SVT contributing to reduced EF and recurrent CVA's.   .Imaging orders placed yesterday (12/25) remain pending:   -STAT Carotid US order placed with Vascular surgery consult/follow-up appreciated  -Nuclear stress test per Cardiology  -MR angio head/neck without contrast  -MR brain without contrast neuroquadrant protocol    Will discharge on Eliquis plus Plavix outpatient follow-up with vascular surgery, cardiology and neurology.  Continue outpatient evaluation for (Vascular surgery)  - (TCAR vs Endart).  See orders for complete plan              Chrissy Wolf  Shared visit with student  Patient seen and examined  Note amended and addended  See my orders for complete plan.    Shared visit with student  Patient seen and examined  Note amended and addended  See my orders for complete plan

## 2023-12-27 NOTE — PROGRESS NOTES
History Of Present Illness:    Elsa Biggs is a 73 y.o. female presenting with PMHx sig for recent admission at [facility] with CVA, Carotid artery stenosis (Was supposed to see Dr. Victor as outpatient) presented as a stroke alert.  Per EMS, patient has had stuttering difficulty speaking and some intermittent left arm weakness.  Patient's  called because she was unable to stand up.  Per EMS they did notice some difficulty speaking and left arm drift however they noted by the time they arrived to the ER the symptoms had improved.  Patient is endorsing a headache as well as nausea and has vomited a few times.  Per EMS, patient recently had a stroke a few months ago.  Further history limited due to altered mental status.   In ER patient was noted to be in SVT/atrial flutter - didnty respond to adenosine and subsequently spontaneously converted into NSR. Echo from recent admission with decreased EF 45%. Last stress test in 2021 was negative and EF was normal then. She also has sig Carotid artery stenosis and supposed to see Dr. Victor as outpatient    Subjective Data:  12/26/23:  Seen while downstairs for stress test.  She denies any chest pain/pressure or SOB.  Intermittent nausea with sitting up - she states this is chronic and has h/o vertigo. Not currently on telemetry.      12/27/23:  Sitting up in chair.  Denies any chest pain/pressure or SOB with exertion.  She states she ambulated with walker with PT and had no SOB.  SR with PVC's on telemetry.    Overnight Events:    None     Objective Data:  Last Recorded Vitals:  Vitals:    12/26/23 2020 12/27/23 0044 12/27/23 0608 12/27/23 0727   BP: 113/63 (!) 109/48 132/67 123/51   BP Location:    Right arm   Patient Position: Lying Lying Lying Lying   Pulse: 84 81 80 74   Resp: 18 16 18 18   Temp: 36.3 °C (97.4 °F) 36.1 °C (97 °F) 36.3 °C (97.4 °F) 36.2 °C (97.1 °F)   TempSrc: Temporal Temporal Temporal Temporal   SpO2: 94% 92% 91% 93%   Weight:   125 kg (275  lb 4.8 oz)    Height:           Last Labs:  Results from last 7 days   Lab Units 12/26/23  0507 12/24/23 1955   WBC AUTO x10*3/uL 8.0 9.8   HEMOGLOBIN g/dL 9.6* 10.8*   HEMATOCRIT % 30.8* 34.4*   PLATELETS AUTO x10*3/uL 254 262       Results from last 7 days   Lab Units 12/26/23  0507 12/25/23  0218 12/24/23 1955   SODIUM mmol/L 134*  --  133*   POTASSIUM mmol/L 3.7  --  4.5   CHLORIDE mmol/L 103  --  102   CO2 mmol/L 24  --  23   BUN mg/dL 15  --  12   CREATININE mg/dL 0.53  --  0.62   CALCIUM mg/dL 8.1*  --  8.2*   PROTEIN TOTAL g/dL  --  6.2* 6.6   BILIRUBIN TOTAL mg/dL  --  0.4 0.5   ALK PHOS U/L  --  45 48   ALT U/L  --  14 14   AST U/L  --  13 19   GLUCOSE mg/dL 117*  --  185*       PTT - 10/20/2023:  5:22 AM  2.0   23.0 30     TROPHS   Date/Time Value Ref Range Status   12/25/2023 12:06 AM 51 0 - 13 ng/L Final   12/24/2023 10:16 PM 40 0 - 13 ng/L Final   12/24/2023 09:19 PM 30 0 - 13 ng/L Final     BNP   Date/Time Value Ref Range Status   12/25/2023 02:18  0 - 99 pg/mL Final   12/24/2023 07:55  0 - 99 pg/mL Final     HGBA1C   Date/Time Value Ref Range Status   10/20/2023 05:22 AM 7.0 see below % Final   08/02/2018 03:17 AM 6.2 % Final     Comment:          Diagnosis of Diabetes-Adults   Non-Diabetic: < or = 5.6%   Increased risk for developing diabetes: 5.7-6.4%   Diagnostic of diabetes: > or = 6.5%  .       Monitoring of Diabetes                Age (y)     Therapeutic Goal (%)   Adults:          >18           <7.0   Pediatrics:    13-18           <7.5                   7-12           <8.0                   0- 6            7.5-8.5   American Diabetes Association. Diabetes Care 33(S1), Jan 2010.     08/01/2018 11:52 AM 6.2 % Final     Comment:          Diagnosis of Diabetes-Adults   Non-Diabetic: < or = 5.6%   Increased risk for developing diabetes: 5.7-6.4%   Diagnostic of diabetes: > or = 6.5%  .       Monitoring of Diabetes                Age (y)     Therapeutic Goal (%)   Adults:          >18            <7.0   Pediatrics:    13-18           <7.5                   7-12           <8.0                   0- 6            7.5-8.5   American Diabetes Association. Diabetes Care 33(S1), Jan 2010.       LDLCALC   Date/Time Value Ref Range Status   10/20/2023 05:22 AM 60 <=99 mg/dL Final     Comment:                                 Near   Borderline      AGE      Desirable  Optimal    High     High     Very High     0-19 Y     0 - 109     ---    110-129   >/= 130     ----    20-24 Y     0 - 119     ---    120-159   >/= 160     ----      >24 Y     0 -  99   100-129  130-159   160-189     >/=190       VLDL   Date/Time Value Ref Range Status   10/20/2023 05:22 AM 13 0 - 40 mg/dL Final      Last I/O:  I/O last 3 completed shifts:  In: 50 (0.4 mL/kg) [I.V.:50 (0.4 mL/kg)]  Out: 1100 (8.8 mL/kg) [Urine:1100 (0.2 mL/kg/hr)]  Weight: 124.9 kg     Echo:  Transthoracic Echo (TTE) Complete 10/20/2023   1. Left ventricular systolic function is mildly decreased with a 45-50% estimated ejection fraction.   2. Spectral Doppler shows an impaired relaxation pattern of left ventricular diastolic filling.   3. There is mild tricuspid regurgitation.   4. Technically difficult bubble study unable to interpret.    Stress Test 12/26/23:  1. No clinical or electrocardiographic evidence for ischemia at maximal infusion.   2. Correlate with myocardial perfusion imaging results.   3. Nuclear image results are reported separately.  Mild attentuation of the anterior wall seen on the stress images.  This is either soft tissue attenuation or ischemia in LAD territory.  Unfortunately there are no prone images available. Dilated left  ventricle with mild left ventricular systolic dysfunction on post  stress gated imaging.    Inpatient Medications:  Scheduled medications   Medication Dose Route Frequency    [Held by provider] apixaban  5 mg oral BID    atorvastatin  80 mg oral Nightly    clopidogrel  75 mg oral Daily    ezetimibe  10 mg oral Daily     insulin lispro  0-5 Units subcutaneous With meals & nightly    metoprolol tartrate  25 mg oral BID    nystatin  1 Application Topical TID    PARoxetine  20 mg oral Nightly     PRN medications   Medication    acetaminophen    dextrose 10 % in water (D10W)    dextrose    glucagon    ondansetron     Continuous Medications   Medication Dose Last Rate       Physical Exam:  Vitals reviewed.   Constitutional:       Appearance: Not in distress.   Neck:      Vascular: No carotid bruit.   Pulmonary:      Effort: Pulmonary effort is normal.      Breath sounds: Normal breath sounds.   Cardiovascular:      PMI at left midclavicular line. Normal rate. Regular rhythm. S1 with normal intensity. S2 with normal intensity.       Murmurs: There is no murmur.   Edema:     Peripheral edema present.     Pretibial: bilateral trace edema of the pretibial area.     Ankle: bilateral trace edema of the ankle.  Abdominal:      General: Bowel sounds are normal.   Neurological:      Mental Status: Alert and oriented to person, place and time.         Assessment/Plan   1) SVT/Atrial flutter   I suspect she is having recurrent atrial flutter which can explain her decreasein EF and recurrent CVAs  Recommend anticoagulation with DOACs if ok with Neurology  Start Metoprolol    12/26/23: Not currently on telemetry as she is off the unit for stress test.  Echo Oct 2023 with LVEF 45%.  Continue metoprolol tartrate.  Neurology consult pending. As above, recommend anticoagulation with DOACs if ok with Neurology    12/27/23:  SR with PVC's on telemetry.  Recommend starting apixaban 5 mg BID once okay from neurological standpoint.      2) Carotid artery stenosis  Needs to be seen by Dr. Victor for CEA    12/26/23: Vascular consult pending.  Currently on DAPT, statin and ezetimibe.   12/27/23:  Management per hospitalist/vascular.  Will likely be d/c on SAPT in addition to apixaban.  She states she has outpatient appt with Dr. Victor in about 2 weeks.    3)  Decreased EF  I suspect due to atrial flutter  But check stress test    12/26/23: Stress test pending.  She has trace BLE edema on exam.  Continue metoprolol tartrate.     12/27/23:  Echo Oct 2023 with LVEF 45-50%. Stress test yesterday showed Mild attentuation of the anterior wall seen on the stress images. This is either soft tissue attenuation or ischemia in LAD territory. Unfortunately there are no prone images available. Dilated left ventricle with mild left ventricular systolic dysfunction on post stress gated imaging.  She denies any ischemic symptoms such as chest pain or SOB.  Okay from cardiac standpoint to d/c and will arrange outpatient follow up.  Can consider outpatient CTA coronaries vs LHC.    4. TIA  Management per hospitalist.    MRA head/neck and MRI brain pending      Code Status:  Full Code      Santa Orellana, APRN-CNP

## 2023-12-28 ENCOUNTER — APPOINTMENT (OUTPATIENT)
Dept: CARDIOLOGY | Facility: HOSPITAL | Age: 73
End: 2023-12-28
Payer: MEDICARE

## 2023-12-28 NOTE — NURSING NOTE
Stroke Patient- TIA Follow-Up Call    Patient/Family knows what medications to take to prevent another event? yes    Patient/Family knows what lifestyle changes are needed to prevent another event? yes    Patient/Family can verbalize signs/symptoms of a stroke and calling 911? yes    Patient/Family recommends our hospital's stroke program? N/a    Any suggestions or comments for us to improve the care we provide to stroke patients and families?  No, everything was explained thoroughly

## 2023-12-29 LAB
BACTERIA BLD CULT: NORMAL
BACTERIA BLD CULT: NORMAL

## 2024-01-03 ENCOUNTER — TELEPHONE (OUTPATIENT)
Dept: CARDIOLOGY | Facility: CLINIC | Age: 74
End: 2024-01-03
Payer: COMMERCIAL

## 2024-01-03 DIAGNOSIS — I48.3 TYPICAL ATRIAL FLUTTER (MULTI): ICD-10-CM

## 2024-01-03 NOTE — TELEPHONE ENCOUNTER
Patient admitted at [facility] 12/24-12/27/23 and was consulted by Dr. Irene. Santa Orellana also followed this patient while inpatient. Eliquis started for atrial flutter. S/p hospital follow up scheduled 2/1/24 so will need 30 days    Age: 73  Weight: 125 kg on 12/27/23  Creat: 0.53 12/26/23

## 2024-01-03 NOTE — TELEPHONE ENCOUNTER
I can see a script for Eliquis 5 mg was sent 12/27/23 with 2 refills. I called pharmacy and confirmed they have it. I called and notified patient. She knew she had refills, but the problem is the first month of Eliquis was free with copay card, but now it is $504. She has filled out the [employer] assistance paperwork and will bring in tomorrow and will fill out physician portion.

## 2024-01-03 NOTE — TELEPHONE ENCOUNTER
----- Message from Liz Hanson sent at 1/3/2024 10:59 AM EST -----  Regarding: Eliquis  Patient was prescribed Eliquis in the hospital and sees ALMAZ Feb 2 I believe but will run out of Eliquis before the appt. Is there anything we can do since AG hasn't seen her yet?

## 2024-01-10 LAB
ATRIAL RATE: 113 BPM
ATRIAL RATE: 86 BPM
P AXIS: -8 DEGREES
P AXIS: 16 DEGREES
PR INTERVAL: 179 MS
PR INTERVAL: 218 MS
Q ONSET: 251 MS
Q ONSET: 251 MS
QRS COUNT: 14 BEATS
QRS COUNT: 18 BEATS
QRS DURATION: 93 MS
QRS DURATION: 95 MS
QT INTERVAL: 327 MS
QT INTERVAL: 406 MS
QTC CALCULATION(BAZETT): 447 MS
QTC CALCULATION(BAZETT): 483 MS
QTC FREDERICIA: 402 MS
QTC FREDERICIA: 456 MS
R AXIS: 11 DEGREES
R AXIS: 2 DEGREES
T AXIS: 142 DEGREES
T AXIS: 176 DEGREES
T OFFSET: 414 MS
T OFFSET: 454 MS
VENTRICULAR RATE: 112 BPM
VENTRICULAR RATE: 85 BPM

## 2024-01-11 ENCOUNTER — OFFICE VISIT (OUTPATIENT)
Dept: VASCULAR SURGERY | Facility: CLINIC | Age: 74
End: 2024-01-11
Payer: MEDICARE

## 2024-01-11 VITALS
WEIGHT: 275 LBS | DIASTOLIC BLOOD PRESSURE: 91 MMHG | HEART RATE: 88 BPM | BODY MASS INDEX: 48.71 KG/M2 | SYSTOLIC BLOOD PRESSURE: 138 MMHG

## 2024-01-11 DIAGNOSIS — I65.23 BILATERAL CAROTID ARTERY STENOSIS: Primary | ICD-10-CM

## 2024-01-11 PROCEDURE — 99214 OFFICE O/P EST MOD 30 MIN: CPT | Performed by: SURGERY

## 2024-01-11 PROCEDURE — 1111F DSCHRG MED/CURRENT MED MERGE: CPT | Performed by: SURGERY

## 2024-01-11 PROCEDURE — 1036F TOBACCO NON-USER: CPT | Performed by: SURGERY

## 2024-01-11 PROCEDURE — 3080F DIAST BP >= 90 MM HG: CPT | Performed by: SURGERY

## 2024-01-11 PROCEDURE — 3075F SYST BP GE 130 - 139MM HG: CPT | Performed by: SURGERY

## 2024-01-11 PROCEDURE — 1159F MED LIST DOCD IN RCRD: CPT | Performed by: SURGERY

## 2024-01-11 PROCEDURE — 1126F AMNT PAIN NOTED NONE PRSNT: CPT | Performed by: SURGERY

## 2024-01-11 PROCEDURE — 1160F RVW MEDS BY RX/DR IN RCRD: CPT | Performed by: SURGERY

## 2024-01-11 NOTE — PROGRESS NOTES
Subjective   Patient ID: Elsa Biggs is a 73 y.o. female who presents for Follow-up (Results carotid duplex).  HPI  Left-sided ophthalmic stroke however at this time she is essentially asymptomatic originally presented with some expressive aphasia with diplopia which has completely resolved  Recent carotid duplex CTA MRI demonstrates high-grade right carotid stenosis moderate disease left.      Review of Systems  Review of Systems    Constitutional:  no generalized malaise overall feels well, energy levels intact, no complaints specifically noted  HEENT:  No blurry vision, no visual aides noted, no hearing loss no ear ache no nose bleeds noted, no dysphagia, no congestion otherwise no pertinent positives noted  Cardiovascular:  no palpitations, chest pain or heaviness noted, no leg swelling, no numbness or tingling in the lower extremity noted  Respiratory:  no shortness of breath, no productive cough noted, no conversation dyspnea or difficulty breathing  Gastrointestinal:  no abdominal pain, no nausea or vomiting, appetite intact, no bowel irregularities noted  Genitourinary:   no urinary incontinence, frequency or urgency issues noted, no hematuria or burning sensation issues  Musculoskeletal:  No muscle aches or pains, no joint discomfort noted, no back pain noted otherwise feels well  Skin: no ulcerations, skin color issues or wounds upper or lower extremities  Neurologic: no dizziness, no hemiplegia, no hemiparesis, no obvious visual deficits noted  Psychiatric: no depression, no memory loss noted, no suicidal ideation  Endocrine: no weight loss or gain, no temperature concerns hot or cold intolerance  Hemogolotic/Lymphatic: no bruising, excessive bleeding, no swelling in the groins or neck noted      Objective   Physical Exam  Physical exam    Constitutional: alert and in no acute distress verbal  Eyes: No erythema swelling or discharge noted  Neck: supple, symmetric, trachea midline, no masses  noted  Cardiovascular: Carotid pulses 2+, no obvious bruit, no Jugular distension noted, no thrill, heart regular rate, lower extremity vascular exam intact, cap refill <2 sec  Pulmonary:  Bilateral breath sounds intact, clear with rales rhonchi or wheeze  Abdomen: soft non tender, no pulsatile masses noted, no rebound rigidity or guarding noted  Skin: intact warm no abnormal turgor  Psychiatric: alert without any obvious cognitive issues, oriented to person, place, and time      Assessment/Plan   High-grade right carotid stenosis  We did discuss response complications of TCAR  Complication not limited to infection bleeding as well as ischemic stroke all questions addressed patient does understand we will proceed  Cardiac clearance pending.         Andrew Victor DO 01/11/24 2:38 PM

## 2024-01-16 ENCOUNTER — OFFICE VISIT (OUTPATIENT)
Dept: CARDIOLOGY | Facility: HOSPITAL | Age: 74
End: 2024-01-16
Payer: MEDICARE

## 2024-01-16 VITALS
DIASTOLIC BLOOD PRESSURE: 87 MMHG | OXYGEN SATURATION: 95 % | HEART RATE: 73 BPM | SYSTOLIC BLOOD PRESSURE: 138 MMHG | WEIGHT: 256 LBS | BODY MASS INDEX: 45.35 KG/M2

## 2024-01-16 DIAGNOSIS — Z01.818 PRE-OPERATIVE CLEARANCE: ICD-10-CM

## 2024-01-16 DIAGNOSIS — I49.3 PVC (PREMATURE VENTRICULAR CONTRACTION): Primary | ICD-10-CM

## 2024-01-16 DIAGNOSIS — R94.39 ABNORMAL STRESS TEST: ICD-10-CM

## 2024-01-16 PROCEDURE — 1159F MED LIST DOCD IN RCRD: CPT | Performed by: NURSE PRACTITIONER

## 2024-01-16 PROCEDURE — 1036F TOBACCO NON-USER: CPT | Performed by: NURSE PRACTITIONER

## 2024-01-16 PROCEDURE — 1126F AMNT PAIN NOTED NONE PRSNT: CPT | Performed by: NURSE PRACTITIONER

## 2024-01-16 PROCEDURE — 99214 OFFICE O/P EST MOD 30 MIN: CPT | Performed by: NURSE PRACTITIONER

## 2024-01-16 PROCEDURE — 93010 ELECTROCARDIOGRAM REPORT: CPT | Performed by: INTERNAL MEDICINE

## 2024-01-16 PROCEDURE — 93005 ELECTROCARDIOGRAM TRACING: CPT | Performed by: NURSE PRACTITIONER

## 2024-01-16 PROCEDURE — 3075F SYST BP GE 130 - 139MM HG: CPT | Performed by: NURSE PRACTITIONER

## 2024-01-16 PROCEDURE — 1111F DSCHRG MED/CURRENT MED MERGE: CPT | Performed by: NURSE PRACTITIONER

## 2024-01-16 PROCEDURE — 3079F DIAST BP 80-89 MM HG: CPT | Performed by: NURSE PRACTITIONER

## 2024-01-16 RX ORDER — METOPROLOL SUCCINATE 25 MG/1
25 TABLET, EXTENDED RELEASE ORAL DAILY
Qty: 30 TABLET | Refills: 11 | Status: ON HOLD | OUTPATIENT
Start: 2024-01-16 | End: 2024-01-29 | Stop reason: SDUPTHER

## 2024-01-17 DIAGNOSIS — R94.39 ABNORMAL STRESS TEST: ICD-10-CM

## 2024-01-17 PROBLEM — Z01.818 PRE-OPERATIVE CLEARANCE: Status: ACTIVE | Noted: 2024-01-16

## 2024-01-17 PROBLEM — I49.3 PVC (PREMATURE VENTRICULAR CONTRACTION): Status: ACTIVE | Noted: 2024-01-16

## 2024-01-17 NOTE — H&P (VIEW-ONLY)
Referred by Dr. Victor for hospital follow up and pre-operative cardiac evaluation      History Of Present Illness:    Elsa Biggs is a 73 y.o. female from home with h/o obesity, hypertension, dyslipidemia, depression, type 2 DM, recently admitted in October with acute ischemic stroke in the left thalamus territory, and incidentally found MALATHI 85% presenting for follow up s/p recurrent hospitalization at the end of December 2023 at [facility] with altered mental status and generalized weakness from unclear etiology-- stroke work up negative.  Cardiology notes reviewed from recent admit and patient was found to be in atrial flutter in the ER and started on apixaban at that time.  Stress test was obtained for further ischemic evaluation in the setting of recently identified mildly reduced EF 45-50%.  Stress test showed possible stress induced ischemia in the LAD territory vs. [facility]. [facility] was deferred to discuss electively as outpatient.  Patient denies symptoms of chest pain, SOB, dizziness, syncope, or palpitations.  She is not very active/mobile and limited by arthritis and deconditioning.   Also had recent [employer] ambulatory monitor for 14 days that was negative for atrial arrhythmia, but did show 32% burden of PVCs.  Had been on Toprol XL, however, this as well as losartan-hydrochlorothiazide were stopped in November d/t hypotension and weakness.      Ms. Biggs was evaluated by Dr. Victor, Vascular Surgery, for severe MALATHI stenosis and has been advised to have TCAR pending further cardiac evaluation.       Past Medical History:  She has a past medical history of Cancer (CMS/HCC) and Diabetes mellitus (CMS/HCC).    Past Surgical History:  She has a past surgical history that includes MR angio neck w and wo IV contrast (01/14/2022); Joint replacement; Fracture surgery; Breast surgery; MR angio head wo IV contrast (12/26/2023); and MR angio neck wo IV contrast (12/26/2023).       Social History:  She reports that she quit smoking about 41 years ago. Her smoking use included cigarettes. She smoked an average of 1 pack per day. She has never used smokeless tobacco. She reports current alcohol use of about 1.0 standard drink of alcohol per week. She reports that she does not use drugs.    Family History:  No family history on file.     Allergies:  Tetracycline    Outpatient Medications:  Current Outpatient Medications   Medication Instructions    acetaminophen (TYLENOL) 650 mg, oral, Every 4 hours PRN    apixaban (ELIQUIS) 5 mg, oral, 2 times daily    atorvastatin (LIPITOR) 80 mg, oral, Nightly    cholecalciferol (Vitamin D-3) 25 MCG (1000 UT) tablet 1 tablet, oral, Daily    clopidogrel (PLAVIX) 75 mg, oral, Daily    ezetimibe (ZETIA) 10 mg, oral, Daily    metFORMIN (GLUCOPHAGE) 500 mg, oral, 2 times daily with meals    metoprolol succinate XL (TOPROL-XL) 25 mg, oral, Daily, Do not crush or chew.    OneTouch Delica Plus Lancet 33 gauge misc USE TO TAKE BLOOD SUGAR 1-2 TIMES PER DAY    PARoxetine (PAXIL) 20 mg, oral, Nightly    pioglitazone (ACTOS) 30 mg, oral, Every 24 hours        Last Recorded Vitals:  Vitals:    01/16/24 1503   BP: 138/87   Pulse: 73   SpO2: 95%   Weight: 116 kg (256 lb)       Physical Exam:  Constitutional: Pleasant, Awake/Alert/Oriented to person place and time. No distress  Head: Atraumatic, Normocephalic  Eyes: EOMI. RIP  Neck: No JVD  Cardiovascular: Regular rate and rhythm, S1, S2. No extra heart sounds or murmurs  Respiratory: Clear to auscultation bilaterally. No wheezing, rales or rhonchi. Good chest wall expansion  Abdomen: Soft, Nontender, Obese. Bowel sounds appreciated  Musculoskeletal: ROM intact. Muscle strength grossly intact upper and lower extremities 5/5.   Neurological: CNII-XII intact. Sensation grossly intact  Extremities: Warm and dry. No acute rashes and lesions  Psychiatric: Appropriate mood and affect         Last Labs:  CBC -  Lab Results    Component Value Date    WBC 8.0 12/26/2023    HGB 9.6 (L) 12/26/2023    HCT 30.8 (L) 12/26/2023    MCV 87 12/26/2023     12/26/2023       CMP -  Lab Results   Component Value Date    CALCIUM 8.1 (L) 12/26/2023    PHOS 3.8 10/21/2023    PROT 6.2 (L) 12/25/2023    ALBUMIN 3.0 (L) 12/25/2023    AST 13 12/25/2023    ALT 14 12/25/2023    ALKPHOS 45 12/25/2023    BILITOT 0.4 12/25/2023       LIPID PANEL -   Lab Results   Component Value Date    CHOL 113 10/20/2023    TRIG 66 10/20/2023    HDL 40.1 10/20/2023    CHHDL 2.8 10/20/2023    VLDL 13 10/20/2023    NHDL 73 10/20/2023       RENAL FUNCTION PANEL -   Lab Results   Component Value Date    GLUCOSE 117 (H) 12/26/2023     (L) 12/26/2023    K 3.7 12/26/2023     12/26/2023    CO2 24 12/26/2023    ANIONGAP 11 12/26/2023    BUN 15 12/26/2023    CREATININE 0.53 12/26/2023    CALCIUM 8.1 (L) 12/26/2023    PHOS 3.8 10/21/2023    ALBUMIN 3.0 (L) 12/25/2023        Lab Results   Component Value Date     (H) 12/25/2023    HGBA1C 7.0 (H) 10/20/2023       Last Cardiology Tests:  ECG:  ECG 12 lead (Clinic Performed) 01/16/2024 (Preliminary)    NSR, PVCs at times in bigeminy, HR 92bpm    Echo:  Transthoracic Echo (TTE) Complete 10/20/2023    CONCLUSIONS:   1. Left ventricular systolic function is mildly decreased with a 45-50% estimated ejection fraction.   2. Spectral Doppler shows an impaired relaxation pattern of left ventricular diastolic filling.   3. There is mild tricuspid regurgitation.   4. Technically difficult bubble study unable to interpret.    Stress Test:  Nuclear Stress Test 12/26/2023    IMPRESSION:  Mild attentuation of the anterior wall seen on the stress images.  This is either soft tissue attenuation or ischemia in LAD territory.  Unfortunately there are no prone images available. Dilated left  ventricle with mild left ventricular systolic dysfunction on post  stress gated imaging.    IMPRESSION:  1. Normal stress myocardial perfusion  imaging in response to  pharmacologic stress.  2. Mild left ventricular systolic dysfunction on post stress gated  imaging.      Lab review: I have personally reviewed the laboratory result(s)   Diagnostic review: I have personally reviewed the result(s) of the Echocardiogram and stress     Assessment/Plan   73 y.o. female from home with h/o obesity, hypertension, dyslipidemia, depression, type 2 DM, recently admitted in October with acute ischemic stroke in the left thalamus territory, and incidentally found MALATHI 85% presenting for follow up s/p recurrent hospitalization at the end of December 2023 at [facility] with altered mental status and generalized weakness from unclear etiology-- stroke work up negative.  Found to be in a.flutter in the ER in December per notes and started on apixaban.  Ambulatory monitor shows NSR with 32% burden of PVCs.  Stress test December 2023 indeterminate-- suggestive of LAD territory ischemia, EF 48%. Pending right TCAR with Dr. Victor.     Plan:  Given high burden of PVCs, reduced EF, and recent abnormal stress test concerning for LAD territory ischemia, and upcoming right carotid surgery recommend coronary angiography for definitive evaluation of obstructive CAD for further preoperative cardiovascular risk assessment.       -Restart Toprol XL 25mg daily and will titrate as BP tolerates   -Hold apixaban for 48hrs prior to LHC and will resume post cath   -Continue plavix, zetia, and atorvastatin       ANITA Dhillon-CNP

## 2024-01-17 NOTE — PROGRESS NOTES
Referred by Dr. Victor for hospital follow up and pre-operative cardiac evaluation      History Of Present Illness:    Elsa Biggs is a 73 y.o. female from home with h/o obesity, hypertension, dyslipidemia, depression, type 2 DM, recently admitted in October with acute ischemic stroke in the left thalamus territory, and incidentally found MALATHI 85% presenting for follow up s/p recurrent hospitalization at the end of December 2023 at [facility] with altered mental status and generalized weakness from unclear etiology-- stroke work up negative.  Cardiology notes reviewed from recent admit and patient was found to be in atrial flutter in the ER and started on apixaban at that time.  Stress test was obtained for further ischemic evaluation in the setting of recently identified mildly reduced EF 45-50%.  Stress test showed possible stress induced ischemia in the LAD territory vs. Artifact. [facility] was deferred to discuss electively as outpatient.  Patient denies symptoms of chest pain, SOB, dizziness, syncope, or palpitations.  She is not very active/mobile and limited by arthritis and deconditioning.   Also had recent FloQast ambulatory monitor for 14 days that was negative for atrial arrhythmia, but did show 32% burden of PVCs.  Had been on Toprol XL, however, this as well as losartan-hydrochlorothiazide were stopped in November d/t hypotension and weakness.      Ms. Biggs was evaluated by Dr. Victor, Vascular Surgery, for severe MALATHI stenosis and has been advised to have TCAR pending further cardiac evaluation.       Past Medical History:  She has a past medical history of Cancer (CMS/HCC) and Diabetes mellitus (CMS/HCC).    Past Surgical History:  She has a past surgical history that includes MR angio neck w and wo IV contrast (01/14/2022); Joint replacement; Fracture surgery; Breast surgery; MR angio head wo IV contrast (12/26/2023); and MR angio neck wo IV contrast (12/26/2023).       Social History:  She reports that she quit smoking about 41 years ago. Her smoking use included cigarettes. She smoked an average of 1 pack per day. She has never used smokeless tobacco. She reports current alcohol use of about 1.0 standard drink of alcohol per week. She reports that she does not use drugs.    Family History:  No family history on file.     Allergies:  Tetracycline    Outpatient Medications:  Current Outpatient Medications   Medication Instructions    acetaminophen (TYLENOL) 650 mg, oral, Every 4 hours PRN    apixaban (ELIQUIS) 5 mg, oral, 2 times daily    atorvastatin (LIPITOR) 80 mg, oral, Nightly    cholecalciferol (Vitamin D-3) 25 MCG (1000 UT) tablet 1 tablet, oral, Daily    clopidogrel (PLAVIX) 75 mg, oral, Daily    ezetimibe (ZETIA) 10 mg, oral, Daily    metFORMIN (GLUCOPHAGE) 500 mg, oral, 2 times daily with meals    metoprolol succinate XL (TOPROL-XL) 25 mg, oral, Daily, Do not crush or chew.    OneTouch Delica Plus Lancet 33 gauge misc USE TO TAKE BLOOD SUGAR 1-2 TIMES PER DAY    PARoxetine (PAXIL) 20 mg, oral, Nightly    pioglitazone (ACTOS) 30 mg, oral, Every 24 hours        Last Recorded Vitals:  Vitals:    01/16/24 1503   BP: 138/87   Pulse: 73   SpO2: 95%   Weight: 116 kg (256 lb)       Physical Exam:  Constitutional: Pleasant, Awake/Alert/Oriented to person place and time. No distress  Head: Atraumatic, Normocephalic  Eyes: EOMI. RIP  Neck: No JVD  Cardiovascular: Regular rate and rhythm, S1, S2. No extra heart sounds or murmurs  Respiratory: Clear to auscultation bilaterally. No wheezing, rales or rhonchi. Good chest wall expansion  Abdomen: Soft, Nontender, Obese. Bowel sounds appreciated  Musculoskeletal: ROM intact. Muscle strength grossly intact upper and lower extremities 5/5.   Neurological: CNII-XII intact. Sensation grossly intact  Extremities: Warm and dry. No acute rashes and lesions  Psychiatric: Appropriate mood and affect         Last Labs:  CBC -  Lab Results    Component Value Date    WBC 8.0 12/26/2023    HGB 9.6 (L) 12/26/2023    HCT 30.8 (L) 12/26/2023    MCV 87 12/26/2023     12/26/2023       CMP -  Lab Results   Component Value Date    CALCIUM 8.1 (L) 12/26/2023    PHOS 3.8 10/21/2023    PROT 6.2 (L) 12/25/2023    ALBUMIN 3.0 (L) 12/25/2023    AST 13 12/25/2023    ALT 14 12/25/2023    ALKPHOS 45 12/25/2023    BILITOT 0.4 12/25/2023       LIPID PANEL -   Lab Results   Component Value Date    CHOL 113 10/20/2023    TRIG 66 10/20/2023    HDL 40.1 10/20/2023    CHHDL 2.8 10/20/2023    VLDL 13 10/20/2023    NHDL 73 10/20/2023       RENAL FUNCTION PANEL -   Lab Results   Component Value Date    GLUCOSE 117 (H) 12/26/2023     (L) 12/26/2023    K 3.7 12/26/2023     12/26/2023    CO2 24 12/26/2023    ANIONGAP 11 12/26/2023    BUN 15 12/26/2023    CREATININE 0.53 12/26/2023    CALCIUM 8.1 (L) 12/26/2023    PHOS 3.8 10/21/2023    ALBUMIN 3.0 (L) 12/25/2023        Lab Results   Component Value Date     (H) 12/25/2023    HGBA1C 7.0 (H) 10/20/2023       Last Cardiology Tests:  ECG:  ECG 12 lead (Clinic Performed) 01/16/2024 (Preliminary)    NSR, PVCs at times in bigeminy, HR 92bpm    Echo:  Transthoracic Echo (TTE) Complete 10/20/2023    CONCLUSIONS:   1. Left ventricular systolic function is mildly decreased with a 45-50% estimated ejection fraction.   2. Spectral Doppler shows an impaired relaxation pattern of left ventricular diastolic filling.   3. There is mild tricuspid regurgitation.   4. Technically difficult bubble study unable to interpret.    Stress Test:  Nuclear Stress Test 12/26/2023    IMPRESSION:  Mild attentuation of the anterior wall seen on the stress images.  This is either soft tissue attenuation or ischemia in LAD territory.  Unfortunately there are no prone images available. Dilated left  ventricle with mild left ventricular systolic dysfunction on post  stress gated imaging.    IMPRESSION:  1. Normal stress myocardial perfusion  imaging in response to  pharmacologic stress.  2. Mild left ventricular systolic dysfunction on post stress gated  imaging.      Lab review: I have personally reviewed the laboratory result(s)   Diagnostic review: I have personally reviewed the result(s) of the Echocardiogram and stress     Assessment/Plan   73 y.o. female from home with h/o obesity, hypertension, dyslipidemia, depression, type 2 DM, recently admitted in October with acute ischemic stroke in the left thalamus territory, and incidentally found MALATHI 85% presenting for follow up s/p recurrent hospitalization at the end of December 2023 at [facility] with altered mental status and generalized weakness from unclear etiology-- stroke work up negative.  Found to be in a.flutter in the ER in December per notes and started on apixaban.  Ambulatory monitor shows NSR with 32% burden of PVCs.  Stress test December 2023 indeterminate-- suggestive of LAD territory ischemia, EF 48%. Pending right TCAR with Dr. Victor.     Plan:  Given high burden of PVCs, reduced EF, and recent abnormal stress test concerning for LAD territory ischemia, and upcoming right carotid surgery recommend coronary angiography for definitive evaluation of obstructive CAD for further preoperative cardiovascular risk assessment.       -Restart Toprol XL 25mg daily and will titrate as BP tolerates   -Hold apixaban for 48hrs prior to LHC and will resume post cath   -Continue plavix, zetia, and atorvastatin       ANITA Dhillon-CNP

## 2024-01-22 ENCOUNTER — APPOINTMENT (OUTPATIENT)
Dept: GASTROENTEROLOGY | Facility: HOSPITAL | Age: 74
End: 2024-01-22
Payer: COMMERCIAL

## 2024-01-24 ENCOUNTER — LAB (OUTPATIENT)
Dept: LAB | Facility: LAB | Age: 74
End: 2024-01-24
Payer: MEDICARE

## 2024-01-24 DIAGNOSIS — R94.39 ABNORMAL STRESS TEST: ICD-10-CM

## 2024-01-24 LAB
ANION GAP SERPL CALC-SCNC: 12 MMOL/L (ref 10–20)
BASOPHILS # BLD AUTO: 0.05 X10*3/UL (ref 0–0.1)
BASOPHILS NFR BLD AUTO: 0.7 %
BUN SERPL-MCNC: 14 MG/DL (ref 6–23)
CALCIUM SERPL-MCNC: 8.4 MG/DL (ref 8.6–10.3)
CHLORIDE SERPL-SCNC: 103 MMOL/L (ref 98–107)
CO2 SERPL-SCNC: 27 MMOL/L (ref 21–32)
CREAT SERPL-MCNC: 0.58 MG/DL (ref 0.5–1.05)
EGFRCR SERPLBLD CKD-EPI 2021: >90 ML/MIN/1.73M*2
EOSINOPHIL # BLD AUTO: 0.12 X10*3/UL (ref 0–0.4)
EOSINOPHIL NFR BLD AUTO: 1.7 %
ERYTHROCYTE [DISTWIDTH] IN BLOOD BY AUTOMATED COUNT: 16.2 % (ref 11.5–14.5)
GLUCOSE SERPL-MCNC: 100 MG/DL (ref 74–99)
HCT VFR BLD AUTO: 38.5 % (ref 36–46)
HGB BLD-MCNC: 11.7 G/DL (ref 12–16)
IMM GRANULOCYTES # BLD AUTO: 0.02 X10*3/UL (ref 0–0.5)
IMM GRANULOCYTES NFR BLD AUTO: 0.3 % (ref 0–0.9)
LYMPHOCYTES # BLD AUTO: 2.37 X10*3/UL (ref 0.8–3)
LYMPHOCYTES NFR BLD AUTO: 33.6 %
MCH RBC QN AUTO: 26.4 PG (ref 26–34)
MCHC RBC AUTO-ENTMCNC: 30.4 G/DL (ref 32–36)
MCV RBC AUTO: 87 FL (ref 80–100)
MONOCYTES # BLD AUTO: 0.58 X10*3/UL (ref 0.05–0.8)
MONOCYTES NFR BLD AUTO: 8.2 %
NEUTROPHILS # BLD AUTO: 3.91 X10*3/UL (ref 1.6–5.5)
NEUTROPHILS NFR BLD AUTO: 55.5 %
NRBC BLD-RTO: 0 /100 WBCS (ref 0–0)
PLATELET # BLD AUTO: 263 X10*3/UL (ref 150–450)
POTASSIUM SERPL-SCNC: 4.1 MMOL/L (ref 3.5–5.3)
RBC # BLD AUTO: 4.43 X10*6/UL (ref 4–5.2)
SODIUM SERPL-SCNC: 138 MMOL/L (ref 136–145)
WBC # BLD AUTO: 7.1 X10*3/UL (ref 4.4–11.3)

## 2024-01-24 PROCEDURE — 80048 BASIC METABOLIC PNL TOTAL CA: CPT

## 2024-01-24 PROCEDURE — 36415 COLL VENOUS BLD VENIPUNCTURE: CPT

## 2024-01-24 PROCEDURE — 85025 COMPLETE CBC W/AUTO DIFF WBC: CPT

## 2024-01-29 ENCOUNTER — HOSPITAL ENCOUNTER (OUTPATIENT)
Facility: HOSPITAL | Age: 74
Setting detail: OUTPATIENT SURGERY
Discharge: HOME | End: 2024-01-29
Attending: INTERNAL MEDICINE | Admitting: INTERNAL MEDICINE
Payer: MEDICARE

## 2024-01-29 VITALS
SYSTOLIC BLOOD PRESSURE: 162 MMHG | OXYGEN SATURATION: 96 % | HEART RATE: 78 BPM | BODY MASS INDEX: 44.29 KG/M2 | RESPIRATION RATE: 18 BRPM | DIASTOLIC BLOOD PRESSURE: 80 MMHG | WEIGHT: 250 LBS

## 2024-01-29 DIAGNOSIS — R94.39 ABNORMAL STRESS TEST: ICD-10-CM

## 2024-01-29 DIAGNOSIS — I49.3 PVC (PREMATURE VENTRICULAR CONTRACTION): ICD-10-CM

## 2024-01-29 DIAGNOSIS — R94.30 ABNORMAL RESULT OF CARDIOVASCULAR FUNCTION STUDY, UNSPECIFIED: ICD-10-CM

## 2024-01-29 DIAGNOSIS — Z01.818 PRE-OPERATIVE CLEARANCE: ICD-10-CM

## 2024-01-29 PROCEDURE — C1887 CATHETER, GUIDING: HCPCS | Performed by: INTERNAL MEDICINE

## 2024-01-29 PROCEDURE — 7100000010 HC PHASE TWO TIME - EACH INCREMENTAL 1 MINUTE: Performed by: INTERNAL MEDICINE

## 2024-01-29 PROCEDURE — 2550000001 HC RX 255 CONTRASTS: Performed by: INTERNAL MEDICINE

## 2024-01-29 PROCEDURE — 93458 L HRT ARTERY/VENTRICLE ANGIO: CPT | Performed by: INTERNAL MEDICINE

## 2024-01-29 PROCEDURE — 2500000005 HC RX 250 GENERAL PHARMACY W/O HCPCS: Performed by: INTERNAL MEDICINE

## 2024-01-29 PROCEDURE — 99152 MOD SED SAME PHYS/QHP 5/>YRS: CPT | Performed by: INTERNAL MEDICINE

## 2024-01-29 PROCEDURE — C1894 INTRO/SHEATH, NON-LASER: HCPCS | Performed by: INTERNAL MEDICINE

## 2024-01-29 PROCEDURE — 2500000004 HC RX 250 GENERAL PHARMACY W/ HCPCS (ALT 636 FOR OP/ED)

## 2024-01-29 PROCEDURE — 2720000007 HC OR 272 NO HCPCS: Performed by: INTERNAL MEDICINE

## 2024-01-29 PROCEDURE — 2500000004 HC RX 250 GENERAL PHARMACY W/ HCPCS (ALT 636 FOR OP/ED): Performed by: INTERNAL MEDICINE

## 2024-01-29 PROCEDURE — 7100000009 HC PHASE TWO TIME - INITIAL BASE CHARGE: Performed by: INTERNAL MEDICINE

## 2024-01-29 RX ORDER — NITROGLYCERIN 40 MG/100ML
INJECTION INTRAVENOUS AS NEEDED
Status: DISCONTINUED | OUTPATIENT
Start: 2024-01-29 | End: 2024-01-29 | Stop reason: HOSPADM

## 2024-01-29 RX ORDER — HEPARIN SODIUM 1000 [USP'U]/ML
INJECTION, SOLUTION INTRAVENOUS; SUBCUTANEOUS AS NEEDED
Status: DISCONTINUED | OUTPATIENT
Start: 2024-01-29 | End: 2024-01-29 | Stop reason: HOSPADM

## 2024-01-29 RX ORDER — METOPROLOL SUCCINATE 25 MG/1
25 TABLET, EXTENDED RELEASE ORAL DAILY
Qty: 90 TABLET | Refills: 3 | Status: SHIPPED | OUTPATIENT
Start: 2024-01-29 | End: 2024-03-07 | Stop reason: HOSPADM

## 2024-01-29 RX ORDER — FENTANYL CITRATE 50 UG/ML
INJECTION, SOLUTION INTRAMUSCULAR; INTRAVENOUS AS NEEDED
Status: DISCONTINUED | OUTPATIENT
Start: 2024-01-29 | End: 2024-01-29 | Stop reason: HOSPADM

## 2024-01-29 RX ORDER — SODIUM CHLORIDE 9 MG/ML
1 INJECTION, SOLUTION INTRAVENOUS CONTINUOUS
Status: DISCONTINUED | OUTPATIENT
Start: 2024-01-29 | End: 2024-01-29 | Stop reason: HOSPADM

## 2024-01-29 RX ORDER — VERAPAMIL HYDROCHLORIDE 2.5 MG/ML
INJECTION, SOLUTION INTRAVENOUS AS NEEDED
Status: DISCONTINUED | OUTPATIENT
Start: 2024-01-29 | End: 2024-01-29 | Stop reason: HOSPADM

## 2024-01-29 RX ORDER — MIDAZOLAM HYDROCHLORIDE 1 MG/ML
INJECTION INTRAMUSCULAR; INTRAVENOUS AS NEEDED
Status: DISCONTINUED | OUTPATIENT
Start: 2024-01-29 | End: 2024-01-29 | Stop reason: HOSPADM

## 2024-01-29 RX ORDER — LIDOCAINE HYDROCHLORIDE 20 MG/ML
INJECTION, SOLUTION INFILTRATION; PERINEURAL AS NEEDED
Status: DISCONTINUED | OUTPATIENT
Start: 2024-01-29 | End: 2024-01-29 | Stop reason: HOSPADM

## 2024-01-29 RX ORDER — METOPROLOL SUCCINATE 50 MG/1
50 TABLET, EXTENDED RELEASE ORAL DAILY
Qty: 90 TABLET | Refills: 3 | Status: SHIPPED | OUTPATIENT
Start: 2024-01-29 | End: 2024-01-29 | Stop reason: SDUPTHER

## 2024-01-29 RX ADMIN — SODIUM CHLORIDE 1 ML/KG/HR: 9 INJECTION, SOLUTION INTRAVENOUS at 09:45

## 2024-01-29 ASSESSMENT — PAIN SCALES - GENERAL
PAINLEVEL_OUTOF10: 0 - NO PAIN

## 2024-01-29 ASSESSMENT — COLUMBIA-SUICIDE SEVERITY RATING SCALE - C-SSRS
2. HAVE YOU ACTUALLY HAD ANY THOUGHTS OF KILLING YOURSELF?: NO
1. IN THE PAST MONTH, HAVE YOU WISHED YOU WERE DEAD OR WISHED YOU COULD GO TO SLEEP AND NOT WAKE UP?: NO
6. HAVE YOU EVER DONE ANYTHING, STARTED TO DO ANYTHING, OR PREPARED TO DO ANYTHING TO END YOUR LIFE?: NO

## 2024-01-29 ASSESSMENT — PAIN - FUNCTIONAL ASSESSMENT
PAIN_FUNCTIONAL_ASSESSMENT: 0-10

## 2024-01-29 NOTE — SIGNIFICANT EVENT
Took over care at this time. -3 ml of air from the TR band. No bleeding noticed. +2 R radial pulse.

## 2024-01-29 NOTE — SIGNIFICANT EVENT
R radial site is unchanged. Family is at the bedside. -3 ml of air from the TR band. No bleeding noticed.

## 2024-01-29 NOTE — SIGNIFICANT EVENT
R radial site is unchanged. The TR band was removed at this time and a dressing was placed over the site. Also, an arm board was placed on the palm of the right hand.

## 2024-01-29 NOTE — SIGNIFICANT EVENT
R radial site is unchanged. Dr. Childress is at the bedside talking to the pt at this time. -3 ml of  air from the vasc band. No bleeding noticed.

## 2024-01-29 NOTE — SIGNIFICANT EVENT
R radial site is unchanged. Discharge paperwork was given to the pt. The pt stated she understood.   - 3 ml of air from the TR band. No bleeding noticed.

## 2024-01-30 DIAGNOSIS — I48.0 PAROXYSMAL ATRIAL FIBRILLATION (MULTI): Primary | ICD-10-CM

## 2024-01-30 NOTE — SIGNIFICANT EVENT
Cath Lab Procedure Call Back  Procedure Date: ___1/29/24____________   Procedure Performed:______LHC______________  Physician:______Stefano_________  Spoke with:_____Betsy________________________  Date/Time Contacted: 1st attempt: __1/30/24 at 1333_______ ___:____ 2nd attempt: _________ ___:____  Phone#:_______________ Unable to reach/Left message:____________  Access Site: Pain______Bleeding______Bruised______Infection______WNL_xx_____  Dressing Removal Date:____today__________ Anticoagulation Post Intervention_________  Satisfied with Care:____yes____________ D/C Instructions adequate______yes_________  Follow Up Appointment:_________yes____________ Length of Call:____2 min______________  Comments:______________________________________________________________________________________________________________________________________________

## 2024-02-01 ENCOUNTER — APPOINTMENT (OUTPATIENT)
Dept: CARDIOLOGY | Facility: CLINIC | Age: 74
End: 2024-02-01
Payer: COMMERCIAL

## 2024-02-01 DIAGNOSIS — I48.92 ATRIAL FLUTTER, UNSPECIFIED TYPE (MULTI): Primary | ICD-10-CM

## 2024-02-01 RX ORDER — WARFARIN 2 MG/1
2 TABLET ORAL EVERY EVENING
Qty: 90 TABLET | Refills: 3 | Status: SHIPPED | OUTPATIENT
Start: 2024-02-01 | End: 2024-03-19 | Stop reason: ALTCHOICE

## 2024-02-05 ENCOUNTER — TELEPHONE (OUTPATIENT)
Dept: VASCULAR SURGERY | Facility: CLINIC | Age: 74
End: 2024-02-05
Payer: COMMERCIAL

## 2024-02-05 NOTE — TELEPHONE ENCOUNTER
----- Message from Andrew Victor DO sent at 2/5/2024 10:33 AM EST -----  Regarding: RE: TCAR 3/4/24  opened  ----- Message -----  From: Malinda Alejandre LPN  Sent: 2/1/2024  11:29 AM EST  To: Andrew Victor DO; Malinda Alejandre LPN  Subject: TCAR 3/4/24                                      Please start case for 3/4/24        12/19/23 Clearance faxed to Malinda Posada cnp  1/30/24- CLEARED BY CARDIOLOGY      ----- Message -----  From: AMANDA Aly  Sent: 12/11/2023   2:31 PM EST  To: JOSIAH Burgess, make sure it is the CT angio brain attack neck w IV contrast and post procedure  ----- Message -----  From: Malinda Alejandre LPN  Sent: 12/11/2023   2:14 PM EST  To: AMANDA Aly    Am I sending this one?     CT angio brain attack head w IV contrast and post procedure   CT angio brain attack neck w IV contrast and post procedure     ----- Message -----  From: AMANDA Aly  Sent: 12/11/2023   2:03 PM EST  To: Malinda Alejandre LPN    Please send CTA neck disc to Efe from TCAR      PROCEDURE 3/4/24   POV 3/14/24 1:45PM

## 2024-02-06 ENCOUNTER — ANTICOAGULATION - WARFARIN VISIT (OUTPATIENT)
Dept: PHARMACY | Facility: HOSPITAL | Age: 74
End: 2024-02-06
Payer: MEDICARE

## 2024-02-06 DIAGNOSIS — I48.92 ATRIAL FLUTTER, UNSPECIFIED TYPE (MULTI): Primary | ICD-10-CM

## 2024-02-06 LAB
POC INR: 1.6 (ref 2–3)
POC PROTHROMBIN TIME: ABNORMAL

## 2024-02-06 NOTE — PROGRESS NOTES
Subjective     Elsa Biggs is a 73 y.o. female who presents for anticoagulation follow up.    Location: [facility]     Referring Provider: Marycruz Knapp  INR Goal: 2.0-3.0  Indication: Atrial Fibrillation/Atrial Flutter    Bleeding signs/symptoms: No  Bruising: No   Major bleeding event: No  Thrombosis signs/symptoms: No  Thromboembolic event: No  Missed doses: No  Extra doses: No  Medication changes: No  Dietary changes: No  Change in health: No  Change in activity: No  Alcohol: No  Other concerns: No  Upcoming Surgeries: no    Current Outpatient Medications on File Prior to Visit   Medication Sig Dispense Refill    warfarin (Coumadin) 2 mg tablet Take 1 tablet (2 mg) by mouth once daily in the evening. Take as directed per After Visit Summary. 90 tablet 3    acetaminophen (Tylenol) 325 mg tablet Take 2 tablets (650 mg) by mouth every 4 hours if needed for mild pain (1 - 3), moderate pain (4 - 6) or fever (temp greater than 38.0 C). 30 tablet 1    atorvastatin (Lipitor) 80 mg tablet Take 1 tablet (80 mg) by mouth once daily at bedtime. 30 tablet 1    cholecalciferol (Vitamin D-3) 25 MCG (1000 UT) tablet Take 1 tablet (25 mcg) by mouth once daily.      clopidogrel (Plavix) 75 mg tablet Take 1 tablet (75 mg) by mouth once daily. 30 tablet 1    ezetimibe (Zetia) 10 mg tablet Take 1 tablet (10 mg) by mouth once daily.      metFORMIN (Glucophage) 500 mg tablet Take 1 tablet (500 mg) by mouth 2 times a day with meals.      metoprolol succinate XL (Toprol-XL) 25 mg 24 hr tablet Take 1 tablet (25 mg) by mouth once daily. Do not crush or chew. 90 tablet 3    OneTouch Delica Plus Lancet 33 gauge misc USE TO TAKE BLOOD SUGAR 1-2 TIMES PER DAY      PARoxetine (Paxil) 20 mg tablet Take 1 tablet (20 mg) by mouth once daily at bedtime.      pioglitazone (Actos) 30 mg tablet Take 1 tablet (30 mg) by mouth once every 24 hours.      [DISCONTINUED] apixaban (Eliquis) 5 mg tablet Take 1 tablet (5 mg) by  mouth 2 times a day. 60 tablet 2    [DISCONTINUED] rivaroxaban (Xarelto) 20 mg tablet Take 1 tablet (20 mg) by mouth once daily in the evening. Take with meals. Take with food. 30 tablet 11     No current facility-administered medications on file prior to visit.        Objective   Anticoagulation Summary  As of 2024      INR goal:  2.0-3.0   TTR:  --   INR used for dosin.60 (2024)   Weekly warfarin total:  14 mg             Lab Results   Component Value Date    INR 1.60 (A) 2024    INR 2.0 (H) 2023    INR 1.6 (H) 10/20/2023    INR 1.6 (H) 10/19/2023    PROTIME 23.0 (H) 2023    PROTIME 17.8 (H) 10/20/2023    PROTIME 17.7 (H) 10/19/2023       Assessment/Plan   This is an initial visit for this patient. Patient was transitioned off Eliquis therapy due to high copays. Current INR is Subtherapeutic at 1.6. Patient started warfarin 2 mg daily on 24 - today is day 6 of warrfarin therapy. Advised patient to take 4 mg x1 dose today, then resume current warfarin regimen of 2 mg daily. Will determine need for weekly dose increase at follow up visit.     Provided education on warfarin. Patient reports that her  used to take warfarin and she is familiar with INR monitoring. Reiterated that the INR goal is 2.0-3.0. Discussed risks associated with out of range INR results. Provided  warfarin guide and reviewed food and drug interactions. Encouraged patient to maintain consistent vitamin K intake but does not need to avoid greens entirely.     Of note, patient does have an upcoming procedure with Dr. Victor in March. Anticipate that there will be a need to hold warfarin x 5 days prior.       Patient Assistance Screening (VAF)  - Patient verbally reports monthly or yearly income which is less than 400% federal poverty level  - Once financial documents are received, application for program will be submitted for Eliquis.   - Patient aware this process may take up to 6 weeks. Patient is  aware that medications must be filled through [facility] pharmacy.     Follow Up: 1 week    Milagro Mckeon, PharmD

## 2024-02-13 ENCOUNTER — ANTICOAGULATION - WARFARIN VISIT (OUTPATIENT)
Dept: PHARMACY | Facility: HOSPITAL | Age: 74
End: 2024-02-13
Payer: MEDICARE

## 2024-02-13 DIAGNOSIS — I48.92 ATRIAL FLUTTER, UNSPECIFIED TYPE (MULTI): Primary | ICD-10-CM

## 2024-02-13 LAB
POC INR: 2.2 (ref 2–3)
POC PROTHROMBIN TIME: ABNORMAL

## 2024-02-13 NOTE — PROGRESS NOTES
Subjective     Elsa Biggs is a 73 y.o. female who presents for anticoagulation follow up.    Location: [facility]     Referring Provider: Malinda Posada  INR Goal: 2.0-3.0  Indication: Atrial Fibrillation/Atrial Flutter    Bleeding signs/symptoms: No  Bruising: No   Major bleeding event: No  Thrombosis signs/symptoms: No  Thromboembolic event: No  Missed doses: No  Extra doses: No  Medication changes: No  Dietary changes: No  Change in health: No  Change in activity: No  Alcohol: No  Other concerns: No  Upcoming Surgeries: yes - revascularization procedure on 3/4 and colonoscopy on 3/18    Current Outpatient Medications on File Prior to Visit   Medication Sig Dispense Refill    warfarin (Coumadin) 2 mg tablet Take 1 tablet (2 mg) by mouth once daily in the evening. Take as directed per After Visit Summary. 90 tablet 3    acetaminophen (Tylenol) 325 mg tablet Take 2 tablets (650 mg) by mouth every 4 hours if needed for mild pain (1 - 3), moderate pain (4 - 6) or fever (temp greater than 38.0 C). 30 tablet 1    atorvastatin (Lipitor) 80 mg tablet Take 1 tablet (80 mg) by mouth once daily at bedtime. 30 tablet 1    cholecalciferol (Vitamin D-3) 25 MCG (1000 UT) tablet Take 1 tablet (25 mcg) by mouth once daily.      clopidogrel (Plavix) 75 mg tablet Take 1 tablet (75 mg) by mouth once daily. 30 tablet 1    ezetimibe (Zetia) 10 mg tablet Take 1 tablet (10 mg) by mouth once daily.      metFORMIN (Glucophage) 500 mg tablet Take 1 tablet (500 mg) by mouth 2 times a day with meals.      metoprolol succinate XL (Toprol-XL) 25 mg 24 hr tablet Take 1 tablet (25 mg) by mouth once daily. Do not crush or chew. 90 tablet 3    OneTouch Delica Plus Lancet 33 gauge misc USE TO TAKE BLOOD SUGAR 1-2 TIMES PER DAY      PARoxetine (Paxil) 20 mg tablet Take 1 tablet (20 mg) by mouth once daily at bedtime.      pioglitazone (Actos) 30 mg tablet Take 1 tablet (30 mg) by mouth once every 24 hours.       No current  facility-administered medications on file prior to visit.        Objective   Anticoagulation Summary  As of 2024      INR goal:  2.0-3.0   TTR:  94.6 % (2 d)   INR used for dosin.20 (2024)   Weekly warfarin total:  16 mg             Lab Results   Component Value Date    INR 2.20 (N) 2024    INR 1.60 (A) 2024    INR 2.0 (H) 2023    INR 1.6 (H) 10/20/2023    INR 1.6 (H) 10/19/2023    PROTIME 23.0 (H) 2023    PROTIME 17.8 (H) 10/20/2023    PROTIME 17.7 (H) 10/19/2023       Assessment/Plan   Current INR is Therapeutic at 2.2. Previous INR was Subtherapeutic at 1.6. Patient denies any changes from previous visit. Patient has been on warfarin therapy for approximately 2 weeks and has reached steady state. Advised patient to continue current warfarin regimen of 4 mg on  and 2 mg all other days.    Of note, patient has two upcoming procedures that will require holding of warfarin therapy. Both proceduralists have provided instructions for holding.     Writer is awaiting receipt of financial documents to submit [facility] application. Patient is agreeable to bringing to next visit.      Follow Up: 1 week    Milagro Mckeon, PharmD

## 2024-02-20 ENCOUNTER — ANTICOAGULATION - WARFARIN VISIT (OUTPATIENT)
Dept: PHARMACY | Facility: HOSPITAL | Age: 74
End: 2024-02-20
Payer: MEDICARE

## 2024-02-20 DIAGNOSIS — I48.92 ATRIAL FLUTTER, UNSPECIFIED TYPE (MULTI): Primary | ICD-10-CM

## 2024-02-20 LAB
POC INR: 2.6 (ref 2–3)
POC PROTHROMBIN TIME: ABNORMAL

## 2024-02-20 NOTE — PROGRESS NOTES
Subjective     Elsa Biggs is a 73 y.o. female who presents for anticoagulation follow up.    Location: [facility]     Referring Provider: Malinda Posada  INR Goal: 2.0-3.0  Indication: Atrial Fibrillation/Atrial Flutter    Bleeding signs/symptoms: No  Bruising: No   Major bleeding event: No  Thrombosis signs/symptoms: No  Thromboembolic event: No  Missed doses: No  Extra doses: No  Medication changes: No  Dietary changes: No  Change in health: No  Change in activity: No  Alcohol: No  Other concerns: No  Upcoming Surgeries: yes  Injections: no    Current Outpatient Medications on File Prior to Visit   Medication Sig Dispense Refill    acetaminophen (Tylenol) 325 mg tablet Take 2 tablets (650 mg) by mouth every 4 hours if needed for mild pain (1 - 3), moderate pain (4 - 6) or fever (temp greater than 38.0 C). 30 tablet 1    atorvastatin (Lipitor) 80 mg tablet Take 1 tablet (80 mg) by mouth once daily at bedtime. 30 tablet 1    cholecalciferol (Vitamin D-3) 25 MCG (1000 UT) tablet Take 1 tablet (25 mcg) by mouth once daily.      clopidogrel (Plavix) 75 mg tablet Take 1 tablet (75 mg) by mouth once daily. 30 tablet 1    ezetimibe (Zetia) 10 mg tablet Take 1 tablet (10 mg) by mouth once daily.      metFORMIN (Glucophage) 500 mg tablet Take 1 tablet (500 mg) by mouth 2 times a day with meals.      metoprolol succinate XL (Toprol-XL) 25 mg 24 hr tablet Take 1 tablet (25 mg) by mouth once daily. Do not crush or chew. 90 tablet 3    OneTouch Delica Plus Lancet 33 gauge misc USE TO TAKE BLOOD SUGAR 1-2 TIMES PER DAY      PARoxetine (Paxil) 20 mg tablet Take 1 tablet (20 mg) by mouth once daily at bedtime.      pioglitazone (Actos) 30 mg tablet Take 1 tablet (30 mg) by mouth once every 24 hours.      warfarin (Coumadin) 2 mg tablet Take 1 tablet (2 mg) by mouth once daily in the evening. Take as directed per After Visit Summary. 90 tablet 3     No current facility-administered medications on file  prior to visit.        Objective   Anticoagulation Summary  As of 2024      INR goal:  2.0-3.0   TTR:  98.6 % (1.3 wk)   INR used for dosin.60 (2024)   Weekly warfarin total:  16 mg             Lab Results   Component Value Date    INR 2.60 (N) 2024    INR 2.20 (N) 2024    INR 1.60 (A) 2024    PROTIME 23.0 (H) 2023    PROTIME 17.8 (H) 10/20/2023    PROTIME 17.7 (H) 10/19/2023       Assessment/Plan   Current INR is Therapeutic at 2.6. Previous INR was Therapeutic at 2.2. No changes reported from previous visit. Advised patient to continue current warfarin regimen of 4 mg on Tu and 2 mg all other days. Patient has upcoming carotid artery procedure on 3/4/24. Plan for holding provided by surgical team. Patient reports that she has all instructions at home but does not remember the specific details. Will follow up as needed after procedure.     Patient also brought in financial documents for  PAP application. Plan to confirm transition with referring provider then schedule follow up visit to review the enrollment details and provide education on Eliquis.    Follow Up:  as needed    Milagro Mckeon, KennD

## 2024-02-22 ENCOUNTER — TELEPHONE (OUTPATIENT)
Dept: PREADMISSION TESTING | Facility: HOSPITAL | Age: 74
End: 2024-02-22
Payer: COMMERCIAL

## 2024-02-22 ENCOUNTER — PRE-ADMISSION TESTING (OUTPATIENT)
Dept: PREADMISSION TESTING | Facility: HOSPITAL | Age: 74
End: 2024-02-22
Payer: MEDICARE

## 2024-02-22 VITALS
DIASTOLIC BLOOD PRESSURE: 62 MMHG | SYSTOLIC BLOOD PRESSURE: 112 MMHG | BODY MASS INDEX: 43.94 KG/M2 | HEART RATE: 75 BPM | TEMPERATURE: 97.4 F | RESPIRATION RATE: 22 BRPM | HEIGHT: 63 IN | OXYGEN SATURATION: 95 % | WEIGHT: 248 LBS

## 2024-02-22 DIAGNOSIS — Z01.818 ENCOUNTER FOR PREADMISSION TESTING: Primary | ICD-10-CM

## 2024-02-22 LAB
ANION GAP SERPL CALC-SCNC: 13 MMOL/L (ref 10–20)
BUN SERPL-MCNC: 18 MG/DL (ref 6–23)
CALCIUM SERPL-MCNC: 8.4 MG/DL (ref 8.6–10.3)
CHLORIDE SERPL-SCNC: 104 MMOL/L (ref 98–107)
CO2 SERPL-SCNC: 21 MMOL/L (ref 21–32)
CREAT SERPL-MCNC: 0.69 MG/DL (ref 0.5–1.05)
EGFRCR SERPLBLD CKD-EPI 2021: >90 ML/MIN/1.73M*2
ERYTHROCYTE [DISTWIDTH] IN BLOOD BY AUTOMATED COUNT: 15.9 % (ref 11.5–14.5)
GLUCOSE SERPL-MCNC: 146 MG/DL (ref 74–99)
HCT VFR BLD AUTO: 40.2 % (ref 36–46)
HGB BLD-MCNC: 12.8 G/DL (ref 12–16)
MCH RBC QN AUTO: 27 PG (ref 26–34)
MCHC RBC AUTO-ENTMCNC: 31.8 G/DL (ref 32–36)
MCV RBC AUTO: 85 FL (ref 80–100)
NRBC BLD-RTO: 0 /100 WBCS (ref 0–0)
PLATELET # BLD AUTO: 231 X10*3/UL (ref 150–450)
POTASSIUM SERPL-SCNC: 5.4 MMOL/L (ref 3.5–5.3)
RBC # BLD AUTO: 4.74 X10*6/UL (ref 4–5.2)
SODIUM SERPL-SCNC: 133 MMOL/L (ref 136–145)
WBC # BLD AUTO: 6.7 X10*3/UL (ref 4.4–11.3)

## 2024-02-22 PROCEDURE — 85027 COMPLETE CBC AUTOMATED: CPT | Performed by: NURSE PRACTITIONER

## 2024-02-22 PROCEDURE — 82374 ASSAY BLOOD CARBON DIOXIDE: CPT | Performed by: NURSE PRACTITIONER

## 2024-02-22 PROCEDURE — 99203 OFFICE O/P NEW LOW 30 MIN: CPT | Performed by: NURSE PRACTITIONER

## 2024-02-22 PROCEDURE — 36415 COLL VENOUS BLD VENIPUNCTURE: CPT

## 2024-02-22 ASSESSMENT — ENCOUNTER SYMPTOMS
CONSTITUTIONAL NEGATIVE: 1
NEUROLOGICAL NEGATIVE: 1
ENDOCRINE NEGATIVE: 1
PALPITATIONS: 0
WEAKNESS: 0
NUMBNESS: 0
MUSCULOSKELETAL NEGATIVE: 1
EYES NEGATIVE: 1
RESPIRATORY NEGATIVE: 1
GASTROINTESTINAL NEGATIVE: 1
NECK NEGATIVE: 1

## 2024-02-22 ASSESSMENT — CHADS2 SCORE
CHF: NO
CHADS2 SCORE: 5
DIABETES: YES
AGE GREATER THAN OR EQUAL TO 75: YES
HYPERTENSION: YES
PRIOR STROKE OR TIA OR THROMBOEMBOLISM: YES

## 2024-02-22 ASSESSMENT — LIFESTYLE VARIABLES: SMOKING_STATUS: NONSMOKER

## 2024-02-22 NOTE — CPM/PAT H&P
CPM/PAT Evaluation       Name: Elsa Biggs (Elsa Biggs)  /Age: 1950/73 y.o.     In-Person       Chief Complaint: Pre-Admission Testing-PAT    HPI- 74 yo female presents for Pre-Admission testing for Scheduled 3/4/24 Transcarotid Artery Revascularization.    Past Medical History:   Diagnosis Date    Cancer (CMS/HCC)     Diabetes mellitus (CMS/HCC)     Hyperlipidemia     Hypertension     Stroke (CMS/HCC)        Past Surgical History:   Procedure Laterality Date    BREAST SURGERY      CARDIAC CATHETERIZATION N/A 2024    Procedure: Left Heart Cath;  Surgeon: Virgil Childress MD;  Location: [facility] Cardiac [facility];  Service: Cardiovascular;  Laterality: N/A;    FRACTURE SURGERY      JOINT REPLACEMENT      MR HEAD ANGIO WO IV CONTRAST  2023    MR HEAD ANGIO WO IV CONTRAST 2023 [facility]    MR NECK ANGIO W AND WO IV CONTRAST  2022    MR NECK ANGIO W AND WO IV CONTRAST 2022 [facility]    MR NECK ANGIO WO IV CONTRAST  2023    MR NECK ANGIO WO IV CONTRAST 2023 [facility]       Patient Sexual activity questions deferred to the physician.    No family history on file.    Allergies   Allergen Reactions    Tetracycline Rash       Prior to Admission medications    Medication Sig Start Date End Date Taking? Authorizing Provider   acetaminophen (Tylenol) 325 mg tablet Take 2 tablets (650 mg) by mouth every 4 hours if needed for mild pain (1 - 3), moderate pain (4 - 6) or fever (temp greater than 38.0 C). 23   Zion Azar MD   atorvastatin (Lipitor) 80 mg tablet Take 1 tablet (80 mg) by mouth once daily at bedtime. 10/22/23   Stephanie Navarro MD   cholecalciferol (Vitamin D-3) 25 MCG (1000 UT) tablet Take 1 tablet (25 mcg) by mouth once daily. 22   Historical Provider, MD   clopidogrel (Plavix) 75 mg tablet Take 1 tablet (75 mg) by mouth once daily. 10/22/23   Stephanie Navarro MD   ezetimibe (Zetia) 10 mg tablet Take 1 tablet (10 mg) by mouth once daily. 10/27/23    Historical Provider, MD   metFORMIN (Glucophage) 500 mg tablet Take 1 tablet (500 mg) by mouth 2 times a day with meals.    Historical Provider, MD   metoprolol succinate XL (Toprol-XL) 25 mg 24 hr tablet Take 1 tablet (25 mg) by mouth once daily. Do not crush or chew. 1/29/24 1/28/25  AMANDA Quinones   OneTouch Delica Plus Lancet 33 gauge misc USE TO TAKE BLOOD SUGAR 1-2 TIMES PER DAY 1/4/23   Historical Provider, MD   PARoxetine (Paxil) 20 mg tablet Take 1 tablet (20 mg) by mouth once daily at bedtime. 5/26/22   Historical Provider, MD   pioglitazone (Actos) 30 mg tablet Take 1 tablet (30 mg) by mouth once every 24 hours. 5/26/22   Historical Provider, MD   warfarin (Coumadin) 2 mg tablet Take 1 tablet (2 mg) by mouth once daily in the evening. Take as directed per After Visit Summary. 2/1/24 1/31/25  ANITA Dhillon-CNP        PAT ROS:   Constitutional:   neg    Neuro/Psych:   neg     no numbness   no weakness  Eyes:   neg    Ears:   neg    Nose:   neg    Mouth:   neg    Throat:   neg    Neck:   neg    Cardio:    Hisotry of A-flutter    no chest pain   no palpitations  Respiratory:   neg    Endocrine:   neg    GI:   neg    :   Musculoskeletal:   neg    Hematologic:   neg     no history of blood transfusion   blood clots (PE 2 years ago)  Skin:  neg        Physical Exam  Constitutional:       Appearance: She is obese.   HENT:      Head: Normocephalic.   Cardiovascular:      Rate and Rhythm: Normal rate and regular rhythm.      Heart sounds: Normal heart sounds.   Pulmonary:      Effort: Pulmonary effort is normal.      Breath sounds: Normal breath sounds.   Abdominal:      Palpations: Abdomen is soft.      Tenderness: There is no abdominal tenderness. There is no guarding.   Musculoskeletal:      Cervical back: Normal range of motion and neck supple. No rigidity.   Skin:     General: Skin is warm and dry.   Neurological:      Mental Status: She is alert.   Psychiatric:         Mood and Affect:  Mood normal.         Behavior: Behavior normal.          PAT AIRWAY:   Airway:     Mallampati::  I    TM distance::  >3 FB    Neck ROM::  Full         There were no vitals taken for this visit.    DASI Risk Score    No data to display       Caprini DVT Assessment    No data to display       Modified Frailty Index    No data to display       CHADS2 Stroke Risk  Current as of 5 hours ago        12.5% 3 - 100%: High Risk   2 - 3%: Medium Risk   0 - 2%: Low Risk     No Change          This score determines the patient's risk of having a stroke if the patient has atrial fibrillation.          Points Metrics   0 Has Congestive Heart Failure:  No     Patients with congestive heart failure get 1 point.    Current as of 5 hours ago   1 Has Hypertension:  Yes     Patients with hypertension get 1 point.    Current as of 5 hours ago   1 Age:  73     Patients who are 75 years of age or older get 1 point.    Current as of 5 hours ago   1 Has Diabetes:  Yes     Patients with diabetes get 1 point.    Current as of 5 hours ago   2 Had Stroke:  Yes  Had TIA:  Yes  Had Thromboembolism:  No     Patients who have had a stroke, TIA, or thromboembolism get 2 points.    Current as of 5 hours ago             Revised Cardiac Risk Index    No data to display       Apfel Simplified Score    No data to display       Risk Analysis Index Results This Encounter    No data found in the last 1 encounters.         Assessment and Plan:   EKG Completed 1/14/24  CBC and BMP Completed   H&P Completed   Al lPre-Admission testing completed- all questions answered, pt verbalized an understanding

## 2024-02-22 NOTE — PREPROCEDURE INSTRUCTIONS
Medication List            Accurate as of February 22, 2024 10:51 AM. Always use your most recent med list.                acetaminophen 325 mg tablet  Commonly known as: Tylenol  Take 2 tablets (650 mg) by mouth every 4 hours if needed for mild pain (1 - 3), moderate pain (4 - 6) or fever (temp greater than 38.0 C).     atorvastatin 80 mg tablet  Commonly known as: Lipitor  Take 1 tablet (80 mg) by mouth once daily at bedtime.     cholecalciferol 25 MCG (1000 UT) tablet  Commonly known as: Vitamin D-3     clopidogrel 75 mg tablet  Commonly known as: Plavix  Take 1 tablet (75 mg) by mouth once daily.     ezetimibe 10 mg tablet  Commonly known as: Zetia     metFORMIN 500 mg tablet  Commonly known as: Glucophage     metoprolol succinate XL 25 mg 24 hr tablet  Commonly known as: Toprol-XL  Take 1 tablet (25 mg) by mouth once daily. Do not crush or chew.     OneTouch Delica Plus Lancet 33 gauge misc  Generic drug: lancets     PARoxetine 20 mg tablet  Commonly known as: Paxil     pioglitazone 30 mg tablet  Commonly known as: Actos     warfarin 2 mg tablet  Commonly known as: Coumadin  Take as directed by the anticoagulation clinic. If you are unsure how to take this medication, talk to your nurse or doctor.  Original instructions: Take 1 tablet (2 mg) by mouth once daily in the evening. Take as directed per After Visit Summary.                The medication bridging plan has been discussed with the surgeon and the patient has been informed of the plan.              NPO Instructions:  Nothing to eat or drink after midnight  Morning of procedure take metoprolol, plavix, aspirin morning of surgery  Do not stop pm dose of coumadin, but I will clarify with Dr. Victor    Additional Instructions:     Wear  comfortable loose fitting clothing  Do not use moisturizers, creams, lotions or perfume  All jewelry and valuables should be left at home    Shower morning of deodorant only  Call with any questions [phone]

## 2024-02-27 ENCOUNTER — OFFICE VISIT (OUTPATIENT)
Dept: CARDIOLOGY | Facility: HOSPITAL | Age: 74
End: 2024-02-27
Payer: MEDICARE

## 2024-02-27 VITALS
OXYGEN SATURATION: 95 % | WEIGHT: 250.66 LBS | HEART RATE: 54 BPM | BODY MASS INDEX: 44.4 KG/M2 | DIASTOLIC BLOOD PRESSURE: 50 MMHG | SYSTOLIC BLOOD PRESSURE: 114 MMHG

## 2024-02-27 DIAGNOSIS — Z01.818 PRE-OPERATIVE CLEARANCE: Primary | ICD-10-CM

## 2024-02-27 PROCEDURE — 1160F RVW MEDS BY RX/DR IN RCRD: CPT | Performed by: NURSE PRACTITIONER

## 2024-02-27 PROCEDURE — 99214 OFFICE O/P EST MOD 30 MIN: CPT | Performed by: NURSE PRACTITIONER

## 2024-02-27 PROCEDURE — 1157F ADVNC CARE PLAN IN RCRD: CPT | Performed by: NURSE PRACTITIONER

## 2024-02-27 PROCEDURE — 3074F SYST BP LT 130 MM HG: CPT | Performed by: NURSE PRACTITIONER

## 2024-02-27 PROCEDURE — 1159F MED LIST DOCD IN RCRD: CPT | Performed by: NURSE PRACTITIONER

## 2024-02-27 PROCEDURE — 3078F DIAST BP <80 MM HG: CPT | Performed by: NURSE PRACTITIONER

## 2024-02-27 PROCEDURE — 1036F TOBACCO NON-USER: CPT | Performed by: NURSE PRACTITIONER

## 2024-02-27 PROCEDURE — 1126F AMNT PAIN NOTED NONE PRSNT: CPT | Performed by: NURSE PRACTITIONER

## 2024-02-27 NOTE — H&P (VIEW-ONLY)
Chief Complaint:   No chief complaint on file.     History Of Present Illness:    Elsa Biggs is a 73 y.o. female h/o obesity, hypertension, dyslipidemia, depression, type 2 DM, recently admitted in October with acute ischemic stroke in the left thalamus territory, and incidentally found MALATHI 85%, PAF on warfarin, PVCs, HFmrEF presenting today for follow up s/p recent coronary angiography which was negative for obstructive CAD (LM mild disease, p-mLAD 40, Lcx mid 60, RCA mild diffuse disease).  Doing well from a cardiac standpoint. Denies chest pain, SOB, or palpitations.  Currently working with clinical pharmacy to try to transition warfarin to apixaban.      Last Recorded Vitals:  Vitals:    02/27/24 1108   BP: 114/50   Pulse: 54   SpO2: 95%   Weight: 114 kg (250 lb 10.6 oz)       Past Medical History:  She has a past medical history of Breast cancer (CMS/HCC), Cancer (CMS/HCC), Diabetes mellitus (CMS/HCC), Hyperlipidemia, Hypertension, and Stroke (CMS/HCC).    Past Surgical History:  She has a past surgical history that includes MR angio neck w and wo IV contrast (01/14/2022); Joint replacement (Left); Fracture surgery; Breast surgery; MR angio head wo IV contrast (12/26/2023); MR angio neck wo IV contrast (12/26/2023); and Cardiac catheterization (N/A, 01/29/2024).      Social History:  She reports that she quit smoking about 41 years ago. Her smoking use included cigarettes. She smoked an average of 1 pack per day. She has never used smokeless tobacco. She reports that she does not drink alcohol and does not use drugs.    Family History:  Family History   Problem Relation Name Age of Onset    Heart attack Father  65        blood clot    Coronary artery disease Sister          Allergies:  Tetracycline    Outpatient Medications:  Current Outpatient Medications   Medication Instructions    acetaminophen (TYLENOL) 650 mg, oral, Every 4 hours PRN    atorvastatin (LIPITOR) 80 mg, oral, Nightly    cholecalciferol  (Vitamin D-3) 25 MCG (1000 UT) tablet 1 tablet, oral, Daily    clopidogrel (PLAVIX) 75 mg, oral, Daily    ezetimibe (ZETIA) 10 mg, oral, Daily    metFORMIN (GLUCOPHAGE) 500 mg, oral, 2 times daily with meals    metoprolol succinate XL (TOPROL-XL) 25 mg, oral, Daily, Do not crush or chew.    OneTouch Delica Plus Lancet 33 gauge misc USE TO TAKE BLOOD SUGAR 1-2 TIMES PER DAY    PARoxetine (PAXIL) 20 mg, oral, Nightly    pioglitazone (ACTOS) 30 mg, oral, Every 24 hours    warfarin (COUMADIN) 2 mg, oral, Every evening, Take as directed per After Visit Summary.       Physical Exam:  Constitutional: Pleasant, Awake/Alert/Oriented to person place and time. No distress  Head: Atraumatic, Normocephalic  Eyes: EOMI. RIP  Neck: Enlarged neck circumference, No JVD  Cardiovascular: Regular rate and rhythm, S1, S2. No extra heart sounds or murmurs  Respiratory: Clear to auscultation bilaterally. No wheezing, rales or rhonchi. Good chest wall expansion  Abdomen: Soft, Nontender, Obese. Bowel sounds appreciated  Musculoskeletal: ROM intact. Muscle strength grossly intact upper and lower extremities 5/5.   Neurological: CNII-XII intact. Sensation grossly intact  Extremities: Warm and dry. No acute rashes and lesions  Psychiatric: Appropriate mood and affect       Last Labs:  CBC -  Lab Results   Component Value Date    WBC 6.7 02/22/2024    HGB 12.8 02/22/2024    HCT 40.2 02/22/2024    MCV 85 02/22/2024     02/22/2024       CMP -  Lab Results   Component Value Date    CALCIUM 8.4 (L) 02/22/2024    PHOS 3.8 10/21/2023    PROT 6.2 (L) 12/25/2023    ALBUMIN 3.0 (L) 12/25/2023    AST 13 12/25/2023    ALT 14 12/25/2023    ALKPHOS 45 12/25/2023    BILITOT 0.4 12/25/2023       LIPID PANEL -   Lab Results   Component Value Date    CHOL 113 10/20/2023    TRIG 66 10/20/2023    HDL 40.1 10/20/2023    CHHDL 2.8 10/20/2023    VLDL 13 10/20/2023    NHDL 73 10/20/2023       RENAL FUNCTION PANEL -   Lab Results   Component Value Date     GLUCOSE 146 (H) 02/22/2024     (L) 02/22/2024    K 5.4 (H) 02/22/2024     02/22/2024    CO2 21 02/22/2024    ANIONGAP 13 02/22/2024    BUN 18 02/22/2024    CREATININE 0.69 02/22/2024    CALCIUM 8.4 (L) 02/22/2024    PHOS 3.8 10/21/2023    ALBUMIN 3.0 (L) 12/25/2023        Lab Results   Component Value Date     (H) 12/25/2023    HGBA1C 7.0 (H) 10/20/2023       Last Cardiology Tests:    Echo:  Transthoracic Echo (TTE) Complete 10/20/2023  CONCLUSIONS:   1. Left ventricular systolic function is mildly decreased with a 45-50% estimated ejection fraction.   2. Spectral Doppler shows an impaired relaxation pattern of left ventricular diastolic filling.   3. There is mild tricuspid regurgitation.   4. Technically difficult bubble study unable to interpret.       Cath:  Cardiac catheterization - coronary 01/29/2024    CONCLUSIONS:   1. Left main: no significant angiographic disease.   2. LAD: diffuse 40% prox-mid disease.   3. LCx: 60% mid-vessel disease.   4. RCA: mild diffuse irregularities.   5. LVEDP 26mmHg, no aortic stenosis on LV-Ao gradient.    Stress Test:  Nuclear Stress Test 12/26/2023    IMPRESSION:  Mild attentuation of the anterior wall seen on the stress images.  This is either soft tissue attenuation or ischemia in LAD territory.  Unfortunately there are no prone images available. Dilated left  ventricle with mild left ventricular systolic dysfunction on post  stress gated imaging.     IMPRESSION:  1. Normal stress myocardial perfusion imaging in response to  pharmacologic stress.  2. Mild left ventricular systolic dysfunction on post stress gated  imaging.      Lab review: I have personally reviewed the laboratory result(s)   Diagnostic review: I have personally reviewed the result(s) of the Echocardiogram, stress, and [facility]     Assessment/Plan   Very pleasant 73 y.o. female h/o obesity, hypertension, dyslipidemia, depression, type 2 DM, recently admitted in October with acute ischemic  stroke in the left thalamus territory, and incidentally found MALATHI 85%, PAF on warfarin, PVCs, HFmrEF presenting today for follow up s/p recent coronary angiography which was negative for obstructive CAD (LM mild disease, p-mLAD 40, Lcx mid 60, RCA mild diffuse disease). Scheduled for TCAR with Dr. Victor on 3/4/2024.     Plan:  -Continue atorvastatin, zetia, Toprol XL  -Continue plavix-- patient reports she was advised to continue pre-operatively   -Hold warfarin for 5 days prior to TCAR with plan to resume post-operatively when safe from a vascular standpoint.   -May proceed to the OR with moderate risk for perioperative cardiovascular event.   -Follow up in 6 months or sooner if needed       ANITA Dhillon-CNP   none/not sure none

## 2024-02-27 NOTE — PROGRESS NOTES
Chief Complaint:   No chief complaint on file.     History Of Present Illness:    Elsa Biggs is a 73 y.o. female h/o obesity, hypertension, dyslipidemia, depression, type 2 DM, recently admitted in October with acute ischemic stroke in the left thalamus territory, and incidentally found MALATHI 85%, PAF on warfarin, PVCs, HFmrEF presenting today for follow up s/p recent coronary angiography which was negative for obstructive CAD (LM mild disease, p-mLAD 40, Lcx mid 60, RCA mild diffuse disease).  Doing well from a cardiac standpoint. Denies chest pain, SOB, or palpitations.  Currently working with clinical pharmacy to try to transition warfarin to apixaban.      Last Recorded Vitals:  Vitals:    02/27/24 1108   BP: 114/50   Pulse: 54   SpO2: 95%   Weight: 114 kg (250 lb 10.6 oz)       Past Medical History:  She has a past medical history of Breast cancer (CMS/HCC), Cancer (CMS/HCC), Diabetes mellitus (CMS/HCC), Hyperlipidemia, Hypertension, and Stroke (CMS/HCC).    Past Surgical History:  She has a past surgical history that includes MR angio neck w and wo IV contrast (01/14/2022); Joint replacement (Left); Fracture surgery; Breast surgery; MR angio head wo IV contrast (12/26/2023); MR angio neck wo IV contrast (12/26/2023); and Cardiac catheterization (N/A, 01/29/2024).      Social History:  She reports that she quit smoking about 41 years ago. Her smoking use included cigarettes. She smoked an average of 1 pack per day. She has never used smokeless tobacco. She reports that she does not drink alcohol and does not use drugs.    Family History:  Family History   Problem Relation Name Age of Onset    Heart attack Father  65        blood clot    Coronary artery disease Sister          Allergies:  Tetracycline    Outpatient Medications:  Current Outpatient Medications   Medication Instructions    acetaminophen (TYLENOL) 650 mg, oral, Every 4 hours PRN    atorvastatin (LIPITOR) 80 mg, oral, Nightly    cholecalciferol  (Vitamin D-3) 25 MCG (1000 UT) tablet 1 tablet, oral, Daily    clopidogrel (PLAVIX) 75 mg, oral, Daily    ezetimibe (ZETIA) 10 mg, oral, Daily    metFORMIN (GLUCOPHAGE) 500 mg, oral, 2 times daily with meals    metoprolol succinate XL (TOPROL-XL) 25 mg, oral, Daily, Do not crush or chew.    OneTouch Delica Plus Lancet 33 gauge misc USE TO TAKE BLOOD SUGAR 1-2 TIMES PER DAY    PARoxetine (PAXIL) 20 mg, oral, Nightly    pioglitazone (ACTOS) 30 mg, oral, Every 24 hours    warfarin (COUMADIN) 2 mg, oral, Every evening, Take as directed per After Visit Summary.       Physical Exam:  Constitutional: Pleasant, Awake/Alert/Oriented to person place and time. No distress  Head: Atraumatic, Normocephalic  Eyes: EOMI. RIP  Neck: Enlarged neck circumference, No JVD  Cardiovascular: Regular rate and rhythm, S1, S2. No extra heart sounds or murmurs  Respiratory: Clear to auscultation bilaterally. No wheezing, rales or rhonchi. Good chest wall expansion  Abdomen: Soft, Nontender, Obese. Bowel sounds appreciated  Musculoskeletal: ROM intact. Muscle strength grossly intact upper and lower extremities 5/5.   Neurological: CNII-XII intact. Sensation grossly intact  Extremities: Warm and dry. No acute rashes and lesions  Psychiatric: Appropriate mood and affect       Last Labs:  CBC -  Lab Results   Component Value Date    WBC 6.7 02/22/2024    HGB 12.8 02/22/2024    HCT 40.2 02/22/2024    MCV 85 02/22/2024     02/22/2024       CMP -  Lab Results   Component Value Date    CALCIUM 8.4 (L) 02/22/2024    PHOS 3.8 10/21/2023    PROT 6.2 (L) 12/25/2023    ALBUMIN 3.0 (L) 12/25/2023    AST 13 12/25/2023    ALT 14 12/25/2023    ALKPHOS 45 12/25/2023    BILITOT 0.4 12/25/2023       LIPID PANEL -   Lab Results   Component Value Date    CHOL 113 10/20/2023    TRIG 66 10/20/2023    HDL 40.1 10/20/2023    CHHDL 2.8 10/20/2023    VLDL 13 10/20/2023    NHDL 73 10/20/2023       RENAL FUNCTION PANEL -   Lab Results   Component Value Date     GLUCOSE 146 (H) 02/22/2024     (L) 02/22/2024    K 5.4 (H) 02/22/2024     02/22/2024    CO2 21 02/22/2024    ANIONGAP 13 02/22/2024    BUN 18 02/22/2024    CREATININE 0.69 02/22/2024    CALCIUM 8.4 (L) 02/22/2024    PHOS 3.8 10/21/2023    ALBUMIN 3.0 (L) 12/25/2023        Lab Results   Component Value Date     (H) 12/25/2023    HGBA1C 7.0 (H) 10/20/2023       Last Cardiology Tests:    Echo:  Transthoracic Echo (TTE) Complete 10/20/2023  CONCLUSIONS:   1. Left ventricular systolic function is mildly decreased with a 45-50% estimated ejection fraction.   2. Spectral Doppler shows an impaired relaxation pattern of left ventricular diastolic filling.   3. There is mild tricuspid regurgitation.   4. Technically difficult bubble study unable to interpret.       Cath:  Cardiac catheterization - coronary 01/29/2024    CONCLUSIONS:   1. Left main: no significant angiographic disease.   2. LAD: diffuse 40% prox-mid disease.   3. LCx: 60% mid-vessel disease.   4. RCA: mild diffuse irregularities.   5. LVEDP 26mmHg, no aortic stenosis on LV-Ao gradient.    Stress Test:  Nuclear Stress Test 12/26/2023    IMPRESSION:  Mild attentuation of the anterior wall seen on the stress images.  This is either soft tissue attenuation or ischemia in LAD territory.  Unfortunately there are no prone images available. Dilated left  ventricle with mild left ventricular systolic dysfunction on post  stress gated imaging.     IMPRESSION:  1. Normal stress myocardial perfusion imaging in response to  pharmacologic stress.  2. Mild left ventricular systolic dysfunction on post stress gated  imaging.      Lab review: I have personally reviewed the laboratory result(s)   Diagnostic review: I have personally reviewed the result(s) of the Echocardiogram, stress, and [facility]     Assessment/Plan   Very pleasant 73 y.o. female h/o obesity, hypertension, dyslipidemia, depression, type 2 DM, recently admitted in October with acute ischemic  stroke in the left thalamus territory, and incidentally found MALATHI 85%, PAF on warfarin, PVCs, HFmrEF presenting today for follow up s/p recent coronary angiography which was negative for obstructive CAD (LM mild disease, p-mLAD 40, Lcx mid 60, RCA mild diffuse disease). Scheduled for TCAR with Dr. Victor on 3/4/2024.     Plan:  -Continue atorvastatin, zetia, Toprol XL  -Continue plavix-- patient reports she was advised to continue pre-operatively   -Hold warfarin for 5 days prior to TCAR with plan to resume post-operatively when safe from a vascular standpoint.   -May proceed to the OR with moderate risk for perioperative cardiovascular event.   -Follow up in 6 months or sooner if needed       ANITA Dhillon-CNP

## 2024-02-28 ENCOUNTER — ANESTHESIA EVENT (OUTPATIENT)
Dept: OPERATING ROOM | Facility: HOSPITAL | Age: 74
DRG: 034 | End: 2024-02-28
Payer: MEDICARE

## 2024-03-01 ENCOUNTER — TELEMEDICINE (OUTPATIENT)
Dept: PHARMACY | Facility: HOSPITAL | Age: 74
End: 2024-03-01
Payer: COMMERCIAL

## 2024-03-01 DIAGNOSIS — I48.92 ATRIAL FLUTTER, UNSPECIFIED TYPE (MULTI): ICD-10-CM

## 2024-03-01 NOTE — PROGRESS NOTES
"Pharmacist Clinic: Anticoagulation Management  Elsa Biggs was referred to the Clinical Pharmacy Team for her anticoagulation management.    Referring Provider:  Malinda Posada  _______________________________________________________________________  PHARMACY ASSESSMENT    Allergies Reviewed? Yes  Medication List: Reviewed; all up to date  Affordability/Accessibility: Eliquis previously discontinued due to affordability issues  Adherence/Organization: Pill box  Adverse Effects: None    RELEVANT LAB RESULTS  Lab Results   Component Value Date    BILITOT 0.4 12/25/2023    CALCIUM 8.4 (L) 02/22/2024    CO2 21 02/22/2024     02/22/2024    CREATININE 0.69 02/22/2024    GLUCOSE 146 (H) 02/22/2024    ALKPHOS 45 12/25/2023    K 5.4 (H) 02/22/2024    PROT 6.2 (L) 12/25/2023     (L) 02/22/2024    AST 13 12/25/2023    ALT 14 12/25/2023    BUN 18 02/22/2024    ANIONGAP 13 02/22/2024    MG 1.64 12/26/2023    PHOS 3.8 10/21/2023    ALBUMIN 3.0 (L) 12/25/2023    LIPASE 10 09/29/2022    GFRF >90 11/01/2022     Lab Results   Component Value Date    TRIG 66 10/20/2023    CHOL 113 10/20/2023    LDLCALC 60 10/20/2023    HDL 40.1 10/20/2023     No results found for: \"BMCBC\", \"CBCDIF\"     DRUG INTERACTIONS  - No  _______________________________________________________________________  ANTICOAGULATION ASSESSMENT    The ASCVD Risk score (Omar SOTO, et al., 2019) failed to calculate for the following reasons:    The patient has a prior MI or stroke diagnosis    DIAGNOSIS: prevention of nonvalvular atrial fibrilliation stroke and systemic embolism  - Patient is projected to be on anticoagulation indefinitely  - GCT3EC1-CFTY Score: [7]   Age: [<65 (0)] [65-74 (+1)] [> 75 (+2)]: 2  Sex: [Male/Female (+1)]: 1  CHF history: [No/Yes(+1)]: 0  Hypertension history: [No/Yes(+1)]: 1  Stroke/TIA/thromboembolism history: [No/Yes(+2)]: 2  Vascular disease history (prior MI, peripheral artery disease, aortic plaque): [No/Yes(+1)]: 0  Diabetes " history: [No/Yes(+1)]: 1    CURRENT PHARMACOTHERAPY:   - Patient is currently taking warfarin 2 mg tablets - 4 mg on Tuesdays and 2 mg all other days  - Holding therapy at this time for procedure    REVIEW OF PHARMACOTHERAPY/MEDICAL HISTORY  - Patient has been on Eliquis for a couple years but was recently discontinued earlier this month due to high copays.  - She is now therapeutic on warfarin 4 mg on Tuesdays and 2 mg all other days  - Creatinine clearance is 127 mL/min as of lab work on 2/22/24 is (Scr 0.69)    _______________________________________________________________________  PATIENT EDUCATION/GOALS  - Counseled patient on MOA, expectations, duration of therapy, contraindications, administration, and monitoring parameters  - Counseled patient of side effects that are indicative of bleeding such as dark tarry stool, unexplainable bruising, or vomiting up a coffee ground like substance  - Answered all patient questions and concerns  - Patient verbalized understanding   _______________________________________________________________________  RECOMMENDATIONS/PLAN  1. Initiate Eliquis 5 mg BID   - Based on age, renal function, and weight no dose reduction of Eliquis needed.   - Will schedule INR follow up and check INR again before transitioning back to Eliquis  2.  PAP Screening   - Patient verbally reports monthly or yearly income which is less than 400% federal poverty level  - If approved medication must be filled through [facility] pharmacy and may be picked up or mailed to patient  - Patient aware that the application process may take up to 6 weeks and must be renewed annually.   - Application for program has been submitted for the following medications: Eliquis 5 mg BID       UPDATE:  PAP application approved on 3/7/24 for Eliquis.    Next Cardiology Appointment: 8/27/24  Clinical Pharmacist follow up: 3/8/24  Type of Encounter: Tato Mckeon, Obdulio    Verbal consent to manage  patient's drug therapy was obtained from the patient . They were informed they may decline to participate or withdraw from participation in pharmacy services at any time.    Continue all meds under the continuation of care with the referring provider and clinical pharmacy team.

## 2024-03-04 ENCOUNTER — APPOINTMENT (OUTPATIENT)
Dept: RADIOLOGY | Facility: HOSPITAL | Age: 74
DRG: 034 | End: 2024-03-04
Payer: MEDICARE

## 2024-03-04 ENCOUNTER — HOSPITAL ENCOUNTER (INPATIENT)
Facility: HOSPITAL | Age: 74
LOS: 3 days | Discharge: HOME | DRG: 034 | End: 2024-03-07
Attending: SURGERY | Admitting: SURGERY
Payer: MEDICARE

## 2024-03-04 ENCOUNTER — APPOINTMENT (OUTPATIENT)
Dept: CARDIOLOGY | Facility: HOSPITAL | Age: 74
DRG: 034 | End: 2024-03-04
Payer: MEDICARE

## 2024-03-04 ENCOUNTER — ANESTHESIA (OUTPATIENT)
Dept: OPERATING ROOM | Facility: HOSPITAL | Age: 74
DRG: 034 | End: 2024-03-04
Payer: MEDICARE

## 2024-03-04 DIAGNOSIS — I65.21 CAROTID STENOSIS, RIGHT: Primary | ICD-10-CM

## 2024-03-04 DIAGNOSIS — R00.1 BRADYCARDIA: ICD-10-CM

## 2024-03-04 DIAGNOSIS — I95.9 HYPOTENSION, UNSPECIFIED HYPOTENSION TYPE: ICD-10-CM

## 2024-03-04 DIAGNOSIS — I65.23 BILATERAL CAROTID ARTERY STENOSIS: ICD-10-CM

## 2024-03-04 LAB
ABO GROUP (TYPE) IN BLOOD: NORMAL
ANION GAP BLDA CALCULATED.4IONS-SCNC: 11 MMO/L (ref 10–25)
ANTIBODY SCREEN: NORMAL
ATRIAL RATE: 69 BPM
BASE EXCESS BLDA CALC-SCNC: -0.1 MMOL/L (ref -2–3)
BODY TEMPERATURE: 37 DEGREES CELSIUS
CA-I BLDA-SCNC: 1.13 MMOL/L (ref 1.1–1.33)
CHLORIDE BLDA-SCNC: 102 MMOL/L (ref 98–107)
ERYTHROCYTE [DISTWIDTH] IN BLOOD BY AUTOMATED COUNT: 16.4 % (ref 11.5–14.5)
GLUCOSE BLD MANUAL STRIP-MCNC: 105 MG/DL (ref 74–99)
GLUCOSE BLD MANUAL STRIP-MCNC: 128 MG/DL (ref 74–99)
GLUCOSE BLD MANUAL STRIP-MCNC: 91 MG/DL (ref 74–99)
GLUCOSE BLDA-MCNC: 143 MG/DL (ref 74–99)
HCO3 BLDA-SCNC: 25.4 MMOL/L (ref 22–26)
HCT VFR BLD AUTO: 33 % (ref 36–46)
HCT VFR BLD EST: 32 % (ref 36–46)
HGB BLD-MCNC: 10.5 G/DL (ref 12–16)
HGB BLDA-MCNC: 10.8 G/DL (ref 12–16)
INHALED O2 CONCENTRATION: 28 %
LACTATE BLDA-SCNC: 0.8 MMOL/L (ref 0.4–2)
MCH RBC QN AUTO: 27.4 PG (ref 26–34)
MCHC RBC AUTO-ENTMCNC: 31.8 G/DL (ref 32–36)
MCV RBC AUTO: 86 FL (ref 80–100)
NRBC BLD-RTO: 0 /100 WBCS (ref 0–0)
OXYHGB MFR BLDA: 98.4 % (ref 94–98)
P AXIS: -2 DEGREES
PCO2 BLDA: 44 MM HG (ref 38–42)
PH BLDA: 7.37 PH (ref 7.38–7.42)
PLATELET # BLD AUTO: 244 X10*3/UL (ref 150–450)
PO2 BLDA: 154 MM HG (ref 85–95)
POTASSIUM BLDA-SCNC: 3.9 MMOL/L (ref 3.5–5.3)
PR INTERVAL: 229 MS
Q ONSET: 252 MS
QRS COUNT: 11 BEATS
QRS DURATION: 92 MS
QT INTERVAL: 416 MS
QTC CALCULATION(BAZETT): 446 MS
QTC FREDERICIA: 436 MS
R AXIS: -5 DEGREES
RBC # BLD AUTO: 3.83 X10*6/UL (ref 4–5.2)
RH FACTOR (ANTIGEN D): NORMAL
SAO2 % BLDA: 100 % (ref 94–100)
SODIUM BLDA-SCNC: 134 MMOL/L (ref 136–145)
T AXIS: 66 DEGREES
T OFFSET: 460 MS
VENTRICULAR RATE: 69 BPM
WBC # BLD AUTO: 8.7 X10*3/UL (ref 4.4–11.3)

## 2024-03-04 PROCEDURE — 82947 ASSAY GLUCOSE BLOOD QUANT: CPT

## 2024-03-04 PROCEDURE — 2500000004 HC RX 250 GENERAL PHARMACY W/ HCPCS (ALT 636 FOR OP/ED): Performed by: ANESTHESIOLOGY

## 2024-03-04 PROCEDURE — C1725 CATH, TRANSLUMIN NON-LASER: HCPCS | Performed by: SURGERY

## 2024-03-04 PROCEDURE — 2500000005 HC RX 250 GENERAL PHARMACY W/O HCPCS: Performed by: NURSE ANESTHETIST, CERTIFIED REGISTERED

## 2024-03-04 PROCEDURE — 2500000001 HC RX 250 WO HCPCS SELF ADMINISTERED DRUGS (ALT 637 FOR MEDICARE OP): Performed by: ANESTHESIOLOGY

## 2024-03-04 PROCEDURE — 2020000001 HC ICU ROOM DAILY

## 2024-03-04 PROCEDURE — 3600000004 HC OR TIME - INITIAL BASE CHARGE - PROCEDURE LEVEL FOUR: Performed by: SURGERY

## 2024-03-04 PROCEDURE — C1876 STENT, NON-COA/NON-COV W/DEL: HCPCS | Performed by: SURGERY

## 2024-03-04 PROCEDURE — 7100000001 HC RECOVERY ROOM TIME - INITIAL BASE CHARGE: Performed by: SURGERY

## 2024-03-04 PROCEDURE — C1769 GUIDE WIRE: HCPCS | Performed by: SURGERY

## 2024-03-04 PROCEDURE — 76937 US GUIDE VASCULAR ACCESS: CPT | Performed by: SURGERY

## 2024-03-04 PROCEDURE — 76000 FLUOROSCOPY <1 HR PHYS/QHP: CPT

## 2024-03-04 PROCEDURE — 36556 INSERT NON-TUNNEL CV CATH: CPT | Mod: ZK

## 2024-03-04 PROCEDURE — 2550000001 HC RX 255 CONTRASTS: Performed by: SURGERY

## 2024-03-04 PROCEDURE — 037K3DZ DILATION OF RIGHT INTERNAL CAROTID ARTERY WITH INTRALUMINAL DEVICE, PERCUTANEOUS APPROACH: ICD-10-PCS | Performed by: SURGERY

## 2024-03-04 PROCEDURE — 93005 ELECTROCARDIOGRAM TRACING: CPT

## 2024-03-04 PROCEDURE — 36556 INSERT NON-TUNNEL CV CATH: CPT

## 2024-03-04 PROCEDURE — 2720000007 HC OR 272 NO HCPCS: Performed by: SURGERY

## 2024-03-04 PROCEDURE — 84132 ASSAY OF SERUM POTASSIUM: CPT

## 2024-03-04 PROCEDURE — 3700000002 HC GENERAL ANESTHESIA TIME - EACH INCREMENTAL 1 MINUTE: Performed by: SURGERY

## 2024-03-04 PROCEDURE — 02HV33Z INSERTION OF INFUSION DEVICE INTO SUPERIOR VENA CAVA, PERCUTANEOUS APPROACH: ICD-10-PCS

## 2024-03-04 PROCEDURE — 2500000004 HC RX 250 GENERAL PHARMACY W/ HCPCS (ALT 636 FOR OP/ED): Performed by: SURGERY

## 2024-03-04 PROCEDURE — 3600000009 HC OR TIME - EACH INCREMENTAL 1 MINUTE - PROCEDURE LEVEL FOUR: Performed by: SURGERY

## 2024-03-04 PROCEDURE — C1884 EMBOLIZATION PROTECT SYST: HCPCS | Performed by: SURGERY

## 2024-03-04 PROCEDURE — 93010 ELECTROCARDIOGRAM REPORT: CPT | Performed by: INTERNAL MEDICINE

## 2024-03-04 PROCEDURE — 2500000005 HC RX 250 GENERAL PHARMACY W/O HCPCS: Performed by: SURGERY

## 2024-03-04 PROCEDURE — 2500000004 HC RX 250 GENERAL PHARMACY W/ HCPCS (ALT 636 FOR OP/ED): Performed by: PHYSICIAN ASSISTANT

## 2024-03-04 PROCEDURE — 36415 COLL VENOUS BLD VENIPUNCTURE: CPT | Performed by: ANESTHESIOLOGY

## 2024-03-04 PROCEDURE — 99291 CRITICAL CARE FIRST HOUR: CPT

## 2024-03-04 PROCEDURE — 2500000005 HC RX 250 GENERAL PHARMACY W/O HCPCS

## 2024-03-04 PROCEDURE — 3E033XZ INTRODUCTION OF VASOPRESSOR INTO PERIPHERAL VEIN, PERCUTANEOUS APPROACH: ICD-10-PCS

## 2024-03-04 PROCEDURE — 2500000001 HC RX 250 WO HCPCS SELF ADMINISTERED DRUGS (ALT 637 FOR MEDICARE OP): Performed by: PHYSICIAN ASSISTANT

## 2024-03-04 PROCEDURE — 2500000004 HC RX 250 GENERAL PHARMACY W/ HCPCS (ALT 636 FOR OP/ED)

## 2024-03-04 PROCEDURE — 86901 BLOOD TYPING SEROLOGIC RH(D): CPT | Performed by: ANESTHESIOLOGY

## 2024-03-04 PROCEDURE — 7100000002 HC RECOVERY ROOM TIME - EACH INCREMENTAL 1 MINUTE: Performed by: SURGERY

## 2024-03-04 PROCEDURE — 2500000004 HC RX 250 GENERAL PHARMACY W/ HCPCS (ALT 636 FOR OP/ED): Performed by: NURSE ANESTHETIST, CERTIFIED REGISTERED

## 2024-03-04 PROCEDURE — 3700000001 HC GENERAL ANESTHESIA TIME - INITIAL BASE CHARGE: Performed by: SURGERY

## 2024-03-04 PROCEDURE — 85347 COAGULATION TIME ACTIVATED: CPT

## 2024-03-04 PROCEDURE — 37215 TRANSCATH STENT CCA W/EPS: CPT | Performed by: SURGERY

## 2024-03-04 PROCEDURE — 85027 COMPLETE CBC AUTOMATED: CPT

## 2024-03-04 PROCEDURE — 2780000003 HC OR 278 NO HCPCS: Performed by: SURGERY

## 2024-03-04 PROCEDURE — 37799 UNLISTED PX VASCULAR SURGERY: CPT

## 2024-03-04 PROCEDURE — 2500000005 HC RX 250 GENERAL PHARMACY W/O HCPCS: Performed by: PHYSICIAN ASSISTANT

## 2024-03-04 PROCEDURE — C1874 STENT, COATED/COV W/DEL SYS: HCPCS | Performed by: SURGERY

## 2024-03-04 DEVICE — 9 MM X 30 MM
Type: IMPLANTABLE DEVICE | Site: CAROTID | Status: FUNCTIONAL
Brand: ENROUTE TRANSCAROTID STENT

## 2024-03-04 RX ORDER — ACETAMINOPHEN 325 MG/1
650 TABLET ORAL EVERY 6 HOURS SCHEDULED
Status: DISCONTINUED | OUTPATIENT
Start: 2024-03-04 | End: 2024-03-07 | Stop reason: HOSPADM

## 2024-03-04 RX ORDER — HEPARIN SODIUM 5000 [USP'U]/ML
INJECTION, SOLUTION INTRAVENOUS; SUBCUTANEOUS AS NEEDED
Status: DISCONTINUED | OUTPATIENT
Start: 2024-03-04 | End: 2024-03-04

## 2024-03-04 RX ORDER — ONDANSETRON HYDROCHLORIDE 2 MG/ML
4 INJECTION, SOLUTION INTRAVENOUS EVERY 6 HOURS PRN
Status: DISCONTINUED | OUTPATIENT
Start: 2024-03-04 | End: 2024-03-07 | Stop reason: HOSPADM

## 2024-03-04 RX ORDER — SODIUM CHLORIDE 9 MG/ML
50 INJECTION, SOLUTION INTRAVENOUS CONTINUOUS
Status: DISCONTINUED | OUTPATIENT
Start: 2024-03-04 | End: 2024-03-04

## 2024-03-04 RX ORDER — NOREPINEPHRINE BITARTRATE/D5W 8 MG/250ML
PLASTIC BAG, INJECTION (ML) INTRAVENOUS
Status: COMPLETED
Start: 2024-03-04 | End: 2024-03-04

## 2024-03-04 RX ORDER — SODIUM CITRATE AND CITRIC ACID MONOHYDRATE 334; 500 MG/5ML; MG/5ML
30 SOLUTION ORAL ONCE
Status: COMPLETED | OUTPATIENT
Start: 2024-03-04 | End: 2024-03-04

## 2024-03-04 RX ORDER — NOREPINEPHRINE BITARTRATE/D5W 8 MG/250ML
.01-1 PLASTIC BAG, INJECTION (ML) INTRAVENOUS CONTINUOUS
Status: DISCONTINUED | OUTPATIENT
Start: 2024-03-04 | End: 2024-03-05

## 2024-03-04 RX ORDER — PAROXETINE 10 MG/1
20 TABLET, FILM COATED ORAL NIGHTLY
Status: DISCONTINUED | OUTPATIENT
Start: 2024-03-04 | End: 2024-03-07 | Stop reason: HOSPADM

## 2024-03-04 RX ORDER — ONDANSETRON HYDROCHLORIDE 2 MG/ML
4 INJECTION, SOLUTION INTRAVENOUS ONCE AS NEEDED
Status: DISCONTINUED | OUTPATIENT
Start: 2024-03-04 | End: 2024-03-04 | Stop reason: HOSPADM

## 2024-03-04 RX ORDER — CEFAZOLIN SODIUM 2 G/100ML
2 INJECTION, SOLUTION INTRAVENOUS EVERY 8 HOURS
Status: COMPLETED | OUTPATIENT
Start: 2024-03-04 | End: 2024-03-05

## 2024-03-04 RX ORDER — DEXTROSE 50 % IN WATER (D50W) INTRAVENOUS SYRINGE
25
Status: DISCONTINUED | OUTPATIENT
Start: 2024-03-04 | End: 2024-03-07 | Stop reason: HOSPADM

## 2024-03-04 RX ORDER — ONDANSETRON HYDROCHLORIDE 2 MG/ML
4 INJECTION, SOLUTION INTRAVENOUS ONCE
Status: COMPLETED | OUTPATIENT
Start: 2024-03-04 | End: 2024-03-04

## 2024-03-04 RX ORDER — FAMOTIDINE 10 MG/ML
20 INJECTION INTRAVENOUS ONCE
Status: COMPLETED | OUTPATIENT
Start: 2024-03-04 | End: 2024-03-04

## 2024-03-04 RX ORDER — NALOXONE HYDROCHLORIDE 1 MG/ML
0.2 INJECTION INTRAMUSCULAR; INTRAVENOUS; SUBCUTANEOUS EVERY 5 MIN PRN
Status: DISCONTINUED | OUTPATIENT
Start: 2024-03-04 | End: 2024-03-04 | Stop reason: SDUPTHER

## 2024-03-04 RX ORDER — IODIXANOL 270 MG/ML
INJECTION, SOLUTION INTRAVASCULAR AS NEEDED
Status: DISCONTINUED | OUTPATIENT
Start: 2024-03-04 | End: 2024-03-04 | Stop reason: HOSPADM

## 2024-03-04 RX ORDER — INSULIN LISPRO 100 [IU]/ML
0-5 INJECTION, SOLUTION INTRAVENOUS; SUBCUTANEOUS
Status: DISCONTINUED | OUTPATIENT
Start: 2024-03-04 | End: 2024-03-07 | Stop reason: HOSPADM

## 2024-03-04 RX ORDER — MIDAZOLAM HYDROCHLORIDE 1 MG/ML
2 INJECTION, SOLUTION INTRAMUSCULAR; INTRAVENOUS ONCE
Status: COMPLETED | OUTPATIENT
Start: 2024-03-04 | End: 2024-03-04

## 2024-03-04 RX ORDER — LIDOCAINE HYDROCHLORIDE 10 MG/ML
INJECTION INFILTRATION; PERINEURAL AS NEEDED
Status: DISCONTINUED | OUTPATIENT
Start: 2024-03-04 | End: 2024-03-04

## 2024-03-04 RX ORDER — ATROPINE SULFATE 0.4 MG/ML
INJECTION, SOLUTION ENDOTRACHEAL; INTRAMEDULLARY; INTRAMUSCULAR; INTRAVENOUS; SUBCUTANEOUS AS NEEDED
Status: DISCONTINUED | OUTPATIENT
Start: 2024-03-04 | End: 2024-03-04

## 2024-03-04 RX ORDER — ROCURONIUM BROMIDE 10 MG/ML
INJECTION, SOLUTION INTRAVENOUS AS NEEDED
Status: DISCONTINUED | OUTPATIENT
Start: 2024-03-04 | End: 2024-03-04

## 2024-03-04 RX ORDER — GLYCOPYRROLATE 0.2 MG/ML
INJECTION INTRAMUSCULAR; INTRAVENOUS AS NEEDED
Status: DISCONTINUED | OUTPATIENT
Start: 2024-03-04 | End: 2024-03-04

## 2024-03-04 RX ORDER — ASPIRIN 81 MG/1
81 TABLET ORAL DAILY
Status: DISCONTINUED | OUTPATIENT
Start: 2024-03-05 | End: 2024-03-07 | Stop reason: HOSPADM

## 2024-03-04 RX ORDER — MORPHINE SULFATE 4 MG/ML
4 INJECTION INTRAVENOUS EVERY 5 MIN PRN
Status: DISCONTINUED | OUTPATIENT
Start: 2024-03-04 | End: 2024-03-04 | Stop reason: HOSPADM

## 2024-03-04 RX ORDER — PIOGLITAZONEHYDROCHLORIDE 30 MG/1
30 TABLET ORAL EVERY 24 HOURS
Status: DISCONTINUED | OUTPATIENT
Start: 2024-03-05 | End: 2024-03-07 | Stop reason: HOSPADM

## 2024-03-04 RX ORDER — EZETIMIBE 10 MG/1
10 TABLET ORAL DAILY
Status: DISCONTINUED | OUTPATIENT
Start: 2024-03-05 | End: 2024-03-07 | Stop reason: HOSPADM

## 2024-03-04 RX ORDER — TRAMADOL HYDROCHLORIDE 50 MG/1
25 TABLET ORAL EVERY 6 HOURS PRN
Status: DISCONTINUED | OUTPATIENT
Start: 2024-03-04 | End: 2024-03-07 | Stop reason: HOSPADM

## 2024-03-04 RX ORDER — ESMOLOL HYDROCHLORIDE 10 MG/ML
INJECTION INTRAVENOUS AS NEEDED
Status: DISCONTINUED | OUTPATIENT
Start: 2024-03-04 | End: 2024-03-04

## 2024-03-04 RX ORDER — DOCUSATE SODIUM 100 MG/1
100 CAPSULE, LIQUID FILLED ORAL 2 TIMES DAILY
Status: DISCONTINUED | OUTPATIENT
Start: 2024-03-04 | End: 2024-03-07 | Stop reason: HOSPADM

## 2024-03-04 RX ORDER — NALOXONE HYDROCHLORIDE 1 MG/ML
0.2 INJECTION INTRAMUSCULAR; INTRAVENOUS; SUBCUTANEOUS EVERY 5 MIN PRN
Status: DISCONTINUED | OUTPATIENT
Start: 2024-03-04 | End: 2024-03-07 | Stop reason: HOSPADM

## 2024-03-04 RX ORDER — FENTANYL CITRATE 50 UG/ML
INJECTION, SOLUTION INTRAMUSCULAR; INTRAVENOUS AS NEEDED
Status: DISCONTINUED | OUTPATIENT
Start: 2024-03-04 | End: 2024-03-04

## 2024-03-04 RX ORDER — PHENYLEPHRINE HCL IN 0.9% NACL 1 MG/10 ML
SYRINGE (ML) INTRAVENOUS AS NEEDED
Status: DISCONTINUED | OUTPATIENT
Start: 2024-03-04 | End: 2024-03-04

## 2024-03-04 RX ORDER — DEXTROSE MONOHYDRATE 100 MG/ML
0.3 INJECTION, SOLUTION INTRAVENOUS ONCE AS NEEDED
Status: DISCONTINUED | OUTPATIENT
Start: 2024-03-04 | End: 2024-03-07 | Stop reason: HOSPADM

## 2024-03-04 RX ORDER — METOPROLOL SUCCINATE 25 MG/1
25 TABLET, EXTENDED RELEASE ORAL DAILY
Status: DISCONTINUED | OUTPATIENT
Start: 2024-03-05 | End: 2024-03-07 | Stop reason: HOSPADM

## 2024-03-04 RX ORDER — SODIUM CHLORIDE, SODIUM LACTATE, POTASSIUM CHLORIDE, CALCIUM CHLORIDE 600; 310; 30; 20 MG/100ML; MG/100ML; MG/100ML; MG/100ML
INJECTION, SOLUTION INTRAVENOUS CONTINUOUS PRN
Status: DISCONTINUED | OUTPATIENT
Start: 2024-03-04 | End: 2024-03-04

## 2024-03-04 RX ORDER — CLOPIDOGREL BISULFATE 75 MG/1
75 TABLET ORAL DAILY
Status: DISCONTINUED | OUTPATIENT
Start: 2024-03-05 | End: 2024-03-07 | Stop reason: HOSPADM

## 2024-03-04 RX ORDER — ALBUTEROL SULFATE 0.83 MG/ML
2.5 SOLUTION RESPIRATORY (INHALATION) ONCE
Status: DISCONTINUED | OUTPATIENT
Start: 2024-03-04 | End: 2024-03-04 | Stop reason: HOSPADM

## 2024-03-04 RX ORDER — OXYCODONE HYDROCHLORIDE 5 MG/1
5 TABLET ORAL EVERY 6 HOURS PRN
Status: DISCONTINUED | OUTPATIENT
Start: 2024-03-04 | End: 2024-03-04

## 2024-03-04 RX ORDER — ATORVASTATIN CALCIUM 40 MG/1
80 TABLET, FILM COATED ORAL NIGHTLY
Status: DISCONTINUED | OUTPATIENT
Start: 2024-03-05 | End: 2024-03-07 | Stop reason: HOSPADM

## 2024-03-04 RX ORDER — ASPIRIN 81 MG/1
81 TABLET ORAL DAILY
COMMUNITY

## 2024-03-04 RX ORDER — MORPHINE SULFATE 2 MG/ML
2 INJECTION, SOLUTION INTRAMUSCULAR; INTRAVENOUS EVERY 5 MIN PRN
Status: DISCONTINUED | OUTPATIENT
Start: 2024-03-04 | End: 2024-03-04 | Stop reason: HOSPADM

## 2024-03-04 RX ORDER — PHENYLEPHRINE 10 MG/250 ML(40 MCG/ML)IN 0.9 % SOD.CHLORIDE INTRAVENOUS
CONTINUOUS PRN
Status: DISCONTINUED | OUTPATIENT
Start: 2024-03-04 | End: 2024-03-04

## 2024-03-04 RX ORDER — SENNOSIDES 8.6 MG/1
2 TABLET ORAL NIGHTLY
Status: DISCONTINUED | OUTPATIENT
Start: 2024-03-04 | End: 2024-03-07 | Stop reason: HOSPADM

## 2024-03-04 RX ORDER — CEFAZOLIN SODIUM 2 G/100ML
2 INJECTION, SOLUTION INTRAVENOUS ONCE
Status: COMPLETED | OUTPATIENT
Start: 2024-03-04 | End: 2024-03-04

## 2024-03-04 RX ORDER — HEPARIN SODIUM 5000 [USP'U]/ML
7500 INJECTION, SOLUTION INTRAVENOUS; SUBCUTANEOUS EVERY 8 HOURS SCHEDULED
Status: DISCONTINUED | OUTPATIENT
Start: 2024-03-04 | End: 2024-03-05

## 2024-03-04 RX ORDER — METOCLOPRAMIDE HYDROCHLORIDE 5 MG/ML
10 INJECTION INTRAMUSCULAR; INTRAVENOUS ONCE
Status: COMPLETED | OUTPATIENT
Start: 2024-03-04 | End: 2024-03-04

## 2024-03-04 RX ORDER — NICARDIPINE HYDROCHLORIDE 0.2 MG/ML
2.5-15 INJECTION INTRAVENOUS CONTINUOUS
Status: DISCONTINUED | OUTPATIENT
Start: 2024-03-04 | End: 2024-03-05

## 2024-03-04 RX ORDER — SCOLOPAMINE TRANSDERMAL SYSTEM 1 MG/1
1 PATCH, EXTENDED RELEASE TRANSDERMAL
Status: DISCONTINUED | OUTPATIENT
Start: 2024-03-04 | End: 2024-03-04

## 2024-03-04 RX ORDER — SODIUM CHLORIDE, SODIUM LACTATE, POTASSIUM CHLORIDE, CALCIUM CHLORIDE 600; 310; 30; 20 MG/100ML; MG/100ML; MG/100ML; MG/100ML
100 INJECTION, SOLUTION INTRAVENOUS CONTINUOUS
Status: DISCONTINUED | OUTPATIENT
Start: 2024-03-04 | End: 2024-03-05

## 2024-03-04 RX ORDER — MORPHINE SULFATE 2 MG/ML
2 INJECTION, SOLUTION INTRAMUSCULAR; INTRAVENOUS EVERY 2 HOUR PRN
Status: DISCONTINUED | OUTPATIENT
Start: 2024-03-04 | End: 2024-03-05

## 2024-03-04 RX ORDER — PROPOFOL 10 MG/ML
INJECTION, EMULSION INTRAVENOUS AS NEEDED
Status: DISCONTINUED | OUTPATIENT
Start: 2024-03-04 | End: 2024-03-04

## 2024-03-04 RX ORDER — WARFARIN 2 MG/1
2 TABLET ORAL DAILY
Status: DISCONTINUED | OUTPATIENT
Start: 2024-03-05 | End: 2024-03-05

## 2024-03-04 RX ORDER — PROTAMINE SULFATE 10 MG/ML
INJECTION, SOLUTION INTRAVENOUS AS NEEDED
Status: DISCONTINUED | OUTPATIENT
Start: 2024-03-04 | End: 2024-03-04

## 2024-03-04 RX ADMIN — HEPARIN SODIUM 10000 UNITS: 5000 INJECTION INTRAVENOUS; SUBCUTANEOUS at 08:01

## 2024-03-04 RX ADMIN — SENNOSIDES 17.2 MG: 8.6 TABLET, FILM COATED ORAL at 22:29

## 2024-03-04 RX ADMIN — ATROPINE SULFATE 0.4 MG: 0.4 INJECTION, SOLUTION INTRAVENOUS at 08:11

## 2024-03-04 RX ADMIN — Medication 100 MCG: at 08:49

## 2024-03-04 RX ADMIN — PHENYLEPHRINE HYDROCHLORIDE 0.5 MCG/KG/MIN: 10 INJECTION INTRAVENOUS at 17:00

## 2024-03-04 RX ADMIN — ACETAMINOPHEN 650 MG: 325 TABLET ORAL at 12:10

## 2024-03-04 RX ADMIN — NITROGLYCERIN 0.5 INCH: 20 OINTMENT TOPICAL at 06:55

## 2024-03-04 RX ADMIN — DOCUSATE SODIUM 100 MG: 100 CAPSULE, LIQUID FILLED ORAL at 12:10

## 2024-03-04 RX ADMIN — TRAMADOL HYDROCHLORIDE 25 MG: 50 TABLET, COATED ORAL at 12:11

## 2024-03-04 RX ADMIN — PROPOFOL 150 MG: 10 INJECTION, EMULSION INTRAVENOUS at 07:44

## 2024-03-04 RX ADMIN — Medication 0.01 MCG/KG/MIN: at 17:46

## 2024-03-04 RX ADMIN — ONDANSETRON 4 MG: 2 INJECTION INTRAMUSCULAR; INTRAVENOUS at 06:55

## 2024-03-04 RX ADMIN — Medication 0.2 MCG/KG/MIN: at 07:45

## 2024-03-04 RX ADMIN — ESMOLOL HYDROCHLORIDE 40 MG: 10 INJECTION, SOLUTION INTRAVENOUS at 08:04

## 2024-03-04 RX ADMIN — GLYCOPYRROLATE 0.2 MG: 0.2 INJECTION INTRAMUSCULAR; INTRAVENOUS at 07:50

## 2024-03-04 RX ADMIN — HEPARIN SODIUM 3000 UNITS: 5000 INJECTION INTRAVENOUS; SUBCUTANEOUS at 08:14

## 2024-03-04 RX ADMIN — Medication 10 MCG: at 08:29

## 2024-03-04 RX ADMIN — MIDAZOLAM 2 MG: 1 INJECTION INTRAMUSCULAR; INTRAVENOUS at 16:16

## 2024-03-04 RX ADMIN — ATROPINE SULFATE 0.4 MG: 0.4 INJECTION, SOLUTION INTRAVENOUS at 08:25

## 2024-03-04 RX ADMIN — FENTANYL CITRATE 100 MCG: 50 INJECTION INTRAMUSCULAR; INTRAVENOUS at 08:06

## 2024-03-04 RX ADMIN — PROTAMINE SULFATE 25 MG: 10 INJECTION, SOLUTION INTRAVENOUS at 08:44

## 2024-03-04 RX ADMIN — ROCURONIUM BROMIDE 50 MG: 10 INJECTION, SOLUTION INTRAVENOUS at 07:44

## 2024-03-04 RX ADMIN — DOCUSATE SODIUM 100 MG: 100 CAPSULE, LIQUID FILLED ORAL at 22:29

## 2024-03-04 RX ADMIN — SODIUM CHLORIDE, POTASSIUM CHLORIDE, SODIUM LACTATE AND CALCIUM CHLORIDE: 600; 310; 30; 20 INJECTION, SOLUTION INTRAVENOUS at 07:53

## 2024-03-04 RX ADMIN — SODIUM CHLORIDE, POTASSIUM CHLORIDE, SODIUM LACTATE AND CALCIUM CHLORIDE 100 ML/HR: 600; 310; 30; 20 INJECTION, SOLUTION INTRAVENOUS at 11:45

## 2024-03-04 RX ADMIN — CEFAZOLIN SODIUM 2 G: 2 INJECTION, SOLUTION INTRAVENOUS at 07:39

## 2024-03-04 RX ADMIN — Medication 3 L/MIN: at 12:00

## 2024-03-04 RX ADMIN — PHENYLEPHRINE HYDROCHLORIDE 0.04 MCG/KG/MIN: 10 INJECTION INTRAVENOUS at 10:03

## 2024-03-04 RX ADMIN — CEFAZOLIN SODIUM 2 G: 2 INJECTION, SOLUTION INTRAVENOUS at 17:45

## 2024-03-04 RX ADMIN — METOCLOPRAMIDE 10 MG: 5 INJECTION, SOLUTION INTRAMUSCULAR; INTRAVENOUS at 06:55

## 2024-03-04 RX ADMIN — ATROPINE SULFATE 0.4 MG: 0.4 INJECTION, SOLUTION INTRAVENOUS at 07:52

## 2024-03-04 RX ADMIN — HEPARIN SODIUM 7500 UNITS: 5000 INJECTION INTRAVENOUS; SUBCUTANEOUS at 22:29

## 2024-03-04 RX ADMIN — CEFAZOLIN SODIUM 1 G: 2 INJECTION, SOLUTION INTRAVENOUS at 08:10

## 2024-03-04 RX ADMIN — PHENYLEPHRINE HYDROCHLORIDE 0.5 MCG/KG/MIN: 10 INJECTION INTRAVENOUS at 14:55

## 2024-03-04 RX ADMIN — LIDOCAINE HYDROCHLORIDE 50 MG: 10 INJECTION, SOLUTION INFILTRATION; PERINEURAL at 07:44

## 2024-03-04 RX ADMIN — SODIUM CHLORIDE 50 ML/HR: 9 INJECTION, SOLUTION INTRAVENOUS at 06:55

## 2024-03-04 RX ADMIN — SODIUM CITRATE AND CITRIC ACID MONOHYDRATE 30 ML: 500; 334 SOLUTION ORAL at 06:55

## 2024-03-04 RX ADMIN — SODIUM CHLORIDE, POTASSIUM CHLORIDE, SODIUM LACTATE AND CALCIUM CHLORIDE 500 ML: 600; 310; 30; 20 INJECTION, SOLUTION INTRAVENOUS at 14:54

## 2024-03-04 RX ADMIN — ATROPINE SULFATE 0.4 MG: 0.4 INJECTION, SOLUTION INTRAVENOUS at 08:05

## 2024-03-04 RX ADMIN — NOREPINEPHRINE BITARTRATE 0.01 MCG/KG/MIN: 8 INJECTION, SOLUTION INTRAVENOUS at 17:46

## 2024-03-04 RX ADMIN — FENTANYL CITRATE 50 MCG: 50 INJECTION INTRAMUSCULAR; INTRAVENOUS at 07:44

## 2024-03-04 RX ADMIN — FAMOTIDINE 20 MG: 10 INJECTION, SOLUTION INTRAVENOUS at 06:55

## 2024-03-04 SDOH — SOCIAL STABILITY: SOCIAL INSECURITY: ABUSE: ADULT

## 2024-03-04 SDOH — SOCIAL STABILITY: SOCIAL INSECURITY: ARE THERE ANY APPARENT SIGNS OF INJURIES/BEHAVIORS THAT COULD BE RELATED TO ABUSE/NEGLECT?: NO

## 2024-03-04 SDOH — SOCIAL STABILITY: SOCIAL INSECURITY: DO YOU FEEL ANYONE HAS EXPLOITED OR TAKEN ADVANTAGE OF YOU FINANCIALLY OR OF YOUR PERSONAL PROPERTY?: NO

## 2024-03-04 SDOH — SOCIAL STABILITY: SOCIAL INSECURITY: ARE YOU OR HAVE YOU BEEN THREATENED OR ABUSED PHYSICALLY, EMOTIONALLY, OR SEXUALLY BY ANYONE?: NO

## 2024-03-04 SDOH — SOCIAL STABILITY: SOCIAL INSECURITY: DOES ANYONE TRY TO KEEP YOU FROM HAVING/CONTACTING OTHER FRIENDS OR DOING THINGS OUTSIDE YOUR HOME?: NO

## 2024-03-04 SDOH — HEALTH STABILITY: MENTAL HEALTH: CURRENT SMOKER: 0

## 2024-03-04 SDOH — SOCIAL STABILITY: SOCIAL INSECURITY: HAS ANYONE EVER THREATENED TO HURT YOUR FAMILY OR YOUR PETS?: NO

## 2024-03-04 SDOH — SOCIAL STABILITY: SOCIAL INSECURITY: WERE YOU ABLE TO COMPLETE ALL THE BEHAVIORAL HEALTH SCREENINGS?: YES

## 2024-03-04 SDOH — SOCIAL STABILITY: SOCIAL INSECURITY: HAVE YOU HAD THOUGHTS OF HARMING ANYONE ELSE?: NO

## 2024-03-04 SDOH — SOCIAL STABILITY: SOCIAL INSECURITY: DO YOU FEEL UNSAFE GOING BACK TO THE PLACE WHERE YOU ARE LIVING?: NO

## 2024-03-04 ASSESSMENT — ACTIVITIES OF DAILY LIVING (ADL)
DRESSING YOURSELF: INDEPENDENT
GROOMING: INDEPENDENT
ASSISTIVE_DEVICE: WALKER
JUDGMENT_ADEQUATE_SAFELY_COMPLETE_DAILY_ACTIVITIES: YES
LACK_OF_TRANSPORTATION: NO
TOILETING: INDEPENDENT
PATIENT'S MEMORY ADEQUATE TO SAFELY COMPLETE DAILY ACTIVITIES?: YES
ADEQUATE_TO_COMPLETE_ADL: YES
BATHING: INDEPENDENT
HEARING - LEFT EAR: FUNCTIONAL
FEEDING YOURSELF: INDEPENDENT
HEARING - RIGHT EAR: FUNCTIONAL
WALKS IN HOME: INDEPENDENT

## 2024-03-04 ASSESSMENT — LIFESTYLE VARIABLES
SKIP TO QUESTIONS 9-10: 1
SUBSTANCE_ABUSE_PAST_12_MONTHS: NO
HOW MANY STANDARD DRINKS CONTAINING ALCOHOL DO YOU HAVE ON A TYPICAL DAY: PATIENT DOES NOT DRINK
PRESCIPTION_ABUSE_PAST_12_MONTHS: NO
HOW OFTEN DO YOU HAVE A DRINK CONTAINING ALCOHOL: NEVER
AUDIT-C TOTAL SCORE: 0
HOW OFTEN DO YOU HAVE 6 OR MORE DRINKS ON ONE OCCASION: NEVER
AUDIT-C TOTAL SCORE: 0

## 2024-03-04 ASSESSMENT — COGNITIVE AND FUNCTIONAL STATUS - GENERAL
PATIENT BASELINE BEDBOUND: NO
HELP NEEDED FOR BATHING: A LITTLE
DAILY ACTIVITIY SCORE: 20
MOBILITY SCORE: 24
DRESSING REGULAR UPPER BODY CLOTHING: A LITTLE
TOILETING: A LITTLE
DRESSING REGULAR LOWER BODY CLOTHING: A LITTLE

## 2024-03-04 ASSESSMENT — PAIN SCALES - GENERAL
PAINLEVEL_OUTOF10: 0 - NO PAIN
PAINLEVEL_OUTOF10: 2
PAINLEVEL_OUTOF10: 3
PAINLEVEL_OUTOF10: 0 - NO PAIN
PAINLEVEL_OUTOF10: 3
PAINLEVEL_OUTOF10: 0 - NO PAIN
PAIN_LEVEL: 0
PAINLEVEL_OUTOF10: 0 - NO PAIN

## 2024-03-04 ASSESSMENT — PAIN - FUNCTIONAL ASSESSMENT
PAIN_FUNCTIONAL_ASSESSMENT: 0-10

## 2024-03-04 ASSESSMENT — COLUMBIA-SUICIDE SEVERITY RATING SCALE - C-SSRS
1. IN THE PAST MONTH, HAVE YOU WISHED YOU WERE DEAD OR WISHED YOU COULD GO TO SLEEP AND NOT WAKE UP?: NO
2. HAVE YOU ACTUALLY HAD ANY THOUGHTS OF KILLING YOURSELF?: NO
6. HAVE YOU EVER DONE ANYTHING, STARTED TO DO ANYTHING, OR PREPARED TO DO ANYTHING TO END YOUR LIFE?: NO

## 2024-03-04 ASSESSMENT — PATIENT HEALTH QUESTIONNAIRE - PHQ9
2. FEELING DOWN, DEPRESSED OR HOPELESS: NOT AT ALL
1. LITTLE INTEREST OR PLEASURE IN DOING THINGS: NOT AT ALL
SUM OF ALL RESPONSES TO PHQ9 QUESTIONS 1 & 2: 0

## 2024-03-04 NOTE — PROGRESS NOTES
Elsa Biggs is a 73 y.o. female admitted for Carotid stenosis, right. Pharmacy reviewed the patient's [id number] medications and allergies for accuracy.    The list below reflects the PTA list prior to pharmacy medication history. A summary a changes to the PTA medication list has been listed below. Please review each medication in order reconciliation for additional clarification and justification.    Source of information:  T2P    Medications added:    Medications modified:  Metformin 500mg bid --> 500mg 1t am and 2t pm    Medications to be removed:  Eliquis 5mg    Medications of concern:      Prior to Admission Medications   Prescriptions Last Dose Informant Patient Reported? Taking?   OneTouch Delica Plus Lancet 33 gauge misc Past Week  Yes Yes   Sig: USE TO TAKE BLOOD SUGAR 1-2 TIMES PER DAY   PARoxetine (Paxil) 20 mg tablet 3/3/2024  Yes Yes   Sig: Take 1 tablet (20 mg) by mouth once daily at bedtime.   acetaminophen (Tylenol) 325 mg tablet Past Week  No Yes   Sig: Take 2 tablets (650 mg) by mouth every 4 hours if needed for mild pain (1 - 3), moderate pain (4 - 6) or fever (temp greater than 38.0 C).   apixaban (Eliquis) 5 mg tablet Not Taking  No No   Sig: Take 1 tablet (5 mg) by mouth 2 times a day.   Patient not taking: Reported on 3/4/2024   aspirin 81 mg EC tablet 3/4/2024  Yes Yes   Sig: Take 1 tablet (81 mg) by mouth once daily.   atorvastatin (Lipitor) 80 mg tablet 3/4/2024  No Yes   Sig: Take 1 tablet (80 mg) by mouth once daily at bedtime.   cholecalciferol (Vitamin D-3) 25 MCG (1000 UT) tablet 3/3/2024  Yes Yes   Sig: Take 1 tablet (25 mcg) by mouth once daily.   clopidogrel (Plavix) 75 mg tablet 3/4/2024  No Yes   Sig: Take 1 tablet (75 mg) by mouth once daily.   ezetimibe (Zetia) 10 mg tablet 3/3/2024  Yes Yes   Sig: Take 1 tablet (10 mg) by mouth once daily.   metFORMIN (Glucophage) 500 mg tablet 3/3/2024  Yes Yes   Sig: Take 1 tablet (500 mg) by mouth 2 times a day with meals.    metoprolol succinate XL (Toprol-XL) 25 mg 24 hr tablet 3/4/2024  No Yes   Sig: Take 1 tablet (25 mg) by mouth once daily. Do not crush or chew.   pioglitazone (Actos) 30 mg tablet 3/3/2024  Yes Yes   Sig: Take 1 tablet (30 mg) by mouth once every 24 hours.   warfarin (Coumadin) 2 mg tablet Past Week  No Yes   Sig: Take 1 tablet (2 mg) by mouth once daily in the evening. Take as directed per After Visit Summary.   Patient taking differently: Take 1 tablet (2 mg) by mouth once daily in the evening. One tablet daily except on Tuesdays, takes 2 tablets      Facility-Administered Medications: None       Danelle Holder CPhT

## 2024-03-04 NOTE — OP NOTE
Transcarotid Artery Revascularization (TCAR)*SDS* C-ARM *[facility] WITH Elpas (R) Operative Note     Date: 3/4/2024  OR Location: [facility]    Name: Elsa Biggs, : 1950, Age: 73 y.o., MRN: [id number], Sex: female    Diagnosis  Pre-op Diagnosis     * Bilateral carotid artery stenosis [I65.23] Post-op Diagnosis     * Bilateral carotid artery stenosis [I65.23]     Procedures  Transcarotid Artery Revascularization (TCAR)*SDS* C-ARM *[facility] WITH Elpas  [address]01 - TX TCAT IV STENT CRV CRTD ART EMBOLIC PROTECJ      Surgeons      * Andrew Victor - Primary    Resident/Fellow/Other Assistant:  Surgeon(s) and Role:  KENNA Metz    Procedure Summary  Anesthesia: General  ASA: III  Anesthesia Staff: CRNA: ANITA Edouard-CRNA  Estimated Blood Loss: 10mL  Intra-op Medications:   Administrations occurring from 0730 to 0930 on 24:   Medication Name Total Dose   gelatin absorbable (Gelfoam) 100 sponge 1 each   thrombin solution 10,000 Units   heparin (porcine) 5,000 Units in sodium chloride 0.9% 100 mL irrigation 5,000 Units   iodixanol (VISIPaque) 270 mg iodine/mL injection 40 mL   ceFAZolin in dextrose (iso-os) (Ancef) IVPB 2 g 3 g   sodium chloride 0.9% infusion Cannot be calculated              Anesthesia Record               Intraprocedure I/O Totals          Intake    Phenylephrine Drip 0.00 mL    The total shown is the total volume documented since Anesthesia Start was filed.    LR infusion 800.00 mL    sodium chloride 0.9% infusion 800.00 mL    Total Intake 1600 mL          Specimen: No specimens collected     Staff:   Circulator: Judie Blum RN  Relief Circulator: Scarlett Farrell RN  Scrub Person: Margot Mcwilliams; Edna Do         Drains and/or Catheters: * None in log *    Tourniquet Times:         Implants:  Implants       Type Name Action Serial No.      Stent STENT, 9MM X 30MM, ENROUTE TRANSCAROTID SYSTEM - [id number] Implanted               Findings:      Indications: Elsa RAMIREZ  Dian is an 73 y.o. female who is having surgery for i65.23. Right carotid stenosis    The patient was seen in the preoperative area. The risks, benefits, complications, treatment options, non-operative alternatives, expected recovery and outcomes were discussed with the patient. The possibilities of reaction to medication, pulmonary aspiration, injury to surrounding structures, bleeding, recurrent infection, the need for additional procedures, failure to diagnose a condition, and creating a complication requiring transfusion or operation were discussed with the patient. The patient concurred with the proposed plan, giving informed consent.  The site of surgery was properly noted/marked if necessary per policy. The patient has been actively warmed in preoperative area. Preoperative antibiotics have been ordered and given within 1 hours of incision. Venous thrombosis prophylaxis have been ordered including bilateral sequential compression devices    Procedure Details: Patient was brought to the OR suite placed in the supine position administered general anesthetic with endotracheal tube intubation bilateral groins and right neck and chest prepped and draped standard fashion.  Was utilized ultrasound guidance to access the left common femoral vein utilizing micro puncture technique.  Wire was advanced and subsequently was exchanged for an 035 wire and the en route venous sheath was advanced.  I aspirated and flushed with no difficulty.  Subsequently transverse supraclavicular neck incision was made subcutaneous dissection electrocautery as well as Metzenbaum scissors the proximal common carotid arteries identified circumvented with Vesseloops.  U-Stich was placed at the proximal common carotid artery.  Patient is systemically heparinized.  Micropuncture needle was advanced with a specified 014 wire to the 3 centimeters.  Microsheath was advanced contrast arteriography was performed demonstrating the highly stenotic  right* internal  artery.    Wire was subsequently advanced to the external carotid artery and subsequently the En route sheath was advanced after exchange with 0.035 J-tip wire and sutured to the chest wall in a standard fashion.  The En route embolic protection reversal device was attached in a standard fashion aspirated flushed and reverse flow noted and confirmed with saline flush.  TCAR timeout was undertaken.  With reversal of flow with CCA clamped the right internal carotid artery was engaged with the 014 wire advancing the wire to the carotid siphon.  The area of stenosis was pre-dilated twice with a 4.5 x25 Abbott balloon and a 9x 30  En route stent was placed and deployed in pin and pull manner.  Patient tolerated procedure well.  Completion images demonstrated excellent patency of the internal carotid artery as well as patency of the stent.  The En route sheath was removed and U stitch secured.  The venous sheath was removed compression is applied  excellent hemostasis maintained.  Protamine was administered.  Patient tolerated the procedure well was awoken neurovascular status was intact sent to PACU in stable condition.   Complications:  None; patient tolerated the procedure well.    Disposition: PACU - hemodynamically stable.  Condition: stable         Additional Details: No qualified vascular fellow was available for assistance in the above-noted procedure.  KENNA Metz was available for the entirety of the procedure.  She assisted in all components of the procedure from start to completion.     Attending Attestation: I was present and scrubbed for the entire procedure.    Andrew Victor  Phone Number: [phone]

## 2024-03-04 NOTE — PROCEDURES
Central Line    Date/Time: 3/4/2024 5:25 PM    Performed by: AMANDA Stewart  Authorized by: AMANDA Stewart    Consent:     Consent obtained:  Written    Consent given by:  Patient    Risks, benefits, and alternatives were discussed: yes      Risks discussed:  Arterial puncture, incorrect placement, bleeding, infection and pneumothorax  Universal protocol:     Procedure explained and questions answered to patient or proxy's satisfaction: yes      Patient identity confirmed:  Verbally with patient, hospital-assigned identification number and arm band  Pre-procedure details:     Indication(s): central venous access and hemodynamic monitoring      Hand hygiene: Hand hygiene performed prior to insertion      Sterile barrier technique: All elements of maximal sterile technique followed      Skin preparation:  Chlorhexidine    Skin preparation agent: Skin preparation agent completely dried prior to procedure    Sedation:     Sedation type:  Anxiolysis  Anesthesia:     Anesthesia method:  Local infiltration    Local anesthetic:  Lidocaine 1% w/o epi  Procedure details:     Location:  R femoral    Site selection rationale:  Status post right TCAR    Patient position:  Supine    Procedural supplies:  Triple lumen    Catheter size:  7 Fr    Landmarks identified: yes      Ultrasound guidance: yes      Ultrasound guidance timing: prior to insertion and real time      Sterile ultrasound techniques: Sterile gel and sterile probe covers were used      Number of attempts:  2    Successful placement: yes    Post-procedure details:     Post-procedure:  Dressing applied and line sutured    Assessment:  Blood return through all ports    Procedure completion:  Tolerated well, no immediate complications    Dr. Constantino present, assisted, and proctored throughout procedure.

## 2024-03-04 NOTE — ANESTHESIA PROCEDURE NOTES
Airway  Date/Time: 3/4/2024 7:49 AM  Urgency: elective    Airway not difficult    Staffing  Performed: CRNA   Authorized by: SLOAN Edouard    Performed by: SLOAN Edouard  Patient location during procedure: OR    Indications and Patient Condition  Indications for airway management: anesthesia  Spontaneous Ventilation: absent  Sedation level: deep  Preoxygenated: yes  Patient position: sniffing  Mask difficulty assessment: 1 - vent by mask  No planned trial extubation    Final Airway Details  Final airway type: endotracheal airway      Successful airway: ETT  Cuffed: yes   Successful intubation technique: video laryngoscopy  Facilitating devices/methods: intubating stylet  Endotracheal tube insertion site: oral  Blade: Alcides  Blade size: #4  ETT size (mm): 7.0  Cormack-Lehane Classification: grade I - full view of glottis  Placement verified by: chest auscultation and capnometry   Cuff volume (mL): 7  Measured from: gums  ETT to gums (cm): 22  Number of attempts at approach: 1    Additional Comments  Dentition intact post intubation.  Hildreth video scope utilized

## 2024-03-04 NOTE — ANESTHESIA POSTPROCEDURE EVALUATION
Patient: Elsa Biggs    Procedure Summary       Date: 03/04/24 Room / Location: [facility] 08 / Virtual [facility]    Anesthesia Start: 0738 Anesthesia Stop: 0912    Procedure: Transcarotid Artery Revascularization (TCAR)*SDS* C-ARM *[facility] WITH [address] [facility] (Right) Diagnosis:       Bilateral carotid artery stenosis      (i65.23)    Surgeons: Andrew Victor DO Responsible Provider: SLOAN Edouard    Anesthesia Type: general ASA Status: 3            Anesthesia Type: general    Vitals Value Taken Time   /67 03/04/24 1101   Temp 36.4 °C (97.6 °F) 03/04/24 0906   Pulse 62 03/04/24 1103   Resp 11 03/04/24 1102   SpO2 99 % 03/04/24 1103   Vitals shown include unvalidated device data.    Anesthesia Post Evaluation    Patient location during evaluation: PACU  Patient participation: complete - patient participated  Level of consciousness: awake and alert  Pain score: 0  Pain management: adequate  Airway patency: patent  Cardiovascular status: acceptable  Respiratory status: acceptable  Hydration status: acceptable  Postoperative Nausea and Vomiting: none    No notable events documented.

## 2024-03-04 NOTE — INTERVAL H&P NOTE
H&P reviewed. The patient was examined and there are no changes to the H&P.    Scheduled for RIGHT TCAR with  3/4/24.

## 2024-03-04 NOTE — CONSULTS
Inpatient consult to Medicine  Consult performed by: Nette Herring MD  Consult ordered by: Katiana Burgso PA-C          Reason For Consult  Medical management    History Of Present Illness  Elsa Biggs is a 73 y.o. female with past medical history of paroxysmal atrial fibrillation on Coumadin, HFrEF, coronary artery disease, diabetes mellitus type 2, obesity, hypertension, hyperlipidemia, depression was admitted to the hospital late last year with acute ischemic stroke in the left thalamus territory and at that time she was found to have incidental finding of R ICA 85%.  The patient underwent Transcarotid Artery Revascularization of the right carotid.  Patient is seen postoperatively currently she is on Kamron-Synephrine drip and IV fluids due to hypotension.  The patient reports mild pain at the site of incisions otherwise denies any other specific complaints.  Denies any chest pain shortness of breath, nausea vomiting or abdominal pain.  Medicine has been consulted for medical management.     Past Medical History  She has a past medical history of Anxiety, Breast cancer (CMS/HCC), Cancer (CMS/HCC), Depression, Diabetes mellitus (CMS/HCC), GERD (gastroesophageal reflux disease), Hyperlipidemia, Hypertension, and Stroke (CMS/HCC).    Surgical History  She has a past surgical history that includes MR angio neck w and wo IV contrast (01/14/2022); Joint replacement (Left); Fracture surgery; Breast surgery; MR angio head wo IV contrast (12/26/2023); MR angio neck wo IV contrast (12/26/2023); and Cardiac catheterization (N/A, 01/29/2024).     Social History  She reports that she quit smoking about 41 years ago. Her smoking use included cigarettes. She smoked an average of 1 pack per day. She has never used smokeless tobacco. She reports that she does not drink alcohol and does not use drugs.    Family History  Family History   Problem Relation Name Age of Onset    Heart attack Father  65        blood clot    Coronary  artery disease Sister          Allergies  Tetracycline    Review of Systems  10 point review of system is otherwise negative except for was documented in HPI.     Physical Exam  General: Awake alert oriented in no acute distress  HEENT: Head is atraumatic normocephalic, EOMI, neck incisions are clean dry intact  Cardiovascular: Regular rate and rhythm S1-S2 no extra heart sounds or murmurs.  Respiratory: Clear to auscultate bilaterally no wheezing rales or rhonchi  Abdomen: Nontender nondistended bowel sounds are positive  Musculoskeletal: ROM intact. Muscle strength grossly intact upper and lower extremities 5/5.   Neurological: CNII-XII intact. Sensation grossly intact  Extremities: Warm and dry. No acute rashes and lesions  Psychiatric: Appropriate mood and affect       Last Recorded Vitals  /66   Pulse 61   Temp 36.8 °C (98.2 °F)   Resp 14   Wt 113 kg (250 lb)   SpO2 (!) 88%        Assessment/Plan     Elsa Biggs is a 73 y.o. female with past medical history of paroxysmal atrial fibrillation on Coumadin, HFrEF, coronary artery disease, diabetes mellitus type 2, obesity, hypertension, hyperlipidemia, depression, breast cancer was admitted to the hospital late last year with acute ischemic stroke in the left thalamus territory and at that time she was found to have incidental finding of R ICA 85%.  The patient underwent Transcarotid Artery Revascularization of the right carotid.  The patient was noted to be hypotensive requiring pressors  -Currently on Kamron-Synephrine.  Managed by critical care.  Wean as able.  Continue with IV fluids.  Last echo on 10/20/2023 showed LVEF of 45 to 50%.  monitor for volume overload.  Echo also showed impaired laxation pattern of the left ventricular diastolic filling.  -Continue with atorvastatin, Zetia and aspirin.  Resume metoprolol once able  -Continue with Plavix  -Coumadin has been resumed was held 5 days prior to TCAR.  Plans to transition to [facility] in future.   Monitor INR  -Continue with heparin  -Last A1c was 7.0.  Continue sliding scale insulin resume oral antidiabetics on discharge.  -A.m. labs    PT OT    Nette Herring MD

## 2024-03-04 NOTE — ANESTHESIA PREPROCEDURE EVALUATION
Patient: Elsa Biggs    Procedure Information       Date/Time: 03/04/24 0730    Procedure: Transcarotid Artery Revascularization (TCAR)*SDS* C-ARM *[facility] WITH [address] [facility] (Right) - 90 MINUTE PROCEDURE TIME    Location: [facility] 08 / [facility]    Surgeons: Andrew Victor, DO            Relevant Problems   Cardiovascular   (+) Atrial flutter (CMS/HCC)   (+) Atrial flutter, unspecified type (CMS/HCC)   (+) Bilateral carotid artery stenosis   (+) Essential (primary) hypertension   (+) Hyperlipidemia, unspecified   (+) PVC (premature ventricular contraction)   (+) Pure hyperglyceridemia      Endocrine   (+) Morbid (severe) obesity due to excess calories (CMS/HCC)   (+) Type 2 diabetes mellitus without complications (CMS/HCC)      GI   (+) Gastroesophageal reflux disease without esophagitis      Neuro/Psych   (+) Anxiety disorder, unspecified   (+) Bilateral carotid artery stenosis   (+) Depression, unspecified   (+) Major depressive disorder, recurrent, unspecified (CMS/HCC)       Clinical information reviewed:   Tobacco  Allergies  Meds  Problems  Med Hx  Surg Hx   Fam Hx  Soc   Hx        NPO Detail:  NPO/Void Status  Carbohydrate Drink Given Prior to Surgery? : N  Date of Last Liquid: 03/03/24  Time of Last Liquid: 2300  Date of Last Solid: 03/03/24  Time of Last Solid: 1700  Last Intake Type: Clear fluids  Time of Last Void: 0622         Physical Exam    Airway  Mallampati: III  TM distance: >3 FB  Neck ROM: full     Cardiovascular - normal exam  Rhythm: irregular     Dental - normal exam     Pulmonary - normal exam     Abdominal            Anesthesia Plan    History of general anesthesia?: yes  History of complications of general anesthesia?: no    ASA 3     general     The patient is not a current smoker.    intravenous induction   Anesthetic plan and risks discussed with patient.  Use of blood products discussed with patient who consented to blood products.    Plan discussed with CRNA.

## 2024-03-04 NOTE — CONSULTS
Critical Care Medicine Consult      Reason For Consult:   Status post TCAR, postoperative critical care management    History Of Present Illness  Elsa Biggs is a 73 y.o. female with past medical history of paroxsymal atrial fibrillation on Coumadin, HFrEF, coronary artery disease, diabetes mellitus type 2, obesity, hypertension, hyperlipidemia, depression, ischemic stroke in the left thalamus territory, and right ICA 85%. Patient underwent transcarotid artery revascularization of the right carotid artery with Dr. Victor. Patient seen and examined in ICU bed 2. Patient alert and oriented x4. She reports mild incisional pain but denies any other complaints. She denies any shortness of breath, chest, pain, nausea, vomiting, abdominal pain, or paresthesias. She is on phenylephrine to maintain MAP greater than 65 and systolic blood pressure greater than 120.      Past Medical History:   Diagnosis Date    Anxiety     Breast cancer (CMS/[facility])     16 tx radiation left lumpectomy 2022    Cancer (CMS/[facility])     Depression     Diabetes mellitus (CMS/[facility])     GERD (gastroesophageal reflux disease)     Hyperlipidemia     Hypertension     Stroke (CMS/[facility])      Past Surgical History:   Procedure Laterality Date    BREAST SURGERY      CARDIAC CATHETERIZATION N/A 01/29/2024    Procedure: Left Heart Cath;  Surgeon: Virgil Childress MD;  Location: [facility] Cardiac Cath Lab;  Service: Cardiovascular;  Laterality: N/A;    FRACTURE SURGERY      left femur and LLE fabrice  knee plate    JOINT REPLACEMENT Left     MR HEAD ANGIO WO IV CONTRAST  12/26/2023    MR HEAD ANGIO WO IV CONTRAST 12/26/2023 [facility] MRI    MR NECK ANGIO W AND WO IV CONTRAST  01/14/2022    MR NECK ANGIO W AND WO IV CONTRAST 1/14/2022 [facility]    MR NECK ANGIO WO IV CONTRAST  12/26/2023    MR NECK ANGIO WO IV CONTRAST 12/26/2023 [facility]     Medications Prior to Admission   Medication Sig Dispense Refill Last Dose    acetaminophen (Tylenol) 325 mg tablet Take 2 tablets  (650 mg) by mouth every 4 hours if needed for mild pain (1 - 3), moderate pain (4 - 6) or fever (temp greater than 38.0 C). 30 tablet 1 Past Week    aspirin 81 mg EC tablet Take 1 tablet (81 mg) by mouth once daily.   3/4/2024    atorvastatin (Lipitor) 80 mg tablet Take 1 tablet (80 mg) by mouth once daily at bedtime. 30 tablet 1 3/4/2024    cholecalciferol (Vitamin D-3) 25 MCG (1000 UT) tablet Take 1 tablet (25 mcg) by mouth once daily.   3/3/2024    clopidogrel (Plavix) 75 mg tablet Take 1 tablet (75 mg) by mouth once daily. 30 tablet 1 3/4/2024    ezetimibe (Zetia) 10 mg tablet Take 1 tablet (10 mg) by mouth once daily.   3/3/2024    metFORMIN (Glucophage) 500 mg tablet Take 1 tablet (500 mg) by mouth once daily in the morning. And 2 tabs In the evening   3/3/2024    metoprolol succinate XL (Toprol-XL) 25 mg 24 hr tablet Take 1 tablet (25 mg) by mouth once daily. Do not crush or chew. 90 tablet 3 3/4/2024    OneTouch Delica Plus Lancet 33 gauge misc USE TO TAKE BLOOD SUGAR 1-2 TIMES PER DAY   Past Week    PARoxetine (Paxil) 20 mg tablet Take 1 tablet (20 mg) by mouth once daily at bedtime.   3/3/2024    pioglitazone (Actos) 30 mg tablet Take 1 tablet (30 mg) by mouth once every 24 hours.   3/3/2024    warfarin (Coumadin) 2 mg tablet Take 1 tablet (2 mg) by mouth once daily in the evening. Take as directed per After Visit Summary. (Patient taking differently: Take 1 tablet (2 mg) by mouth once daily in the evening. One tablet daily except on , takes 2 tablets) 90 tablet 3 Past Week    apixaban (Eliquis) 5 mg tablet Take 1 tablet (5 mg) by mouth 2 times a day. (Patient not taking: Reported on 3/4/2024) 60 tablet 2 Not Taking     Tetracycline  Social History     Tobacco Use    Smoking status: Former     Packs/day: 1     Types: Cigarettes     Quit date:      Years since quittin.2    Smokeless tobacco: Never   Vaping Use    Vaping Use: Never used   Substance Use Topics    Alcohol use: Never      Alcohol/week: 1.0 standard drink of alcohol     Types: 1 Standard drinks or equivalent per week    Drug use: Never     Family History   Problem Relation Name Age of Onset    Heart attack Father  65        blood clot    Coronary artery disease Sister         Scheduled Medications:   acetaminophen, 650 mg, oral, q6h ALEXANDER  [START ON 3/5/2024] aspirin, 81 mg, oral, Daily  [START ON 3/5/2024] atorvastatin, 80 mg, oral, Nightly  ceFAZolin, 2 g, intravenous, q8h  [START ON 3/5/2024] clopidogrel, 75 mg, oral, Daily  docusate sodium, 100 mg, oral, BID  [START ON 3/5/2024] ezetimibe, 10 mg, oral, Daily  heparin (porcine), 7,500 Units, subcutaneous, q8h ALEXANDER  insulin lispro, 0-5 Units, subcutaneous, TID with meals  [Held by provider] metoprolol succinate XL, 25 mg, oral, Daily  PARoxetine, 20 mg, oral, Nightly  [START ON 3/5/2024] pioglitazone, 30 mg, oral, q24h  sennosides, 2 tablet, oral, Nightly  [START ON 3/5/2024] warfarin, 2 mg, oral, Daily         Continuous Medications:   lactated Ringer's, 100 mL/hr, Last Rate: 100 mL/hr (03/04/24 1350)  niCARdipine, 2.5-15 mg/hr  norepinephrine, 0.01-1 mcg/kg/min, Last Rate: 0.01 mcg/kg/min (03/04/24 1746)         PRN Medications:   PRN medications: dextrose 10 % in water (D10W), dextrose, glucagon, morphine, naloxone, ondansetron, oxygen, traMADol    Review of Systems:  Review of Systems   All other systems reviewed and are negative.       Objective   Vitals:  Most Recent:  Vitals:    03/04/24 1630   BP:    Pulse: 59   Resp: 16   Temp:    SpO2:        24hr Min/Max:  Temp  Min: 36.4 °C (97.5 °F)  Max: 36.8 °C (98.2 °F)  Pulse  Min: 53  Max: 74  BP  Min: 92/73  Max: 128/66  Resp  Min: 8  Max: 27  SpO2  Min: 85 %  Max: 100 %    LDA:   CVC 03/04/24 Triple lumen Right Femoral (Active)   Placement Date/Time: 03/04/24 (c) 6661   Hand Hygiene Performed Prior to CVC Insertion: Yes  Site Prep: Chlorhexidine   Site Prep Agent has Completely Dried Before Insertion: Yes  All 5 Sterile Barriers  Used (Gloves, Gown, Cap, Mask, Large Sterile Sujatha...   Number of days: 0       Arterial Line 03/04/24 Radial (Active)   Placement Date/Time: 03/04/24 (c) 0748   Size: 20 G  Location: Radial  Securement Method: Taped  Patient Tolerance: Tolerated well   Number of days: 0       External Urinary Catheter Female (Active)   Placement Date/Time: 03/04/24 1325   Hand Hygiene Completed: Yes  External Catheter Type: Female   Number of days: 0         Vent settings:       Hemodynamic parameters for last 24 hours:         Intake/Output Summary (Last 24 hours) at 3/4/2024 1749  Last data filed at 3/4/2024 1350  Gross per 24 hour   Intake 2443.33 ml   Output --   Net 2443.33 ml           Physical exam:    Physical Exam  Constitutional:       General: She is awake. She is not in acute distress.     Appearance: Normal appearance. She is obese.      Interventions: Nasal cannula in place.   HENT:      Head: Normocephalic.      Nose: Nose normal.      Mouth/Throat:      Mouth: Mucous membranes are moist.   Eyes:      Extraocular Movements: Extraocular movements intact.      Pupils: Pupils are equal, round, and reactive to light.   Neck:      Comments: Right sided incisional pain s/p Right TCAR   Cardiovascular:      Rate and Rhythm: Normal rate.      Pulses: Normal pulses.      Heart sounds: Normal heart sounds. No murmur heard.  Pulmonary:      Effort: Pulmonary effort is normal.      Breath sounds: Normal breath sounds.   Abdominal:      General: Bowel sounds are normal.      Palpations: Abdomen is soft.   Musculoskeletal:         General: Normal range of motion.      Right lower leg: No edema.      Left lower leg: No edema.   Skin:     General: Skin is warm and dry.      Capillary Refill: Capillary refill takes less than 2 seconds.      Findings: Rash present.      Comments: Moisture-associated skin damage to left groin   Neurological:      General: No focal deficit present.      Mental Status: She is alert and oriented to person,  place, and time. Mental status is at baseline.      GCS: GCS eye subscore is 4. GCS verbal subscore is 5. GCS motor subscore is 6.      Cranial Nerves: Cranial nerves 2-12 are intact.      Sensory: Sensation is intact.   Psychiatric:         Mood and Affect: Mood normal.         Behavior: Behavior normal.         Thought Content: Thought content normal.         Judgment: Judgment normal.          Lab/Radiology/Diagnostic Review:  Results for orders placed or performed during the hospital encounter of 03/04/24 (from the past 24 hour(s))   POCT GLUCOSE   Result Value Ref Range    POCT Glucose 128 (H) 74 - 99 mg/dL   Type and screen   Result Value Ref Range    ABO TYPE AB     Rh TYPE POS     ANTIBODY SCREEN NEG    ECG 12 lead   Result Value Ref Range    Ventricular Rate 69 BPM    Atrial Rate 69 BPM    AK Interval 229 ms    QRS Duration 92 ms    QT Interval 416 ms    QTC Calculation(Bazett) 446 ms    P Axis -2 degrees    R Axis -5 degrees    T Axis 66 degrees    QRS Count 11 beats    Q Onset 252 ms    T Offset 460 ms    QTC Fredericia 436 ms   POCT GLUCOSE   Result Value Ref Range    POCT Glucose 105 (H) 74 - 99 mg/dL   Blood Gas Arterial Full Panel   Result Value Ref Range    POCT pH, Arterial 7.37 (L) 7.38 - 7.42 pH    POCT pCO2, Arterial 44 (H) 38 - 42 mm Hg    POCT pO2, Arterial 154 (H) 85 - 95 mm Hg    POCT SO2, Arterial 100 94 - 100 %    POCT Oxy Hemoglobin, Arterial 98.4 (H) 94.0 - 98.0 %    POCT Hematocrit Calculated, Arterial 32.0 (L) 36.0 - 46.0 %    POCT Sodium, Arterial 134 (L) 136 - 145 mmol/L    POCT Potassium, Arterial 3.9 3.5 - 5.3 mmol/L    POCT Chloride, Arterial 102 98 - 107 mmol/L    POCT Ionized Calcium, Arterial 1.13 1.10 - 1.33 mmol/L    POCT Glucose, Arterial 143 (H) 74 - 99 mg/dL    POCT Lactate, Arterial 0.8 0.4 - 2.0 mmol/L    POCT Base Excess, Arterial -0.1 -2.0 - 3.0 mmol/L    POCT HCO3 Calculated, Arterial 25.4 22.0 - 26.0 mmol/L    POCT Hemoglobin, Arterial 10.8 (L) 12.0 - 16.0 g/dL     POCT Anion Gap, Arterial 11 10 - 25 mmo/L    Patient Temperature 37.0 degrees Celsius    FiO2 28 %     ECG 12 lead    Result Date: 3/4/2024  Sinus rhythm Prolonged VT interval Probable left atrial enlargement Borderline repolarization abnormality Minimal ST elevation, inferior leads Confirmed by Wilder Cassidy ([id number]) on 3/4/2024 4:31:40 PM    FL less than 1 hour    Result Date: 3/4/2024  These images are not reportable by radiology and will not be interpreted by  Radiologists.      Assessment/Plan   Principal Problem:    Carotid stenosis, right    Elsa Biggs is a 73 y.o. female with past medical history of paroxsymal atrial fibrillation on Coumadin, HFrEF, coronary artery disease, diabetes mellitus type 2, obesity, hypertension, hyperlipidemia, depression, ischemic stroke in the left thalamus territory, and right ICA 85%. Patient underwent transcarotid artery revascularization of the right carotid artery with Dr. Victor.    Status post right transcarotid artery revascularization of right carotid  - Patient underwent right TCAR today with Dr. Victor  - Continue Plavix, lipitor, aspirin, and zetia  -Monitor incision site for signs of bleeding  - Monitor CBC   -Transfuse for hemoglobin less than 7 or greater than 7 with signs of bleeding and hemodynamic instability  - Maintain MAP greater than 65 and systolic blood pressure greater than 120  - Continue IV fluids  - 500ml LR bolus  -Add Levophed  - Discontinue phenylephrine  - Start nicardipine if systolic blood pressure greater than 150.    Postoperative hypotension  - Hypotensive requiring vasopressors after right TCAR.  -No evident blood loss noted.  - Continue IV fluids  -500ml LR bolus  - Place central line for vasopressor support  - Encourage oral intake  - Discontinue phenylephrine  - Add Levophed and titrate to maintain MAP greater than 65 and systolic blood pressure greater than 120.   - Hold metoprolol succinate  - Monitor CBC    Acute respiratory  failure with hypoxia  - On 4 liters of oxygen via nasal cannula post op which is acute  - Wean oxygen to maintain SPO2 greater than 92%  - Encourage use of incentive spirometer  - Encourage cough and deep breathe.     I spent 40 minutes of cumulative critical care time with the patient excluding procedures.  Greater than 50% of that time was spent in the direct collaboration and or coordination of care of the patient.     Dragon dictation software was used to dictate this note and thus there may be minor errors in translation/transcription including garbled speech or misspellings. Please contact for clarification if needed.

## 2024-03-05 ENCOUNTER — APPOINTMENT (OUTPATIENT)
Dept: RADIOLOGY | Facility: HOSPITAL | Age: 74
DRG: 034 | End: 2024-03-05
Payer: MEDICARE

## 2024-03-05 LAB
ACT BLD: 173 SEC (ref 89–169)
ACT BLD: 201 SEC (ref 89–169)
ACT BLD: 205 SEC (ref 89–169)
ACT BLD: 333 SEC (ref 89–169)
ANION GAP BLDA CALCULATED.4IONS-SCNC: 12 MMO/L (ref 10–25)
ANION GAP SERPL CALC-SCNC: 9 MMOL/L (ref 10–20)
APPEARANCE UR: CLEAR
BASE EXCESS BLDA CALC-SCNC: -0.7 MMOL/L (ref -2–3)
BILIRUB UR STRIP.AUTO-MCNC: NEGATIVE MG/DL
BODY TEMPERATURE: 37 DEGREES CELSIUS
BUN SERPL-MCNC: 13 MG/DL (ref 6–23)
CA-I BLDA-SCNC: 1.18 MMOL/L (ref 1.1–1.33)
CALCIUM SERPL-MCNC: 7.9 MG/DL (ref 8.6–10.3)
CHLORIDE BLDA-SCNC: 103 MMOL/L (ref 98–107)
CHLORIDE SERPL-SCNC: 104 MMOL/L (ref 98–107)
CO2 SERPL-SCNC: 26 MMOL/L (ref 21–32)
COLOR UR: YELLOW
CREAT SERPL-MCNC: 0.69 MG/DL (ref 0.5–1.05)
EGFRCR SERPLBLD CKD-EPI 2021: >90 ML/MIN/1.73M*2
ERYTHROCYTE [DISTWIDTH] IN BLOOD BY AUTOMATED COUNT: 16.3 % (ref 11.5–14.5)
GLUCOSE BLD MANUAL STRIP-MCNC: 142 MG/DL (ref 74–99)
GLUCOSE BLD MANUAL STRIP-MCNC: 142 MG/DL (ref 74–99)
GLUCOSE BLD MANUAL STRIP-MCNC: 154 MG/DL (ref 74–99)
GLUCOSE BLD MANUAL STRIP-MCNC: 157 MG/DL (ref 74–99)
GLUCOSE BLDA-MCNC: 211 MG/DL (ref 74–99)
GLUCOSE SERPL-MCNC: 153 MG/DL (ref 74–99)
GLUCOSE UR STRIP.AUTO-MCNC: NEGATIVE MG/DL
HCO3 BLDA-SCNC: 24.2 MMOL/L (ref 22–26)
HCT VFR BLD AUTO: 33.4 % (ref 36–46)
HCT VFR BLD EST: 30 % (ref 36–46)
HGB BLD-MCNC: 10.4 G/DL (ref 12–16)
HGB BLDA-MCNC: 10.1 G/DL (ref 12–16)
HOLD SPECIMEN: NORMAL
INHALED O2 CONCENTRATION: 21 %
INR PPP: 2 (ref 0.9–1.1)
KETONES UR STRIP.AUTO-MCNC: NEGATIVE MG/DL
LACTATE BLDA-SCNC: 1.5 MMOL/L (ref 0.4–2)
LEUKOCYTE ESTERASE UR QL STRIP.AUTO: NEGATIVE
MAGNESIUM SERPL-MCNC: 1.51 MG/DL (ref 1.6–2.4)
MCH RBC QN AUTO: 26.9 PG (ref 26–34)
MCHC RBC AUTO-ENTMCNC: 31.1 G/DL (ref 32–36)
MCV RBC AUTO: 87 FL (ref 80–100)
MRSA DNA SPEC QL NAA+PROBE: NOT DETECTED
NITRITE UR QL STRIP.AUTO: NEGATIVE
NRBC BLD-RTO: 0 /100 WBCS (ref 0–0)
OXYHGB MFR BLDA: 94.7 % (ref 94–98)
PCO2 BLDA: 40 MM HG (ref 38–42)
PH BLDA: 7.39 PH (ref 7.38–7.42)
PH UR STRIP.AUTO: 5 [PH]
PHOSPHATE SERPL-MCNC: 3.7 MG/DL (ref 2.5–4.9)
PLATELET # BLD AUTO: 231 X10*3/UL (ref 150–450)
PO2 BLDA: 76 MM HG (ref 85–95)
POTASSIUM BLDA-SCNC: 4 MMOL/L (ref 3.5–5.3)
POTASSIUM SERPL-SCNC: 3.9 MMOL/L (ref 3.5–5.3)
PROT UR STRIP.AUTO-MCNC: NEGATIVE MG/DL
PROTHROMBIN TIME: 22.4 SECONDS (ref 9.8–12.8)
RBC # BLD AUTO: 3.86 X10*6/UL (ref 4–5.2)
RBC # UR STRIP.AUTO: ABNORMAL /UL
RBC #/AREA URNS AUTO: NORMAL /HPF
SAO2 % BLDA: 97 % (ref 94–100)
SODIUM BLDA-SCNC: 135 MMOL/L (ref 136–145)
SODIUM SERPL-SCNC: 135 MMOL/L (ref 136–145)
SP GR UR STRIP.AUTO: 1.01
SQUAMOUS #/AREA URNS AUTO: NORMAL /HPF
TSH SERPL-ACNC: 1.94 MIU/L (ref 0.44–3.98)
UROBILINOGEN UR STRIP.AUTO-MCNC: <2 MG/DL
WBC # BLD AUTO: 7.9 X10*3/UL (ref 4.4–11.3)
WBC #/AREA URNS AUTO: NORMAL /HPF

## 2024-03-05 PROCEDURE — 99232 SBSQ HOSP IP/OBS MODERATE 35: CPT | Performed by: INTERNAL MEDICINE

## 2024-03-05 PROCEDURE — 99291 CRITICAL CARE FIRST HOUR: CPT

## 2024-03-05 PROCEDURE — 2500000001 HC RX 250 WO HCPCS SELF ADMINISTERED DRUGS (ALT 637 FOR MEDICARE OP): Performed by: PHYSICIAN ASSISTANT

## 2024-03-05 PROCEDURE — 87040 BLOOD CULTURE FOR BACTERIA: CPT | Mod: PORLAB

## 2024-03-05 PROCEDURE — 2020000001 HC ICU ROOM DAILY

## 2024-03-05 PROCEDURE — 2500000001 HC RX 250 WO HCPCS SELF ADMINISTERED DRUGS (ALT 637 FOR MEDICARE OP)

## 2024-03-05 PROCEDURE — 84100 ASSAY OF PHOSPHORUS: CPT | Performed by: PHYSICIAN ASSISTANT

## 2024-03-05 PROCEDURE — 84132 ASSAY OF SERUM POTASSIUM: CPT | Performed by: INTERNAL MEDICINE

## 2024-03-05 PROCEDURE — 71045 X-RAY EXAM CHEST 1 VIEW: CPT | Performed by: RADIOLOGY

## 2024-03-05 PROCEDURE — 2500000004 HC RX 250 GENERAL PHARMACY W/ HCPCS (ALT 636 FOR OP/ED): Performed by: PHYSICIAN ASSISTANT

## 2024-03-05 PROCEDURE — 81001 URINALYSIS AUTO W/SCOPE: CPT

## 2024-03-05 PROCEDURE — 36415 COLL VENOUS BLD VENIPUNCTURE: CPT

## 2024-03-05 PROCEDURE — 71045 X-RAY EXAM CHEST 1 VIEW: CPT

## 2024-03-05 PROCEDURE — 80048 BASIC METABOLIC PNL TOTAL CA: CPT | Performed by: PHYSICIAN ASSISTANT

## 2024-03-05 PROCEDURE — 82533 TOTAL CORTISOL: CPT | Mod: PORLAB

## 2024-03-05 PROCEDURE — 37799 UNLISTED PX VASCULAR SURGERY: CPT

## 2024-03-05 PROCEDURE — 87641 MR-STAPH DNA AMP PROBE: CPT | Performed by: PHARMACIST

## 2024-03-05 PROCEDURE — 83735 ASSAY OF MAGNESIUM: CPT | Performed by: PHYSICIAN ASSISTANT

## 2024-03-05 PROCEDURE — 84145 PROCALCITONIN (PCT): CPT | Mod: PORLAB | Performed by: INTERNAL MEDICINE

## 2024-03-05 PROCEDURE — 2500000004 HC RX 250 GENERAL PHARMACY W/ HCPCS (ALT 636 FOR OP/ED): Performed by: PHARMACIST

## 2024-03-05 PROCEDURE — 94761 N-INVAS EAR/PLS OXIMETRY MLT: CPT

## 2024-03-05 PROCEDURE — 2500000002 HC RX 250 W HCPCS SELF ADMINISTERED DRUGS (ALT 637 FOR MEDICARE OP, ALT 636 FOR OP/ED): Performed by: PHYSICIAN ASSISTANT

## 2024-03-05 PROCEDURE — 84443 ASSAY THYROID STIM HORMONE: CPT

## 2024-03-05 PROCEDURE — 97165 OT EVAL LOW COMPLEX 30 MIN: CPT | Mod: GO

## 2024-03-05 PROCEDURE — 2500000004 HC RX 250 GENERAL PHARMACY W/ HCPCS (ALT 636 FOR OP/ED): Performed by: INTERNAL MEDICINE

## 2024-03-05 PROCEDURE — 87075 CULTR BACTERIA EXCEPT BLOOD: CPT | Mod: 59,PORLAB

## 2024-03-05 PROCEDURE — 85610 PROTHROMBIN TIME: CPT | Performed by: PHYSICIAN ASSISTANT

## 2024-03-05 PROCEDURE — 37799 UNLISTED PX VASCULAR SURGERY: CPT | Performed by: PHYSICIAN ASSISTANT

## 2024-03-05 PROCEDURE — 2500000005 HC RX 250 GENERAL PHARMACY W/O HCPCS

## 2024-03-05 PROCEDURE — 97161 PT EVAL LOW COMPLEX 20 MIN: CPT | Mod: GP | Performed by: PHYSICAL THERAPIST

## 2024-03-05 PROCEDURE — 2500000004 HC RX 250 GENERAL PHARMACY W/ HCPCS (ALT 636 FOR OP/ED)

## 2024-03-05 PROCEDURE — 85027 COMPLETE CBC AUTOMATED: CPT | Performed by: PHYSICIAN ASSISTANT

## 2024-03-05 PROCEDURE — 82947 ASSAY GLUCOSE BLOOD QUANT: CPT

## 2024-03-05 RX ORDER — VANCOMYCIN HYDROCHLORIDE 1 G/20ML
INJECTION, POWDER, LYOPHILIZED, FOR SOLUTION INTRAVENOUS DAILY PRN
Status: DISCONTINUED | OUTPATIENT
Start: 2024-03-05 | End: 2024-03-06

## 2024-03-05 RX ORDER — WARFARIN 2 MG/1
2 TABLET ORAL
Status: DISCONTINUED | OUTPATIENT
Start: 2024-03-06 | End: 2024-03-07 | Stop reason: HOSPADM

## 2024-03-05 RX ORDER — MIDODRINE HYDROCHLORIDE 2.5 MG/1
5 TABLET ORAL 3 TIMES DAILY
Status: DISCONTINUED | OUTPATIENT
Start: 2024-03-05 | End: 2024-03-06

## 2024-03-05 RX ORDER — LANOLIN ALCOHOL/MO/W.PET/CERES
800 CREAM (GRAM) TOPICAL ONCE
Status: COMPLETED | OUTPATIENT
Start: 2024-03-05 | End: 2024-03-05

## 2024-03-05 RX ORDER — NYSTATIN 100000 [USP'U]/G
1 POWDER TOPICAL 2 TIMES DAILY
Status: DISCONTINUED | OUTPATIENT
Start: 2024-03-05 | End: 2024-03-07 | Stop reason: HOSPADM

## 2024-03-05 RX ORDER — WARFARIN 2 MG/1
4 TABLET ORAL
Status: DISCONTINUED | OUTPATIENT
Start: 2024-03-05 | End: 2024-03-07 | Stop reason: HOSPADM

## 2024-03-05 RX ORDER — NOREPINEPHRINE BITARTRATE/D5W 8 MG/250ML
.01-1 PLASTIC BAG, INJECTION (ML) INTRAVENOUS CONTINUOUS
Status: DISCONTINUED | OUTPATIENT
Start: 2024-03-05 | End: 2024-03-07

## 2024-03-05 RX ADMIN — Medication 800 MG: at 09:20

## 2024-03-05 RX ADMIN — DOCUSATE SODIUM 100 MG: 100 CAPSULE, LIQUID FILLED ORAL at 23:59

## 2024-03-05 RX ADMIN — CLOPIDOGREL 75 MG: 75 TABLET ORAL at 08:14

## 2024-03-05 RX ADMIN — VANCOMYCIN HYDROCHLORIDE 2000 MG: 10 INJECTION, POWDER, LYOPHILIZED, FOR SOLUTION INTRAVENOUS at 13:45

## 2024-03-05 RX ADMIN — NYSTATIN 1 APPLICATION: 100000 POWDER TOPICAL at 16:30

## 2024-03-05 RX ADMIN — EZETIMIBE 10 MG: 10 TABLET ORAL at 08:14

## 2024-03-05 RX ADMIN — PAROXETINE 20 MG: 10 TABLET, FILM COATED ORAL at 02:29

## 2024-03-05 RX ADMIN — MIDODRINE HYDROCHLORIDE 5 MG: 2.5 TABLET ORAL at 23:59

## 2024-03-05 RX ADMIN — ASPIRIN 81 MG: 81 TABLET, COATED ORAL at 08:15

## 2024-03-05 RX ADMIN — PAROXETINE 20 MG: 10 TABLET, FILM COATED ORAL at 23:59

## 2024-03-05 RX ADMIN — TRAMADOL HYDROCHLORIDE 25 MG: 50 TABLET, COATED ORAL at 08:14

## 2024-03-05 RX ADMIN — PIPERACILLIN SODIUM AND TAZOBACTAM SODIUM 3.38 G: 3; .375 INJECTION, SOLUTION INTRAVENOUS at 17:44

## 2024-03-05 RX ADMIN — ATORVASTATIN CALCIUM 80 MG: 40 TABLET, FILM COATED ORAL at 23:58

## 2024-03-05 RX ADMIN — INSULIN LISPRO 1 UNITS: 100 INJECTION, SOLUTION INTRAVENOUS; SUBCUTANEOUS at 16:32

## 2024-03-05 RX ADMIN — MIDODRINE HYDROCHLORIDE 5 MG: 2.5 TABLET ORAL at 12:25

## 2024-03-05 RX ADMIN — SODIUM CHLORIDE, POTASSIUM CHLORIDE, SODIUM LACTATE AND CALCIUM CHLORIDE 500 ML: 600; 310; 30; 20 INJECTION, SOLUTION INTRAVENOUS at 08:15

## 2024-03-05 RX ADMIN — MIDODRINE HYDROCHLORIDE 5 MG: 2.5 TABLET ORAL at 16:30

## 2024-03-05 RX ADMIN — PIOGLITAZONE HYDROCHLORIDE 30 MG: 30 TABLET ORAL at 06:12

## 2024-03-05 RX ADMIN — ACETAMINOPHEN 650 MG: 325 TABLET ORAL at 12:25

## 2024-03-05 RX ADMIN — NOREPINEPHRINE BITARTRATE 0.05 MCG/KG/MIN: 8 INJECTION, SOLUTION INTRAVENOUS at 08:15

## 2024-03-05 RX ADMIN — PIPERACILLIN SODIUM AND TAZOBACTAM SODIUM 3.38 G: 3; .375 INJECTION, SOLUTION INTRAVENOUS at 23:58

## 2024-03-05 RX ADMIN — SENNOSIDES 17.2 MG: 8.6 TABLET, FILM COATED ORAL at 23:58

## 2024-03-05 RX ADMIN — HEPARIN SODIUM 7500 UNITS: 5000 INJECTION INTRAVENOUS; SUBCUTANEOUS at 06:12

## 2024-03-05 RX ADMIN — WARFARIN SODIUM 4 MG: 2 TABLET ORAL at 17:44

## 2024-03-05 RX ADMIN — ACETAMINOPHEN 650 MG: 325 TABLET ORAL at 02:29

## 2024-03-05 RX ADMIN — DOCUSATE SODIUM 100 MG: 100 CAPSULE, LIQUID FILLED ORAL at 08:14

## 2024-03-05 RX ADMIN — ACETAMINOPHEN 650 MG: 325 TABLET ORAL at 06:12

## 2024-03-05 RX ADMIN — ACETAMINOPHEN 650 MG: 325 TABLET ORAL at 23:59

## 2024-03-05 RX ADMIN — INSULIN LISPRO 1 UNITS: 100 INJECTION, SOLUTION INTRAVENOUS; SUBCUTANEOUS at 12:26

## 2024-03-05 RX ADMIN — PIPERACILLIN SODIUM AND TAZOBACTAM SODIUM 3.38 G: 3; .375 INJECTION, SOLUTION INTRAVENOUS at 12:25

## 2024-03-05 RX ADMIN — CEFAZOLIN SODIUM 2 G: 2 INJECTION, SOLUTION INTRAVENOUS at 02:29

## 2024-03-05 RX ADMIN — ACETAMINOPHEN 650 MG: 325 TABLET ORAL at 17:44

## 2024-03-05 ASSESSMENT — COGNITIVE AND FUNCTIONAL STATUS - GENERAL
TOILETING: A LOT
MOBILITY SCORE: 15
WALKING IN HOSPITAL ROOM: A LITTLE
DAILY ACTIVITIY SCORE: 15
HELP NEEDED FOR BATHING: A LOT
STANDING UP FROM CHAIR USING ARMS: A LOT
PERSONAL GROOMING: A LITTLE
TURNING FROM BACK TO SIDE WHILE IN FLAT BAD: A LITTLE
CLIMB 3 TO 5 STEPS WITH RAILING: A LOT
MOVING TO AND FROM BED TO CHAIR: A LOT
DRESSING REGULAR LOWER BODY CLOTHING: A LOT
MOVING FROM LYING ON BACK TO SITTING ON SIDE OF FLAT BED WITH BEDRAILS: A LITTLE
DRESSING REGULAR UPPER BODY CLOTHING: A LITTLE
EATING MEALS: A LITTLE

## 2024-03-05 ASSESSMENT — ACTIVITIES OF DAILY LIVING (ADL)
ASSISTIVE_DEVICE: WALKER
PATIENT'S MEMORY ADEQUATE TO SAFELY COMPLETE DAILY ACTIVITIES?: YES
HEARING - RIGHT EAR: FUNCTIONAL
DRESSING YOURSELF: INDEPENDENT
LACK_OF_TRANSPORTATION: NO
WALKS IN HOME: INDEPENDENT
BATHING_ASSISTANCE: MAXIMAL
ADEQUATE_TO_COMPLETE_ADL: YES
GROOMING: INDEPENDENT
TOILETING: INDEPENDENT
JUDGMENT_ADEQUATE_SAFELY_COMPLETE_DAILY_ACTIVITIES: YES
FEEDING YOURSELF: INDEPENDENT
BATHING: INDEPENDENT
HEARING - LEFT EAR: FUNCTIONAL

## 2024-03-05 ASSESSMENT — PAIN SCALES - GENERAL
PAINLEVEL_OUTOF10: 3
PAINLEVEL_OUTOF10: 2
PAINLEVEL_OUTOF10: 0 - NO PAIN

## 2024-03-05 ASSESSMENT — PAIN - FUNCTIONAL ASSESSMENT: PAIN_FUNCTIONAL_ASSESSMENT: 0-10

## 2024-03-05 NOTE — PROGRESS NOTES
Physical Therapy    Physical Therapy Evaluation    Patient Name: Elsa Biggs  MRN: [id number]  Today's Date: 3/5/2024   Time Calculation  Start Time: 1005  Stop Time: 1020  Time Calculation (min): 15 min    Assessment/Plan   PT Assessment  PT Assessment Results: Decreased strength, Decreased endurance, Impaired balance, Decreased mobility, Obesity  Rehab Prognosis: Good  Barriers to Discharge: none  Evaluation/Treatment Tolerance: Patient tolerated treatment well  Medical Staff Made Aware: Yes  End of Session Communication: Bedside nurse  Assessment Comment: Patient requires 1-2 assist for mobility post-op, anticipate good improvement over course of admit with increased OOB activity.  End of Session Patient Position: Up in chair, Alarm on  IP OR SWING BED PT PLAN  Inpatient or Swing Bed: Inpatient  PT Plan  Treatment/Interventions: Bed mobility, Transfer training, Gait training, Stair training, Balance training, Strengthening, Endurance training, Therapeutic exercise, Therapeutic activity, Home exercise program  PT Plan: Skilled PT  PT Frequency: 4 times per week  PT Discharge Recommendations: Low intensity level of continued care  PT - OK to Discharge: Yes (when medically cleared)      General Visit Information:  General  Reason for Referral: Dx: TCAR  Referred By: Aubrey  Past Medical History Relevant to Rehab: Obesity, HTN, depression, DM, L CVA 10/23, breast CA, L TKA.  Co-Treatment:  (with OT for safety and mobility)  Prior to Session Communication: Bedside nurse  Patient Position Received: Up in chair, Alarm on  General Comment: Seen in room 1002, tele, IV, external Fontenot, IV, art line    Home Living:  Home Living  Type of Home:  (Lives with  in 1 level home, 1 step to enter. Patient amb with rollator/FWW, fairly independent with ADLs, drives, uses lift chair.)    Prior Level of Function:       Precautions:  Precautions  Precautions Comment: falls    Vital Signs:  Vital Signs  BP:  (some noted decreased  BP during activity systolic in 90's via art line, improved to >/=104 sitting back in chair after amb)  Objective     Pain:  Pain Assessment  Pain Assessment:  (no significant complaints)    Cognition:  Cognition  Overall Cognitive Status: Within Functional Limits    General Assessments:      Activity Tolerance  Endurance: Tolerates 10 - 20 min exercise with multiple rests     Strength  Strength Comments: JEFFREY LE quads grossly 4/5        Postural Control  Postural Control:  (sitting balance fair/fair+; standing balance fair/fair- initially with AD and assist, improved to fair over course of eval)          Functional Assessments:        Transfers  Transfer:  (sit to stand with Min assist x 2 from chair; needed increased time to fully stand up right and assist to regain balance after transfer; cues for sequencing, safety, AD use, posture, balance, wt shifting)  Ambulation/Gait Training  Ambulation/Gait Training Performed:  (Amb forward and retro in room twice for total of 16 feet (distance limited due to lines) with Min assist x 1-2 and FWW; cues for sequencing, safety, AD use, posture, balance, wt shifting)          Extremity/Trunk Assessments:                Outcome Measures:  [facility] Basic Mobility  Turning from your back to your side while in a flat bed without using bedrails: A little  Moving from lying on your back to sitting on the side of a flat bed without using bedrails: A little  Moving to and from bed to chair (including a wheelchair): A lot  Standing up from a chair using your arms (e.g. wheelchair or bedside chair): A lot  To walk in hospital room: A little  Climbing 3-5 steps with railing: A lot  Basic Mobility - Total Score: 15                            Goals:  Encounter Problems       Encounter Problems (Active)       PT Problem       transfers       Start:  03/05/24    Expected End:  03/19/24       Patient will perform all transfers with SBA x1          gait       Start:  03/05/24    Expected End:   03/19/24       Patient will amb 50+ feet with SBA x1          strengthening       Start:  03/05/24    Expected End:  03/19/24       Patient will perform 20+ reps of AROM/RROM for JEFFREY LE's to improve safety and functional independence              Pain - Adult            Education Documentation  Precautions, taught by Briseyda Paiz, PT at 3/5/2024 10:58 AM.  Learner: Patient  Readiness: Acceptance  Method: Explanation  Response: Verbalizes Understanding    Mobility Training, taught by Briseyda Paiz, PT at 3/5/2024 10:58 AM.  Learner: Patient  Readiness: Acceptance  Method: Explanation  Response: Verbalizes Understanding    Education Comments  No comments found.

## 2024-03-05 NOTE — PROGRESS NOTES
Occupational Therapy    Evaluation    Patient Name: Elsa Biggs  MRN: [id number]  Today's Date: 3/5/2024  Time Calculation  Start Time: 1004  Stop Time: 1017  Time Calculation (min): 13 min    Assessment  IP OT Assessment  OT Assessment: OT eval completed. The patient is functioning below baseline for ADLs and mobility. can benefit from continued OT  End of Session Communication: Bedside nurse  End of Session Patient Position: Up in chair, Alarm on    Plan:  Treatment Interventions: ADL retraining, Functional transfer training, UE strengthening/ROM, Endurance training, Patient/family training, Neuromuscular reeducation  OT Frequency: 4 times per week  OT Discharge Recommendations: Low intensity level of continued care  OT - OK to Discharge: Yes To next level of care, with consideration of recommendations established on OT eval, and once cleared by medical team/physician for DC.     Subjective     Current Problem:  1. Carotid stenosis, right        2. Bilateral carotid artery stenosis            General:  General  Reason for Referral: ADLs, safety assessment  Referred By: Aubrey  Past Medical History Relevant to Rehab: Obesity, HTN, depression, DM, L CVA 10/23, breast CA, L TKA.  Co-Treatment: PT  Co-Treatment Reason: to optimize safety and mobility, while focusing on discipline specific goals  Prior to Session Communication: Bedside nurse  Patient Position Received: Up in chair, Alarm on  General Comment: pt alert and agreeable to therapy    Precautions:  Medical Precautions: Fall precautions      Pain:  Pain Assessment  Pain Assessment: 0-10  Pain Score: 0 - No pain    Objective     Cognition:  Overall Cognitive Status: Within Functional Limits  Orientation Level: Oriented X4             Home Living:  Type of Home:  (Lives with  in 1 level home, 1 step to enter. Patient amb with rollator/FWW, fairly independent with ADLs, drives, uses lift chair.)       ADL:  Eating Assistance: Stand by (anticipated)  Grooming  Assistance: Minimal (anticipated)  Bathing Assistance: Maximal (anticipated)  UE Dressing Assistance: Minimal (anticipated)  LE Dressing Assistance: Maximal (anticipated)  Toileting Assistance with Device: Maximal (anticipated)      Bed Mobility/Transfers:   Bed Mobility  Bed Mobility:  (not performed. pt out of bed and remained up at end of session)  Transfers  Transfer: Yes  Transfer 1  Transfer From 1: Sit to  Transfer to 1: Stand  Technique 1: Stand to sit  Transfer Device 1: Walker  Transfer Level of Assistance 1: Minimum assistance, +2, Minimal verbal cues  Trials/Comments 1: The patient performed functional mobility forward, back a few feet beside the chair. min A x2 FWW for functional mobility.        Sitting Balance:  Static Sitting Balance  Static Sitting-Balance Support: Feet supported  Static Sitting-Level of Assistance: Distant supervision  Dynamic Sitting Balance  Dynamic Sitting-Balance Support: Feet supported  Dynamic Sitting-Comments: supervision    Standing Balance:  Static Standing Balance  Static Standing-Balance Support: Bilateral upper extremity supported  Static Standing-Level of Assistance: Moderate assistance, Minimum assistance  Dynamic Standing Balance  Dynamic Standing-Balance Support: Bilateral upper extremity supported  Dynamic Standing-Comments: mod to min A    Vision: Vision - Basic Assessment  Current Vision:  (wear glasses for reading)   and      Sensation:  Sensation Comment: no deficits reported    Strength:  Strength Comments: BUE grossly 4-/5      Coordination:  Movements are Fluid and Coordinated: Yes     Hand Function:  Hand Function  Gross Grasp: Functional  Coordination: Functional    Extremities: WFL  and WFL     Outcome Measures: [facility] Daily Activity  Putting on and taking off regular lower body clothing: A lot  Bathing (including washing, rinsing, drying): A lot  Putting on and taking off regular upper body clothing: A little  Toileting, which includes using toilet,  bedpan or urinal: A lot  Taking care of personal grooming such as brushing teeth: A little  Eating Meals: A little  Daily Activity - Total Score: 15                    EDUCATION:     Education Documentation  Body Mechanics, taught by Delfina Negro OT at 3/5/2024  1:06 PM.  Learner: Patient  Readiness: Acceptance  Method: Explanation  Response: Needs Reinforcement    ADL Training, taught by Delfina Negro OT at 3/5/2024  1:06 PM.  Learner: Patient  Readiness: Acceptance  Method: Explanation  Response: Needs Reinforcement    Education Comments  No comments found.        Goals:   Encounter Problems       Encounter Problems (Active)       ADLs       Patient with complete lower body dressing with set-up and stand by assist level of assistance donning and doffing all LE clothes  with PRN adaptive equipment while supported sitting and standing (Progressing)       Start:  03/05/24    Expected End:  03/19/24               BALANCE       Pt will maintain dynamic standing balance x8 minutes during ADL task with supervision level of assistance in order to demonstrate decreased risk of falling and improved postural control. (Progressing)       Start:  03/05/24    Expected End:  03/19/24               TRANSFERS       Patient will complete functional transfers and functional mobility with front wheeled walker with modified independent level of assistance. (Progressing)       Start:  03/05/24    Expected End:  03/19/24

## 2024-03-05 NOTE — CARE PLAN
Problem: Skin  Goal: Participates in plan/prevention/treatment measures  3/4/2024 1940 by Lea Razo RN  Flowsheets (Taken 3/4/2024 1940)  Participates in plan/prevention/treatment measures: Elevate heels  Note: Participates in q2 turns.    The patient's goals for the shift include      The clinical goals for the shift include Patient will maintain map >65 throughout shift while on pressor    Patient required increase pressor requirement. Central line placed to switch from juan carlos to levophed.

## 2024-03-05 NOTE — PROGRESS NOTES
Critical Care Daily Progress        Subjective   Patient is a 73 y.o. female admitted on 3/4/2024  6:05 AM with the following indication(s) for ICU care: status post TCAR, postoperative critical care management.     Interval History: Elsa Biggs is a 73 y.o. female with past medical history of paroxsymal atrial fibrillation on Coumadin, HFrEF, coronary artery disease, diabetes mellitus type 2, obesity, hypertension, hyperlipidemia, depression, ischemic stroke in the left thalamus territory, and right ICA 85%. Patient underwent transcarotid artery revascularization of the right carotid artery with Dr. Victor.     3/5/24: Patient alert and oriented, lying in bed. Discussed with bedside nurse and no acute events overnight. She continues to require vasopressor support with norepinephrine.  Norepinephrine at 0.05mcg this morning. Will change vasopressor parameters to maintain MAP greater than 60. Patient denies having any pain, shortness of breath, dizziness, nausea, or vomiting.     Complaints: has none..     Scheduled Medications:   acetaminophen, 650 mg, oral, q6h ALEXANDER  aspirin, 81 mg, oral, Daily  atorvastatin, 80 mg, oral, Nightly  clopidogrel, 75 mg, oral, Daily  docusate sodium, 100 mg, oral, BID  ezetimibe, 10 mg, oral, Daily  insulin lispro, 0-5 Units, subcutaneous, TID with meals  [Held by provider] metoprolol succinate XL, 25 mg, oral, Daily  midodrine, 5 mg, oral, TID  nystatin, 1 Application, Topical, BID  PARoxetine, 20 mg, oral, Nightly  pioglitazone, 30 mg, oral, q24h  piperacillin-tazobactam, 3.375 g, intravenous, q6h  sennosides, 2 tablet, oral, Nightly  [START ON 3/6/2024] vancomycin, 1,500 mg, intravenous, q24h  vancomycin, 20 mg/kg, intravenous, Once  [START ON 3/6/2024] warfarin, 2 mg, oral, Once per day on Sun Mon Wed Thu Fri Sat  warfarin, 4 mg, oral, Once per day on Tue         Continuous Medications:   niCARdipine, 2.5-15 mg/hr  norepinephrine, 0.01-1 mcg/kg/min, Last Rate: 0.05 mcg/kg/min  (03/05/24 0815)      PRN Medications:   PRN medications: dextrose 10 % in water (D10W), dextrose, glucagon, morphine, naloxone, ondansetron, oxygen, traMADol, vancomycin    Objective   Vitals:  Most Recent:  Vitals:    03/05/24 1130   Pulse: 56   Resp: 10   Temp:    SpO2: 90%       24hr Min/Max:  Temp  Min: 36.8 °C (98.2 °F)  Max: 36.9 °C (98.4 °F)  Pulse  Min: 50  Max: 65  Resp  Min: 7  Max: 40  SpO2  Min: 79 %  Max: 99 %    LDA:  CVC 03/04/24 Triple lumen Right Femoral (Active)   Placement Date/Time: 03/04/24 (c) 1729   Hand Hygiene Performed Prior to CVC Insertion: Yes  Site Prep: Chlorhexidine   Site Prep Agent has Completely Dried Before Insertion: Yes  All 5 Sterile Barriers Used (Gloves, Gown, Cap, Mask, Large Sterile Sujatha...   Number of days: 0       Arterial Line 03/04/24 Radial (Active)   Placement Date/Time: 03/04/24 (c) 0784   Size: 20 G  Location: Radial  Securement Method: Taped  Patient Tolerance: Tolerated well   Number of days: 1       External Urinary Catheter Female (Active)   Placement Date/Time: 03/04/24 1325   Hand Hygiene Completed: Yes  External Catheter Type: Female   Number of days: 0         Vent settings:  FiO2 (%):  [21 %] 21 %    Hemodynamic parameters for last 24 hours:       I/O:    Intake/Output Summary (Last 24 hours) at 3/5/2024 1249  Last data filed at 3/5/2024 0656  Gross per 24 hour   Intake 1998.29 ml   Output 100 ml   Net 1898.29 ml       Physical Exam:   Physical Exam  Constitutional:       General: She is not in acute distress.     Appearance: Normal appearance. She is obese.   HENT:      Head: Normocephalic.      Nose: Nose normal.      Mouth/Throat:      Mouth: Mucous membranes are moist.   Eyes:      Extraocular Movements: Extraocular movements intact.      Pupils: Pupils are equal, round, and reactive to light.   Cardiovascular:      Rate and Rhythm: Normal rate and regular rhythm.      Pulses: Normal pulses.      Heart sounds: Normal heart sounds. No murmur heard.      Comments: 1+ pitting edema to bilateral hips  Pulmonary:      Effort: Pulmonary effort is normal. No respiratory distress.      Breath sounds: Normal breath sounds.   Abdominal:      General: Bowel sounds are normal.      Palpations: Abdomen is soft.      Tenderness: There is no abdominal tenderness.   Musculoskeletal:         General: Normal range of motion.      Cervical back: Normal range of motion.      Right lower le+ Pitting Edema present.      Left lower le+ Pitting Edema present.   Skin:     General: Skin is warm and dry.      Capillary Refill: Capillary refill takes less than 2 seconds.      Findings: Erythema present.      Comments: Intertrigo to abdominal pannus and groin   Neurological:      General: No focal deficit present.      Mental Status: She is alert and oriented to person, place, and time. Mental status is at baseline.      Cranial Nerves: Cranial nerves 2-12 are intact.      Sensory: Sensation is intact.   Psychiatric:         Mood and Affect: Mood normal.         Behavior: Behavior normal.         Thought Content: Thought content normal.          Lab/Radiology/Diagnostic Review:  Results for orders placed or performed during the hospital encounter of 24 (from the past 24 hour(s))   Blood Gas Arterial Full Panel   Result Value Ref Range    POCT pH, Arterial 7.37 (L) 7.38 - 7.42 pH    POCT pCO2, Arterial 44 (H) 38 - 42 mm Hg    POCT pO2, Arterial 154 (H) 85 - 95 mm Hg    POCT SO2, Arterial 100 94 - 100 %    POCT Oxy Hemoglobin, Arterial 98.4 (H) 94.0 - 98.0 %    POCT Hematocrit Calculated, Arterial 32.0 (L) 36.0 - 46.0 %    POCT Sodium, Arterial 134 (L) 136 - 145 mmol/L    POCT Potassium, Arterial 3.9 3.5 - 5.3 mmol/L    POCT Chloride, Arterial 102 98 - 107 mmol/L    POCT Ionized Calcium, Arterial 1.13 1.10 - 1.33 mmol/L    POCT Glucose, Arterial 143 (H) 74 - 99 mg/dL    POCT Lactate, Arterial 0.8 0.4 - 2.0 mmol/L    POCT Base Excess, Arterial -0.1 -2.0 - 3.0 mmol/L    POCT HCO3  Calculated, Arterial 25.4 22.0 - 26.0 mmol/L    POCT Hemoglobin, Arterial 10.8 (L) 12.0 - 16.0 g/dL    POCT Anion Gap, Arterial 11 10 - 25 mmo/L    Patient Temperature 37.0 degrees Celsius    FiO2 28 %   POCT GLUCOSE   Result Value Ref Range    POCT Glucose 91 74 - 99 mg/dL   CBC   Result Value Ref Range    WBC 8.7 4.4 - 11.3 x10*3/uL    nRBC 0.0 0.0 - 0.0 /100 WBCs    RBC 3.83 (L) 4.00 - 5.20 x10*6/uL    Hemoglobin 10.5 (L) 12.0 - 16.0 g/dL    Hematocrit 33.0 (L) 36.0 - 46.0 %    MCV 86 80 - 100 fL    MCH 27.4 26.0 - 34.0 pg    MCHC 31.8 (L) 32.0 - 36.0 g/dL    RDW 16.4 (H) 11.5 - 14.5 %    Platelets 244 150 - 450 x10*3/uL   Basic Metabolic Panel   Result Value Ref Range    Glucose 153 (H) 74 - 99 mg/dL    Sodium 135 (L) 136 - 145 mmol/L    Potassium 3.9 3.5 - 5.3 mmol/L    Chloride 104 98 - 107 mmol/L    Bicarbonate 26 21 - 32 mmol/L    Anion Gap 9 (L) 10 - 20 mmol/L    Urea Nitrogen 13 6 - 23 mg/dL    Creatinine 0.69 0.50 - 1.05 mg/dL    eGFR >90 >60 mL/min/1.73m*2    Calcium 7.9 (L) 8.6 - 10.3 mg/dL   CBC   Result Value Ref Range    WBC 7.9 4.4 - 11.3 x10*3/uL    nRBC 0.0 0.0 - 0.0 /100 WBCs    RBC 3.86 (L) 4.00 - 5.20 x10*6/uL    Hemoglobin 10.4 (L) 12.0 - 16.0 g/dL    Hematocrit 33.4 (L) 36.0 - 46.0 %    MCV 87 80 - 100 fL    MCH 26.9 26.0 - 34.0 pg    MCHC 31.1 (L) 32.0 - 36.0 g/dL    RDW 16.3 (H) 11.5 - 14.5 %    Platelets 231 150 - 450 x10*3/uL   Magnesium   Result Value Ref Range    Magnesium 1.51 (L) 1.60 - 2.40 mg/dL   Phosphorus   Result Value Ref Range    Phosphorus 3.7 2.5 - 4.9 mg/dL   Protime-INR   Result Value Ref Range    Protime 22.4 (H) 9.8 - 12.8 seconds    INR 2.0 (H) 0.9 - 1.1   POCT GLUCOSE   Result Value Ref Range    POCT Glucose 142 (H) 74 - 99 mg/dL   Blood Gas Arterial Full Panel   Result Value Ref Range    POCT pH, Arterial 7.39 7.38 - 7.42 pH    POCT pCO2, Arterial 40 38 - 42 mm Hg    POCT pO2, Arterial 76 (L) 85 - 95 mm Hg    POCT SO2, Arterial 97 94 - 100 %    POCT Oxy Hemoglobin,  Arterial 94.7 94.0 - 98.0 %    POCT Hematocrit Calculated, Arterial 30.0 (L) 36.0 - 46.0 %    POCT Sodium, Arterial 135 (L) 136 - 145 mmol/L    POCT Potassium, Arterial 4.0 3.5 - 5.3 mmol/L    POCT Chloride, Arterial 103 98 - 107 mmol/L    POCT Ionized Calcium, Arterial 1.18 1.10 - 1.33 mmol/L    POCT Glucose, Arterial 211 (H) 74 - 99 mg/dL    POCT Lactate, Arterial 1.5 0.4 - 2.0 mmol/L    POCT Base Excess, Arterial -0.7 -2.0 - 3.0 mmol/L    POCT HCO3 Calculated, Arterial 24.2 22.0 - 26.0 mmol/L    POCT Hemoglobin, Arterial 10.1 (L) 12.0 - 16.0 g/dL    POCT Anion Gap, Arterial 12 10 - 25 mmo/L    Patient Temperature 37.0 degrees Celsius    FiO2 21 %   POCT GLUCOSE   Result Value Ref Range    POCT Glucose 154 (H) 74 - 99 mg/dL   Urinalysis with Reflex Culture and Microscopic   Result Value Ref Range    Color, Urine Yellow Straw, Yellow    Appearance, Urine Clear Clear    Specific Gravity, Urine 1.013 1.005 - 1.035    pH, Urine 5.0 5.0, 5.5, 6.0, 6.5, 7.0, 7.5, 8.0    Protein, Urine NEGATIVE NEGATIVE mg/dL    Glucose, Urine NEGATIVE NEGATIVE mg/dL    Blood, Urine SMALL (1+) (A) NEGATIVE    Ketones, Urine NEGATIVE NEGATIVE mg/dL    Bilirubin, Urine NEGATIVE NEGATIVE    Urobilinogen, Urine <2.0 <2.0 mg/dL    Nitrite, Urine NEGATIVE NEGATIVE    Leukocyte Esterase, Urine NEGATIVE NEGATIVE   Urinalysis Microscopic   Result Value Ref Range    WBC, Urine NONE 1-5, NONE /HPF    RBC, Urine NONE NONE, 1-2, 3-5 /HPF    Squamous Epithelial Cells, Urine 1-9 (SPARSE) Reference range not established. /HPF     ECG 12 lead    Result Date: 3/4/2024  Sinus rhythm Prolonged NE interval Probable left atrial enlargement Borderline repolarization abnormality Minimal ST elevation, inferior leads Confirmed by Wilder Cassidy ([id number]) on 3/4/2024 4:31:40 PM    FL less than 1 hour    Result Date: 3/4/2024  These images are not reportable by radiology and will not be interpreted by  Radiologists.        Assessment/Plan   Principal Problem:     Carotid stenosis, right    Elsa Biggs is a 73 y.o. female with past medical history of paroxsymal atrial fibrillation on Coumadin, HFrEF, coronary artery disease, diabetes mellitus type 2, obesity, hypertension, hyperlipidemia, depression, ischemic stroke in the left thalamus territory, and right ICA 85%. Patient underwent transcarotid artery revascularization of the right carotid artery with Dr. Victor.     Shock after TCAR  - Hypotensive requiring vasopressors after right TCAR.  -No evident blood loss noted.  - Central line placed 3/4/24 due to requiring vasopressor support  -Patient continues to be hypotensive requiring vasopressor support with Norepinephrine despite fluid resuscitation  - No clear source of infection  -No fevers and WBC count 7.9  - LR bolus 500ml  - Encourage oral intake  - Continue to hold metoprolol succinate  - Monitor CBC  - Urinalysis with reflex microscopic and culture  - Blood culture x1  -MRSA PCR  - Add Zosyn x7 days and Vancomycin  -Pharmacy to dose vancomycin  - Continue Levophed and titrate to maintain MAP greater than 60  - Add Midodrine 5mg TID  -Check TSH and cortisol     2. Intertrigo   - Erythema and moisture-associated skin damage noted to abdominal pannus and left groin  - Add nystatin powder   - Cleanse abdominal pannus and groin with soap and water, pat dry, and apply nystatin powder twice daily    Acute respiratory failure with hypoxia- resolved  - On 4 liters of oxygen via nasal cannula post op which is acute  - Wean oxygen to maintain SPO2 greater than 92%  - Encourage use of incentive spirometer  - Encourage cough and deep breathe.   3/5/24: Patient is on room air and no longer requiring oxygen. May utilize oxygen PRN to maintain SPO2 greater than 92%.    4. Status post right transcarotid artery revascularization of right carotid  - Patient underwent right TCAR today with Dr. Victor  - Continue Plavix, lipitor, aspirin, and zetia  -Monitor incision site for signs of  bleeding  - Monitor CBC   -Transfuse for hemoglobin less than 7 or greater than 7 with signs of bleeding and hemodynamic instability  - Maintain MAP greater than 65 and systolic blood pressure greater than 120  - Continue IV fluids  - 500ml LR bolus  -Add Levophed  - Discontinue phenylephrine  - Start nicardipine if systolic blood pressure greater than 150.  3/5/24: Surgical incision covered with surgical dressing. No signs of bleeding noted. Will discontinue nicardipine due to nonuse and continued need for norepinephrine. Hemoglobin 10.4. Discontinue IV fluids.       I spent 45 minutes of cumulative critical care time with the patient.  Greater than 50% of that time was spent in the direct collaboration and or coordination of care of the patient.     Dragon dictation software was used to dictate this note and thus there may be minor errors in translation/transcription including garbled speech or misspellings. Please contact for clarification if needed.

## 2024-03-05 NOTE — PROGRESS NOTES
"Vancomycin Dosing by Pharmacy- INITIAL    Elsa Biggs is a 73 y.o. year old female who Pharmacy has been consulted for vancomycin dosing for pneumonia. Based on the patient's indication and renal status this patient will be dosed based on a goal AUC of 400-600.     Renal function is currently stable.    Visit Vitals  /66   Pulse 52   Temp 36.9 °C (98.4 °F) (Temporal)   Resp 21        Lab Results   Component Value Date    CREATININE 0.69 03/05/2024    CREATININE 0.69 02/22/2024    CREATININE 0.58 01/24/2024    CREATININE 0.53 12/26/2023        Patient weight is No results found for: \"PTWEIGHT\"    No results found for: \"CULTURE\"     I/O last 3 completed shifts:  In: 4098.3 (36.2 mL/kg) [P.O.:100; I.V.:3798.3 (33.5 mL/kg); IV Piggyback:200]  Out: 100 (0.9 mL/kg) [Urine:100 (0 mL/kg/hr)]  Weight: 113.3 kg   [unfilled]    Lab Results   Component Value Date    PATIENTTEMP 37.0 03/05/2024    PATIENTTEMP 37.0 03/04/2024    PATIENTTEMP 37.0 12/24/2023          Assessment/Plan     Patient will be given a loading dose of 2000 mg.  Will initiate vancomycin maintenance,  1500 mg every 24 hours.    This dosing regimen is predicted by [employer] to result in the following pharmacokinetic parameters:    Loading dose: 2000 mg at 12:00 03/05/2024.  Regimen: 1500 mg IV every 24 hours.  Start time: 12:00 on 03/06/2024  Exposure target: AUC24 (range)400-600 mg/L.hr   AUC24,ss: 463 mg/L.hr  Probability of AUC24 > 400: 61 %  Ctrough,ss: 10.5 mg/L  Probability of Ctrough,ss > 20: 24 %  Probability of nephrotoxicity (Lodise DEANNA 2009): 6 %      Follow-up level will be ordered on 3/7 at 0500 unless clinically indicated sooner.  Will continue to monitor renal function daily while on vancomycin and order serum creatinine at least every 48 hours if not already ordered.  Follow for continued vancomycin needs, clinical response, and signs/symptoms of toxicity.       Edie Burks, PharmD       "

## 2024-03-05 NOTE — PROGRESS NOTES
"Elsa Biggs is a 73 y.o. female on day 1 of admission presenting with Carotid stenosis, right.    Subjective    Overall, she is doing well post op. Incision site on right neck has a gauze dressing in place. Groin incision is dry and intact. She is up in a chair, eating without difficulty. BP remains low, she is on a BP gtt.      Objective   10 point review of system is otherwise negative except for was documented in HPI.     Physical Exam  General: Awake alert oriented in no acute distress  HEENT: Head is atraumatic normocephalic, EOMI, neck incisions are clean dry intact  Cardiovascular: Regular rate and rhythm S1-S2 no extra heart sounds or murmurs.  Respiratory: Clear to auscultate bilaterally no wheezing rales or rhonchi  Abdomen: Nontender nondistended bowel sounds are positive  Musculoskeletal: ROM intact. Muscle strength grossly intact upper and lower extremities 5/5.   Neurological: CNII-XII intact. Sensation grossly intact  Extremities: Warm and dry. No acute rashes and lesions  Psychiatric: Appropriate mood and affec  Last Recorded Vitals  Blood pressure 128/66, pulse (!) 47, temperature 36.9 °C (98.4 °F), temperature source Temporal, resp. rate 11, height 1.6 m (5' 3\"), weight 113 kg (249 lb 12.5 oz), SpO2 95 %.  Intake/Output last 3 Shifts:  I/O last 3 completed shifts:  In: 4098.3 (36.2 mL/kg) [P.O.:100; I.V.:3798.3 (33.5 mL/kg); IV Piggyback:200]  Out: 100 (0.9 mL/kg) [Urine:100 (0 mL/kg/hr)]  Weight: 113.3 kg     Relevant Results  Scheduled medications  acetaminophen, 650 mg, oral, q6h ALEXANDER  aspirin, 81 mg, oral, Daily  atorvastatin, 80 mg, oral, Nightly  clopidogrel, 75 mg, oral, Daily  docusate sodium, 100 mg, oral, BID  ezetimibe, 10 mg, oral, Daily  insulin lispro, 0-5 Units, subcutaneous, TID with meals  [Held by provider] metoprolol succinate XL, 25 mg, oral, Daily  midodrine, 5 mg, oral, TID  nystatin, 1 Application, Topical, BID  PARoxetine, 20 mg, oral, Nightly  pioglitazone, 30 mg, oral, " q24h  piperacillin-tazobactam, 3.375 g, intravenous, q6h  sennosides, 2 tablet, oral, Nightly  [START ON 3/6/2024] vancomycin, 1,500 mg, intravenous, q24h  vancomycin, 20 mg/kg, intravenous, Once  [START ON 3/6/2024] warfarin, 2 mg, oral, Once per day on Sun Mon Wed Thu Fri Sat  warfarin, 4 mg, oral, Once per day on Tue      Continuous medications  norepinephrine, 0.01-1 mcg/kg/min, Last Rate: 0.04 mcg/kg/min (03/05/24 1346)      PRN medications  PRN medications: dextrose 10 % in water (D10W), dextrose, glucagon, naloxone, ondansetron, oxygen, traMADol, vancomycin     Results for orders placed or performed during the hospital encounter of 03/04/24 (from the past 24 hour(s))   POCT GLUCOSE   Result Value Ref Range    POCT Glucose 91 74 - 99 mg/dL   CBC   Result Value Ref Range    WBC 8.7 4.4 - 11.3 x10*3/uL    nRBC 0.0 0.0 - 0.0 /100 WBCs    RBC 3.83 (L) 4.00 - 5.20 x10*6/uL    Hemoglobin 10.5 (L) 12.0 - 16.0 g/dL    Hematocrit 33.0 (L) 36.0 - 46.0 %    MCV 86 80 - 100 fL    MCH 27.4 26.0 - 34.0 pg    MCHC 31.8 (L) 32.0 - 36.0 g/dL    RDW 16.4 (H) 11.5 - 14.5 %    Platelets 244 150 - 450 x10*3/uL   Basic Metabolic Panel   Result Value Ref Range    Glucose 153 (H) 74 - 99 mg/dL    Sodium 135 (L) 136 - 145 mmol/L    Potassium 3.9 3.5 - 5.3 mmol/L    Chloride 104 98 - 107 mmol/L    Bicarbonate 26 21 - 32 mmol/L    Anion Gap 9 (L) 10 - 20 mmol/L    Urea Nitrogen 13 6 - 23 mg/dL    Creatinine 0.69 0.50 - 1.05 mg/dL    eGFR >90 >60 mL/min/1.73m*2    Calcium 7.9 (L) 8.6 - 10.3 mg/dL   CBC   Result Value Ref Range    WBC 7.9 4.4 - 11.3 x10*3/uL    nRBC 0.0 0.0 - 0.0 /100 WBCs    RBC 3.86 (L) 4.00 - 5.20 x10*6/uL    Hemoglobin 10.4 (L) 12.0 - 16.0 g/dL    Hematocrit 33.4 (L) 36.0 - 46.0 %    MCV 87 80 - 100 fL    MCH 26.9 26.0 - 34.0 pg    MCHC 31.1 (L) 32.0 - 36.0 g/dL    RDW 16.3 (H) 11.5 - 14.5 %    Platelets 231 150 - 450 x10*3/uL   Magnesium   Result Value Ref Range    Magnesium 1.51 (L) 1.60 - 2.40 mg/dL   Phosphorus    Result Value Ref Range    Phosphorus 3.7 2.5 - 4.9 mg/dL   Protime-INR   Result Value Ref Range    Protime 22.4 (H) 9.8 - 12.8 seconds    INR 2.0 (H) 0.9 - 1.1   TSH   Result Value Ref Range    Thyroid Stimulating Hormone 1.94 0.44 - 3.98 mIU/L   POCT GLUCOSE   Result Value Ref Range    POCT Glucose 142 (H) 74 - 99 mg/dL   Blood Gas Arterial Full Panel   Result Value Ref Range    POCT pH, Arterial 7.39 7.38 - 7.42 pH    POCT pCO2, Arterial 40 38 - 42 mm Hg    POCT pO2, Arterial 76 (L) 85 - 95 mm Hg    POCT SO2, Arterial 97 94 - 100 %    POCT Oxy Hemoglobin, Arterial 94.7 94.0 - 98.0 %    POCT Hematocrit Calculated, Arterial 30.0 (L) 36.0 - 46.0 %    POCT Sodium, Arterial 135 (L) 136 - 145 mmol/L    POCT Potassium, Arterial 4.0 3.5 - 5.3 mmol/L    POCT Chloride, Arterial 103 98 - 107 mmol/L    POCT Ionized Calcium, Arterial 1.18 1.10 - 1.33 mmol/L    POCT Glucose, Arterial 211 (H) 74 - 99 mg/dL    POCT Lactate, Arterial 1.5 0.4 - 2.0 mmol/L    POCT Base Excess, Arterial -0.7 -2.0 - 3.0 mmol/L    POCT HCO3 Calculated, Arterial 24.2 22.0 - 26.0 mmol/L    POCT Hemoglobin, Arterial 10.1 (L) 12.0 - 16.0 g/dL    POCT Anion Gap, Arterial 12 10 - 25 mmo/L    Patient Temperature 37.0 degrees Celsius    FiO2 21 %   POCT GLUCOSE   Result Value Ref Range    POCT Glucose 154 (H) 74 - 99 mg/dL   MRSA Surveillance for Vancomycin De-escalation, PCR    Specimen: Anterior Nares; Swab   Result Value Ref Range    MRSA PCR Not Detected Not Detected   Urinalysis with Reflex Culture and Microscopic   Result Value Ref Range    Color, Urine Yellow Straw, Yellow    Appearance, Urine Clear Clear    Specific Gravity, Urine 1.013 1.005 - 1.035    pH, Urine 5.0 5.0, 5.5, 6.0, 6.5, 7.0, 7.5, 8.0    Protein, Urine NEGATIVE NEGATIVE mg/dL    Glucose, Urine NEGATIVE NEGATIVE mg/dL    Blood, Urine SMALL (1+) (A) NEGATIVE    Ketones, Urine NEGATIVE NEGATIVE mg/dL    Bilirubin, Urine NEGATIVE NEGATIVE    Urobilinogen, Urine <2.0 <2.0 mg/dL    Nitrite,  Urine NEGATIVE NEGATIVE    Leukocyte Esterase, Urine NEGATIVE NEGATIVE   Urinalysis Microscopic   Result Value Ref Range    WBC, Urine NONE 1-5, NONE /HPF    RBC, Urine NONE NONE, 1-2, 3-5 /HPF    Squamous Epithelial Cells, Urine 1-9 (SPARSE) Reference range not established. /HPF                       Assessment/Plan   73 year old female with a past medical history of  HLD, HTN, diabetes mellitus, obesity and stroke that presents to the hospital for an elective TCAR on the right per Dr. Victor on 3/4/24.    Plan:  Pt up and out of bed- in chair- doing well  Incision site appears dry and intact on right sided neck and groin incision. Has a gauze dressing in place  Wean down bp gtt  Possible discharge within the next 24 hours       I spent 30 minutes in the professional and overall care of this patient.      Yudi Shore, APRN-CNP

## 2024-03-05 NOTE — PROGRESS NOTES
Elsa Biggs is a 73 y.o. female on day 1 of admission presenting with Carotid stenosis, right.      Subjective   The patient is seen and evaluated this afternoon.  The patient is laying in bed has some nausea.  The patient was started on IV antibiotics.  Patient denies any vomiting.  She has been up and about and denies any other specific complaints at this time.  Remains on Levophed.       Objective     Last Recorded Vitals  /66   Pulse (!) 47   Temp 36.9 °C (98.4 °F) (Temporal)   Resp 21   Wt 113 kg (249 lb 12.5 oz)   SpO2 96%   Intake/Output last 3 Shifts:    Intake/Output Summary (Last 24 hours) at 3/5/2024 1629  Last data filed at 3/5/2024 0656  Gross per 24 hour   Intake 1654.96 ml   Output 100 ml   Net 1554.96 ml       Admission Weight  Weight: 113 kg (250 lb) (03/04/24 0631)    Daily Weight  03/05/24 : 113 kg (249 lb 12.5 oz)    Image Results  ECG 12 lead  Sinus rhythm  Prolonged CA interval  Probable left atrial enlargement  Borderline repolarization abnormality  Minimal ST elevation, inferior leads    Confirmed by Wilder Cassidy ([id number]) on 3/4/2024 4:31:40 PM  FL less than 1 hour  These images are not reportable by radiology and will not be interpreted   by  Radiologists.      Physical Exam  Constitutional:       Appearance: She is obese.   HENT:      Head: Normocephalic and atraumatic.      Nose: Nose normal.      Mouth/Throat:      Mouth: Mucous membranes are dry.   Eyes:      Extraocular Movements: Extraocular movements intact.      Conjunctiva/sclera: Conjunctivae normal.   Cardiovascular:      Rate and Rhythm: Normal rate and regular rhythm.      Pulses: Normal pulses.      Heart sounds: Normal heart sounds.   Pulmonary:      Effort: Pulmonary effort is normal.      Breath sounds: Normal breath sounds.   Abdominal:      General: Bowel sounds are normal.      Palpations: Abdomen is soft.   Musculoskeletal:         General: Normal range of motion.      Cervical back: Normal range of  motion and neck supple.   Skin:     General: Skin is warm and dry.      Comments: Neck incision clean dry intact.     Neurological:      General: No focal deficit present.      Mental Status: She is alert and oriented to person, place, and time. Mental status is at baseline.   Psychiatric:         Mood and Affect: Mood normal.         Relevant Results               Assessment/Plan   This patient currently has cardiac telemetry ordered; if you would like to modify or discontinue the telemetry order, click here to go to the orders activity to modify/discontinue the order.      Elsa Biggs is a 73 y.o. female with past medical history of paroxysmal atrial fibrillation on Coumadin, HFrEF, coronary artery disease, diabetes mellitus type 2, obesity, hypertension, hyperlipidemia, depression, breast cancer was admitted to the hospital late last year with acute ischemic stroke in the left thalamus territory and at that time she was found to have incidental finding of R ICA 85%.  The patient underwent Transcarotid Artery Revascularization of the right carotid.  The patient was noted to be hypotensive requiring pressors         Principal Problem:    Carotid stenosis, right    Hypotension after DEEP  -Patient is status post TCAR on 3/4/2024.  -Currently requiring Levophed.  -Hypotension after CS is associated with adverse neurologic and cardiac events as well as with prolonged length of stay in hospital mortality.  -The patient is afebrile no leukocytosis.  Appreciate critical care management patient be started on vancomycin and Zosyn.  UA no evidence of UTI.  Blood cultures pending at this time.  -Checking TSH and cortisol.  -Checking chest x-ray and procalcitonin    Acute respiratory failure with hypoxia now resolved  -Patient now on room air.    Paroxysmal atrial fibrillation  -Continue with Coumadin.  Patient with therapeutic INR at this time.  Plans for transition to Eliquis in outpatient setting.  -Resume rate control  medications when able.    Diastolic congestive heart failure  -She does not appear volume overloaded at this time.  -Last echo on 10/20/2023 showed LVEF of 45 to 50%. monitor for volume overload. Echo also showed impaired relaxation pattern of the left ventricular diastolic filling.     Coronary artery disease  Recent CVA  -Currently on triple therapy continue with aspirin and statin Plavix and statin at this time.      Diabetes mellitus type 2  -Last A1c was 7.0.  Continue sign scale insulin and oral antidiabetics at this time.    Continue with ICU level care.  Per surgery anticipate discharge soon.              Nette Herring MD

## 2024-03-06 ENCOUNTER — APPOINTMENT (OUTPATIENT)
Dept: CARDIOLOGY | Facility: HOSPITAL | Age: 74
DRG: 034 | End: 2024-03-06
Payer: MEDICARE

## 2024-03-06 LAB
ANION GAP BLDV CALCULATED.4IONS-SCNC: 5 MMOL/L (ref 10–25)
ANION GAP SERPL CALC-SCNC: 8 MMOL/L (ref 10–20)
AORTIC VALVE MEAN GRADIENT: 6 MMHG
AORTIC VALVE PEAK VELOCITY: 1.55 M/S
AV PEAK GRADIENT: 9.6 MMHG
AVA (PEAK VEL): 1.86 CM2
AVA (VTI): 1.9 CM2
BASE EXCESS BLDV CALC-SCNC: 4.2 MMOL/L (ref -2–3)
BODY TEMPERATURE: 37 DEGREES CELSIUS
BUN SERPL-MCNC: 12 MG/DL (ref 6–23)
CA-I BLDV-SCNC: 1.2 MMOL/L (ref 1.1–1.33)
CALCIUM SERPL-MCNC: 7.9 MG/DL (ref 8.6–10.3)
CARDIAC TROPONIN I PNL SERPL HS: 7 NG/L (ref 0–13)
CARDIAC TROPONIN I PNL SERPL HS: 7 NG/L (ref 0–13)
CHLORIDE BLDV-SCNC: 105 MMOL/L (ref 98–107)
CHLORIDE SERPL-SCNC: 106 MMOL/L (ref 98–107)
CO2 SERPL-SCNC: 27 MMOL/L (ref 21–32)
CORTIS SERPL-MCNC: 17.8 UG/DL (ref 2.5–20)
CREAT SERPL-MCNC: 0.63 MG/DL (ref 0.5–1.05)
EGFRCR SERPLBLD CKD-EPI 2021: >90 ML/MIN/1.73M*2
EJECTION FRACTION APICAL 4 CHAMBER: 66.1
EJECTION FRACTION: 62 %
ERYTHROCYTE [DISTWIDTH] IN BLOOD BY AUTOMATED COUNT: 16.3 % (ref 11.5–14.5)
GLUCOSE BLD MANUAL STRIP-MCNC: 119 MG/DL (ref 74–99)
GLUCOSE BLD MANUAL STRIP-MCNC: 120 MG/DL (ref 74–99)
GLUCOSE BLD MANUAL STRIP-MCNC: 125 MG/DL (ref 74–99)
GLUCOSE BLD MANUAL STRIP-MCNC: 129 MG/DL (ref 74–99)
GLUCOSE BLDV-MCNC: 122 MG/DL (ref 74–99)
GLUCOSE SERPL-MCNC: 124 MG/DL (ref 74–99)
HCO3 BLDV-SCNC: 29.7 MMOL/L (ref 22–26)
HCT VFR BLD AUTO: 29.3 % (ref 36–46)
HCT VFR BLD EST: 30 % (ref 36–46)
HGB BLD-MCNC: 8.8 G/DL (ref 12–16)
HGB BLDV-MCNC: 9.9 G/DL (ref 12–16)
INHALED O2 CONCENTRATION: 21 %
INR PPP: 2.1 (ref 0.9–1.1)
LACTATE BLDV-SCNC: 1.1 MMOL/L (ref 0.4–2)
LEFT ATRIUM VOLUME AREA LENGTH INDEX BSA: 38.3 ML/M2
LEFT VENTRICULAR OUTFLOW TRACT DIAMETER: 2 CM
MAGNESIUM SERPL-MCNC: 1.59 MG/DL (ref 1.6–2.4)
MCH RBC QN AUTO: 26 PG (ref 26–34)
MCHC RBC AUTO-ENTMCNC: 30 G/DL (ref 32–36)
MCV RBC AUTO: 86 FL (ref 80–100)
MITRAL VALVE E/A RATIO: 1.27
MITRAL VALVE E/E' RATIO: 14.53
NRBC BLD-RTO: 0 /100 WBCS (ref 0–0)
OXYHGB MFR BLDV: 65.2 % (ref 45–75)
PCO2 BLDV: 48 MM HG (ref 41–51)
PH BLDV: 7.4 PH (ref 7.33–7.43)
PLATELET # BLD AUTO: 185 X10*3/UL (ref 150–450)
PO2 BLDV: 41 MM HG (ref 35–45)
POTASSIUM BLDV-SCNC: 4.3 MMOL/L (ref 3.5–5.3)
POTASSIUM SERPL-SCNC: 4.1 MMOL/L (ref 3.5–5.3)
PROCALCITONIN SERPL-MCNC: <0.02 NG/ML
PROTHROMBIN TIME: 23.4 SECONDS (ref 9.8–12.8)
RBC # BLD AUTO: 3.39 X10*6/UL (ref 4–5.2)
RIGHT VENTRICLE FREE WALL PEAK S': 14 CM/S
SAO2 % BLDV: 67 % (ref 45–75)
SODIUM BLDV-SCNC: 135 MMOL/L (ref 136–145)
SODIUM SERPL-SCNC: 137 MMOL/L (ref 136–145)
TRICUSPID ANNULAR PLANE SYSTOLIC EXCURSION: 2.3 CM
WBC # BLD AUTO: 6.4 X10*3/UL (ref 4.4–11.3)

## 2024-03-06 PROCEDURE — 83735 ASSAY OF MAGNESIUM: CPT | Performed by: INTERNAL MEDICINE

## 2024-03-06 PROCEDURE — 37799 UNLISTED PX VASCULAR SURGERY: CPT | Performed by: INTERNAL MEDICINE

## 2024-03-06 PROCEDURE — 93005 ELECTROCARDIOGRAM TRACING: CPT

## 2024-03-06 PROCEDURE — 93306 TTE W/DOPPLER COMPLETE: CPT

## 2024-03-06 PROCEDURE — 2500000004 HC RX 250 GENERAL PHARMACY W/ HCPCS (ALT 636 FOR OP/ED): Performed by: PHARMACIST

## 2024-03-06 PROCEDURE — 2500000002 HC RX 250 W HCPCS SELF ADMINISTERED DRUGS (ALT 637 FOR MEDICARE OP, ALT 636 FOR OP/ED): Performed by: PHYSICIAN ASSISTANT

## 2024-03-06 PROCEDURE — 2500000001 HC RX 250 WO HCPCS SELF ADMINISTERED DRUGS (ALT 637 FOR MEDICARE OP)

## 2024-03-06 PROCEDURE — 99232 SBSQ HOSP IP/OBS MODERATE 35: CPT | Performed by: INTERNAL MEDICINE

## 2024-03-06 PROCEDURE — 93306 TTE W/DOPPLER COMPLETE: CPT | Performed by: INTERNAL MEDICINE

## 2024-03-06 PROCEDURE — 84484 ASSAY OF TROPONIN QUANT: CPT | Performed by: INTERNAL MEDICINE

## 2024-03-06 PROCEDURE — 97535 SELF CARE MNGMENT TRAINING: CPT | Mod: GO,CO

## 2024-03-06 PROCEDURE — 85027 COMPLETE CBC AUTOMATED: CPT | Performed by: INTERNAL MEDICINE

## 2024-03-06 PROCEDURE — 97110 THERAPEUTIC EXERCISES: CPT | Mod: GP,CQ

## 2024-03-06 PROCEDURE — 82947 ASSAY GLUCOSE BLOOD QUANT: CPT

## 2024-03-06 PROCEDURE — 99291 CRITICAL CARE FIRST HOUR: CPT | Performed by: INTERNAL MEDICINE

## 2024-03-06 PROCEDURE — 97530 THERAPEUTIC ACTIVITIES: CPT | Mod: GO,CO

## 2024-03-06 PROCEDURE — 2020000001 HC ICU ROOM DAILY

## 2024-03-06 PROCEDURE — 2500000004 HC RX 250 GENERAL PHARMACY W/ HCPCS (ALT 636 FOR OP/ED)

## 2024-03-06 PROCEDURE — 85610 PROTHROMBIN TIME: CPT | Performed by: PHARMACIST

## 2024-03-06 PROCEDURE — 97116 GAIT TRAINING THERAPY: CPT | Mod: GP,CQ

## 2024-03-06 PROCEDURE — 84132 ASSAY OF SERUM POTASSIUM: CPT | Performed by: INTERNAL MEDICINE

## 2024-03-06 PROCEDURE — 99291 CRITICAL CARE FIRST HOUR: CPT

## 2024-03-06 PROCEDURE — 2500000001 HC RX 250 WO HCPCS SELF ADMINISTERED DRUGS (ALT 637 FOR MEDICARE OP): Performed by: PHYSICIAN ASSISTANT

## 2024-03-06 PROCEDURE — 82374 ASSAY BLOOD CARBON DIOXIDE: CPT | Performed by: INTERNAL MEDICINE

## 2024-03-06 PROCEDURE — 99233 SBSQ HOSP IP/OBS HIGH 50: CPT | Performed by: INTERNAL MEDICINE

## 2024-03-06 RX ORDER — MIDODRINE HYDROCHLORIDE 10 MG/1
10 TABLET ORAL 3 TIMES DAILY
Status: DISCONTINUED | OUTPATIENT
Start: 2024-03-06 | End: 2024-03-07 | Stop reason: HOSPADM

## 2024-03-06 RX ORDER — MIDODRINE HYDROCHLORIDE 2.5 MG/1
5 TABLET ORAL ONCE
Status: COMPLETED | OUTPATIENT
Start: 2024-03-06 | End: 2024-03-06

## 2024-03-06 RX ORDER — MAGNESIUM SULFATE HEPTAHYDRATE 40 MG/ML
2 INJECTION, SOLUTION INTRAVENOUS ONCE
Status: COMPLETED | OUTPATIENT
Start: 2024-03-06 | End: 2024-03-06

## 2024-03-06 RX ADMIN — EZETIMIBE 10 MG: 10 TABLET ORAL at 08:37

## 2024-03-06 RX ADMIN — PAROXETINE 20 MG: 10 TABLET, FILM COATED ORAL at 20:53

## 2024-03-06 RX ADMIN — PIPERACILLIN SODIUM AND TAZOBACTAM SODIUM 3.38 G: 3; .375 INJECTION, SOLUTION INTRAVENOUS at 10:56

## 2024-03-06 RX ADMIN — ACETAMINOPHEN 650 MG: 325 TABLET ORAL at 05:24

## 2024-03-06 RX ADMIN — DOCUSATE SODIUM 100 MG: 100 CAPSULE, LIQUID FILLED ORAL at 08:37

## 2024-03-06 RX ADMIN — MIDODRINE HYDROCHLORIDE 5 MG: 2.5 TABLET ORAL at 09:47

## 2024-03-06 RX ADMIN — NYSTATIN 1 APPLICATION: 100000 POWDER TOPICAL at 09:00

## 2024-03-06 RX ADMIN — HUMAN ALBUMIN MICROSPHERES AND PERFLUTREN 3 ML: 10; .22 INJECTION, SOLUTION INTRAVENOUS at 15:28

## 2024-03-06 RX ADMIN — MIDODRINE HYDROCHLORIDE 10 MG: 10 TABLET ORAL at 20:53

## 2024-03-06 RX ADMIN — MAGNESIUM SULFATE HEPTAHYDRATE 2 G: 40 INJECTION, SOLUTION INTRAVENOUS at 10:56

## 2024-03-06 RX ADMIN — MIDODRINE HYDROCHLORIDE 10 MG: 10 TABLET ORAL at 15:28

## 2024-03-06 RX ADMIN — ATORVASTATIN CALCIUM 80 MG: 40 TABLET, FILM COATED ORAL at 20:53

## 2024-03-06 RX ADMIN — PIPERACILLIN SODIUM AND TAZOBACTAM SODIUM 3.38 G: 3; .375 INJECTION, SOLUTION INTRAVENOUS at 05:22

## 2024-03-06 RX ADMIN — ASPIRIN 81 MG: 81 TABLET, COATED ORAL at 08:37

## 2024-03-06 RX ADMIN — MIDODRINE HYDROCHLORIDE 5 MG: 2.5 TABLET ORAL at 08:37

## 2024-03-06 RX ADMIN — DOCUSATE SODIUM 100 MG: 100 CAPSULE, LIQUID FILLED ORAL at 20:54

## 2024-03-06 RX ADMIN — CLOPIDOGREL 75 MG: 75 TABLET ORAL at 08:37

## 2024-03-06 RX ADMIN — SENNOSIDES 17.2 MG: 8.6 TABLET, FILM COATED ORAL at 20:54

## 2024-03-06 RX ADMIN — WARFARIN SODIUM 2 MG: 2 TABLET ORAL at 17:46

## 2024-03-06 RX ADMIN — PIOGLITAZONE HYDROCHLORIDE 30 MG: 30 TABLET ORAL at 05:24

## 2024-03-06 RX ADMIN — PIPERACILLIN SODIUM AND TAZOBACTAM SODIUM 3.38 G: 3; .375 INJECTION, SOLUTION INTRAVENOUS at 17:46

## 2024-03-06 ASSESSMENT — ACTIVITIES OF DAILY LIVING (ADL): HOME_MANAGEMENT_TIME_ENTRY: 12

## 2024-03-06 ASSESSMENT — COGNITIVE AND FUNCTIONAL STATUS - GENERAL
MOBILITY SCORE: 17
WALKING IN HOSPITAL ROOM: A LITTLE
CLIMB 3 TO 5 STEPS WITH RAILING: TOTAL
TURNING FROM BACK TO SIDE WHILE IN FLAT BAD: A LITTLE
EATING MEALS: A LITTLE
HELP NEEDED FOR BATHING: A LOT
DAILY ACTIVITIY SCORE: 13
HELP NEEDED FOR BATHING: A LITTLE
TOILETING: A LOT
MOVING FROM LYING ON BACK TO SITTING ON SIDE OF FLAT BED WITH BEDRAILS: A LITTLE
MOVING TO AND FROM BED TO CHAIR: A LITTLE
DAILY ACTIVITIY SCORE: 19
DRESSING REGULAR UPPER BODY CLOTHING: A LITTLE
MOBILITY SCORE: 16
PERSONAL GROOMING: A LOT
STANDING UP FROM CHAIR USING ARMS: A LITTLE
DRESSING REGULAR LOWER BODY CLOTHING: A LITTLE
DRESSING REGULAR UPPER BODY CLOTHING: A LOT
DRESSING REGULAR LOWER BODY CLOTHING: A LOT
TOILETING: A LITTLE
STANDING UP FROM CHAIR USING ARMS: A LITTLE
PERSONAL GROOMING: A LITTLE
WALKING IN HOSPITAL ROOM: A LITTLE
CLIMB 3 TO 5 STEPS WITH RAILING: A LOT
MOVING TO AND FROM BED TO CHAIR: A LITTLE
TURNING FROM BACK TO SIDE WHILE IN FLAT BAD: A LITTLE
MOVING FROM LYING ON BACK TO SITTING ON SIDE OF FLAT BED WITH BEDRAILS: A LITTLE

## 2024-03-06 ASSESSMENT — PAIN SCALES - GENERAL
PAINLEVEL_OUTOF10: 0 - NO PAIN

## 2024-03-06 ASSESSMENT — PAIN - FUNCTIONAL ASSESSMENT
PAIN_FUNCTIONAL_ASSESSMENT: 0-10

## 2024-03-06 NOTE — PROGRESS NOTES
Critical Care Daily Progress        Subjective   Patient is a 73 y.o. female admitted on 3/4/2024  6:05 AM with the following indication(s) for ICU care: status post TCAR, postoperative critical care management.     Interval History: Elsa Biggs is a 73 y.o. female with past medical history of paroxsymal atrial fibrillation on Coumadin, HFrEF, coronary artery disease, diabetes mellitus type 2, obesity, hypertension, hyperlipidemia, depression, ischemic stroke in the left thalamus territory, and right ICA 85%. Patient underwent transcarotid artery revascularization of the right carotid artery with Dr. Victro.     3/5/24: Patient alert and oriented, lying in bed. Discussed with bedside nurse and no acute events overnight. She continues to require vasopressor support with norepinephrine.  Norepinephrine at 0.05mcg this morning. Will change vasopressor parameters to maintain MAP greater than 60. Patient denies having any pain, shortness of breath, dizziness, nausea, or vomiting.     3/6/24: Patient lying in bed, alert and oriented. Discussed with bedside nurse and no acute events overnight. Norepinephrine was weaned off around 0530. Patient wanting to go home but is ok with staying in the hospital due to hypotension and bradycardia. Will increase Midodrine to 10mg due to ongoing hypotension and goal of remaining off of norepinephrine.     Complaints: has none..     Scheduled Medications:   acetaminophen, 650 mg, oral, q6h ALEXANDER  aspirin, 81 mg, oral, Daily  atorvastatin, 80 mg, oral, Nightly  clopidogrel, 75 mg, oral, Daily  docusate sodium, 100 mg, oral, BID  ezetimibe, 10 mg, oral, Daily  insulin lispro, 0-5 Units, subcutaneous, TID with meals  magnesium sulfate, 2 g, intravenous, Once  [Held by provider] metoprolol succinate XL, 25 mg, oral, Daily  midodrine, 10 mg, oral, TID  nystatin, 1 Application, Topical, BID  PARoxetine, 20 mg, oral, Nightly  pioglitazone, 30 mg, oral, q24h  piperacillin-tazobactam, 3.375 g,  intravenous, q6h  sennosides, 2 tablet, oral, Nightly  warfarin, 2 mg, oral, Once per day on Sun Mon Wed Thu Fri Sat  warfarin, 4 mg, oral, Once per day on Tue         Continuous Medications:   norepinephrine, 0.01-1 mcg/kg/min, Last Rate: Stopped (03/06/24 0530)      PRN Medications:   PRN medications: dextrose 10 % in water (D10W), dextrose, glucagon, naloxone, ondansetron, oxygen, traMADol    Objective   Vitals:  Most Recent:  Vitals:    03/06/24 1130   BP: (!) 114/49   Pulse: 52   Resp: 23   Temp:    SpO2: 92%       24hr Min/Max:  Temp  Min: 36.6 °C (97.9 °F)  Max: 36.8 °C (98.2 °F)  Pulse  Min: 46  Max: 61  BP  Min: 61/45  Max: 132/55  Resp  Min: 6  Max: 27  SpO2  Min: 87 %  Max: 99 %    LDA:  CVC 03/04/24 Triple lumen Right Femoral (Active)   Placement Date/Time: 03/04/24 (c) 1729   Hand Hygiene Performed Prior to CVC Insertion: Yes  Site Prep: Chlorhexidine   Site Prep Agent has Completely Dried Before Insertion: Yes  All 5 Sterile Barriers Used (Gloves, Gown, Cap, Mask, Large Sterile Sujatha...   Number of days: 0       Arterial Line 03/04/24 Radial (Active)   Placement Date/Time: 03/04/24 (c) 5652   Size: 20 G  Location: Radial  Securement Method: Taped  Patient Tolerance: Tolerated well   Number of days: 1       External Urinary Catheter Female (Active)   Placement Date/Time: 03/04/24 1325   Hand Hygiene Completed: Yes  External Catheter Type: Female   Number of days: 0         Vent settings:       Hemodynamic parameters for last 24 hours:       I/O:    Intake/Output Summary (Last 24 hours) at 3/6/2024 1159  Last data filed at 3/6/2024 0726  Gross per 24 hour   Intake 479.5 ml   Output 1100 ml   Net -620.5 ml         Physical Exam:   Physical Exam  Constitutional:       General: She is not in acute distress.     Appearance: Normal appearance. She is obese.   HENT:      Head: Normocephalic.      Nose: Nose normal.      Mouth/Throat:      Mouth: Mucous membranes are moist.   Eyes:      Extraocular Movements:  Extraocular movements intact.      Pupils: Pupils are equal, round, and reactive to light.   Cardiovascular:      Rate and Rhythm: Regular rhythm. Bradycardia present.      Pulses: Normal pulses.      Heart sounds: Normal heart sounds. No murmur heard.     Comments: 1+ pitting edema to bilateral hips  Pulmonary:      Effort: Pulmonary effort is normal. No respiratory distress.      Breath sounds: Normal breath sounds.   Abdominal:      General: Bowel sounds are normal.      Palpations: Abdomen is soft.      Tenderness: There is no abdominal tenderness.   Musculoskeletal:         General: Normal range of motion.      Cervical back: Normal range of motion.      Right lower le+ Pitting Edema present.      Left lower le+ Pitting Edema present.   Skin:     General: Skin is warm and dry.      Capillary Refill: Capillary refill takes less than 2 seconds.      Findings: Erythema present.      Comments: Intertrigo to abdominal pannus and groin  TCAR incision and small amount of bruising to right neck    Neurological:      General: No focal deficit present.      Mental Status: She is alert and oriented to person, place, and time. Mental status is at baseline.      Cranial Nerves: Cranial nerves 2-12 are intact.      Sensory: Sensation is intact.   Psychiatric:         Mood and Affect: Mood normal.         Behavior: Behavior normal.         Thought Content: Thought content normal.          Lab/Radiology/Diagnostic Review:  Results for orders placed or performed during the hospital encounter of 24 (from the past 24 hour(s))   POCT GLUCOSE   Result Value Ref Range    POCT Glucose 157 (H) 74 - 99 mg/dL   Cortisol   Result Value Ref Range    Cortisol 17.8 2.5 - 20.0 ug/dL   Procalcitonin   Result Value Ref Range    Procalcitonin <0.02 <=0.07 ng/mL   POCT GLUCOSE   Result Value Ref Range    POCT Glucose 142 (H) 74 - 99 mg/dL   CBC   Result Value Ref Range    WBC 6.4 4.4 - 11.3 x10*3/uL    nRBC 0.0 0.0 - 0.0 /100 WBCs     RBC 3.39 (L) 4.00 - 5.20 x10*6/uL    Hemoglobin 8.8 (L) 12.0 - 16.0 g/dL    Hematocrit 29.3 (L) 36.0 - 46.0 %    MCV 86 80 - 100 fL    MCH 26.0 26.0 - 34.0 pg    MCHC 30.0 (L) 32.0 - 36.0 g/dL    RDW 16.3 (H) 11.5 - 14.5 %    Platelets 185 150 - 450 x10*3/uL   Basic Metabolic Panel   Result Value Ref Range    Glucose 124 (H) 74 - 99 mg/dL    Sodium 137 136 - 145 mmol/L    Potassium 4.1 3.5 - 5.3 mmol/L    Chloride 106 98 - 107 mmol/L    Bicarbonate 27 21 - 32 mmol/L    Anion Gap 8 (L) 10 - 20 mmol/L    Urea Nitrogen 12 6 - 23 mg/dL    Creatinine 0.63 0.50 - 1.05 mg/dL    eGFR >90 >60 mL/min/1.73m*2    Calcium 7.9 (L) 8.6 - 10.3 mg/dL   Magnesium   Result Value Ref Range    Magnesium 1.59 (L) 1.60 - 2.40 mg/dL   POCT GLUCOSE   Result Value Ref Range    POCT Glucose 125 (H) 74 - 99 mg/dL   Blood Gas Venous Full Panel   Result Value Ref Range    POCT pH, Venous 7.40 7.33 - 7.43 pH    POCT pCO2, Venous 48 41 - 51 mm Hg    POCT pO2, Venous 41 35 - 45 mm Hg    POCT SO2, Venous 67 45 - 75 %    POCT Oxy Hemoglobin, Venous 65.2 45.0 - 75.0 %    POCT Hematocrit Calculated, Venous 30.0 (L) 36.0 - 46.0 %    POCT Sodium, Venous 135 (L) 136 - 145 mmol/L    POCT Potassium, Venous 4.3 3.5 - 5.3 mmol/L    POCT Chloride, Venous 105 98 - 107 mmol/L    POCT Ionized Calicum, Venous 1.20 1.10 - 1.33 mmol/L    POCT Glucose, Venous 122 (H) 74 - 99 mg/dL    POCT Lactate, Venous 1.1 0.4 - 2.0 mmol/L    POCT Base Excess, Venous 4.2 (H) -2.0 - 3.0 mmol/L    POCT HCO3 Calculated, Venous 29.7 (H) 22.0 - 26.0 mmol/L    POCT Hemoglobin, Venous 9.9 (L) 12.0 - 16.0 g/dL    POCT Anion Gap, Venous 5.0 (L) 10.0 - 25.0 mmol/L    Patient Temperature 37.0 degrees Celsius    FiO2 21 %   Troponin I, High Sensitivity   Result Value Ref Range    Troponin I, High Sensitivity 7 0 - 13 ng/L   Protime-INR   Result Value Ref Range    Protime 23.4 (H) 9.8 - 12.8 seconds    INR 2.1 (H) 0.9 - 1.1     ECG 12 lead    Result Date: 3/4/2024  Sinus rhythm Prolonged CO  interval Probable left atrial enlargement Borderline repolarization abnormality Minimal ST elevation, inferior leads Confirmed by Wilder Cassidy ([id number]) on 3/4/2024 4:31:40 PM    FL less than 1 hour    Result Date: 3/4/2024  These images are not reportable by radiology and will not be interpreted by  Radiologists.        Assessment/Plan   Principal Problem:    Carotid stenosis, right    Elsa Biggs is a 73 y.o. female with past medical history of paroxsymal atrial fibrillation on Coumadin, HFrEF, coronary artery disease, diabetes mellitus type 2, obesity, hypertension, hyperlipidemia, depression, ischemic stroke in the left thalamus territory, and right ICA 85%. Patient underwent transcarotid artery revascularization of the right carotid artery with Dr. Victor.     Hypotension vs shock after TCAR  - Hypotensive requiring vasopressors after right TCAR.  -No evident blood loss noted.  - Central line placed 3/4/24 due to requiring vasopressor support  -Patient continues to be hypotensive requiring vasopressor support with Norepinephrine despite fluid resuscitation  - No clear source of infection  -No fevers and WBC count 7.9  - LR bolus 500ml  - Encourage oral intake  - Continue to hold metoprolol succinate  - Monitor CBC  - Urinalysis with reflex microscopic and culture  - Blood culture x1  -MRSA PCR  - Add Zosyn x7 days and Vancomycin  -Pharmacy to dose vancomycin  - Continue Levophed and titrate to maintain MAP greater than 60  - Add Midodrine 5mg TID  -Check TSH and cortisol  3/6/24: Weaned off of norepinephrine this morning at 0530. Hypotensive this morning with blood pressure of 98/39. Will increase Midodrine to 10mg TID. Cardiology consulted, input appreciated. TSH 1.94. Cortisol 17.8. MRSA negative and blood cultures negative to date. Will discontinue Vancomycin. Check venous blood gas full panel     2. Bradycardia  - Patient noted with heart rate in 40s-50s. She is asymptomatic at rest and with  movement.  - TSH- 1.94  -Cortisol- 17.8  -EKG revealed no acute ischemic abnormalities  - Troponin 7  - Consulted cardiology due to bradycardia and ongoing hypotension, input appreciated.  - Treat with atropine if develops symptomatic bradycardia.  -Continue telemetry monitoring  - Continue to hold metoprolol due to bradycardia    Hypomagnesemia  - Magnesium level 1.59 this morning  - Received 800mg of oral magnesium on 3/5 for magnesium level of 1.51.  - Replace magnesium with 2 grams of IV magnesium  - Follow magnesium level.    History of CVA and paroxysmal atrial fibrillation  - Continue coumadin with plans to transition to [facility] as outpatient.   - Currently sinus bradycardia with 1st degree block  - Continue to hold metoprolol due to bradycardia and ongoing hypotension  - Continue aspirin, plavix, and statin  -Follow INR daily for coumadin dosing.    Intertrigo  - Erythema and moisture-associated skin damage noted to abdominal pannus and left groin  - Add nystatin powder   - Cleanse abdominal pannus and groin with soap and water, pat dry, and apply nystatin powder twice daily    Acute respiratory failure with hypoxia- resolved  - On 4 liters of oxygen via nasal cannula post op which is acute  - Wean oxygen to maintain SPO2 greater than 92%  - Encourage use of incentive spirometer  - Encourage cough and deep breathe.   3/5/24: Patient is on room air and no longer requiring oxygen. May utilize oxygen PRN to maintain SPO2 greater than 92%.    7. Status post right transcarotid artery revascularization of right carotid  - Patient underwent right TCAR today with Dr. Victor  - Continue Plavix, lipitor, aspirin, and zetia  -Monitor incision site for signs of bleeding  - Monitor CBC   -Transfuse for hemoglobin less than 7 or greater than 7 with signs of bleeding and hemodynamic instability  - Maintain MAP greater than 65 and systolic blood pressure greater than 120  - Continue IV fluids  - 500ml LR bolus  -Add  Levophed  - Discontinue phenylephrine  - Start nicardipine if systolic blood pressure greater than 150.  3/5/24: Surgical incision covered with surgical dressing. No signs of bleeding noted. Will discontinue nicardipine due to nonuse and continued need for norepinephrine. Hemoglobin 10.4. Discontinue IV fluids.     8. Advanced care planning  - Discussed with patient about goals of care and code status. Discussed differences between full code and DNR. Patient states that she wants to live forever but is realistic and knows that living forever is impossible. She states that she wants everything to be done in the event of cardiac arrest. Will continue with current status of Full Code.       I spent 40 minutes of cumulative critical care time with the patient.  Greater than 50% of that time was spent in the direct collaboration and or coordination of care of the patient.     Dragon dictation software was used to dictate this note and thus there may be minor errors in translation/transcription including garbled speech or misspellings. Please contact for clarification if needed.

## 2024-03-06 NOTE — PROGRESS NOTES
Occupational Therapy    OT Treatment    Patient Name: Elsa Biggs  MRN: [id number]  Today's Date: 3/6/2024  Time Calculation  Start Time: 1113  Stop Time: 1137  Time Calculation (min): 24 min         Assessment:  End of Session Patient Position: Up in chair, Alarm on (family arrived and visiting with patient)     Plan:  Treatment Interventions: ADL retraining, Functional transfer training, UE strengthening/ROM, Endurance training, Patient/family training, Neuromuscular reeducation      Subjective   Previous Visit Info:  OT Last Visit  OT Received On: 03/06/24  General:  General  Co-Treatment: PT  Co-Treatment Reason: to optimize safety and mobility, while focusing on discipline specific goals  Prior to Session Communication: Bedside nurse (ok to treat.,)  Patient Position Received: Bed, 3 rail up, Alarm on  General Comment: pt. doing well today. see PT BP readings BP stayed consistent during treatment.pt. denies dizziness but noted fatigue. Transfers MIN A and Bed mobility CGA. Pt. is MOD A for toileting,  increased functional moblity toward door and back to EOB then to chair. Pt. is motivated and likely to continue to progress with daily participation.  Precautions:     Vital Signs: seePT notes as BP monitored throughout treatment      Pain:  Pain Assessment  Pain Assessment: 0-10  Pain Score: 0 - No pain    Objective    Cognition:  Cognition  Orientation Level: Oriented X4  Coordination:     Activities of Daily Living:      LE Dressing  LE Dressing: Yes  Adult Briefs Level of Assistance: Maximum assistance  LE Dressing Comments: pt unable to reach may benefit from reacher. did not have reacher available during treatment    Toileting  Toileting Level of Assistance: Moderate assistance  Where Assessed:  (EOB)  Toileting Comments: wiping  buttocks, pt. did attempt to wipe naina area but had dificulty reaching. repositioned purwick  Functional Standing Tolerance:  Time: standing tolerance > 2 mins no loss of  balance.  Activity: during toileting task and management of purwick placement  Bed Mobility/Transfers: Bed Mobility  Bed Mobility: Yes  Bed Mobility 1  Bed Mobility 1: Supine to sitting  Level of Assistance 1: Contact guard  Bed Mobility Comments 1: cueing to use bed rail    Transfers  Transfer: Yes  Transfer 1  Transfer From 1: Sit to  Transfer to 1: Stand  Technique 1: Stand to sit  Transfer Device 1: Walker  Transfer Level of Assistance 1: Minimum assistance  Trials/Comments 1: cueing for hand placement  Transfers 2  Transfer From 2: Bed to  Transfer to 2: Chair with arms  Technique 2: Stand pivot  Transfer Device 2: Walker  Transfer Level of Assistance 2: Minimum assistance  Trials/Comments 2: another available to assure lines were free. no loss of balance.         Standing Balance:  Static Standing Balance  Static Standing-Balance Support: Bilateral upper extremity supported  Static Standing-Level of Assistance: Minimum assistance  Static Standing-Comment/Number of Minutes: > 2 mins no loss of balance during toileting and transfer task    Therapeutic Activity  Therapeutic Activity Performed: Yes  Therapeutic Activity 1: functional mobility to door and back to chair. No loss of balance patient with moderate shortness of breath post completion      Outcome Measures:[facility] Daily Activity  Putting on and taking off regular lower body clothing: A lot  Bathing (including washing, rinsing, drying): A lot  Putting on and taking off regular upper body clothing: A lot  Toileting, which includes using toilet, bedpan or urinal: A lot  Taking care of personal grooming such as brushing teeth: A lot  Eating Meals: A little  Daily Activity - Total Score: 13        Education Documentation  Body Mechanics, taught by MOHSEN Zafar at 3/6/2024  1:27 PM.  Learner: Patient  Readiness: Acceptance  Method: Explanation, Demonstration  Response: Verbalizes Understanding, Needs Reinforcement, Demonstrated Understanding    ADL  Training, taught by MOHSEN Zafar at 3/6/2024  1:27 PM.  Learner: Patient  Readiness: Acceptance  Method: Explanation, Demonstration  Response: Verbalizes Understanding, Needs Reinforcement, Demonstrated Understanding    Education Comments  No comments found.        OP EDUCATION:       Goals:  Encounter Problems       Encounter Problems (Active)       ADLs       Patient with complete lower body dressing with set-up and stand by assist level of assistance donning and doffing all LE clothes  with PRN adaptive equipment while supported sitting and standing (Not Progressing)       Start:  03/05/24    Expected End:  03/07/24               BALANCE       Pt will maintain dynamic standing balance x8 minutes during ADL task with supervision level of assistance in order to demonstrate decreased risk of falling and improved postural control. (Progressing)       Start:  03/05/24    Expected End:  03/07/24               TRANSFERS       Patient will complete functional transfers and functional mobility with front wheeled walker with modified independent level of assistance. (Progressing)       Start:  03/05/24    Expected End:  03/07/24

## 2024-03-06 NOTE — PROGRESS NOTES
03/06/24 1052   Discharge Planning   Living Arrangements Spouse/significant other   Support Systems Spouse/significant other   Assistance Needed min assist   Type of Residence Private residence   Home or Post Acute Services None   Patient expects to be discharged to: Home   Does the patient need discharge transport arranged? No     Met with patient at bedside, introduced self and role as RN TCC. Patient lives at home with spouse. She states she is independent with her walker at home. She manages her own medications, follows with PCP routinely. She was admitted for vascular surgery, she was seen by PT OT and they recommend Home Care, patient agreeable and chose [facility]. Patient not medically ready for DC as of yet due to hypotension, still in ICU on gtt. TCC to follow.

## 2024-03-06 NOTE — PROGRESS NOTES
"Elsa Biggs is a 73 y.o. female on day 2 of admission presenting with Carotid stenosis, right.    Subjective   Post the procedure she had hypotension requiring phenylephrine infusion and IVF.  She also developed sinus bradycardia with HR in the high 40's-50's.     The patient currently is asymptomatic at rest, and has been able to ambulate around the room/unit without significant LH/dizziness, presyncope, syncope, palps, chest discomfort.  Tele demonstrates sinus bradycardia HR 50's.        Objective     Physical Exam  General: Awake alert oriented in no acute distress  HEENT: Head is atraumatic normocephalic, EOMI, neck incisions are clean dry intact  Cardiovascular: Regular rate and rhythm S1-S2 no extra heart sounds or murmurs.  Respiratory: Clear to auscultate bilaterally no wheezing rales or rhonchi  Abdomen: Nontender nondistended bowel sounds are positive  Musculoskeletal: ROM intact. Muscle strength grossly intact upper and lower extremities 5/5.   Neurological: CNII-XII intact. Sensation grossly intact  Extremities: Warm and dry. No acute rashes and lesions  Psychiatric: Appropriate mood and affec    Last Recorded Vitals  Blood pressure 113/69, pulse (!) 48, temperature 36.8 °C (98.2 °F), resp. rate 12, height 1.6 m (5' 3\"), weight 113 kg (250 lb), SpO2 94 %.  Intake/Output last 3 Shifts:  I/O last 3 completed shifts:  In: 2134.5 (18.8 mL/kg) [I.V.:1734.5 (15.3 mL/kg); IV Piggyback:400]  Out: 650 (5.7 mL/kg) [Urine:650 (0.2 mL/kg/hr)]  Weight: 113.6 kg     Relevant Results  Scheduled medications  acetaminophen, 650 mg, oral, q6h ALEXANDER  aspirin, 81 mg, oral, Daily  atorvastatin, 80 mg, oral, Nightly  clopidogrel, 75 mg, oral, Daily  docusate sodium, 100 mg, oral, BID  ezetimibe, 10 mg, oral, Daily  insulin lispro, 0-5 Units, subcutaneous, TID with meals  [Held by provider] metoprolol succinate XL, 25 mg, oral, Daily  midodrine, 10 mg, oral, TID  nystatin, 1 Application, Topical, BID  PARoxetine, 20 mg, oral, " Nightly  pioglitazone, 30 mg, oral, q24h  piperacillin-tazobactam, 3.375 g, intravenous, q6h  sennosides, 2 tablet, oral, Nightly  warfarin, 2 mg, oral, Once per day on Sun Mon Wed Thu Fri Sat  warfarin, 4 mg, oral, Once per day on Tue      Continuous medications  norepinephrine, 0.01-1 mcg/kg/min, Last Rate: Stopped (03/06/24 0530)      PRN medications  PRN medications: dextrose 10 % in water (D10W), dextrose, glucagon, naloxone, ondansetron, oxygen, traMADol     Results for orders placed or performed during the hospital encounter of 03/04/24 (from the past 24 hour(s))   POCT GLUCOSE   Result Value Ref Range    POCT Glucose 157 (H) 74 - 99 mg/dL   Cortisol   Result Value Ref Range    Cortisol 17.8 2.5 - 20.0 ug/dL   Procalcitonin   Result Value Ref Range    Procalcitonin <0.02 <=0.07 ng/mL   POCT GLUCOSE   Result Value Ref Range    POCT Glucose 142 (H) 74 - 99 mg/dL   CBC   Result Value Ref Range    WBC 6.4 4.4 - 11.3 x10*3/uL    nRBC 0.0 0.0 - 0.0 /100 WBCs    RBC 3.39 (L) 4.00 - 5.20 x10*6/uL    Hemoglobin 8.8 (L) 12.0 - 16.0 g/dL    Hematocrit 29.3 (L) 36.0 - 46.0 %    MCV 86 80 - 100 fL    MCH 26.0 26.0 - 34.0 pg    MCHC 30.0 (L) 32.0 - 36.0 g/dL    RDW 16.3 (H) 11.5 - 14.5 %    Platelets 185 150 - 450 x10*3/uL   Basic Metabolic Panel   Result Value Ref Range    Glucose 124 (H) 74 - 99 mg/dL    Sodium 137 136 - 145 mmol/L    Potassium 4.1 3.5 - 5.3 mmol/L    Chloride 106 98 - 107 mmol/L    Bicarbonate 27 21 - 32 mmol/L    Anion Gap 8 (L) 10 - 20 mmol/L    Urea Nitrogen 12 6 - 23 mg/dL    Creatinine 0.63 0.50 - 1.05 mg/dL    eGFR >90 >60 mL/min/1.73m*2    Calcium 7.9 (L) 8.6 - 10.3 mg/dL   Magnesium   Result Value Ref Range    Magnesium 1.59 (L) 1.60 - 2.40 mg/dL   POCT GLUCOSE   Result Value Ref Range    POCT Glucose 125 (H) 74 - 99 mg/dL   Blood Gas Venous Full Panel   Result Value Ref Range    POCT pH, Venous 7.40 7.33 - 7.43 pH    POCT pCO2, Venous 48 41 - 51 mm Hg    POCT pO2, Venous 41 35 - 45 mm Hg     POCT SO2, Venous 67 45 - 75 %    POCT Oxy Hemoglobin, Venous 65.2 45.0 - 75.0 %    POCT Hematocrit Calculated, Venous 30.0 (L) 36.0 - 46.0 %    POCT Sodium, Venous 135 (L) 136 - 145 mmol/L    POCT Potassium, Venous 4.3 3.5 - 5.3 mmol/L    POCT Chloride, Venous 105 98 - 107 mmol/L    POCT Ionized Calicum, Venous 1.20 1.10 - 1.33 mmol/L    POCT Glucose, Venous 122 (H) 74 - 99 mg/dL    POCT Lactate, Venous 1.1 0.4 - 2.0 mmol/L    POCT Base Excess, Venous 4.2 (H) -2.0 - 3.0 mmol/L    POCT HCO3 Calculated, Venous 29.7 (H) 22.0 - 26.0 mmol/L    POCT Hemoglobin, Venous 9.9 (L) 12.0 - 16.0 g/dL    POCT Anion Gap, Venous 5.0 (L) 10.0 - 25.0 mmol/L    Patient Temperature 37.0 degrees Celsius    FiO2 21 %   Troponin I, High Sensitivity   Result Value Ref Range    Troponin I, High Sensitivity 7 0 - 13 ng/L   Protime-INR   Result Value Ref Range    Protime 23.4 (H) 9.8 - 12.8 seconds    INR 2.1 (H) 0.9 - 1.1   POCT GLUCOSE   Result Value Ref Range    POCT Glucose 119 (H) 74 - 99 mg/dL   Troponin I, High Sensitivity   Result Value Ref Range    Troponin I, High Sensitivity 7 0 - 13 ng/L   POCT GLUCOSE   Result Value Ref Range    POCT Glucose 129 (H) 74 - 99 mg/dL   Transthoracic Echo (TTE) Complete   Result Value Ref Range    BSA 2.24 m2          Assessment/Plan   73 year old female with a past medical history of  HLD, HTN, diabetes mellitus, obesity and stroke that presents to the hospital for an elective TCAR on the right per Dr. Victor on 3/4/24.     Plan:  Pt up and out of bed- in chair- doing well  Incision site appears dry and intact on right sided neck and groin incision. Removed gauze dressing- steri strips remain intact  Cardiology on consult for hypotension and bradycardia- ordered an echo  Hold Metoprolol          I spent 30 minutes in the professional and overall care of this patient.      Yudi Shore, APRN-CNP

## 2024-03-06 NOTE — PROGRESS NOTES
Vancomycin Dosing by Pharmacy- Cessation of Therapy    Consult to pharmacy for vancomycin dosing has been discontinued by the prescriber, pharmacy will sign off at this time.    Please call pharmacy if there are further questions or re-enter a consult if vancomycin is resumed.     Edie Burks, PharmD

## 2024-03-06 NOTE — PROGRESS NOTES
Elsa Biggs is a 73 y.o. female on day 2 of admission presenting with Carotid stenosis, right.      Subjective   The patient is seen and evaluated this afternoon.  She is comfortably sitting in her chair with family in the room.  She denies any specific complaints.  She is still has been on pressors on and off.  Denies any nausea vomiting abdominal pain dizziness lightheadedness or chest pain at this time.       Objective     Last Recorded Vitals  /69   Pulse (!) 48   Temp 36.8 °C (98.2 °F)   Resp 12   Wt 113 kg (250 lb)   SpO2 94%   Intake/Output last 3 Shifts:    Intake/Output Summary (Last 24 hours) at 3/6/2024 1556  Last data filed at 3/6/2024 1351  Gross per 24 hour   Intake 529.5 ml   Output 950 ml   Net -420.5 ml         Admission Weight  Weight: 113 kg (250 lb) (03/04/24 0631)    Daily Weight  03/06/24 : 113 kg (250 lb)    Image Results  XR chest 1 view  Narrative: Interpreted By:  Ken James,   STUDY:  XR CHEST 1 VIEW;  3/5/2024 6:09 pm      INDICATION:  Signs/Symptoms:Possible PNA..      COMPARISON:  Chest x-ray 12/24/2023      ACCESSION NUMBER(S):  [id number]      ORDERING CLINICIAN:  AUDRA FERRARA      FINDINGS:  Multiple overlying leads are present.      CARDIOMEDIASTINAL SILHOUETTE:  Cardiac silhouette is enlarged but stable.      LUNGS:  No consolidation, pleural effusion or pneumothorax.      ABDOMEN:  No remarkable upper abdominal findings.      BONES:  No acute osseous abnormality.      Impression: Stable cardiomegaly. No acute pulmonary process.      MACRO:  None      Signed by: Ken James 3/5/2024 7:32 PM  Dictation workstation:   [id number]      Physical Exam  Constitutional:       Appearance: She is obese.   HENT:      Head: Normocephalic and atraumatic.      Nose: Nose normal.      Mouth/Throat:      Mouth: Mucous membranes are dry.   Eyes:      Extraocular Movements: Extraocular movements intact.      Conjunctiva/sclera: Conjunctivae normal.   Cardiovascular:       Rate and Rhythm: Normal rate and regular rhythm.      Pulses: Normal pulses.      Heart sounds: Normal heart sounds.   Pulmonary:      Effort: Pulmonary effort is normal.      Breath sounds: Normal breath sounds.   Abdominal:      General: Bowel sounds are normal.      Palpations: Abdomen is soft.   Musculoskeletal:         General: Normal range of motion.      Cervical back: Normal range of motion and neck supple.   Skin:     General: Skin is warm and dry.      Comments: Neck incision clean dry intact.     Neurological:      General: No focal deficit present.      Mental Status: She is alert and oriented to person, place, and time. Mental status is at baseline.   Psychiatric:         Mood and Affect: Mood normal.         Relevant Results               Assessment/Plan   This patient currently has cardiac telemetry ordered; if you would like to modify or discontinue the telemetry order, click here to go to the orders activity to modify/discontinue the order.      Elsa Biggs is a 73 y.o. female with past medical history of paroxysmal atrial fibrillation on Coumadin, HFrEF, coronary artery disease, diabetes mellitus type 2, obesity, hypertension, hyperlipidemia, depression, breast cancer was admitted to the hospital late last year with acute ischemic stroke in the left thalamus territory and at that time she was found to have incidental finding of R ICA 85%.  The patient underwent Transcarotid Artery Revascularization of the right carotid.  The patient was noted to be hypotensive requiring pressors         Principal Problem:    Carotid stenosis, right    Hypotension after DEEP  -Patient is status post TCAR on 3/4/2024.  -Patient did require Levophed and noted with bradycardia as well.  -The patient is afebrile no leukocytosis.  Appreciate critical care management patient be started on vancomycin and Zosyn.  UA no evidence of UTI.  Blood cultures pending at this time.  MRSA screen came back negative, vancomycin has  been discontinued currently on Zosyn.  Blood cultures no growth to date.  Chest x-ray no evidence of pneumonia at this time.  Procalcitonin is low.  Antibiotics currently being managed by critical care and pulmonology  -Cardiology were consulted prescient recommendations.  Patient with bradycardia and hypotension possibly due to prolonged baroreceptor stretch response postcardiac stenting procedure.  -Can try as needed atropine for symptomatic bradycardia.  Anticipate improvement soon      Acute respiratory failure with hypoxia now resolved  -Patient now on room air.    Paroxysmal atrial fibrillation  -Continue with Coumadin.  Patient with therapeutic INR at this time.  Plans for transition to [facility] in outpatient setting.  -Holding rate control medications at this time due to bradycardia    Diastolic congestive heart failure  -She does not appear volume overloaded at this time.  -Previous echo on 10/20/2023 showed LVEF of 45 to 50%. monitor for volume overload. Echo also showed impaired relaxation pattern of the left ventricular diastolic filling.   -Repeat echo this admission shows  LVEF of 50 to 55%.    Coronary artery disease  Recent CVA  -Currently on triple therapy continue with aspirin and statin Plavix and statin at this time.      Diabetes mellitus type 2  -Last A1c was 7.0.  Continue sign scale insulin and oral antidiabetics at this time.    Continue with ICU level care.               Nette Herring MD

## 2024-03-06 NOTE — PROGRESS NOTES
Physical Therapy    Physical Therapy Treatment    Patient Name: Elsa Biggs  MRN: [id number]  Today's Date: 3/6/2024  Time Calculation  Start Time: 1112  Stop Time: 1137  Time Calculation (min): 25 min       Assessment/Plan   PT Assessment  End of Session Communication: Bedside nurse  End of Session Patient Position: Up in chair, Alarm on     PT Plan  Treatment/Interventions: Bed mobility, Transfer training, Gait training, Stair training, Balance training, Strengthening, Endurance training, Therapeutic exercise, Therapeutic activity, Home exercise program  PT Plan: Skilled PT  PT Frequency: 4 times per week  PT Discharge Recommendations: Low intensity level of continued care  PT - OK to Discharge: Yes (when medically cleared)    General Visit Information:   PT  Visit  PT Received On: 03/06/24  General  Prior to Session Communication: Bedside nurse  Patient Position Received: Bed, 3 rail up, Alarm on    Subjective   Precautions:  Precautions  Medical Precautions: Fall precautions  Vital Signs:  Vital Signs  BP: 126/53 (initial BP read 132/55, after amb 114/49, at end of tx 126/53)    Objective   Pain:  Pain Assessment  Pain Assessment: 0-10  Pain Score: 0 - No pain  Cognition:  Cognition  Orientation Level: Oriented X4    Treatments:  Therapeutic Exercise  Therapeutic Exercise Performed: Yes  Therapeutic Exercise Activity 1: pt completed seated LE exercises: AP x 10 reps, LAQ x 10 reps, hip flexion x 10 reps, hip adduction x 10 reps, hip abduction x 10 reps    Therapeutic Activity  Therapeutic Activity Performed: Yes  Therapeutic Activity 1: pt completed static stand for 2 min with CGA/Min to maintain    Bed Mobility  Bed Mobility: Yes  Bed Mobility 1  Bed Mobility 1: Supine to sitting  Level of Assistance 1: Contact guard, Minimal verbal cues    Ambulation/Gait Training  Ambulation/Gait Training Performed: Yes  Ambulation/Gait Training 1  Surface 1: Level tile  Device 1: Rolling walker  Assistance 1: Minimum  assistance, Minimal verbal cues  Quality of Gait 1: Decreased step length, Forward flexed posture  Comments/Distance (ft) 1: 35 feet; cues to stand tall and to keep walker close  Transfers  Transfer: Yes  Transfer 1  Technique 1: Sit to stand, Stand to sit  Transfer Device 1: Walker  Transfer Level of Assistance 1: Contact guard    Outcome Measures:  [facility] Basic Mobility  Turning from your back to your side while in a flat bed without using bedrails: A little  Moving from lying on your back to sitting on the side of a flat bed without using bedrails: A little  Moving to and from bed to chair (including a wheelchair): A little  Standing up from a chair using your arms (e.g. wheelchair or bedside chair): A little  To walk in hospital room: A little  Climbing 3-5 steps with railing: Total  Basic Mobility - Total Score: 16    Education Documentation  Precautions, taught by Malinda Potter PTA at 3/6/2024  1:48 PM.  Learner: Patient  Readiness: Acceptance  Method: Explanation  Response: Needs Reinforcement    Mobility Training, taught by Malinda Potter PTA at 3/6/2024  1:48 PM.  Learner: Patient  Readiness: Acceptance  Method: Explanation  Response: Needs Reinforcement    Education Comments  No comments found.        OP EDUCATION:       Encounter Problems       Encounter Problems (Active)       PT Problem       transfers (Progressing)       Start:  03/05/24    Expected End:  03/07/24       Patient will perform all transfers with SBA x1          gait (Progressing)       Start:  03/05/24    Expected End:  03/07/24       Patient will amb 50+ feet with SBA x1          strengthening (Progressing)       Start:  03/05/24    Expected End:  03/07/24       Patient will perform 20+ reps of AROM/RROM for JEFFREY LE's to improve safety and functional independence              Pain - Adult

## 2024-03-06 NOTE — CONSULTS
Inpatient consult to Cardiology  Consult performed by: Homero Aponte MD  Consult ordered by: Jean-Pierre Constantino MD  Reason for consult: bradycardia, hypotension        History Of Present Illness:    Elsa Biggs is a 73 y.o. female presenting with admitted after revascularization of R carotid artery.  She has a history of mild-mod CAD, HFrEF, pAfib on coumadin, HTN, DL, CVA (ischemic CVA in L thalamus territory in 2023, sx was diplopia- now resolved), MALATHI 85% stenosis, DM2, obesity, depression.     The patient underwent Transcarotid Artery Revascularization of the right carotid by Dr. Victor on 3/4/24.  Post the procedure she had hypotension requiring phenylephrine infusion and IVF.  She also developed sinus bradycardia with HR in the high 40's-50's.    The patient currently is asymptomatic at rest, and has been able to ambulate around the room/unit without significant LH/dizziness, presyncope, syncope, palps, chest discomfort.  Tele demonstrates sinus bradycardia HR 50's.      ROS:  The remainder of the review of systems was obtained, as was negative as pertains to the chief complaint.       Last Recorded Vitals:  Vitals:    03/06/24 1125 03/06/24 1130 03/06/24 1205 03/06/24 1222   BP: 125/55 (!) 114/49 113/69    Pulse:  52 (!) 48    Resp: 22 23 12    Temp:    36.8 °C (98.2 °F)   TempSrc:       SpO2: 97% 92% 94%    Weight:       Height:           Last Labs:  CBC - 3/6/2024:  5:38 AM  6.4 8.8 185    29.3      CMP - 3/6/2024:  5:38 AM  7.9 6.2 13 --- 0.4   3.7 3.0 14 45      PTT - 10/20/2023:  5:22 AM  2.1   23.4 30     Troponin I, High Sensitivity   Date/Time Value Ref Range Status   03/06/2024 08:44 AM 7 0 - 13 ng/L Final   12/25/2023 12:06 AM 51 (HH) 0 - 13 ng/L Final   12/24/2023 10:16 PM 40 (H) 0 - 13 ng/L Final     BNP   Date/Time Value Ref Range Status   12/25/2023 02:18  (H) 0 - 99 pg/mL Final   12/24/2023 07:55  (H) 0 - 99 pg/mL Final     Hemoglobin A1C   Date/Time Value Ref Range Status    10/20/2023 05:22 AM 7.0 (H) see below % Final   08/02/2018 03:17 AM 6.2 % Final     Comment:          Diagnosis of Diabetes-Adults   Non-Diabetic: < or = 5.6%   Increased risk for developing diabetes: 5.7-6.4%   Diagnostic of diabetes: > or = 6.5%  .       Monitoring of Diabetes                Age (y)     Therapeutic Goal (%)   Adults:          >18           <7.0   Pediatrics:    13-18           <7.5                   7-12           <8.0                   0- 6            7.5-8.5   American Diabetes Association. Diabetes Care 33(S1), Jan 2010.     08/01/2018 11:52 AM 6.2 % Final     Comment:          Diagnosis of Diabetes-Adults   Non-Diabetic: < or = 5.6%   Increased risk for developing diabetes: 5.7-6.4%   Diagnostic of diabetes: > or = 6.5%  .       Monitoring of Diabetes                Age (y)     Therapeutic Goal (%)   Adults:          >18           <7.0   Pediatrics:    13-18           <7.5                   7-12           <8.0                   0- 6            7.5-8.5   American Diabetes Association. Diabetes Care 33(S1), Jan 2010.       LDL Calculated   Date/Time Value Ref Range Status   10/20/2023 05:22 AM 60 <=99 mg/dL Final     Comment:                                 Near   Borderline      AGE      Desirable  Optimal    High     High     Very High     0-19 Y     0 - 109     ---    110-129   >/= 130     ----    20-24 Y     0 - 119     ---    120-159   >/= 160     ----      >24 Y     0 -  99   100-129  130-159   160-189     >/=190       VLDL   Date/Time Value Ref Range Status   10/20/2023 05:22 AM 13 0 - 40 mg/dL Final      Last I/O:  I/O last 3 completed shifts:  In: 2134.5 (18.8 mL/kg) [I.V.:1734.5 (15.3 mL/kg); IV Piggyback:400]  Out: 650 (5.7 mL/kg) [Urine:650 (0.2 mL/kg/hr)]  Weight: 113.6 kg     Past Cardiology Tests (Last 3 Years):  EKG:  ECG 12 lead 03/04/2024      ECG 12 lead (Clinic Performed) 01/16/2024 (Preliminary)      ECG 12 Lead 12/27/2023      Electrocardiogram, 12-lead  "12/25/2023      ECG 12 lead 12/13/2023 (Preliminary)      ECG 12 Lead     Echo:  Transthoracic Echo (TTE) Complete 10/20/2023    Ejection Fractions:  No results found for: \"EF\"  Cath:  Cardiac catheterization - coronary 01/29/2024    Stress Test:  Nuclear Stress Test 12/26/2023      Nuclear Stress Test 04/15/2021    Cardiac Imaging:  No results found for this or any previous visit from the past 1095 days.      Past Medical History:  She has a past medical history of Anxiety, Breast cancer (CMS/HCC), Cancer (CMS/HCC), Depression, Diabetes mellitus (CMS/HCC), GERD (gastroesophageal reflux disease), Hyperlipidemia, Hypertension, and Stroke (CMS/HCC).    Past Surgical History:  She has a past surgical history that includes MR angio neck w and wo IV contrast (01/14/2022); Joint replacement (Left); Fracture surgery; Breast surgery; MR angio head wo IV contrast (12/26/2023); MR angio neck wo IV contrast (12/26/2023); and Cardiac catheterization (N/A, 01/29/2024).      Social History:  She reports that she quit smoking about 41 years ago. Her smoking use included cigarettes. She smoked an average of 1 pack per day. She has never used smokeless tobacco. She reports that she does not drink alcohol and does not use drugs.    Family History:  Family History   Problem Relation Name Age of Onset    Heart attack Father  65        blood clot    Coronary artery disease Sister          Allergies:  Tetracycline    Inpatient Medications:  Scheduled medications   Medication Dose Route Frequency    acetaminophen  650 mg oral q6h ALEXANDER    aspirin  81 mg oral Daily    atorvastatin  80 mg oral Nightly    clopidogrel  75 mg oral Daily    docusate sodium  100 mg oral BID    ezetimibe  10 mg oral Daily    insulin lispro  0-5 Units subcutaneous TID with meals    [Held by provider] metoprolol succinate XL  25 mg oral Daily    midodrine  10 mg oral TID    nystatin  1 Application Topical BID    PARoxetine  20 mg oral Nightly    pioglitazone  30 mg " oral q24h    piperacillin-tazobactam  3.375 g intravenous q6h    sennosides  2 tablet oral Nightly    warfarin  2 mg oral Once per day on Sun Mon Wed Thu Fri Sat    warfarin  4 mg oral Once per day on Tue     PRN medications   Medication    dextrose 10 % in water (D10W)    dextrose    glucagon    naloxone    ondansetron    oxygen    traMADol     Continuous Medications   Medication Dose Last Rate    norepinephrine  0.01-1 mcg/kg/min Stopped (03/06/24 0530)     Outpatient Medications:  Current Outpatient Medications   Medication Instructions    acetaminophen (TYLENOL) 650 mg, oral, Every 4 hours PRN    apixaban (ELIQUIS) 5 mg, oral, 2 times daily    aspirin 81 mg, oral, Daily    atorvastatin (LIPITOR) 80 mg, oral, Nightly    cholecalciferol (Vitamin D-3) 25 MCG (1000 UT) tablet 1 tablet, oral, Daily    clopidogrel (PLAVIX) 75 mg, oral, Daily    ezetimibe (ZETIA) 10 mg, oral, Daily    metFORMIN (GLUCOPHAGE) 500 mg, oral, Every morning, And 2 tabs In the evening    metoprolol succinate XL (TOPROL-XL) 25 mg, oral, Daily, Do not crush or chew.    OneTouch Delica Plus Lancet 33 gauge misc USE TO TAKE BLOOD SUGAR 1-2 TIMES PER DAY    PARoxetine (PAXIL) 20 mg, oral, Nightly    pioglitazone (ACTOS) 30 mg, oral, Every 24 hours    warfarin (COUMADIN) 2 mg, oral, Every evening, Take as directed per After Visit Summary.       Physical Exam:  Physical Exam  HENT:      Head: Normocephalic.      Mouth/Throat:      Mouth: Mucous membranes are dry.   Eyes:      Extraocular Movements: Extraocular movements intact.   Neck:      Comments: +  mild ecchymosis R neck  Cardiovascular:      Rate and Rhythm: Bradycardia present.      Comments: Distant heart sounds  Pulmonary:      Effort: Pulmonary effort is normal.   Skin:     General: Skin is warm.   Neurological:      General: No focal deficit present.      Mental Status: She is alert.   Psychiatric:         Mood and Affect: Mood normal.            Assessment/Plan   R severe carotid artery  stenosis s/p TCAR on 3/4/24  Bradycardia  Hypotension  Nonobstructive CAD  HFrEF, chronic systolic HF  pAfib, anticoagulated with warfarin  HTN  DL    Bradycardia, hypotension:  suspect this is prolonged baroreceptor stretch response post carotid stenting procedure.  TSH was checked and is WNL.  HSTI negative.  EKG demonstrates no obvious acute ischemic abnormalities.    We can check TTE to ensure there are no new rWMA to suggest an MI, given patient's history of nonobstructive CAD.  Otherwise, would recommend supportive care.  Can try atropine as needed for symptomatic bradycardia.  At this time, no indication for TVP/PPM.  Suspect this baroreceptor response will gradually resolve over course of next 12-24 hrs.    -tele monitoring  -rpt troponin and TTE ordered  -serial BP monitoring  -supportive care with IVF, pressors to keep MAP > 60  -if develops worsening or symptomatic bradycardia, would treat with atropine  -currently there is no indication for TVP or PPM  -continue antiplatelet therapy with Asa/clopidogrel  -statin, zetia  -hold BB due to bradycardia - this can be readdressed at future outpatient visit  -warfarin for CVA prophylaxis  -patient should have close follow up with her outpatient cardiology providers, Malinda Posada CNP/ Dr. Childress        CVC 03/04/24 Triple lumen Right Femoral (Active)   Site Assessment Clean;Dry;Intact 03/06/24 0900   Dressing Status Clean;Dry 03/06/24 0900   Number of days: 2       Peripheral IV 03/04/24 20 G Right Antecubital (Active)   Site Assessment Clean;Dry;Intact 03/06/24 0900   Dressing Status Clean;Dry 03/06/24 0900   Number of days: 2       Peripheral IV 03/04/24 22 G Right;Dorsal Hand (Active)   Site Assessment Clean;Dry;Intact 03/06/24 0900   Dressing Status Clean;Dry 03/06/24 0900   Number of days: 2       Code Status:  Full Code    I spent 40 minutes of critical care time in the professional and overall care of this patient today.    Homero Aponte MD

## 2024-03-07 VITALS
HEIGHT: 63 IN | HEART RATE: 51 BPM | WEIGHT: 268.3 LBS | SYSTOLIC BLOOD PRESSURE: 146 MMHG | BODY MASS INDEX: 47.54 KG/M2 | TEMPERATURE: 97.5 F | DIASTOLIC BLOOD PRESSURE: 64 MMHG | OXYGEN SATURATION: 97 % | RESPIRATION RATE: 17 BRPM

## 2024-03-07 PROBLEM — I65.21 CAROTID STENOSIS, RIGHT: Status: RESOLVED | Noted: 2024-03-04 | Resolved: 2024-03-07

## 2024-03-07 LAB
ANION GAP BLDV CALCULATED.4IONS-SCNC: 8 MMOL/L (ref 10–25)
ANION GAP SERPL CALC-SCNC: 7 MMOL/L (ref 10–20)
BASE EXCESS BLDV CALC-SCNC: 4.8 MMOL/L (ref -2–3)
BODY TEMPERATURE: 37 DEGREES CELSIUS
BUN SERPL-MCNC: 16 MG/DL (ref 6–23)
CA-I BLDV-SCNC: 1.19 MMOL/L (ref 1.1–1.33)
CALCIUM SERPL-MCNC: 7.9 MG/DL (ref 8.6–10.3)
CHLORIDE BLDV-SCNC: 103 MMOL/L (ref 98–107)
CHLORIDE SERPL-SCNC: 106 MMOL/L (ref 98–107)
CO2 SERPL-SCNC: 28 MMOL/L (ref 21–32)
CREAT SERPL-MCNC: 0.71 MG/DL (ref 0.5–1.05)
EGFRCR SERPLBLD CKD-EPI 2021: 89 ML/MIN/1.73M*2
ERYTHROCYTE [DISTWIDTH] IN BLOOD BY AUTOMATED COUNT: 16.4 % (ref 11.5–14.5)
GLUCOSE BLD MANUAL STRIP-MCNC: 174 MG/DL (ref 74–99)
GLUCOSE BLD MANUAL STRIP-MCNC: 96 MG/DL (ref 74–99)
GLUCOSE BLDV-MCNC: 99 MG/DL (ref 74–99)
GLUCOSE SERPL-MCNC: 101 MG/DL (ref 74–99)
HCO3 BLDV-SCNC: 30.4 MMOL/L (ref 22–26)
HCT VFR BLD AUTO: 28.9 % (ref 36–46)
HCT VFR BLD EST: 29 % (ref 36–46)
HGB BLD-MCNC: 9.1 G/DL (ref 12–16)
HGB BLDV-MCNC: 9.6 G/DL (ref 12–16)
INHALED O2 CONCENTRATION: 21 %
INR PPP: 2.3 (ref 0.9–1.1)
LACTATE BLDV-SCNC: 0.6 MMOL/L (ref 0.4–2)
MAGNESIUM SERPL-MCNC: 1.87 MG/DL (ref 1.6–2.4)
MCH RBC QN AUTO: 26.8 PG (ref 26–34)
MCHC RBC AUTO-ENTMCNC: 31.5 G/DL (ref 32–36)
MCV RBC AUTO: 85 FL (ref 80–100)
NRBC BLD-RTO: 0 /100 WBCS (ref 0–0)
OXYHGB MFR BLDV: 55.4 % (ref 45–75)
PCO2 BLDV: 49 MM HG (ref 41–51)
PH BLDV: 7.4 PH (ref 7.33–7.43)
PLATELET # BLD AUTO: 191 X10*3/UL (ref 150–450)
PO2 BLDV: 36 MM HG (ref 35–45)
POTASSIUM BLDV-SCNC: 4.2 MMOL/L (ref 3.5–5.3)
POTASSIUM SERPL-SCNC: 4.2 MMOL/L (ref 3.5–5.3)
PROTHROMBIN TIME: 26.6 SECONDS (ref 9.8–12.8)
RBC # BLD AUTO: 3.39 X10*6/UL (ref 4–5.2)
SAO2 % BLDV: 57 % (ref 45–75)
SODIUM BLDV-SCNC: 137 MMOL/L (ref 136–145)
SODIUM SERPL-SCNC: 137 MMOL/L (ref 136–145)
WBC # BLD AUTO: 7 X10*3/UL (ref 4.4–11.3)

## 2024-03-07 PROCEDURE — 83735 ASSAY OF MAGNESIUM: CPT

## 2024-03-07 PROCEDURE — 99231 SBSQ HOSP IP/OBS SF/LOW 25: CPT | Performed by: CLINICAL NURSE SPECIALIST

## 2024-03-07 PROCEDURE — 80048 BASIC METABOLIC PNL TOTAL CA: CPT | Performed by: INTERNAL MEDICINE

## 2024-03-07 PROCEDURE — 2500000002 HC RX 250 W HCPCS SELF ADMINISTERED DRUGS (ALT 637 FOR MEDICARE OP, ALT 636 FOR OP/ED): Performed by: PHYSICIAN ASSISTANT

## 2024-03-07 PROCEDURE — 97116 GAIT TRAINING THERAPY: CPT | Mod: GP,CQ

## 2024-03-07 PROCEDURE — 2500000001 HC RX 250 WO HCPCS SELF ADMINISTERED DRUGS (ALT 637 FOR MEDICARE OP)

## 2024-03-07 PROCEDURE — 2500000001 HC RX 250 WO HCPCS SELF ADMINISTERED DRUGS (ALT 637 FOR MEDICARE OP): Performed by: PHYSICIAN ASSISTANT

## 2024-03-07 PROCEDURE — 99238 HOSP IP/OBS DSCHRG MGMT 30/<: CPT | Performed by: INTERNAL MEDICINE

## 2024-03-07 PROCEDURE — 37799 UNLISTED PX VASCULAR SURGERY: CPT | Performed by: INTERNAL MEDICINE

## 2024-03-07 PROCEDURE — 85610 PROTHROMBIN TIME: CPT | Performed by: PHARMACIST

## 2024-03-07 PROCEDURE — 82947 ASSAY GLUCOSE BLOOD QUANT: CPT

## 2024-03-07 PROCEDURE — 2500000004 HC RX 250 GENERAL PHARMACY W/ HCPCS (ALT 636 FOR OP/ED): Performed by: PHARMACIST

## 2024-03-07 PROCEDURE — RXMED WILLOW AMBULATORY MEDICATION CHARGE

## 2024-03-07 PROCEDURE — 85027 COMPLETE CBC AUTOMATED: CPT | Performed by: INTERNAL MEDICINE

## 2024-03-07 PROCEDURE — 97110 THERAPEUTIC EXERCISES: CPT | Mod: GP,CQ

## 2024-03-07 PROCEDURE — 84132 ASSAY OF SERUM POTASSIUM: CPT | Performed by: INTERNAL MEDICINE

## 2024-03-07 PROCEDURE — 99291 CRITICAL CARE FIRST HOUR: CPT

## 2024-03-07 RX ORDER — MIDODRINE HYDROCHLORIDE 10 MG/1
10 TABLET ORAL 3 TIMES DAILY
Qty: 90 TABLET | Refills: 0 | Status: SHIPPED | OUTPATIENT
Start: 2024-03-07 | End: 2024-03-19 | Stop reason: ALTCHOICE

## 2024-03-07 RX ADMIN — PIPERACILLIN SODIUM AND TAZOBACTAM SODIUM 3.38 G: 3; .375 INJECTION, SOLUTION INTRAVENOUS at 00:10

## 2024-03-07 RX ADMIN — DOCUSATE SODIUM 100 MG: 100 CAPSULE, LIQUID FILLED ORAL at 08:39

## 2024-03-07 RX ADMIN — CLOPIDOGREL 75 MG: 75 TABLET ORAL at 08:40

## 2024-03-07 RX ADMIN — PIPERACILLIN SODIUM AND TAZOBACTAM SODIUM 3.38 G: 3; .375 INJECTION, SOLUTION INTRAVENOUS at 04:32

## 2024-03-07 RX ADMIN — ASPIRIN 81 MG: 81 TABLET, COATED ORAL at 08:40

## 2024-03-07 RX ADMIN — MIDODRINE HYDROCHLORIDE 10 MG: 10 TABLET ORAL at 08:40

## 2024-03-07 RX ADMIN — ACETAMINOPHEN 650 MG: 325 TABLET ORAL at 00:10

## 2024-03-07 RX ADMIN — PIOGLITAZONE HYDROCHLORIDE 30 MG: 30 TABLET ORAL at 00:10

## 2024-03-07 RX ADMIN — EZETIMIBE 10 MG: 10 TABLET ORAL at 08:40

## 2024-03-07 ASSESSMENT — COGNITIVE AND FUNCTIONAL STATUS - GENERAL
MOVING FROM LYING ON BACK TO SITTING ON SIDE OF FLAT BED WITH BEDRAILS: A LITTLE
DRESSING REGULAR UPPER BODY CLOTHING: A LITTLE
DAILY ACTIVITIY SCORE: 18
WALKING IN HOSPITAL ROOM: A LITTLE
TURNING FROM BACK TO SIDE WHILE IN FLAT BAD: A LITTLE
MOVING TO AND FROM BED TO CHAIR: A LITTLE
CLIMB 3 TO 5 STEPS WITH RAILING: TOTAL
STANDING UP FROM CHAIR USING ARMS: A LOT
TURNING FROM BACK TO SIDE WHILE IN FLAT BAD: A LITTLE
CLIMB 3 TO 5 STEPS WITH RAILING: A LITTLE
EATING MEALS: A LITTLE
MOBILITY SCORE: 15
WALKING IN HOSPITAL ROOM: A LITTLE
DRESSING REGULAR LOWER BODY CLOTHING: A LITTLE
MOBILITY SCORE: 18
MOVING FROM LYING ON BACK TO SITTING ON SIDE OF FLAT BED WITH BEDRAILS: A LITTLE
STANDING UP FROM CHAIR USING ARMS: A LITTLE
PERSONAL GROOMING: A LITTLE
MOVING TO AND FROM BED TO CHAIR: A LITTLE
TOILETING: A LITTLE
HELP NEEDED FOR BATHING: A LITTLE

## 2024-03-07 ASSESSMENT — PAIN - FUNCTIONAL ASSESSMENT
PAIN_FUNCTIONAL_ASSESSMENT: 0-10

## 2024-03-07 ASSESSMENT — PAIN SCALES - GENERAL
PAINLEVEL_OUTOF10: 0 - NO PAIN
PAINLEVEL_OUTOF10: 3

## 2024-03-07 NOTE — DISCHARGE INSTRUCTIONS
Steri-strips will fall off on their own  Ok to shower 24 hours after procedure  Ok to drive 48 hours after procedure

## 2024-03-07 NOTE — DISCHARGE SUMMARY
Discharge Diagnosis  Carotid stenosis, right    Issues Requiring Follow-Up  Carotid artery stenosis  Hypotension  Bradycardia     Test Results Pending At Discharge  Pending Labs       Order Current Status    Blood Culture Preliminary result            Hospital Course  73 year old female with a past medical history of  HLD, HTN, diabetes mellitus, obesity and stroke that presents to the hospital for an elective TCAR on the right per Dr. Victor on 3/4/24. During her admission, she had developed hypotension and bradycardia s/p TCAR. She was on a levo gtt in the ICU- has been off for 24 hours. She was started on midodrine and her BP is now stable. HR remains in the 50's- metoprolol was stopped. Cardiology was consulted, had an echo that showed an EF of 50-55%. Bradycardia, hypotension: suspect this is prolonged baroreceptor stretch response post carotid stenting procedure. Incision site at right neck area is dry and intact. Gauze removed, has steri-strips in place. Groin incision site is dry and intact. No tenderness noted. She is to remain on Plavix for at least 30 days s/p carotid stenting. Dicussed discharge instructions and allowed time to answer questions. She has a follow up with Dr. Victor arranged. Advised to call cardiology at [facility] and arrange a follow up.    Pertinent Physical Exam At Time of Discharge  Physical Exam  General: Awake alert oriented in no acute distress  HEENT: Head is atraumatic normocephalic, EOMI, neck incisions are clean dry intact  Cardiovascular: Regular rate and rhythm S1-S2 no extra heart sounds or murmurs.  Respiratory: Clear to auscultate bilaterally no wheezing rales or rhonchi  Abdomen: Nontender nondistended bowel sounds are positive  Musculoskeletal: ROM intact. Muscle strength grossly intact upper and lower extremities 5/5.   Neurological: CNII-XII intact. Sensation grossly intact  Extremities: Warm and dry. No acute rashes and lesions  Psychiatric: Appropriate mood and  [facility]  Home Medications     Medication List      START taking these medications     apixaban 5 mg tablet; Commonly known as: Eliquis; Take 1 tablet (5 mg)   by mouth 2 times a day.   midodrine 10 mg tablet; Commonly known as: Proamatine; Take 1 tablet (10   mg) by mouth 3 times a day.     CONTINUE taking these medications     acetaminophen 325 mg tablet; Commonly known as: Tylenol; Take 2 tablets   (650 mg) by mouth every 4 hours if needed for mild pain (1 - 3), moderate   pain (4 - 6) or fever (temp greater than 38.0 C).   aspirin 81 mg EC tablet   atorvastatin 80 mg tablet; Commonly known as: Lipitor; Take 1 tablet (80   mg) by mouth once daily at bedtime.   cholecalciferol 25 MCG (1000 UT) tablet; Commonly known as: Vitamin D-3   clopidogrel 75 mg tablet; Commonly known as: Plavix; Take 1 tablet (75   mg) by mouth once daily.   ezetimibe 10 mg tablet; Commonly known as: Zetia   metFORMIN 500 mg tablet; Commonly known as: Glucophage   OneTouch Delica Plus Lancet 33 gauge misc; Generic drug: lancets   PARoxetine 20 mg tablet; Commonly known as: Paxil   pioglitazone 30 mg tablet; Commonly known as: Actos   warfarin 2 mg tablet; Commonly known as: Coumadin; Take as directed. If   you are unsure how to take this medication, talk to your nurse or doctor.;   Original instructions: Take 1 tablet (2 mg) by mouth once daily in the   evening. Take as directed per After Visit Summary.     STOP taking these medications     metoprolol succinate XL 25 mg 24 hr tablet; Commonly known as: Toprol-XL       Outpatient Follow-Up  Future Appointments   Date Time Provider Department [place]   3/8/2024 11:00 AM Milagro Mckeon, KennD GEAPHRM [facility]   3/14/2024  1:45 PM Andrew Victor DO [id number] [facility]   3/18/2024 10:30 AM Brian Garrett MD [id number] [facility]   3/26/2024  9:40 AM Oswald Isabel MD [id number] [facility]   6/26/2024  1:30 PM Noel Treviño MD [id number] Echo   8/2/2024 10:30 AM Isaiah Jarrell MD [id number] [facility]    8/27/2024 10:30 AM ANITA Dhillon-CNP [id number] [facility]       ANITA Aly-CNP

## 2024-03-07 NOTE — NURSING NOTE
Dc instructions along with wound care reviewed with pt.  Pt receptive.  Pt to lobby via WC and volunteer.

## 2024-03-07 NOTE — PROGRESS NOTES
Critical Care Daily Progress        Subjective   Patient is a 73 y.o. female admitted on 3/4/2024  6:05 AM with the following indication(s) for ICU care: status post TCAR, postoperative critical care management.     Interval History: Elsa Biggs is a 73 y.o. female with past medical history of paroxsymal atrial fibrillation on Coumadin, HFrEF, coronary artery disease, diabetes mellitus type 2, obesity, hypertension, hyperlipidemia, depression, ischemic stroke in the left thalamus territory, and right ICA 85%. Patient underwent transcarotid artery revascularization of the right carotid artery with Dr. Victor.     3/5/24: Patient alert and oriented, lying in bed. Discussed with bedside nurse and no acute events overnight. She continues to require vasopressor support with norepinephrine.  Norepinephrine at 0.05mcg this morning. Will change vasopressor parameters to maintain MAP greater than 60. Patient denies having any pain, shortness of breath, dizziness, nausea, or vomiting.     3/6/24: Patient lying in bed, alert and oriented. Discussed with bedside nurse and no acute events overnight. Norepinephrine was weaned off around 0530. Patient wanting to go home but is ok with staying in the hospital due to hypotension and bradycardia. Will increase Midodrine to 10mg due to ongoing hypotension and goal of remaining off of norepinephrine.     3/7/24: Patient sitting up in chair. She is alert, oriented and in good spirits. She has been off of norepinephrine for over 24 hours. No further hypotension noted since increased dose of Midodrine. She continues to be bradycardic with heart rate in 50s to 60s and is asymptomatic. Patient stable for transfer out of ICU. ICU will sign off.     Complaints: has none..     Scheduled Medications:   acetaminophen, 650 mg, oral, q6h ALEXANDER  aspirin, 81 mg, oral, Daily  atorvastatin, 80 mg, oral, Nightly  clopidogrel, 75 mg, oral, Daily  docusate sodium, 100 mg, oral, BID  ezetimibe, 10 mg,  oral, Daily  insulin lispro, 0-5 Units, subcutaneous, TID with meals  [Held by provider] metoprolol succinate XL, 25 mg, oral, Daily  midodrine, 10 mg, oral, TID  nystatin, 1 Application, Topical, BID  PARoxetine, 20 mg, oral, Nightly  pioglitazone, 30 mg, oral, q24h  sennosides, 2 tablet, oral, Nightly  warfarin, 2 mg, oral, Once per day on Sun Mon Wed Thu Fri Sat  warfarin, 4 mg, oral, Once per day on Tue         Continuous Medications:        PRN Medications:   PRN medications: dextrose 10 % in water (D10W), dextrose, glucagon, naloxone, ondansetron, oxygen, traMADol    Objective   Vitals:  Most Recent:  Vitals:    03/07/24 0809   BP:    Pulse:    Resp:    Temp:    SpO2: 96%       24hr Min/Max:  Temp  Min: 35.9 °C (96.6 °F)  Max: 37.2 °C (99 °F)  Pulse  Min: 47  Max: 64  BP  Min: 95/63  Max: 138/57  Resp  Min: 6  Max: 29  SpO2  Min: 91 %  Max: 100 %    LDA:  CVC 03/04/24 Triple lumen Right Femoral (Active)   Placement Date/Time: 03/04/24 (c) 1728   Hand Hygiene Performed Prior to CVC Insertion: Yes  Site Prep: Chlorhexidine   Site Prep Agent has Completely Dried Before Insertion: Yes  All 5 Sterile Barriers Used (Gloves, Gown, Cap, Mask, Large Sterile Sujatha...   Number of days: 0       Arterial Line 03/04/24 Radial (Active)   Placement Date/Time: 03/04/24 (c) 0743   Size: 20 G  Location: Radial  Securement Method: Taped  Patient Tolerance: Tolerated well   Number of days: 1       External Urinary Catheter Female (Active)   Placement Date/Time: 03/04/24 1325   Hand Hygiene Completed: Yes  External Catheter Type: Female   Number of days: 0         Vent settings:       Hemodynamic parameters for last 24 hours:       I/O:    Intake/Output Summary (Last 24 hours) at 3/7/2024 1117  Last data filed at 3/7/2024 0600  Gross per 24 hour   Intake 50 ml   Output 600 ml   Net -550 ml         Physical Exam:   Physical Exam  Constitutional:       General: She is awake. She is not in acute distress.     Appearance: Normal  appearance. She is obese.   HENT:      Head: Normocephalic.      Nose: Nose normal.      Mouth/Throat:      Mouth: Mucous membranes are moist.   Eyes:      Extraocular Movements: Extraocular movements intact.      Pupils: Pupils are equal, round, and reactive to light.   Cardiovascular:      Rate and Rhythm: Regular rhythm. Bradycardia present.      Pulses: Normal pulses.      Heart sounds: Normal heart sounds. No murmur heard.  Pulmonary:      Effort: Pulmonary effort is normal. No respiratory distress.      Breath sounds: Normal breath sounds.   Abdominal:      General: Bowel sounds are normal.      Palpations: Abdomen is soft.      Tenderness: There is no abdominal tenderness.   Musculoskeletal:         General: Normal range of motion.      Cervical back: Normal range of motion.      Right lower leg: No edema.      Left lower leg: No edema.   Skin:     General: Skin is warm and dry.      Capillary Refill: Capillary refill takes less than 2 seconds.      Findings: Erythema present.      Comments: Intertrigo to abdominal pannus and groin  TCAR incision and small amount of bruising to right neck    Neurological:      General: No focal deficit present.      Mental Status: She is alert and oriented to person, place, and time. Mental status is at baseline.      Cranial Nerves: Cranial nerves 2-12 are intact.      Sensory: Sensation is intact.   Psychiatric:         Mood and Affect: Mood normal.         Behavior: Behavior normal.         Thought Content: Thought content normal.          Lab/Radiology/Diagnostic Review:  Results for orders placed or performed during the hospital encounter of 03/04/24 (from the past 24 hour(s))   POCT GLUCOSE   Result Value Ref Range    POCT Glucose 119 (H) 74 - 99 mg/dL   Troponin I, High Sensitivity   Result Value Ref Range    Troponin I, High Sensitivity 7 0 - 13 ng/L   POCT GLUCOSE   Result Value Ref Range    POCT Glucose 129 (H) 74 - 99 mg/dL   Transthoracic Echo (TTE) Complete    Result Value Ref Range    LVOT diam 2.00 cm    LV biplane EF 62 %    MV E/A ratio 1.27     MV avg E/e' ratio 14.53     Tricuspid annular plane systolic excursion 2.3 cm    AV mn grad 6.0 mmHg    LA vol index A/L 38.3 ml/m2    AV pk carolyn 1.55 m/s    RV free wall pk S' 14.00 cm/s    Aortic Valve Area by Continuity of VTI 1.90 cm2    Aortic Valve Area by Continuity of Peak Velocity 1.86 cm2    AV pk grad 9.6 mmHg    LV A4C EF 66.1    POCT GLUCOSE   Result Value Ref Range    POCT Glucose 120 (H) 74 - 99 mg/dL   CBC   Result Value Ref Range    WBC 7.0 4.4 - 11.3 x10*3/uL    nRBC 0.0 0.0 - 0.0 /100 WBCs    RBC 3.39 (L) 4.00 - 5.20 x10*6/uL    Hemoglobin 9.1 (L) 12.0 - 16.0 g/dL    Hematocrit 28.9 (L) 36.0 - 46.0 %    MCV 85 80 - 100 fL    MCH 26.8 26.0 - 34.0 pg    MCHC 31.5 (L) 32.0 - 36.0 g/dL    RDW 16.4 (H) 11.5 - 14.5 %    Platelets 191 150 - 450 x10*3/uL   Basic Metabolic Panel   Result Value Ref Range    Glucose 101 (H) 74 - 99 mg/dL    Sodium 137 136 - 145 mmol/L    Potassium 4.2 3.5 - 5.3 mmol/L    Chloride 106 98 - 107 mmol/L    Bicarbonate 28 21 - 32 mmol/L    Anion Gap 7 (L) 10 - 20 mmol/L    Urea Nitrogen 16 6 - 23 mg/dL    Creatinine 0.71 0.50 - 1.05 mg/dL    eGFR 89 >60 mL/min/1.73m*2    Calcium 7.9 (L) 8.6 - 10.3 mg/dL   Protime-INR   Result Value Ref Range    Protime 26.6 (H) 9.8 - 12.8 seconds    INR 2.3 (H) 0.9 - 1.1   Magnesium   Result Value Ref Range    Magnesium 1.87 1.60 - 2.40 mg/dL   POCT GLUCOSE   Result Value Ref Range    POCT Glucose 96 74 - 99 mg/dL   Blood Gas Venous Full Panel   Result Value Ref Range    POCT pH, Venous 7.40 7.33 - 7.43 pH    POCT pCO2, Venous 49 41 - 51 mm Hg    POCT pO2, Venous 36 35 - 45 mm Hg    POCT SO2, Venous 57 45 - 75 %    POCT Oxy Hemoglobin, Venous 55.4 45.0 - 75.0 %    POCT Hematocrit Calculated, Venous 29.0 (L) 36.0 - 46.0 %    POCT Sodium, Venous 137 136 - 145 mmol/L    POCT Potassium, Venous 4.2 3.5 - 5.3 mmol/L    POCT Chloride, Venous 103 98 - 107 mmol/L     POCT Ionized Calicum, Venous 1.19 1.10 - 1.33 mmol/L    POCT Glucose, Venous 99 74 - 99 mg/dL    POCT Lactate, Venous 0.6 0.4 - 2.0 mmol/L    POCT Base Excess, Venous 4.8 (H) -2.0 - 3.0 mmol/L    POCT HCO3 Calculated, Venous 30.4 (H) 22.0 - 26.0 mmol/L    POCT Hemoglobin, Venous 9.6 (L) 12.0 - 16.0 g/dL    POCT Anion Gap, Venous 8.0 (L) 10.0 - 25.0 mmol/L    Patient Temperature 37.0 degrees Celsius    FiO2 21 %     ECG 12 lead    Result Date: 3/4/2024  Sinus rhythm Prolonged MO interval Probable left atrial enlargement Borderline repolarization abnormality Minimal ST elevation, inferior leads Confirmed by Wilder Cassidy ([id number]) on 3/4/2024 4:31:40 PM    FL less than 1 hour    Result Date: 3/4/2024  These images are not reportable by radiology and will not be interpreted by  Radiologists.        Assessment/Plan   Principal Problem:    Carotid stenosis, right    Elsa Biggs is a 73 y.o. female with past medical history of paroxsymal atrial fibrillation on Coumadin, HFrEF, coronary artery disease, diabetes mellitus type 2, obesity, hypertension, hyperlipidemia, depression, ischemic stroke in the left thalamus territory, and right ICA 85%. Patient underwent transcarotid artery revascularization of the right carotid artery with Dr. Victor.     Hypotension vs shock after TCAR- resolved  - Hypotensive requiring vasopressors after right TCAR.  -No evident blood loss noted.  - Central line placed 3/4/24 due to requiring vasopressor support  -Patient continues to be hypotensive requiring vasopressor support with Norepinephrine despite fluid resuscitation  - No clear source of infection  -No fevers and WBC count 7.9  - LR bolus 500ml  - Encourage oral intake  - Continue to hold metoprolol succinate  - Monitor CBC  - Urinalysis with reflex microscopic and culture  - Blood culture x1  -MRSA PCR  - Add Zosyn x7 days and Vancomycin  -Pharmacy to dose vancomycin  - Continue Levophed and titrate to maintain MAP greater  than 60  - Add Midodrine 5mg TID  -Check TSH and cortisol  3/6/24: Weaned off of norepinephrine this morning at 0530. Hypotensive this morning with blood pressure of 98/39. Will increase Midodrine to 10mg TID. Cardiology consulted, input appreciated. TSH 1.94. Cortisol 17.8. MRSA negative and blood cultures negative to date. Will discontinue Vancomycin. Check venous blood gas full panel  3/7/24: Patient off of norepinephrine for over 24 hours. Hypotension resolved with increased dose of Midodrine. Cardiology signed off. Will discontinue Zosyn. Patient ok to transfer out of ICU, will sign off.      2. Bradycardia  - Patient noted with heart rate in 40s-50s. She is asymptomatic at rest and with movement.  - TSH- 1.94  -Cortisol- 17.8  -EKG revealed no acute ischemic abnormalities  - Troponin 7  - Consulted cardiology due to bradycardia and ongoing hypotension, input appreciated.  - Treat with atropine if develops symptomatic bradycardia.  -Continue telemetry monitoring  - Continue to hold metoprolol due to bradycardia  3/7/24: Continues to be bradycardic with heart rates in the 50s to low 60s. She is asymptomatic. TTE revealed improved EF. Cardiology recommended to continue current medications, continue  to hold metoprolol, and follow up with OP cardiologist.     Hypomagnesemia- resolved  - Magnesium level 1.59 this morning  - Received 800mg of oral magnesium on 3/5 for magnesium level of 1.51.  - Replace magnesium with 2 grams of IV magnesium  - Follow magnesium level.  3/7/24: Magnesium level 1.87 this morning    History of CVA and paroxysmal atrial fibrillation  - Continue coumadin with plans to transition to [facility] as outpatient.   - Currently sinus bradycardia with 1st degree block  - Continue to hold metoprolol due to bradycardia and ongoing hypotension  - Continue aspirin, plavix, and statin  -Follow INR daily for coumadin dosing.    Intertrigo  - Erythema and moisture-associated skin damage noted to abdominal  pannus and left groin  - Add nystatin powder   - Cleanse abdominal pannus and groin with soap and water, pat dry, and apply nystatin powder twice daily    Acute respiratory failure with hypoxia- resolved  - On 4 liters of oxygen via nasal cannula post op which is acute  - Wean oxygen to maintain SPO2 greater than 92%  - Encourage use of incentive spirometer  - Encourage cough and deep breathe.   3/5/24: Patient is on room air and no longer requiring oxygen. May utilize oxygen PRN to maintain SPO2 greater than 92%.    7. Status post right transcarotid artery revascularization of right carotid  - Patient underwent right TCAR today with Dr. Victor  - Continue Plavix, lipitor, aspirin, and zetia  -Monitor incision site for signs of bleeding  - Monitor CBC   -Transfuse for hemoglobin less than 7 or greater than 7 with signs of bleeding and hemodynamic instability  - Maintain MAP greater than 65 and systolic blood pressure greater than 120  - Continue IV fluids  - 500ml LR bolus  -Add Levophed  - Discontinue phenylephrine  - Start nicardipine if systolic blood pressure greater than 150.  3/5/24: Surgical incision covered with surgical dressing. No signs of bleeding noted. Will discontinue nicardipine due to nonuse and continued need for norepinephrine. Hemoglobin 10.4. Discontinue IV fluids.     8. Advanced care planning  - Discussed with patient about goals of care and code status. Discussed differences between full code and DNR. Patient states that she wants to live forever but is realistic and knows that living forever is impossible. She states that she wants everything to be done in the event of cardiac arrest. Will continue with current status of Full Code.       I spent 30 minutes of cumulative critical care time with the patient.  Greater than 50% of that time was spent in the direct collaboration and or coordination of care of the patient.     Dragon dictation software was used to dictate this note and thus there  may be minor errors in translation/transcription including garbled speech or misspellings. Please contact for clarification if needed.

## 2024-03-07 NOTE — PROGRESS NOTES
tPhysical Therapy    Physical Therapy Treatment    Patient Name: Elsa Biggs  MRN: [id number]  Today's Date: 3/7/2024  Time Calculation  Start Time: 1020  Stop Time: 1045  Time Calculation (min): 25 min       Assessment/Plan   PT Assessment  End of Session Communication: Bedside nurse  End of Session Patient Position: Up in chair, Alarm on     PT Plan  Treatment/Interventions: Bed mobility, Transfer training, Gait training, Stair training, Balance training, Strengthening, Endurance training, Therapeutic exercise, Therapeutic activity, Home exercise program  PT Plan: Skilled PT  PT Frequency: 4 times per week  PT Discharge Recommendations: Low intensity level of continued care  PT - OK to Discharge: Yes (when medically cleared)    General Visit Information:   PT  Visit  PT Received On: 03/07/24  General  Prior to Session Communication: Bedside nurse  Patient Position Received: Up in chair, Alarm on    Subjective   Precautions:  Precautions  Medical Precautions: Fall precautions    Objective   Pain:  Pain Assessment  Pain Assessment: 0-10  Pain Score: 0 - No pain  Cognition:  Cognition  Overall Cognitive Status: Within Functional Limits    Treatments:  Therapeutic Exercise  Therapeutic Exercise Performed: Yes  Therapeutic Exercise Activity 1: pt completed seated LE exercises: AP x 15 reps, LAQ x 15 reps, hip flexion x 15 reps, hip adduction x 15 reps, hip abduction x 15 reps    Ambulation/Gait Training  Ambulation/Gait Training Performed: Yes  Ambulation/Gait Training 1  Surface 1: Level tile  Device 1: Rolling walker  Assistance 1: Contact guard, Minimal verbal cues  Quality of Gait 1: Decreased step length, Forward flexed posture  Comments/Distance (ft) 1: 60 feet; pt cued to stand tall and  keep walker close  Transfers  Transfer: Yes  Transfer 1  Technique 1: Sit to stand  Transfer Device 1: Walker  Transfer Level of Assistance 1: Minimum assistance, Minimal verbal cues  Trials/Comments 1: x 3 times    Outcome  Measures:  [facility] Basic Mobility  Turning from your back to your side while in a flat bed without using bedrails: A little  Moving from lying on your back to sitting on the side of a flat bed without using bedrails: A little  Moving to and from bed to chair (including a wheelchair): A little  Standing up from a chair using your arms (e.g. wheelchair or bedside chair): A lot  To walk in hospital room: A little  Climbing 3-5 steps with railing: Total  Basic Mobility - Total Score: 15    Education Documentation  Precautions, taught by Malinda Potter PTA at 3/7/2024 11:57 AM.  Learner: Patient  Readiness: Acceptance  Method: Explanation  Response: Needs Reinforcement    Mobility Training, taught by Malinda Potter PTA at 3/7/2024 11:57 AM.  Learner: Patient  Readiness: Acceptance  Method: Explanation  Response: Needs Reinforcement    Education Comments  No comments found.        OP EDUCATION:       Encounter Problems       Encounter Problems (Active)       PT Problem       transfers (Progressing)       Start:  03/05/24    Expected End:  03/07/24       Patient will perform all transfers with SBA x1          gait (Progressing)       Start:  03/05/24    Expected End:  03/07/24       Patient will amb 50+ feet with SBA x1          strengthening (Progressing)       Start:  03/05/24    Expected End:  03/07/24       Patient will perform 20+ reps of AROM/RROM for JEFFREY LE's to improve safety and functional independence              Pain - Adult

## 2024-03-07 NOTE — PROGRESS NOTES
Subjective Data:  Ms. Biggs is lying in bed, no distress. She denies chest pain or pressure, no dizziness or lightheadedness with ambulation and no shortness of breath. She endorses rt sided neck pain/stiffness. Monitor demonstrates SB with occasional PVC, rates in the 50s.     Overnight Events:    None     Objective Data:  Last Recorded Vitals:  Vitals:    03/07/24 0635 03/07/24 0705 03/07/24 0723 03/07/24 0809   BP: (!) 98/43 (!) 115/43     Pulse: 50 55     Resp: 18 22     Temp:   36.6 °C (97.9 °F)    TempSrc:       SpO2: 92% 92%  96%   Weight:  122 kg (268 lb 4.8 oz)     Height:           Last Labs:  CBC - 3/7/2024:  4:03 AM  7.0 9.1 191    28.9      CMP - 3/7/2024:  4:03 AM  7.9 6.2 13 --- 0.4   3.7 3.0 14 45      PTT - 10/20/2023:  5:22 AM  2.3   26.6 30     TROPHS   Date/Time Value Ref Range Status   03/06/2024 02:57 PM 7 0 - 13 ng/L Final   03/06/2024 08:44 AM 7 0 - 13 ng/L Final   12/25/2023 12:06 AM 51 0 - 13 ng/L Final     BNP   Date/Time Value Ref Range Status   12/25/2023 02:18  0 - 99 pg/mL Final   12/24/2023 07:55  0 - 99 pg/mL Final     HGBA1C   Date/Time Value Ref Range Status   10/20/2023 05:22 AM 7.0 see below % Final   08/02/2018 03:17 AM 6.2 % Final     Comment:          Diagnosis of Diabetes-Adults   Non-Diabetic: < or = 5.6%   Increased risk for developing diabetes: 5.7-6.4%   Diagnostic of diabetes: > or = 6.5%  .       Monitoring of Diabetes                Age (y)     Therapeutic Goal (%)   Adults:          >18           <7.0   Pediatrics:    13-18           <7.5                   7-12           <8.0                   0- 6            7.5-8.5   American Diabetes Association. Diabetes Care 33(S1), Jan 2010.     08/01/2018 11:52 AM 6.2 % Final     Comment:          Diagnosis of Diabetes-Adults   Non-Diabetic: < or = 5.6%   Increased risk for developing diabetes: 5.7-6.4%   Diagnostic of diabetes: > or = 6.5%  .       Monitoring of Diabetes                Age (y)     Therapeutic Goal  (%)   Adults:          >18           <7.0   Pediatrics:    13-18           <7.5                   7-12           <8.0                   0- 6            7.5-8.5   American Diabetes Association. Diabetes Care 33(S1), Jan 2010.       LDLCALC   Date/Time Value Ref Range Status   10/20/2023 05:22 AM 60 <=99 mg/dL Final     Comment:                                 Near   Borderline      AGE      Desirable  Optimal    High     High     Very High     0-19 Y     0 - 109     ---    110-129   >/= 130     ----    20-24 Y     0 - 119     ---    120-159   >/= 160     ----      >24 Y     0 -  99   100-129  130-159   160-189     >/=190       VLDL   Date/Time Value Ref Range Status   10/20/2023 05:22 AM 13 0 - 40 mg/dL Final      Last I/O:  I/O last 3 completed shifts:  In: 529.5 (4.4 mL/kg) [I.V.:329.5 (2.7 mL/kg); IV Piggyback:200]  Out: 1050 (8.6 mL/kg) [Urine:1050 (0.2 mL/kg/hr)]  Weight: 121.7 kg     Past Cardiology Tests (Last 3 Years):  EKG:  ECG 12 lead 03/04/2024      ECG 12 lead (Clinic Performed) 01/16/2024 (Preliminary)      ECG 12 Lead 12/27/2023      Electrocardiogram, 12-lead 12/25/2023      ECG 12 lead 12/13/2023 (Preliminary)      ECG 12 Lead     Echo:  Transthoracic Echo (TTE) Complete 03/06/2024  Left ventricular systolic function is low normal with a 50-55% estimated ejection fraction.     Transthoracic Echo (TTE) Complete 10/20/2023  . Left ventricular systolic function is mildly decreased with a 45-50% estimated ejection fraction.   2. Spectral Doppler shows an impaired relaxation pattern of left ventricular diastolic filling.   3. There is mild tricuspid regurgitation.   4. Technically difficult bubble study unable to interpret.     Ejection Fractions:  EF   Date/Time Value Ref Range Status   03/06/2024 03:57 PM 62 %      Cath:  Cardiac catheterization - coronary 01/29/2024    Stress Test:  Nuclear Stress Test 12/26/2023      Nuclear Stress Test 04/15/2021    Cardiac Imaging:  No results found for this or any  previous visit from the past 1095 days.      Inpatient Medications:  Scheduled medications   Medication Dose Route Frequency    acetaminophen  650 mg oral q6h ALEXANDER    aspirin  81 mg oral Daily    atorvastatin  80 mg oral Nightly    clopidogrel  75 mg oral Daily    docusate sodium  100 mg oral BID    ezetimibe  10 mg oral Daily    insulin lispro  0-5 Units subcutaneous TID with meals    [Held by provider] metoprolol succinate XL  25 mg oral Daily    midodrine  10 mg oral TID    nystatin  1 Application Topical BID    PARoxetine  20 mg oral Nightly    pioglitazone  30 mg oral q24h    piperacillin-tazobactam  3.375 g intravenous q6h    sennosides  2 tablet oral Nightly    warfarin  2 mg oral Once per day on Sun Mon Wed Thu Fri Sat    warfarin  4 mg oral Once per day on Tue     PRN medications   Medication    dextrose 10 % in water (D10W)    dextrose    glucagon    naloxone    ondansetron    oxygen    traMADol     Continuous Medications   Medication Dose Last Rate       Physical Exam:  Physical Exam  Vitals reviewed.   Constitutional:       Appearance: Normal appearance.   HENT:      Head: Normocephalic.      Mouth/Throat:      Mouth: Mucous membranes are moist.   Cardiovascular:      Rate and Rhythm: Regular rhythm. Bradycardia present.      Pulses: Normal pulses.      Heart sounds: Normal heart sounds. No murmur heard.     No friction rub. No gallop.   Pulmonary:      Effort: Pulmonary effort is normal.      Breath sounds: Normal breath sounds. No wheezing, rhonchi or rales.   Abdominal:      General: Bowel sounds are normal.      Palpations: Abdomen is soft.   Musculoskeletal:         General: Normal range of motion.      Cervical back: Normal range of motion.      Right lower leg: No edema.      Left lower leg: No edema.   Skin:     General: Skin is warm and dry.   Neurological:      General: No focal deficit present.      Mental Status: She is alert and oriented to person, place, and time.   Psychiatric:         Mood  and Affect: Mood normal.         Behavior: Behavior normal.         Thought Content: Thought content normal.         Judgment: Judgment normal.           Assessment/Plan   R severe carotid artery stenosis s/p TCAR on 3/4/24  Bradycardia  Hypotension  Nonobstructive CAD  HFrEF, chronic systolic HF  pAfib, anticoagulated with warfarin  HTN  DL     Bradycardia, hypotension:  suspect this is prolonged baroreceptor stretch response post carotid stenting procedure.  TSH was checked and is WNL.  HSTI negative.  EKG demonstrates no obvious acute ischemic abnormalities.    We can check TTE to ensure there are no new rWMA to suggest an MI, given patient's history of nonobstructive CAD.  Otherwise, would recommend supportive care.  Can try atropine as needed for symptomatic bradycardia.  At this time, no indication for TVP/PPM.  Suspect this baroreceptor response will gradually resolve over course of next 12-24 hrs.     -tele monitoring  -rpt troponin and TTE ordered  -serial BP monitoring  -supportive care with IVF, pressors to keep MAP > 60  -if develops worsening or symptomatic bradycardia, would treat with atropine  -currently there is no indication for TVP or PPM  -continue antiplatelet therapy with Asa/clopidogrel  -statin, zetia  -hold BB due to bradycardia - this can be readdressed at future outpatient visit  -warfarin for CVA prophylaxis  -patient should have close follow up with her outpatient cardiology providers, Malinda Posada CNP/ Dr. Childress      3/7/24:   Patient continues to have asymptomatic bradycardia with heart rates in the 50-60s, blood pressures stable. TTE with improved EF, no rWMA. Troponin 7 for two sets. Would continue to hold metoprolol until further evaluated by OP cardiologist. Continue current cardiac medications.     Recommendations:  Cardiology will sign off at this time, no further testing indicated at this time. Patient will follow up with OP cardiologist within next few weeks. Please call with  any additional cardiac concerns.       Code Status:  Full Code    I      Katiana Pearson, APRN-CNP

## 2024-03-08 ENCOUNTER — PHARMACY VISIT (OUTPATIENT)
Dept: PHARMACY | Facility: CLINIC | Age: 74
End: 2024-03-08
Payer: COMMERCIAL

## 2024-03-08 ENCOUNTER — APPOINTMENT (OUTPATIENT)
Dept: PHARMACY | Facility: HOSPITAL | Age: 74
End: 2024-03-08
Payer: MEDICARE

## 2024-03-09 LAB — BACTERIA BLD CULT: NORMAL

## 2024-03-10 LAB
ATRIAL RATE: 90 BPM
ATRIAL RATE: 92 BPM
P AXIS: 21 DEGREES
P AXIS: 46 DEGREES
P OFFSET: 167 MS
P OFFSET: 180 MS
P ONSET: 116 MS
P ONSET: 129 MS
PR INTERVAL: 182 MS
PR INTERVAL: 206 MS
Q ONSET: 219 MS
Q ONSET: 220 MS
QRS COUNT: 14 BEATS
QRS COUNT: 15 BEATS
QRS DURATION: 92 MS
QRS DURATION: 96 MS
QT INTERVAL: 356 MS
QT INTERVAL: 402 MS
QTC CALCULATION(BAZETT): 440 MS
QTC CALCULATION(BAZETT): 491 MS
QTC FREDERICIA: 410 MS
QTC FREDERICIA: 460 MS
R AXIS: -8 DEGREES
R AXIS: 63 DEGREES
T AXIS: 17 DEGREES
T AXIS: 175 DEGREES
T OFFSET: 398 MS
T OFFSET: 420 MS
VENTRICULAR RATE: 90 BPM
VENTRICULAR RATE: 92 BPM

## 2024-03-14 ENCOUNTER — OFFICE VISIT (OUTPATIENT)
Dept: VASCULAR SURGERY | Facility: CLINIC | Age: 74
End: 2024-03-14
Payer: MEDICARE

## 2024-03-14 VITALS — DIASTOLIC BLOOD PRESSURE: 69 MMHG | WEIGHT: 275 LBS | BODY MASS INDEX: 48.71 KG/M2 | SYSTOLIC BLOOD PRESSURE: 116 MMHG

## 2024-03-14 DIAGNOSIS — R09.89 CAROTID BRUIT, UNSPECIFIED LATERALITY: Primary | ICD-10-CM

## 2024-03-14 PROCEDURE — 99024 POSTOP FOLLOW-UP VISIT: CPT | Performed by: SURGERY

## 2024-03-14 PROCEDURE — 3074F SYST BP LT 130 MM HG: CPT | Performed by: SURGERY

## 2024-03-14 PROCEDURE — 1159F MED LIST DOCD IN RCRD: CPT | Performed by: SURGERY

## 2024-03-14 PROCEDURE — 1111F DSCHRG MED/CURRENT MED MERGE: CPT | Performed by: SURGERY

## 2024-03-14 PROCEDURE — 3078F DIAST BP <80 MM HG: CPT | Performed by: SURGERY

## 2024-03-14 PROCEDURE — 1157F ADVNC CARE PLAN IN RCRD: CPT | Performed by: SURGERY

## 2024-03-14 PROCEDURE — 1160F RVW MEDS BY RX/DR IN RCRD: CPT | Performed by: SURGERY

## 2024-03-14 PROCEDURE — 1036F TOBACCO NON-USER: CPT | Performed by: SURGERY

## 2024-03-14 NOTE — PROGRESS NOTES
Subjective   Patient ID: Elsa Biggs is a 73 y.o. female who presents for Post-op (TCAR 3/4).  HPI  Patient is doing well  No evidence of amaurosis hemiplegia or hemiparesis  Minimal discomfort    Review of Systems    Objective   Physical Exam  Incision is clean dry and intact  Ecchymosis noted anterior chest wall over incision  Neurologic exam close she is intact without evidence of deficit  Assessment/Plan   Status post right TCAR  Repeat carotid duplex in 1 month  Follow-up in 3 months.  Continue antiplatelet therapy         Andrew Victor DO 03/14/24 2:15 PM

## 2024-03-15 ENCOUNTER — ANTICOAGULATION - WARFARIN VISIT (OUTPATIENT)
Dept: PHARMACY | Facility: HOSPITAL | Age: 74
End: 2024-03-15
Payer: MEDICARE

## 2024-03-15 DIAGNOSIS — I48.92 ATRIAL FLUTTER, UNSPECIFIED TYPE (MULTI): ICD-10-CM

## 2024-03-15 DIAGNOSIS — I48.92 ATRIAL FLUTTER, UNSPECIFIED TYPE (MULTI): Primary | ICD-10-CM

## 2024-03-15 LAB
POC INR: 1.7 (ref 2–3)
POC PROTHROMBIN TIME: ABNORMAL

## 2024-03-15 NOTE — PROGRESS NOTES
Subjective     Elsa Biggs is a 73 y.o. female who presents for anticoagulation follow up.     Location: [facility]     Referring Provider: Malinda Posada   INR Goal: The patient does not have an active anticoagulation episode.  Indication: Atrial Fibrillation/Atrial Flutter    Bleeding signs/symptoms: No  Bruising: No   Major bleeding event: No  Thrombosis signs/symptoms: No  Thromboembolic event: No  Missed doses: No  Extra doses: No  Medication changes: Yes  to transition to Eliquis this morning  Dietary changes: No  Change in health: No  Change in activity: No  Alcohol: No  Other concerns: No  Upcoming Surgeries: no  Injections: no    Current Outpatient Medications on File Prior to Visit   Medication Sig Dispense Refill    acetaminophen (Tylenol) 325 mg tablet Take 2 tablets (650 mg) by mouth every 4 hours if needed for mild pain (1 - 3), moderate pain (4 - 6) or fever (temp greater than 38.0 C). 30 tablet 1    apixaban (Eliquis) 5 mg tablet Take 1 tablet (5 mg) by mouth 2 times a day. 60 tablet 2    aspirin 81 mg EC tablet Take 1 tablet (81 mg) by mouth once daily.      atorvastatin (Lipitor) 80 mg tablet Take 1 tablet (80 mg) by mouth once daily at bedtime. 30 tablet 1    cholecalciferol (Vitamin D-3) 25 MCG (1000 UT) tablet Take 1 tablet (25 mcg) by mouth once daily.      clopidogrel (Plavix) 75 mg tablet Take 1 tablet (75 mg) by mouth once daily. 30 tablet 1    ezetimibe (Zetia) 10 mg tablet Take 1 tablet (10 mg) by mouth once daily.      metFORMIN (Glucophage) 500 mg tablet Take 1 tablet (500 mg) by mouth once daily in the morning. And 2 tabs In the evening      midodrine (Proamatine) 10 mg tablet Take 1 tablet (10 mg) by mouth 3 times a day. 90 tablet 0    OneTouch Delica Plus Lancet 33 gauge misc USE TO TAKE BLOOD SUGAR 1-2 TIMES PER DAY      PARoxetine (Paxil) 20 mg tablet Take 1 tablet (20 mg) by mouth once daily at bedtime.      pioglitazone (Actos) 30 mg tablet Take  1 tablet (30 mg) by mouth once every 24 hours.      warfarin (Coumadin) 2 mg tablet Take 1 tablet (2 mg) by mouth once daily in the evening. Take as directed per After Visit Summary. (Patient taking differently: Take 1 tablet (2 mg) by mouth once daily in the evening. One tablet daily except on , takes 2 tablets) 90 tablet 3     No current facility-administered medications on file prior to visit.        Objective   Anticoagulation Summary  As of 3/15/2024      INR goal:  2.0-3.0   TTR:  98.6 % (1.3 wk)   INR used for dosin.70 (3/15/2024)   Weekly warfarin total:  16 mg             Lab Results   Component Value Date    INR 1.70 (A) 03/15/2024    INR 2.3 (H) 2024    INR 2.1 (H) 2024    INR 2.0 (H) 2024    INR 2.60 (N) 2024    INR 2.20 (N) 2024    PROTIME 26.6 (H) 2024    PROTIME 23.4 (H) 2024    PROTIME 22.4 (H) 2024       Assessment/Plan   Current INR is Subtherapeutic at 1.7. Previous INR was Therapeutic at 2.3. Patient held warfarin dose yesterday as instructed in anticipation of Eliquis conversion. INR today, is less than 2.0 which is recommend by  prior to initiation of Eliquis. Advised patient to discard remaining warfarin tablets - drug take back day, drop box, or police station. Reiterated education on Eliquis dosing and side effects. Patient will take first dose of Eliquis this morning. All questions answered and patient verbalized understanding.     Follow Up:  2 months    Milagro Mckeon, KennD

## 2024-03-18 ENCOUNTER — APPOINTMENT (OUTPATIENT)
Dept: GASTROENTEROLOGY | Facility: HOSPITAL | Age: 74
End: 2024-03-18
Payer: MEDICARE

## 2024-03-19 ENCOUNTER — OFFICE VISIT (OUTPATIENT)
Dept: CARDIOLOGY | Facility: HOSPITAL | Age: 74
End: 2024-03-19
Payer: MEDICARE

## 2024-03-19 VITALS
SYSTOLIC BLOOD PRESSURE: 130 MMHG | WEIGHT: 249.12 LBS | BODY MASS INDEX: 44.13 KG/M2 | HEART RATE: 73 BPM | DIASTOLIC BLOOD PRESSURE: 74 MMHG | OXYGEN SATURATION: 96 %

## 2024-03-19 DIAGNOSIS — I49.3 PVC (PREMATURE VENTRICULAR CONTRACTION): Primary | ICD-10-CM

## 2024-03-19 DIAGNOSIS — I48.3 TYPICAL ATRIAL FLUTTER (MULTI): ICD-10-CM

## 2024-03-19 PROCEDURE — 99214 OFFICE O/P EST MOD 30 MIN: CPT | Performed by: NURSE PRACTITIONER

## 2024-03-19 PROCEDURE — 3075F SYST BP GE 130 - 139MM HG: CPT | Performed by: NURSE PRACTITIONER

## 2024-03-19 PROCEDURE — 1111F DSCHRG MED/CURRENT MED MERGE: CPT | Performed by: NURSE PRACTITIONER

## 2024-03-19 PROCEDURE — 1036F TOBACCO NON-USER: CPT | Performed by: NURSE PRACTITIONER

## 2024-03-19 PROCEDURE — 1157F ADVNC CARE PLAN IN RCRD: CPT | Performed by: NURSE PRACTITIONER

## 2024-03-19 PROCEDURE — 1159F MED LIST DOCD IN RCRD: CPT | Performed by: NURSE PRACTITIONER

## 2024-03-19 PROCEDURE — 93010 ELECTROCARDIOGRAM REPORT: CPT | Performed by: INTERNAL MEDICINE

## 2024-03-19 PROCEDURE — 3078F DIAST BP <80 MM HG: CPT | Performed by: NURSE PRACTITIONER

## 2024-03-19 PROCEDURE — 93005 ELECTROCARDIOGRAM TRACING: CPT | Performed by: NURSE PRACTITIONER

## 2024-03-19 PROCEDURE — 1160F RVW MEDS BY RX/DR IN RCRD: CPT | Performed by: NURSE PRACTITIONER

## 2024-03-19 RX ORDER — METOPROLOL SUCCINATE 25 MG/1
25 TABLET, EXTENDED RELEASE ORAL DAILY
Qty: 30 TABLET | Refills: 11 | Status: SHIPPED | OUTPATIENT
Start: 2024-03-19 | End: 2025-03-19

## 2024-03-21 NOTE — PROGRESS NOTES
Chief Complaint:   Follow up s/p recent hospitalization      History Of Present Illness:    Elsa Biggs is a 73 y.o. female from home with h/o obesity, hypertension, dyslipidemia, depression, type 2 DM, recently admitted in October with acute ischemic stroke in the left thalamus territory, and incidentally found MALATHI 85%, PAF on warfarin, PVCs, HFmrEF presenting today for follow up s/p recent coronary angiography which was negative for obstructive CAD (LM mild disease, p-mLAD 40, Lcx mid 60, RCA mild diffuse disease)  presenting today for follow up s/p recent hospitalization for right TCAR.  Patient had transient hypotension and sinus bradycardia event perioperatively.  Was evaluated by cardiology at [facility] and event was thought to be related to high vagal tone post carotid surgery.  Lisinopril and Toprol were held during hospitalization and at discharge.  Elsa is doing well since discharge-- recovering slowly but surely and trying to build her strength back.  She denies having chest pain or pressure, SOB, or palpitations.  Has mild soreness at right surgical site but this is improving.  BP has normalized.  ECG today shows NSR with frequent PVCs.      Last Recorded Vitals:  Vitals:    03/19/24 1015   BP: 130/74   Pulse: 73   SpO2: 96%   Weight: 113 kg (249 lb 1.9 oz)       Past Medical History:  She has a past medical history of Anxiety, Breast cancer (CMS/HCC), Cancer (CMS/HCC), Depression, Diabetes mellitus (CMS/HCC), GERD (gastroesophageal reflux disease), Hyperlipidemia, Hypertension, and Stroke (CMS/HCC).    Past Surgical History:  She has a past surgical history that includes MR angio neck w and wo IV contrast (01/14/2022); Joint replacement (Left); Fracture surgery; Breast surgery; MR angio head wo IV contrast (12/26/2023); MR angio neck wo IV contrast (12/26/2023); and Cardiac catheterization (N/A, 01/29/2024).      Social History:  She reports that she quit smoking about 41 years ago. Her smoking use  included cigarettes. She smoked an average of 1 pack per day. She has never used smokeless tobacco. She reports that she does not drink alcohol and does not use drugs.    Family History:  Family History   Problem Relation Name Age of Onset    Heart attack Father  65        blood clot    Coronary artery disease Sister          Allergies:  Tetracycline    Outpatient Medications:  Current Outpatient Medications   Medication Instructions    acetaminophen (TYLENOL) 650 mg, oral, Every 4 hours PRN    apixaban (ELIQUIS) 5 mg, oral, 2 times daily    aspirin 81 mg, oral, Daily    atorvastatin (LIPITOR) 80 mg, oral, Nightly    cholecalciferol (Vitamin D-3) 25 MCG (1000 UT) tablet 1 tablet, oral, Daily    clopidogrel (PLAVIX) 75 mg, oral, Daily    ezetimibe (ZETIA) 10 mg, oral, Daily    metFORMIN (GLUCOPHAGE) 500 mg, oral, Every morning, And 2 tabs In the evening    metoprolol succinate XL (TOPROL-XL) 25 mg, oral, Daily, Do not crush or chew.    OneTouch Delica Plus Lancet 33 gauge misc USE TO TAKE BLOOD SUGAR 1-2 TIMES PER DAY    PARoxetine (PAXIL) 20 mg, oral, Nightly    pioglitazone (ACTOS) 30 mg, oral, Every 24 hours       Physical Exam:  Constitutional: Pleasant, Awake/Alert/Oriented to person place and time. No distress  Head: Atraumatic, Normocephalic  Eyes: EOMI. RIP  Neck:  No JVD  Cardiovascular: Regular rate and rhythm, S1, S2. No extra heart sounds or murmurs  Respiratory: Clear to auscultation bilaterally. No wheezing, rales or rhonchi. Good chest wall expansion  Abdomen: Soft, Nontender. Bowel sounds appreciated  Musculoskeletal: ROM intact. Muscle strength grossly intact upper and lower extremities 5/5.   Neurological: CNII-XII intact. Sensation grossly intact  Extremities: Warm and dry. No acute rashes and lesions  Psychiatric: Appropriate mood and affect       Last Labs:  CBC -  Lab Results   Component Value Date    WBC 7.0 03/07/2024    HGB 9.1 (L) 03/07/2024    HCT 28.9 (L) 03/07/2024    MCV 85 03/07/2024      03/07/2024       CMP -  Lab Results   Component Value Date    CALCIUM 7.9 (L) 03/07/2024    PHOS 3.7 03/05/2024    PROT 6.2 (L) 12/25/2023    ALBUMIN 3.0 (L) 12/25/2023    AST 13 12/25/2023    ALT 14 12/25/2023    ALKPHOS 45 12/25/2023    BILITOT 0.4 12/25/2023       LIPID PANEL -   Lab Results   Component Value Date    CHOL 113 10/20/2023    TRIG 66 10/20/2023    HDL 40.1 10/20/2023    CHHDL 2.8 10/20/2023    VLDL 13 10/20/2023    NHDL 73 10/20/2023       RENAL FUNCTION PANEL -   Lab Results   Component Value Date    GLUCOSE 101 (H) 03/07/2024     03/07/2024    K 4.2 03/07/2024     03/07/2024    CO2 28 03/07/2024    ANIONGAP 7 (L) 03/07/2024    BUN 16 03/07/2024    CREATININE 0.71 03/07/2024    CALCIUM 7.9 (L) 03/07/2024    PHOS 3.7 03/05/2024    ALBUMIN 3.0 (L) 12/25/2023        Lab Results   Component Value Date     (H) 12/25/2023    HGBA1C 7.0 (H) 10/20/2023       Last Cardiology Tests:  ECG:  ECG 12 lead (Clinic Performed) 03/19/2024 (Preliminary)    NSR, HR 85bpm, PVCs in trigeminy     Echo:  Transthoracic Echo (TTE) Complete 03/06/2024  CONCLUSIONS:   1. Left ventricular systolic function is low normal with a 50-55% estimated ejection fraction.    10/20/2023 Echo:  CONCLUSIONS:   1. Left ventricular systolic function is mildly decreased with a 45-50% estimated ejection fraction.   2. Spectral Doppler shows an impaired relaxation pattern of left ventricular diastolic filling.   3. There is mild tricuspid regurgitation.   4. Technically difficult bubble study unable to interpret.     Cath:  Cardiac catheterization - coronary 01/29/2024  CONCLUSIONS:   1. Left main: no significant angiographic disease.   2. LAD: diffuse 40% prox-mid disease.   3. LCx: 60% mid-vessel disease.   4. RCA: mild diffuse irregularities.   5. LVEDP 26mmHg, no aortic stenosis on LV-Ao gradient.    Stress Test:  Nuclear Stress Test 12/26/2023    IMPRESSION:  Mild attentuation of the anterior wall seen on the  stress images.  This is either soft tissue attenuation or ischemia in LAD territory.  Unfortunately there are no prone images available. Dilated left  ventricle with mild left ventricular systolic dysfunction on post  stress gated imaging.  Summary:   1. No clinical or electrocardiographic evidence for ischemia at maximal infusion.   2. Correlate with myocardial perfusion imaging results.   3. Nuclear image results are reported separately.      Lab review: I have personally reviewed the laboratory result(s)   Diagnostic review: I have personally reviewed the result(s) of the Echocardiogram, LHC, and stress test     Assessment/Plan   Very pleasant 73 y.o. female from home with h/o obesity, hypertension, dyslipidemia, depression, type 2 DM, recently admitted in October with acute ischemic stroke in the left thalamus territory, and incidentally found AMLATHI 85%, PAF on warfarin, PVCs, HFmrEF presenting today for follow up s/p recent coronary angiography which was negative for obstructive CAD (LM mild disease, p-mLAD 40, Lcx mid 60, RCA mild diffuse disease)  presenting today for follow up s/p recent hospitalization for right TCAR.  Transient hypotension/bradycardia post-operatively noted, however, vitals are back to baseline.  Echo 3/6/2024 shows improved EF 50-55%.     Plan:  -Resume Toprol 25mg daily to reduce PVC burden  -Will stop lisinopril to avoid hypotension; EF has recovered  -Continue Apixaban 5mg BID (working with clinical pharmacy, warfarin switched back to apixaban 3/15/2024)  -Continue asa/plavix per Vascular recommendations  -Continue zetia 10mg daily  -Follow up in 6 months or sooner if needed       ANITA Dhillon-CNP

## 2024-03-26 ENCOUNTER — OFFICE VISIT (OUTPATIENT)
Dept: UROLOGY | Facility: CLINIC | Age: 74
End: 2024-03-26
Payer: MEDICARE

## 2024-03-26 DIAGNOSIS — N32.81 OAB (OVERACTIVE BLADDER): Primary | ICD-10-CM

## 2024-03-26 PROCEDURE — 1159F MED LIST DOCD IN RCRD: CPT | Performed by: STUDENT IN AN ORGANIZED HEALTH CARE EDUCATION/TRAINING PROGRAM

## 2024-03-26 PROCEDURE — 1111F DSCHRG MED/CURRENT MED MERGE: CPT | Performed by: STUDENT IN AN ORGANIZED HEALTH CARE EDUCATION/TRAINING PROGRAM

## 2024-03-26 PROCEDURE — 99213 OFFICE O/P EST LOW 20 MIN: CPT | Performed by: STUDENT IN AN ORGANIZED HEALTH CARE EDUCATION/TRAINING PROGRAM

## 2024-03-26 PROCEDURE — 1036F TOBACCO NON-USER: CPT | Performed by: STUDENT IN AN ORGANIZED HEALTH CARE EDUCATION/TRAINING PROGRAM

## 2024-03-26 PROCEDURE — 1157F ADVNC CARE PLAN IN RCRD: CPT | Performed by: STUDENT IN AN ORGANIZED HEALTH CARE EDUCATION/TRAINING PROGRAM

## 2024-03-26 PROCEDURE — 1160F RVW MEDS BY RX/DR IN RCRD: CPT | Performed by: STUDENT IN AN ORGANIZED HEALTH CARE EDUCATION/TRAINING PROGRAM

## 2024-03-26 NOTE — PROGRESS NOTES
"HISTORY OF PRESENT ILLNESS:  Elsa Biggs is a 73 y.o. female who presents today for follow-up.  She is a former patient of [facility].  She has mixed incontinence, she has been taking gemtesa and doing very well with this.         Past Medical History  She has a past medical history of Anxiety, Breast cancer (CMS/[facility]), Cancer (CMS/HCC), Depression, Diabetes mellitus (CMS/[facility]), GERD (gastroesophageal reflux disease), Hyperlipidemia, Hypertension, and Stroke (CMS/[facility]).    Surgical History  She has a past surgical history that includes MR angio neck w and wo IV contrast (01/14/2022); Joint replacement (Left); Fracture surgery; Breast surgery; MR angio head wo IV contrast (12/26/2023); MR angio neck wo IV contrast (12/26/2023); and Cardiac catheterization (N/A, 01/29/2024).     Social History  She reports that she quit smoking about 41 years ago. Her smoking use included cigarettes. She smoked an average of 1 pack per day. She has never used smokeless tobacco. She reports that she does not drink alcohol and does not use drugs.    Family History  Family History   Problem Relation Name Age of Onset    Heart attack Father  65        blood clot    Coronary artery disease Sister          Allergies  Tetracycline      A comprehensive 10+ review of systems was negative except for: see hpi                          PHYSICAL EXAMINATION:  BP Readings from Last 3 Encounters:   03/19/24 130/74   03/14/24 116/69   03/07/24 146/64      Wt Readings from Last 3 Encounters:   03/19/24 113 kg (249 lb 1.9 oz)   03/14/24 125 kg (275 lb)   03/07/24 122 kg (268 lb 4.8 oz)      BMI: Estimated body mass index is 44.13 kg/m² as calculated from the following:    Height as of 3/4/24: 1.6 m (5' 3\").    Weight as of 3/19/24: 113 kg (249 lb 1.9 oz).  BSA: Estimated body surface area is 2.24 meters squared as calculated from the following:    Height as of 3/4/24: 1.6 m (5' 3\").    Weight as of 3/19/24: 113 kg (249 lb 1.9 oz).  HEENT: Normocephalic, " atraumatic, PER EOMI, nonicteric, trachea normal, thyroid normal, oropharynx normal.  CARDIAC: regular rate & rhythm, S1 & S2 normal.  No heaves, thrills, gallops or murmurs.  LUNGS: Clear to auscultation, no spinal or CV tenderness.  EXTREMITIES: No evidence of cyanosis, clubbing or edema.               Assessment:  72 yo female who presents with incontinence    Patient doing well on Gemtesa    Follow up with Elsa in 3 months       Oswald Isabel MD    Scribe Attestation  By signing my name below, I, Jeniffer Parsonus, Scribe   attest that this documentation has been prepared under the direction and in the presence of Oswald Isabel MD.

## 2024-03-27 ENCOUNTER — APPOINTMENT (OUTPATIENT)
Dept: UROLOGY | Facility: CLINIC | Age: 74
End: 2024-03-27
Payer: MEDICARE

## 2024-03-31 PROCEDURE — RXMED WILLOW AMBULATORY MEDICATION CHARGE

## 2024-04-02 ENCOUNTER — PHARMACY VISIT (OUTPATIENT)
Dept: PHARMACY | Facility: CLINIC | Age: 74
End: 2024-04-02
Payer: COMMERCIAL

## 2024-04-02 ENCOUNTER — HOSPITAL ENCOUNTER (OUTPATIENT)
Dept: VASCULAR MEDICINE | Facility: HOSPITAL | Age: 74
Discharge: HOME | End: 2024-04-02
Payer: MEDICARE

## 2024-04-02 DIAGNOSIS — R09.89 CAROTID BRUIT, UNSPECIFIED LATERALITY: ICD-10-CM

## 2024-04-02 DIAGNOSIS — I65.23 OCCLUSION AND STENOSIS OF BILATERAL CAROTID ARTERIES: ICD-10-CM

## 2024-04-02 PROCEDURE — 93880 EXTRACRANIAL BILAT STUDY: CPT

## 2024-04-02 PROCEDURE — 93880 EXTRACRANIAL BILAT STUDY: CPT | Performed by: INTERNAL MEDICINE

## 2024-04-04 ENCOUNTER — TELEPHONE (OUTPATIENT)
Dept: VASCULAR SURGERY | Facility: CLINIC | Age: 74
End: 2024-04-04
Payer: MEDICARE

## 2024-04-04 NOTE — H&P
Critical Care-History and Physical Note   Patient: Elsa Biggs  Room/bed:  06/06-A  Admitted on: 10/19/2023    Age: 72 y.o.   Gender:female   Code Status:  Full Code   Admitting Dx: TIA (transient ischemic attack) [G45.9]  Dizziness [R42]  Cerebrovascular accident (CVA) due to occlusion of precerebral artery (CMS/HCC) [I63.20]    MRN: [id number]  PCP: Marcelle Vitale MD     Attending: [unfilled] Nurse: No care team member to display  TCC: No care team member to display        HPI    HPI: Elsa Biggs is a 72 y.o. [unfilled] who presented to the hospital for   Chief Complaint   Patient presents with    Dizziness    Changes in vision   72-year-old female with a past medical history significant for depression, non-insulin-dependent diabetes mellitus, hyperlipidemia, left total hip arthroplasty [facility] ED with a chief complaint of new onset dizziness and visual disturbance.  The patient states that her symptoms started approximately at 7:15 PM when she was at home standing by her desk and that she was turning she became dizzy lightheaded and started seeing double.  Upon arrival to the ED on neurological exam was significant for ataxia with abnormal finger-to-nose and vertical nystagmus.  NIH stroke scale totaled 3 with a Van negative.  According to ED note she did not have any arm or leg drift or weakness.  Upon arrival to the emergency room stroke alert was called.  CT results were unremarkable.  She received TNK as the patient appeared to have worsening right-sided facial droop.  At the time of this encounter the patient's facial paralysis has almost resolved.  She was then transferred to the ICU for further management of a TIA status post TNK administration.  ED course:     Vitals: Temperature 36.3, heart rate 87, blood pressure 151/70, respirations 18, SPO2 95%  CMP: Calcium 8.4, glucose 168 otherwise unremarkable  CBC no leukocytosis    CT head without contrast did not demonstrate evidence of new or progressive  large territory ischemic infarct per and unchanged old lacunar infarct in the right basal ganglia.  No evidence of acute intracranial hemorrhage  CT angio brain and neck: Demonstrated high-grade stenosis of the right cervical ICA up to 85% with moderate stenosis of the right carotid bifurcation there is moderate narrowing of the left cervical ICA up to 57%      Interventions: TNK in the ED    ROS: 12 points review of system is negative except as stated in the HPI above.   ROS  Review of Systems   All other systems reviewed and are negative.       Past Medical History     Past Medical History:   Diagnosis Date    Cancer (CMS/HCC)     Diabetes mellitus (CMS/HCC)         Surgical History     Past Surgical History:   Procedure Laterality Date    BREAST SURGERY      FRACTURE SURGERY      JOINT REPLACEMENT      MR NECK ANGIO W AND WO IV CONTRAST  01/14/2022    MR NECK ANGIO W AND WO IV CONTRAST 1/14/2022 [facility]       Family History   No family history on file.    Social History     Social History     Socioeconomic History    Marital status:      Spouse name: Not on file    Number of children: Not on file    Years of education: Not on file    Highest education level: Not on file   Occupational History    Not on file   Tobacco Use    Smoking status: Never    Smokeless tobacco: Never   Vaping Use    Vaping Use: Never used   Substance and Sexual Activity    Alcohol use: Not Currently     Alcohol/week: 1.0 standard drink of alcohol     Types: 1 Standard drinks or equivalent per week    Drug use: Never    Sexual activity: Defer   Other Topics Concern    Not on file   Social History Narrative    Not on file     Social Determinants of Health     Financial Resource Strain: Low Risk  (10/20/2023)    Overall Financial Resource Strain (CARDIA)     Difficulty of Paying Living Expenses: Not hard at all   Food Insecurity: Not on file   Transportation Needs: No Transportation Needs (10/20/2023)    PRAPARE -  "Transportation     Lack of Transportation (Medical): No     Lack of Transportation (Non-Medical): No   Physical Activity: Not on file   Stress: Not on file   Social Connections: Not on file   Intimate Partner Violence: Not on file   Housing Stability: Low Risk  (10/20/2023)    Housing Stability Vital Sign     Unable to Pay for Housing in the Last Year: No     Number of Places Lived in the Last Year: 1     Unstable Housing in the Last Year: No       Tobacco Use: Low Risk  (10/20/2023)    Patient History     Smoking Tobacco Use: Never     Smokeless Tobacco Use: Never     Passive Exposure: Not on file        Social History     Substance and Sexual Activity   Alcohol Use Not Currently    Alcohol/week: 1.0 standard drink of alcohol    Types: 1 Standard drinks or equivalent per week        Allergies     Allergies   Allergen Reactions    Tetracycline Rash          Meds    Scheduled medications  atorvastatin, 80 mg, oral, Nightly  PARoxetine, 20 mg, oral, Nightly      Continuous medications     PRN medications  PRN medications: labetaloL, oxygen     Objective     Vitals  Visit Vitals  /70   Pulse 87   Temp 36.3 °C (97.3 °F) (Temporal)   Resp 18   Ht 1.6 m (5' 3\")   Wt 117 kg (258 lb 6.1 oz)   SpO2 95%   BMI 45.77 kg/m²   OB Status Postmenopausal   Smoking Status Never   BSA 2.28 m²        Physical Examination:  Gen: well appearing, in NAD  HEENT: AT/NC, no conjunctival pallor, anicteric sclera, EOMI,   Neck: supple, no LAD/JVD  Chest: Good air entry b/l, no adventitious sounds heard  CVS: s1 & S2 only, no MGR  Abd: soft, flat, NT/ND, BS+  Ext: no cyanosis/clubbing or pedal edema  Neuro: Aox3, cn 2-12 intact, motor 4/5 globally, sensation intact              Residual R facial drooping     I/Os  No intake or output data in the 24 hours ending 10/20/23 0110    Vent Settings         Labs:   Results from last 72 hours   Lab Units 10/19/23  2140   SODIUM mmol/L 137   POTASSIUM mmol/L 3.9   CHLORIDE mmol/L 99   CO2 mmol/L 27 " "  BUN mg/dL 14   CREATININE mg/dL 0.68   GLUCOSE mg/dL 168*   CALCIUM mg/dL 8.4*   ANION GAP mmol/L 15   EGFR mL/min/1.73m*2 >90      Results from last 72 hours   Lab Units 10/19/23  2140   WBC AUTO x10*3/uL 8.5   HEMOGLOBIN g/dL 11.8*   HEMATOCRIT % 38.2   PLATELETS AUTO x10*3/uL 292   NEUTROS PCT AUTO % 59.5   LYMPHS PCT AUTO % 29.6   MONOS PCT AUTO % 7.8   EOS PCT AUTO % 2.1      Lab Results   Component Value Date    CALCIUM 8.4 (L) 10/19/2023    PHOS 3.4 05/24/2022      No results found for: \"CRP\"   [unfilled]     Micro/ID:   Susceptibility data from last 90 days.  Collected Specimen Info Organism Ampicillin Cefazolin Ceftriaxone Ciprofloxacin Gentamicin Levofloxacin Nitrofurantoin Piperacillin/Tazobactam Trimethoprim/Sulfamethoxazole   08/07/23 Urine Escherichia coli S S S S S S S S S                    No lab exists for component: \"AGALPCRNB\"   .ID  Lab Results   Component Value Date    URINECULTURE Escherichia coli (A) 08/07/2023         Images    CT head wo IV contrast  Narrative: Interpreted By:  Christiano Washburn,   STUDY:  CT HEAD WO IV CONTRAST;  10/19/2023 10:19 pm      INDICATION:  Worsening right-sided facial droop.      COMPARISON:  CT head without contrast 10/19/2023 at 9:04 p.m.      ACCESSION NUMBER(S):  [id number]      ORDERING CLINICIAN:  STACEY ESCOBEDO      TECHNIQUE:  Noncontrast axial CT images of head were obtained with coronal and  sagittal reconstructed images.      FINDINGS:  BRAIN PARENCHYMA: There is redemonstration of a hypodensity in the  right basal ganglia and subinsular white matter representing  age-indeterminate lacunar infarct. There is unchanged moderate  periventricular chronic microvascular ischemic change in mild pontine  chronic microvascular ischemic change. No acute intraparenchymal  hemorrhage. There is no parenchymal evidence of an acute large  territory ischemic infarct at this time. There is no mass effect.      VENTRICLES and EXTRA-AXIAL SPACES: There is a small " amount of  residual intravascular contrast from previous CTA. No acute  extra-axial or intraventricular hemorrhage. No effacement of cerebral  sulci. Ventricles and sulci are age-concordant. Severe intracranial  vascular calcifications are again noted.      PARANASAL SINUSES/MASTOIDS:  No hemorrhage or air-fluid levels within  the visualized paranasal sinuses. The mastoids are well aerated.      CALVARIUM/ORBITS:  No skull fracture.  The orbits and globes are  intact to the extent visualized.      EXTRACRANIAL SOFT TISSUES: No discernible abnormality.      Impression: 1. No parenchymal evidence of new or progressing large territory  ischemic infarct. There is an unchanged age indeterminate lacunar  infarct in the right basal ganglia and subinsular white matter. As  previously recommended, MRI would better evaluate the acuity of this  lesion in any other potential acute infarct responsible for patient's  symptoms.      2. No acute intracranial hemorrhage.          MACRO:  None.      Signed by: Christiano Washburn 10/19/2023 10:31 PM  Dictation workstation:   [id number]  CT angio brain attack head w IV contrast and post procedure, CT angio brain attack neck w IV contrast and post procedure  Narrative: Interpreted By:  Shaunna Masters,   STUDY:  CT ANGIO BRAIN ATTACK HEAD W IV CONTRAST AND POST PROCEDURE; CT ANGIO  BRAIN ATTACK NECK W IV CONTRAST AND POST PROCEDURE;  10/19/2023 9:09  pm      INDICATION:  Signs/Symptoms:Stroke Evaluation with VAN Positive. Dizziness,  dysarthria and vision changes.      COMPARISON:  CT head without 10/19/2023      ACCESSION NUMBER(S):  [id number]; [id number]      ORDERING CLINICIAN:  STACEY ESCOBEDO      TECHNIQUE:  90 mL Omnipaque 350 was administered intravenously and axial images  of the head and neck were acquired.  Coronal, sagittal, and 3-D  reconstructions were provided for review.      FINDINGS:  Low-attenuation nodule in the left lobe of the thyroid measuring up  to 1.5 cm.  Indeterminate partially imaged mediastinal lymph node  measuring up to 10 mm.      CTA HEAD FINDINGS:      Anterior circulation: Dense atherosclerotic calcifications within the  carotid siphons with moderate to severe a multifocal stenosis.  Moderate narrowing within the proximal left M1 segment and appearance  of focal high-grade stenosis within the proximal left superior  division of the M2 MCA. The bilateral carotid terminals, bilateral  proximal anterior and middle cerebral arteries are normal.      Posterior circulation: Marked coarse calcifications with multifocal  severe stenosis in the bilateral vertebral arteries. Fetal type  origin of the left PCA. Otherwise the vertebrobasilar junction,  basilar artery and proximal posterior cerebral arteries are normal.      CTA NECK FINDINGS:      Direct origin of the left vertebral artery.      Right carotid vessels: Atherosclerotic calcifications involving the  origin of the right carotid artery. Moderate narrowing of the carotid  bulb with dense atherosclerotic calcifications. Atherosclerotic  changes extend into the proximal left cervical ICA with severe  stenosis, approximately 85 % by NASCET criteria.      Left carotid vessels: The common carotid artery is normal. The  carotid bifurcation is normal. Atherosclerotic calcifications of the  proximal left cervical ICA with up to 57% stenosis by NASCET criteria.      Vertebral vessels:  The visualized segments of the cervical vertebral  arteries are normal in caliber.          Impression: High-grade stenosis of the right cervical ICA up to 85% with moderate  stenosis of the right carotid bifurcation.      Atherosclerotic calcifications of the bilateral carotid siphons with  appearance of moderate to severe stenosis.      Atherosclerotic calcifications of the V4 segment/intradural vertebral  arteries with high-grade stenosis.      Moderate narrowing of the left cervical ICA up to 57%.      Foci of moderate to severe  focal stenosis within left M1 and M2  branches      MACRO:  Shaunna Masters discussed the significance and urgency of this  critical finding by telephone with  Dr. Abdalla on 10/19/2023 at  9:46 pm.  (**-RCF-**) Findings:  See findings.          Signed by: Shaunna Masters 10/19/2023 9:47 PM  Dictation workstation:   [id number]  CT brain attack head wo IV contrast  Narrative: Interpreted By:  Shaunna Masters,   STUDY:  CT BRAIN ATTACK HEAD WO IV CONTRAST;  10/19/2023 9:05 pm      INDICATION:  Signs/Symptoms:Stroke Evaluation.      COMPARISON:  None.      ACCESSION NUMBER(S):  [id number]      ORDERING CLINICIAN:  STACEY ESCOBEDO      TECHNIQUE:  Axial noncontrast CT images of the head.      FINDINGS:  BRAIN PARENCHYMA: Mild-to-moderate non-specific white matter changes  and generalized parenchymal volume loss. Nonspecific low-attenuation  focus in the right basal ganglia and possibly sequela of remote  lacunar infarct. No mass effect or midline shift. Atherosclerotic  calcifications of the vertebral and carotid arteries noted.      HEMORRHAGE: No acute intracranial hemorrhage.  VENTRICLES and EXTRA-AXIAL SPACES: Normal size.  EXTRACRANIAL SOFT TISSUES: Within normal limits.  PARANASAL SINUSES/MASTOIDS: The visualized paranasal sinuses and  mastoid air cells are aerated. CALVARIUM: No depressed skull  fracture. No destructive osseous lesion.      OTHER FINDINGS: None.      Impression: No acute intracranial hemorrhage or mass effect.      Age indeterminate nonspecific white matter changes with probable  remote right lacunar infarct however given lack of prior head CT  imaging evaluation for chronicity is limited. MRI may be considered  as clinically indicated for further evaluation of smaller hyperacute  ischemic infarcts.      MACRO:  Shaunna Masters discussed the significance and urgency of this  critical finding by telephone with  Dr. Abdalla on 10/19/2023 at  9:22 pm.  (**-RCF-**) Findings:  See findings.       Signed by: Shaunna Masters 10/19/2023 9:24 PM  Dictation workstation:   [id number]      Assessment and Plan    Problem list:   Principal Problem:    Dizziness  72-year-old female with a past medical history significant for depression, non-insulin-dependent diabetes mellitus, hyperlipidemia, left total hip arthroplasty [facility] ED with a chief complaint of new onset dizziness and visual disturbance.      Neurology  -NIH stroke scale in the ED: 3  -NIH stroke scale in the ICU: 3  -NIH stroke scale ICU @ 3:30:3   -Last well known: 7:15 pm  -Give Labetalol 20 mg to keep blood pressure below 180/110  -TNK administered in the ED  at   -Initial CT head: Unremarkable  -CT head post NIHSS increase: Unchanged   -CT angio brain and neck: Demonstrated high-grade stenosis of the right cervical ICA up to 85% with moderate stenosis of the right carotid bifurcation there is moderate narrowing of the left cervical ICA up to 57%  -Place patient on telemetry/continuous cardiac monitoring s/p TNK administration   -f/u transthoracic echo (TTE) to assess for thrombus or vegetation, w/ bubble study to assess for PFO/atrial septal aneurysm  -Consider MRI of the brain (without contrast)  -Consider MRA neck and brain  -Transthoracic echo ordered and pending   -f/u on HbA1c, Fasting Lipid Panel (FLP), TSH to assess for comorbid conditions.  -neuro-check Q4H  -PT/OT consult  -Bedside swallow evaluation for now.   -Antiplatelet tx: ASA 81mg  -Atorvastatin 80mg. LDL goal <70  -Allow for permissive hypertension   -Hold antithrombotic medications for 24 hours      Cardiovascular  - No active Issues     Pulmonary  - No active Issues     Gastrointestinal  - No active Issues     Renal/Urology  - No active Issues     Hematology/Oncology  - No active Issues     Infectious Disease  - No active Issues     Endocrine  #non insulin Dependent diabetes mellitus  -She takes metformin 500 mg twice daily at home  -Maintaining blood glucose levels between 140  and 180 mg/dL we will consider placing the patient on an insulin sliding scale if blood glucose >180    Musculoskeletal/Skin  - No active Issues     Psychiatric  # Depression  -We will continue Paxil      IVF: PRN   Electrolytes: Replete as needed   Nutrition: Advance diet as tolerated post swallow eval  GI ppx: None  VTE prophylaxis: Hold anticoagulation status post TNK  Antibiotics: None  Lines/tubes: 1 peripheral IV  Code: Full Code       Disposition: 72 y.o.female admitted for the management of TIA/CVA status post TNK.  Hospital stay less 48 hours.    Jacob Rocha MD                                                                                                                      No

## 2024-04-04 NOTE — TELEPHONE ENCOUNTER
Result Communication    Resulted Orders   Vascular US carotid artery duplex bilateral    [facility]  [address] [id number]       Phone [phone] Fax [phone]       Vascular Lab Report     VASC US CAROTID ARTERY DUPLEX BILATERAL    Patient Name:      RAFAL PFEIFFER       Reading Physician: [id number] Zoya Ly MD  Study Date:        4/2/2024            Ordering Provider: [id number] AMY BRADSHAW  MRN/PID:           [id number]            Fellow:  Accession#:        [id number]        Technologist:      Dora Chavez RVT  Date of Birth/Age: 1950 / 73      Technologist 2:                     years  Gender:            F                   Encounter#:        [id number]  Admission Status:  Outpatient          Location           [facility]                                         Performed:       Diagnosis/ICD: Occlusion and stenosis of bilateral carotid arteries-I65.23  CPT Codes:     37328 Cerebrovascular Carotid Duplex scan complete       CONCLUSIONS:  Right Carotid: The stent in the right internal carotid artery is widely patent with no evidence of stenosis. Right external carotid artery appears patent with no evidence of stenosis. The external carotid artery is not well visualized. The right vertebral artery is patent with antegrade flow. No evidence of hemodynamically significant stenosis in the right subclavian artery.  Left Carotid: Findings are consistent with less than 50% stenosis of the left proximal internal carotid artery. Left external carotid artery appears patent with no evidence of stenosis. The left vertebral artery is patent with antegrade flow. No evidence of hemodynamically significant stenosis in the left subclavian artery.  Stent:  Pre Stent 87 cm/sec  Prox Stent 83  Mid Stent 96  Dist Stent 77  Post Stent 89.  Additional Findings:  Difficult exam due to body habitus and  high bifurcations.       Comparison:  Compared with study from 12/26/2023, decrease in right internal carotid artery stent velocities.     Imaging & Doppler Findings:      Right                      Left    PSV     EDV               PSV      EDV  94 cm/s           CCA P   102 cm/s  77 cm/s           CCA D   85 cm/s                    ICA P   73 cm/s  19 cm/s  81 cm/s           ICA D   100 cm/s 38 cm/s  51 cm/s            ECA    89 cm/s  86 cm/s 25 cm/s Vertebral 86 cm/s  21 cm/s     Right                                  Left    PSV    Waveform                       PSV    Waveform  203 cm/s          Subclavian Proximal 123 cm/s                  Left  ICA/CCA Ratio 0.9          [id number] Zoya Ly MD  Electronically signed by [id number] Zoya Ly MD on 4/3/2024 at 5:51:15 PM         ** Final **         8:31 AM      Results were successfully communicated with the patient and they acknowledged their understanding.

## 2024-04-13 LAB
ATRIAL RATE: 85 BPM
P AXIS: 13 DEGREES
P OFFSET: 180 MS
P ONSET: 135 MS
PR INTERVAL: 170 MS
Q ONSET: 220 MS
QRS COUNT: 14 BEATS
QRS DURATION: 88 MS
QT INTERVAL: 382 MS
QTC CALCULATION(BAZETT): 454 MS
QTC FREDERICIA: 429 MS
R AXIS: -3 DEGREES
T AXIS: -15 DEGREES
T OFFSET: 411 MS
VENTRICULAR RATE: 85 BPM

## 2024-04-15 ENCOUNTER — SPECIALTY PHARMACY (OUTPATIENT)
Dept: PHARMACY | Facility: CLINIC | Age: 74
End: 2024-04-15

## 2024-05-03 ENCOUNTER — DOCUMENTATION (OUTPATIENT)
Dept: UROLOGY | Facility: CLINIC | Age: 74
End: 2024-05-03

## 2024-05-03 ENCOUNTER — OFFICE VISIT (OUTPATIENT)
Dept: ORTHOPEDIC SURGERY | Facility: CLINIC | Age: 74
End: 2024-05-03
Payer: MEDICARE

## 2024-05-03 ENCOUNTER — HOSPITAL ENCOUNTER (OUTPATIENT)
Dept: RADIOLOGY | Facility: HOSPITAL | Age: 74
Discharge: HOME | End: 2024-05-03
Payer: MEDICARE

## 2024-05-03 DIAGNOSIS — M25.552 LEFT HIP PAIN: Primary | ICD-10-CM

## 2024-05-03 DIAGNOSIS — M25.552 LEFT HIP PAIN: ICD-10-CM

## 2024-05-03 PROCEDURE — 1157F ADVNC CARE PLAN IN RCRD: CPT | Performed by: ORTHOPAEDIC SURGERY

## 2024-05-03 PROCEDURE — 99213 OFFICE O/P EST LOW 20 MIN: CPT | Performed by: ORTHOPAEDIC SURGERY

## 2024-05-03 PROCEDURE — 73502 X-RAY EXAM HIP UNI 2-3 VIEWS: CPT | Mod: LEFT SIDE | Performed by: RADIOLOGY

## 2024-05-03 PROCEDURE — 1036F TOBACCO NON-USER: CPT | Performed by: ORTHOPAEDIC SURGERY

## 2024-05-03 PROCEDURE — 1159F MED LIST DOCD IN RCRD: CPT | Performed by: ORTHOPAEDIC SURGERY

## 2024-05-03 PROCEDURE — 73502 X-RAY EXAM HIP UNI 2-3 VIEWS: CPT | Mod: LT

## 2024-05-03 PROCEDURE — 1160F RVW MEDS BY RX/DR IN RCRD: CPT | Performed by: ORTHOPAEDIC SURGERY

## 2024-05-03 NOTE — PROGRESS NOTES
This is a consultation from Dr. Marcelle Vitale MD for   Chief Complaint   Patient presents with    Left Hip - Pain     11/26/22 LT STEVO       This is a 73 y.o. female who presents for follow-up for her left hip.  Patient had a complex left total hip a year and a half ago, she eventually went on to have a fracture of her distal femur after that which was fixed by one of [place] trauma colleagues [place].  She is doing okay but notes that recently she has been weaker and having more trouble walking.  Denies any significant pain but states she is having trouble with strength.  She has not done any recent therapy.  No drainage from incision no fevers or chills.    Physical Exam    There has been no interval change in this patient's past medical, surgical, medications, allergies, family history or social history since the most recent visit to a provider within our department. 14 point review of systems was performed, reviewed, and negative except for pertinent positives documented in the history of present illness.     Constitutional: well developed, well nourished female in no acute distress  Psychiatric: normal mood, appropriate affect  Eyes: sclera anicteric  HENT: normocephalic/atraumatic  CV: regular rate and rhythm   Respiratory: non labored breathing  Integumentary: no rash  Neurological: moves all extremities    Left hip examination: Well-healed surgical incision erythema no drainage no pain with range of motion neurovascular tact distally    Xrays were ordered by me, they were reviewed and independently interpreted by me today, they show stable left total arthroplasty no evidence of any fracture loosening or dislocation    Procedures      Impression/Plan: This is a 73 y.o. female status post complex left total hip and periprosthetic fracture.  She is having some weakness and difficulty getting around, we discussed that she may benefit from some physical therapy.  Will continue to monitor her and I will see her  "back in 2 to 3 months to reevaluate.  We discussed getting femur and knee x-rays at that time as well.  I will see her back as needed    BMI Readings from Last 1 Encounters:   03/19/24 44.13 kg/m²      Lab Results   Component Value Date    CREATININE 0.71 03/07/2024     Tobacco Use: Medium Risk (3/19/2024)    Patient History     Smoking Tobacco Use: Former     Smokeless Tobacco Use: Never     Passive Exposure: Not on file      MELD 3.0: 17 at 12/26/2023  5:07 AM  MELD-Na: 17 at 12/26/2023  5:07 AM  Calculated from:  Serum Creatinine: 0.53 mg/dL (Using min of 1 mg/dL) at 12/26/2023  5:07 AM  Serum Sodium: 134 mmol/L at 12/26/2023  5:07 AM  Total Bilirubin: 0.4 mg/dL (Using min of 1 mg/dL) at 12/25/2023  2:18 AM  Serum Albumin: 3.0 g/dL at 12/25/2023  2:18 AM  INR(ratio): 2.0 at 12/24/2023  7:55 PM  Age at listing (hypothetical): 73 years  Sex: Female at 12/26/2023  5:07 AM       Lab Results   Component Value Date    HGBA1C 7.0 (H) 10/20/2023     No results found for: \"STAPHMRSASCR\"  "

## 2024-05-22 DIAGNOSIS — I48.92 ATRIAL FLUTTER, UNSPECIFIED TYPE (MULTI): Primary | ICD-10-CM

## 2024-05-28 ENCOUNTER — TELEMEDICINE (OUTPATIENT)
Dept: PHARMACY | Facility: HOSPITAL | Age: 74
End: 2024-05-28
Payer: MEDICARE

## 2024-05-28 DIAGNOSIS — I48.92 ATRIAL FLUTTER, UNSPECIFIED TYPE (MULTI): ICD-10-CM

## 2024-05-28 NOTE — PROGRESS NOTES
"Pharmacist Clinic: Anticoagulation Management  Elsa Biggs was referred to the Clinical Pharmacy Team for her anticoagulation management.    Referring Provider:  Malinda Posada     Last Appointment w/ Pharmacist: 3/15/24  Pharmacist Name: Milagro Mckeon, PharmD    _______________________________________________________________________  PHARMACY ASSESSMENT    Review of Past Appointment:   - Patient was converted from warfarin to Eliquis and enrolled in [facility]. Application approved on 3/7/24.     RELEVANT LAB RESULTS  Lab Results   Component Value Date    BILITOT 0.4 12/25/2023    CALCIUM 7.9 (L) 03/07/2024    CO2 28 03/07/2024     03/07/2024    CREATININE 0.71 03/07/2024    GLUCOSE 101 (H) 03/07/2024    ALKPHOS 45 12/25/2023    K 4.2 03/07/2024    PROT 6.2 (L) 12/25/2023     03/07/2024    AST 13 12/25/2023    ALT 14 12/25/2023    BUN 16 03/07/2024    ANIONGAP 7 (L) 03/07/2024    MG 1.87 03/07/2024    PHOS 3.7 03/05/2024    ALBUMIN 3.0 (L) 12/25/2023    LIPASE 10 09/29/2022    GFRF >90 11/01/2022     Lab Results   Component Value Date    TRIG 66 10/20/2023    CHOL 113 10/20/2023    LDLCALC 60 10/20/2023    HDL 40.1 10/20/2023     No results found for: \"BMCBC\", \"CBCDIF\"   _______________________________________________________________________  ANTICOAGULATION ASSESSMENT    The ASCVD Risk score (Omar SOTO, et al., 2019) failed to calculate for the following reasons:    The patient has a prior MI or stroke diagnosis    DIAGNOSIS: prevention of nonvalvular atrial fibrilliation stroke and systemic embolism  - Patient is projected to be on anticoagulation indefinitely   - [id number] Score: [5] (only included if diagnosis is atrial fibrillation)   Age: [<65 (0)] [65-74 (+1)] [> 75 (+2)]: 2  Sex: [Male/Female (+1)]: 1  CHF history: [No/Yes(+1)]: 0  Hypertension history: [No/Yes(+1)]: 1  Stroke/TIA/thromboembolism history: [No/Yes(+2)]: 0  Vascular disease history (prior MI, peripheral artery disease, aortic " plaque): [No/Yes(+1)]: 0  Diabetes history: [No/Yes(+1)]: 1    CURRENT PHARMACOTHERAPY:   - Eliquis 5 mg BID   - Age 73, Scr 0.71, weight 113 kg (no dose adjustment needed)    UPDATE ON PHARMACOTHERAPY:   Affordability/Accessibility: No issues   Adherence/Organization: No issues   Adverse Effects: None   Recent Hospitalizations: None   Recent Falls/Trauma: None   Medication changes: None   Changes in Tobacco or Alcohol Intake: None     DISCUSSION/NOTES:  - Patient reports that she has some bruising but it always heals. Less bruising overall than when taking warfarin. Otherwise, patient has no issues with current regimen. Reports no issues with dispenses. Has 90 day supply with one refill remaining.  _______________________________________________________________________  PATIENT EDUCATION/GOALS  - Counseled patient on MOA, expectations, duration of therapy, contraindications, administration, and monitoring parameters  - Counseled patient of side effects that are indicative of bleeding such as dark tarry stool, unexplainable bruising, or vomiting up a coffee ground like substance  - Answered all patient questions and concerns  _______________________________________________________________________  RECOMMENDATIONS/PLAN  1. Continue Eliquis 5 mg BID     Next Cardiology Appointment: 9/24/24  Clinical Pharmacist follow up: 3 months - 8/27/24 @ 1030  [facility]/Application Expiration: Yes    Date: 3/7/2025  Type of Encounter: Tato Mckeon PharmD    Verbal consent to manage patient's drug therapy was obtained from the patient . They were informed they may decline to participate or withdraw from participation in pharmacy services at any time.    Continue all meds under the continuation of care with the referring provider and clinical pharmacy team.

## 2024-05-30 ENCOUNTER — TELEPHONE (OUTPATIENT)
Dept: CARDIOLOGY | Facility: CLINIC | Age: 74
End: 2024-05-30
Payer: MEDICARE

## 2024-05-30 NOTE — TELEPHONE ENCOUNTER
Izabella from surgery calling regarding patient's upcoming colonoscopy on June 6th, surgery needs verification that patient is taking Eliquis and not Warfarin, also needs advisement on how to stop taking before the procedure. Once I reviewed patient's chart, I was able to verify that patient takes Eliquis and not Warfarin by what is in her chart. I also verified that this patient is established with Malinda Posada NP in [facility] and Eliquis last refilled by her. Recent chart notes show that the patient might've attempted at some point to see Dr. Irene but there is no evidence of any visits in patient's chart. Izabella from surgery was advised to call Malinda Posada at [facility] location for further information for this patient.

## 2024-06-06 ENCOUNTER — ANESTHESIA (OUTPATIENT)
Dept: GASTROENTEROLOGY | Facility: HOSPITAL | Age: 74
End: 2024-06-06
Payer: MEDICARE

## 2024-06-06 ENCOUNTER — ANESTHESIA EVENT (OUTPATIENT)
Dept: GASTROENTEROLOGY | Facility: HOSPITAL | Age: 74
End: 2024-06-06
Payer: MEDICARE

## 2024-06-06 ENCOUNTER — HOSPITAL ENCOUNTER (OUTPATIENT)
Dept: GASTROENTEROLOGY | Facility: HOSPITAL | Age: 74
Discharge: HOME | End: 2024-06-06
Payer: MEDICARE

## 2024-06-06 VITALS
SYSTOLIC BLOOD PRESSURE: 108 MMHG | HEART RATE: 81 BPM | OXYGEN SATURATION: 98 % | TEMPERATURE: 98.2 F | BODY MASS INDEX: 42.52 KG/M2 | RESPIRATION RATE: 14 BRPM | WEIGHT: 240 LBS | HEIGHT: 63 IN | DIASTOLIC BLOOD PRESSURE: 76 MMHG

## 2024-06-06 DIAGNOSIS — Z12.11 SCREEN FOR COLON CANCER: Primary | ICD-10-CM

## 2024-06-06 DIAGNOSIS — Z86.010 HX OF COLONIC POLYPS: ICD-10-CM

## 2024-06-06 PROCEDURE — 7100000010 HC PHASE TWO TIME - EACH INCREMENTAL 1 MINUTE

## 2024-06-06 PROCEDURE — 2500000004 HC RX 250 GENERAL PHARMACY W/ HCPCS (ALT 636 FOR OP/ED): Performed by: NURSE ANESTHETIST, CERTIFIED REGISTERED

## 2024-06-06 PROCEDURE — 2500000005 HC RX 250 GENERAL PHARMACY W/O HCPCS: Performed by: NURSE ANESTHETIST, CERTIFIED REGISTERED

## 2024-06-06 PROCEDURE — 3700000002 HC GENERAL ANESTHESIA TIME - EACH INCREMENTAL 1 MINUTE

## 2024-06-06 PROCEDURE — 2500000004 HC RX 250 GENERAL PHARMACY W/ HCPCS (ALT 636 FOR OP/ED): Performed by: SURGERY

## 2024-06-06 PROCEDURE — 3700000001 HC GENERAL ANESTHESIA TIME - INITIAL BASE CHARGE

## 2024-06-06 PROCEDURE — 88305 TISSUE EXAM BY PATHOLOGIST: CPT | Mod: TC,PORLAB | Performed by: SURGERY

## 2024-06-06 PROCEDURE — 45380 COLONOSCOPY AND BIOPSY: CPT | Performed by: SURGERY

## 2024-06-06 PROCEDURE — 7100000009 HC PHASE TWO TIME - INITIAL BASE CHARGE

## 2024-06-06 RX ORDER — SODIUM CHLORIDE 9 MG/ML
20 INJECTION, SOLUTION INTRAVENOUS CONTINUOUS
Status: DISCONTINUED | OUTPATIENT
Start: 2024-06-06 | End: 2024-06-07 | Stop reason: HOSPADM

## 2024-06-06 RX ORDER — LIDOCAINE HCL/PF 100 MG/5ML
SYRINGE (ML) INTRAVENOUS AS NEEDED
Status: DISCONTINUED | OUTPATIENT
Start: 2024-06-06 | End: 2024-06-06

## 2024-06-06 RX ORDER — PROPOFOL 10 MG/ML
INJECTION, EMULSION INTRAVENOUS AS NEEDED
Status: DISCONTINUED | OUTPATIENT
Start: 2024-06-06 | End: 2024-06-06

## 2024-06-06 RX ADMIN — SODIUM CHLORIDE 20 ML/HR: 9 INJECTION, SOLUTION INTRAVENOUS at 08:30

## 2024-06-06 RX ADMIN — LIDOCAINE HYDROCHLORIDE 100 MG: 20 INJECTION, SOLUTION INTRAVENOUS at 09:37

## 2024-06-06 RX ADMIN — SODIUM CHLORIDE: 9 INJECTION, SOLUTION INTRAVENOUS at 08:06

## 2024-06-06 RX ADMIN — PROPOFOL 300 MG: 10 INJECTION, EMULSION INTRAVENOUS at 09:37

## 2024-06-06 SDOH — HEALTH STABILITY: MENTAL HEALTH: CURRENT SMOKER: 0

## 2024-06-06 ASSESSMENT — PAIN SCALES - GENERAL
PAINLEVEL_OUTOF10: 0 - NO PAIN

## 2024-06-06 ASSESSMENT — PAIN - FUNCTIONAL ASSESSMENT: PAIN_FUNCTIONAL_ASSESSMENT: 0-10

## 2024-06-06 NOTE — ANESTHESIA POSTPROCEDURE EVALUATION
Patient: Elsa Biggs    Procedure Summary       Date: 06/06/24 Room / Location: [facility]    Anesthesia Start: 0923 Anesthesia Stop: 1006    Procedure: COLONOSCOPY Diagnosis:       Screen for colon cancer      Hx of colonic polyps      Screen for colon cancer    Scheduled Providers: Brian Garrett MD Responsible Provider: SLOAN Velarde    Anesthesia Type: MAC ASA Status: 3            Anesthesia Type: MAC    Vitals Value Taken Time   /48 06/06/24 1016   Temp 36.6 °C (97.9 °F) 06/06/24 1004   Pulse 83 06/06/24 1016   Resp 16 06/06/24 1016   SpO2 95 % 06/06/24 1016       Anesthesia Post Evaluation    Patient location during evaluation: bedside  Patient participation: complete - patient participated  Level of consciousness: awake  Pain management: adequate  Airway patency: patent  Cardiovascular status: acceptable  Respiratory status: acceptable  Hydration status: acceptable  Postoperative Nausea and Vomiting: none    There were no known notable events for this encounter.

## 2024-06-06 NOTE — ANESTHESIA PREPROCEDURE EVALUATION
Patient: Elsa Biggs    Procedure Information       Date/Time: 06/06/24 0845    Scheduled providers: Brian Garrett MD    Procedure: COLONOSCOPY    Location:  [facility]            Relevant Problems   Anesthesia (within normal limits)      Cardiac   (+) Atrial flutter (Multi)   (+) Atrial flutter, unspecified type (Multi)   (+) Essential (primary) hypertension   (+) Hyperlipidemia, unspecified   (+) PVC (premature ventricular contraction)   (+) Pure hyperglyceridemia      Neuro   (+) Anxiety disorder, unspecified   (+) Bilateral carotid artery stenosis   (+) Depression, unspecified   (+) Major depressive disorder, recurrent, unspecified (CMS-HCC)      GI   (+) Gastroesophageal reflux disease without esophagitis      Endocrine   (+) Morbid (severe) obesity due to excess calories (Multi)   (+) Type 2 diabetes mellitus without complications (Multi)       Clinical information reviewed:   Tobacco  Allergies  Meds   Med Hx  Surg Hx   Fam Hx  Soc Hx        NPO Detail:  NPO/Void Status  Date of Last Liquid: 06/06/24  Time of Last Liquid: 0600  Date of Last Solid: 06/04/24  Time of Last Solid: 1700  Last Intake Type: Clear fluids         Physical Exam    Airway  Mallampati: III     Cardiovascular - normal exam     Dental    Pulmonary - normal exam     Abdominal            Anesthesia Plan    History of general anesthesia?: yes  History of complications of general anesthesia?: no    ASA 3     MAC     The patient is not a current smoker.    Anesthetic plan and risks discussed with patient.  Use of blood products discussed with who consented to blood products.       · With history of right sided BKA  · Vascular surgery following,  · Continue statin  · Resume ASA with Coumadin/lovenox bridge today as patient's INR 1 94  · Goal INR 2-3  · Coumadin resumed at previous home dose of 5 mg daily, montior INR in the AM closely  · Monitor for any signs of bleeding, currently pt is stable no evidence of acute bleed

## 2024-06-06 NOTE — H&P
"History Of Present Illness  Elsa Biggs is a 73 y.o. female presenting with hx polyp.     Past Medical History  She has a past medical history of A-fib (Multi), Anxiety, Breast cancer (Multi), Cancer (Multi), Depression, Diabetes mellitus (Multi), GERD (gastroesophageal reflux disease), Hyperlipidemia, Hypertension, and Stroke (Multi).    Surgical History  She has a past surgical history that includes MR angio neck w and wo IV contrast (01/14/2022); Joint replacement (Left); Fracture surgery; Breast surgery; MR angio head wo IV contrast (12/26/2023); MR angio neck wo IV contrast (12/26/2023); Cardiac catheterization (N/A, 01/29/2024); Revision total hip arthroplasty (Left); Cholecystectomy; Tubal ligation; and Other surgical history (Right).     Social History  She reports that she quit smoking about 41 years ago. Her smoking use included cigarettes. She has never used smokeless tobacco. She reports that she does not drink alcohol and does not use drugs.    Family History  Family History   Problem Relation Name Age of Onset    Heart attack Father  65        blood clot    Coronary artery disease Sister          Allergies  Tetracycline    Review of Systems   All other systems reviewed and are negative.       Physical Exam  Vitals reviewed.   Cardiovascular:      Rate and Rhythm: Normal rate and regular rhythm.   Pulmonary:      Breath sounds: Normal breath sounds.   Skin:     General: Skin is warm and dry.   Neurological:      Mental Status: She is alert.   Psychiatric:         Mood and Affect: Mood normal.          Last Recorded Vitals  Blood pressure 147/53, pulse 79, temperature 36.4 °C (97.6 °F), temperature source Temporal, resp. rate 16, height 1.6 m (5' 3\"), weight 109 kg (240 lb), SpO2 95%.    Relevant Results               Assessment/Plan   Active Problems:  There are no active Hospital Problems.      For colo       I spent 15 minutes in the professional and overall care of this patient.      Brian Garrett, " MD

## 2024-06-13 LAB
LABORATORY COMMENT REPORT: NORMAL
PATH REPORT.FINAL DX SPEC: NORMAL
PATH REPORT.GROSS SPEC: NORMAL
PATH REPORT.RELEVANT HX SPEC: NORMAL
PATH REPORT.TOTAL CANCER: NORMAL

## 2024-06-19 ENCOUNTER — APPOINTMENT (OUTPATIENT)
Dept: UROLOGY | Facility: CLINIC | Age: 74
End: 2024-06-19
Payer: MEDICARE

## 2024-06-20 ENCOUNTER — APPOINTMENT (OUTPATIENT)
Dept: VASCULAR SURGERY | Facility: CLINIC | Age: 74
End: 2024-06-20
Payer: COMMERCIAL

## 2024-06-25 ENCOUNTER — APPOINTMENT (OUTPATIENT)
Dept: UROLOGY | Facility: CLINIC | Age: 74
End: 2024-06-25
Payer: MEDICARE

## 2024-06-26 ENCOUNTER — APPOINTMENT (OUTPATIENT)
Dept: ORTHOPEDIC SURGERY | Facility: CLINIC | Age: 74
End: 2024-06-26
Payer: COMMERCIAL

## 2024-06-26 ENCOUNTER — APPOINTMENT (OUTPATIENT)
Dept: ORTHOPEDIC SURGERY | Facility: CLINIC | Age: 74
End: 2024-06-26
Payer: MEDICARE

## 2024-07-01 PROCEDURE — RXMED WILLOW AMBULATORY MEDICATION CHARGE

## 2024-07-12 ENCOUNTER — PHARMACY VISIT (OUTPATIENT)
Dept: PHARMACY | Facility: CLINIC | Age: 74
End: 2024-07-12
Payer: COMMERCIAL

## 2024-07-16 ENCOUNTER — APPOINTMENT (OUTPATIENT)
Dept: ORTHOPEDIC SURGERY | Facility: CLINIC | Age: 74
End: 2024-07-16
Payer: MEDICARE

## 2024-07-16 ENCOUNTER — HOSPITAL ENCOUNTER (OUTPATIENT)
Dept: RADIOLOGY | Facility: HOSPITAL | Age: 74
Discharge: HOME | End: 2024-07-16
Payer: MEDICARE

## 2024-07-16 DIAGNOSIS — M25.562 CHRONIC PAIN OF LEFT KNEE: ICD-10-CM

## 2024-07-16 DIAGNOSIS — S72.92XD CLOSED FRACTURE OF LEFT FEMUR WITH ROUTINE HEALING, UNSPECIFIED FRACTURE MORPHOLOGY, UNSPECIFIED PORTION OF FEMUR, SUBSEQUENT ENCOUNTER: Primary | ICD-10-CM

## 2024-07-16 DIAGNOSIS — G89.29 CHRONIC PAIN OF LEFT KNEE: ICD-10-CM

## 2024-07-16 DIAGNOSIS — S72.92XD CLOSED FRACTURE OF LEFT FEMUR WITH ROUTINE HEALING, UNSPECIFIED FRACTURE MORPHOLOGY, UNSPECIFIED PORTION OF FEMUR, SUBSEQUENT ENCOUNTER: ICD-10-CM

## 2024-07-16 PROCEDURE — 73552 X-RAY EXAM OF FEMUR 2/>: CPT | Mod: LT

## 2024-07-16 PROCEDURE — 1036F TOBACCO NON-USER: CPT | Performed by: ORTHOPAEDIC SURGERY

## 2024-07-16 PROCEDURE — 99214 OFFICE O/P EST MOD 30 MIN: CPT | Performed by: ORTHOPAEDIC SURGERY

## 2024-07-16 PROCEDURE — 1157F ADVNC CARE PLAN IN RCRD: CPT | Performed by: ORTHOPAEDIC SURGERY

## 2024-07-16 PROCEDURE — 1159F MED LIST DOCD IN RCRD: CPT | Performed by: ORTHOPAEDIC SURGERY

## 2024-07-16 PROCEDURE — 73564 X-RAY EXAM KNEE 4 OR MORE: CPT | Mod: LT

## 2024-07-16 RX ORDER — DICLOFENAC SODIUM 10 MG/G
4 GEL TOPICAL 4 TIMES DAILY
Qty: 450 G | Refills: 0 | Status: SHIPPED | OUTPATIENT
Start: 2024-07-16

## 2024-07-16 ASSESSMENT — PAIN - FUNCTIONAL ASSESSMENT: PAIN_FUNCTIONAL_ASSESSMENT: NO/DENIES PAIN

## 2024-07-16 NOTE — PROGRESS NOTES
This is a consultation from Dr. Marcelle Vitale MD for   Chief Complaint   Patient presents with    Left Hip - Follow-up     7/26/22 LT HIP       This is a 73 y.o. female who presents for follow-up for her left hip and left knee.  Patient had a left total hip with me about 2 years ago, this was complex because she had a malunited intertrochanteric fracture.  After total hip she went on to have an endoprosthetic fracture that same femur.  Although that healed up she continues to have weakness in her legs and occasional muscle spasms.  This problems been exacerbated last few weeks, occasional muscle spasms mainly over the lateral aspect of the distal thigh.  She feels she is gaining some strength and physical therapy.    Physical Exam    There has been no interval change in this patient's past medical, surgical, medications, allergies, family history or social history since the most recent visit to a provider within our department. 14 point review of systems was performed, reviewed, and negative except for pertinent positives documented in the history of present illness.     Constitutional: well developed, well nourished female in no acute distress  Psychiatric: normal mood, appropriate affect  Eyes: sclera anicteric  HENT: normocephalic/atraumatic  CV: regular rate and rhythm   Respiratory: non labored breathing  Integumentary: no rash  Neurological: moves all extremities    Left knee examination: Well-healed surgical incisions no erythema no drainage stable to varus and valgus stress range of motion 0 to 120 degrees    Left hip examination: Well-healed surgical incision erythema no drainage no pain with range of motion neurovascular tact distally    Xrays were ordered by me, they were reviewed and independently interpreted by me today, they show stable left total knee left total hip, completely healed previous inter prosthetic fracture.    Procedures      Impression/Plan: This is a 73 y.o. female status post left  "total hip and fixation of intra prosthetic fracture, overall she is doing well.  She is making gains with physical therapy and I encouraged her to continue that.  Recommend using a topical anti-inflammatory when needed.  Stretching and strengthening exercises.  I will see her back as needed    BMI Readings from Last 1 Encounters:   06/06/24 42.51 kg/m²      Lab Results   Component Value Date    CREATININE 0.71 03/07/2024     Tobacco Use: Medium Risk (7/16/2024)    Patient History     Smoking Tobacco Use: Former     Smokeless Tobacco Use: Never     Passive Exposure: Not on file      MELD 3.0: 17 at 12/26/2023  5:07 AM  MELD-Na: 17 at 12/26/2023  5:07 AM  Calculated from:  Serum Creatinine: 0.53 mg/dL (Using min of 1 mg/dL) at 12/26/2023  5:07 AM  Serum Sodium: 134 mmol/L at 12/26/2023  5:07 AM  Total Bilirubin: 0.4 mg/dL (Using min of 1 mg/dL) at 12/25/2023  2:18 AM  Serum Albumin: 3.0 g/dL at 12/25/2023  2:18 AM  INR(ratio): 2.0 at 12/24/2023  7:55 PM  Age at listing (hypothetical): 73 years  Sex: Female at 12/26/2023  5:07 AM       Lab Results   Component Value Date    HGBA1C 7.0 (H) 10/20/2023     No results found for: \"STAPHMRSASCR\"  "

## 2024-07-17 ENCOUNTER — OFFICE VISIT (OUTPATIENT)
Dept: UROLOGY | Facility: CLINIC | Age: 74
End: 2024-07-17
Payer: MEDICARE

## 2024-07-17 VITALS — BODY MASS INDEX: 42.35 KG/M2 | WEIGHT: 239 LBS | HEIGHT: 63 IN

## 2024-07-17 DIAGNOSIS — N32.81 OAB (OVERACTIVE BLADDER): Primary | ICD-10-CM

## 2024-07-17 DIAGNOSIS — N39.46 MIXED INCONTINENCE URGE AND STRESS: ICD-10-CM

## 2024-07-17 PROCEDURE — 1160F RVW MEDS BY RX/DR IN RCRD: CPT

## 2024-07-17 PROCEDURE — 1036F TOBACCO NON-USER: CPT

## 2024-07-17 PROCEDURE — 99204 OFFICE O/P NEW MOD 45 MIN: CPT

## 2024-07-17 PROCEDURE — 1159F MED LIST DOCD IN RCRD: CPT

## 2024-07-17 PROCEDURE — 1157F ADVNC CARE PLAN IN RCRD: CPT

## 2024-07-17 PROCEDURE — G2211 COMPLEX E/M VISIT ADD ON: HCPCS

## 2024-07-17 PROCEDURE — 1126F AMNT PAIN NOTED NONE PRSNT: CPT

## 2024-07-17 RX ORDER — VIBEGRON 75 MG/1
75 TABLET, FILM COATED ORAL NIGHTLY
Qty: 84 TABLET | Refills: 0 | COMMUNITY
Start: 2024-07-17 | End: 2024-10-09

## 2024-07-17 ASSESSMENT — PAIN SCALES - GENERAL: PAINLEVEL: 0-NO PAIN

## 2024-07-17 NOTE — PROGRESS NOTES
[facility] [place]  Outpatient Clinic Note    Patient: Elsa Biggs  Age/Sex: 73 y.o., female  MRN: [id number]    Chief Complaint: 3-month follow-up         History of Present Illness  This is a 73 y.o. female, who presents for follow-up for medication management.  The patient has been taking Gemtesa she is a previous patient of Kaylene CHAHAL.  She admits to mixed urinary incontinence.  The patient stated she is 80% better with the medication.  That her urinary urgency and frequency has significantly decreased.  She has went a few months without the medication and has noticed a major difference with increase symptoms.  The patient stated that she has had some lightheadedness this week  She denies dysuria, gross hematuria, flank pain, fever or chills.  The patient denies shortness of breath or chest pain.  She stated she has not been in contact with her primary care or cardiologist.  She will call and leave a message for them with her symptoms of lightheadedness.           Past Medical & Surgical History  Past Medical History:   Diagnosis Date    A-fib (Multi)     Anxiety     Breast cancer (Multi)     16 tx radiation left lumpectomy 2022    Cancer (Multi)     Depression     Diabetes mellitus (Multi)     GERD (gastroesophageal reflux disease)     Hyperlipidemia     Hypertension     Stroke (Multi)      Past Surgical History:   Procedure Laterality Date    BREAST SURGERY      CARDIAC CATHETERIZATION N/A 01/29/2024    Procedure: Left Heart Cath;  Surgeon: Virgil Childress MD;  Location: [facility] Cardiac Cath Lab;  Service: Cardiovascular;  Laterality: N/A;    CHOLECYSTECTOMY      FRACTURE SURGERY      left femur and LLE fabrice  knee plate    JOINT REPLACEMENT Left     MR HEAD ANGIO WO IV CONTRAST  12/26/2023    MR HEAD ANGIO WO IV CONTRAST 12/26/2023 [facility]    MR NECK ANGIO W AND WO IV CONTRAST  01/14/2022    MR NECK ANGIO W AND WO IV CONTRAST 1/14/2022 [facility]    MR NECK ANGIO WO IV CONTRAST  12/26/2023    MR NECK  ANGIO WO IV CONTRAST 12/26/2023 [facility]    OTHER SURGICAL HISTORY Right     Transcarotid Artery Revascularization    REVISION TOTAL HIP ARTHROPLASTY Left     TUBAL LIGATION         Family History  Family History   Problem Relation Name Age of Onset    Heart attack Father  65        blood clot    Coronary artery disease Sister         Social History  She reports that she quit smoking about 41 years ago. Her smoking use included cigarettes. She has never used smokeless tobacco. She reports that she does not drink alcohol and does not use drugs.    Allergies  Tetracycline    Medications:  Current Outpatient Medications on File Prior to Visit   Medication Sig Dispense Refill    acetaminophen (Tylenol) 325 mg tablet Take 2 tablets (650 mg) by mouth every 4 hours if needed for mild pain (1 - 3), moderate pain (4 - 6) or fever (temp greater than 38.0 C). 30 tablet 1    apixaban (Eliquis) 5 mg tablet Take 1 tablet (5 mg) by mouth 2 times a day. 180 tablet 1    aspirin 81 mg EC tablet Take 1 tablet (81 mg) by mouth once daily.      atorvastatin (Lipitor) 80 mg tablet Take 1 tablet (80 mg) by mouth once daily at bedtime. 30 tablet 1    cholecalciferol (Vitamin D-3) 25 MCG (1000 UT) tablet Take 1 tablet (25 mcg) by mouth once daily.      clopidogrel (Plavix) 75 mg tablet Take 1 tablet (75 mg) by mouth once daily. 30 tablet 1    diclofenac sodium (Voltaren) 1 % gel Apply 4.5 inches (4 g) topically 4 times a day. 450 g 0    ezetimibe (Zetia) 10 mg tablet Take 1 tablet (10 mg) by mouth once daily.      metFORMIN (Glucophage) 500 mg tablet Take 1 tablet (500 mg) by mouth once daily in the morning. And 2 tabs In the evening      metoprolol succinate XL (Toprol-XL) 25 mg 24 hr tablet Take 1 tablet (25 mg) by mouth once daily. Do not crush or chew. 30 tablet 11    OneTouch Delica Plus Lancet 33 gauge misc USE TO TAKE BLOOD SUGAR 1-2 TIMES PER DAY      PARoxetine (Paxil) 20 mg tablet Take 1 tablet (20 mg) by mouth once daily at  bedtime.      pioglitazone (Actos) 30 mg tablet Take 1 tablet (30 mg) by mouth once every 24 hours.       No current facility-administered medications on file prior to visit.        Review of Systems   A comprehensive 10+ review of systems was negative except for: see hpi          Physical Exam                                                                                                                      General: Well developed, well nourished, alert and cooperative, appears in no acute distress  Head: Normocephalic, atraumatic  Neck: supple, trachea midline  Eyes: Non-injected conjunctiva, sclera clear, no proptosis  Cardiac: Extremities are warm and well perfused. No edema, cyanosis or pallor.   Lungs: Breathing is easy, non-labored. Speaking in clear and complete sentences. Normal diaphragmatic movement.  Abdomen: soft, non-distended, non-tender, no rebound or guarding, no hernia and no CVA tenderness   MSK: Ambulatory with steady gait, assisted with walker  Neuro: alert and oriented to person, place and time  Psych: Demonstrates good judgement and reason, without hallucinations, abnormal affect or abnormal behaviors.  Skin: no obvious lesions, no rashes      Labs  N/A    Imaging  N/A    IMPRESSION AND PLAN:  This is a 73 y.o. female, who presents with incontinence     CHAD  -discussed mechanism of UUI and ROSIE, and treatment options for both including PFT, pessary, sling for ROSIE and PFT, pharmacotherapy and third-line therapy for OAB  -Patient doing well on Kearneytesa  - clinical pharmacy referral for possible cost assistance with Gemtesa     Follow up in 3 months    All questions and concerns were answered and addressed.  The patient expressed understanding and agrees with the plan.     Reviewed and approved by DARNELL SALDANA on 7/17/24 at 7:22 AM.

## 2024-08-02 ENCOUNTER — APPOINTMENT (OUTPATIENT)
Dept: NEUROLOGY | Facility: CLINIC | Age: 74
End: 2024-08-02
Payer: MEDICARE

## 2024-08-02 VITALS
WEIGHT: 241 LBS | HEART RATE: 45 BPM | DIASTOLIC BLOOD PRESSURE: 58 MMHG | BODY MASS INDEX: 42.7 KG/M2 | TEMPERATURE: 96.4 F | HEIGHT: 63 IN | SYSTOLIC BLOOD PRESSURE: 116 MMHG

## 2024-08-02 DIAGNOSIS — I63.81 CEREBROVASCULAR ACCIDENT (CVA) DUE TO STENOSIS OF SMALL ARTERY (MULTI): Primary | ICD-10-CM

## 2024-08-02 PROCEDURE — 1157F ADVNC CARE PLAN IN RCRD: CPT | Performed by: PSYCHIATRY & NEUROLOGY

## 2024-08-02 PROCEDURE — 99214 OFFICE O/P EST MOD 30 MIN: CPT | Performed by: PSYCHIATRY & NEUROLOGY

## 2024-08-02 PROCEDURE — 1036F TOBACCO NON-USER: CPT | Performed by: PSYCHIATRY & NEUROLOGY

## 2024-08-02 PROCEDURE — 3074F SYST BP LT 130 MM HG: CPT | Performed by: PSYCHIATRY & NEUROLOGY

## 2024-08-02 PROCEDURE — 1159F MED LIST DOCD IN RCRD: CPT | Performed by: PSYCHIATRY & NEUROLOGY

## 2024-08-02 PROCEDURE — 3078F DIAST BP <80 MM HG: CPT | Performed by: PSYCHIATRY & NEUROLOGY

## 2024-08-02 PROCEDURE — 1125F AMNT PAIN NOTED PAIN PRSNT: CPT | Performed by: PSYCHIATRY & NEUROLOGY

## 2024-08-02 ASSESSMENT — ENCOUNTER SYMPTOMS
ACTIVITY CHANGE: 1
WEAKNESS: 1
SPEECH DIFFICULTY: 1

## 2024-08-02 ASSESSMENT — PAIN SCALES - GENERAL: PAINLEVEL: 6

## 2024-08-02 NOTE — PROGRESS NOTES
"Subjective   Patient ID: Elsa Biggs is a 73 y.o. female who presents for Hospital Follow-up (Pt denies any memory loss concerns and tremors. Pt is concerned with balance with walking and standing. ).    HPI     Last seen in the hospital in 10/2023 with left thalamic ischemic stroke, lacunar  She was on DAPT and statin  Had diplopia, dizziness, ataxic gait  Saw cardiology for CHF, 85% right cervical ICA follow up with vascular surgery  Uses walker   Speech is a little slurred  Hearing is altered since stroke  Memory is ok, sometimes forgets things      Review of Systems   Constitutional:  Positive for activity change.   Musculoskeletal:  Positive for gait problem.   Neurological:  Positive for speech difficulty and weakness.       Objective   /58 (BP Location: Left arm, Patient Position: Sitting, BP Cuff Size: Adult)   Pulse (!) 45   Temp 35.8 °C (96.4 °F) (Temporal)   Ht 1.6 m (5' 3\")   Wt 109 kg (241 lb)   BMI 42.69 kg/m²     Physical Exam    Alert and oriented to person, place, time, and situation.   Facial muscles are symmetric without masked facies.  Ocular versions intact.   Speech fluent with some slurring.   Muscle tone and bulk normal and without cogwheel rigidity.   No bradykinesia in the upper or lower extremities  No tremors or dyskinesia.  Normal posture.      Assessment/Plan   Diagnoses and all orders for this visit:  Cerebrovascular accident (CVA) due to stenosis of small artery (Multi)  -     Referral to Speech Therapy; Future    Overall Elsa is doing well after her stroke, noted some speech slurring while in office, will order speech therapy  Continue to see Cardiology for medication management as well as vascular surgery for the carotid artery monitoring  General recommendations given for post stroke recovery with increasing activity and exercise   Encouraged Mediterranean diet lean meats and increasing vegetables and fruit       "

## 2024-08-02 NOTE — PROGRESS NOTES
I saw and evaluated the patient. I personally obtained the key and critical portions of the history and physical exam or was physically present for key and critical portions performed by the resident/fellow. I reviewed the resident/fellow's documentation and discussed the patient with the resident/fellow. I agree with the resident/fellow's medical decision making as documented in the note.    Isaiah Jarrell MD

## 2024-08-27 ENCOUNTER — APPOINTMENT (OUTPATIENT)
Dept: PHARMACY | Facility: HOSPITAL | Age: 74
End: 2024-08-27
Payer: MEDICARE

## 2024-08-27 ENCOUNTER — APPOINTMENT (OUTPATIENT)
Dept: CARDIOLOGY | Facility: HOSPITAL | Age: 74
End: 2024-08-27
Payer: COMMERCIAL

## 2024-08-27 DIAGNOSIS — I48.92 ATRIAL FLUTTER, UNSPECIFIED TYPE (MULTI): ICD-10-CM

## 2024-08-27 PROCEDURE — RXMED WILLOW AMBULATORY MEDICATION CHARGE

## 2024-08-27 NOTE — PROGRESS NOTES
Clinical Pharmacy Appointment    Patient ID: Elsa Biggs is a 73 y.o. female who presents for Follow Up appointment.     Referring Provider: Malinda Posada, APRN-CNP  PCP: Marcelle Vitale MD     Subjective     ANTICOAGULATION  Does patient follow with cardiology? Yes  Last cardiology appointment: 3/19/24     The ASCVD Risk score (Omar SOTO, et al., 2019) failed to calculate for the following reasons:    The patient has a prior MI or stroke diagnosis    DIAGNOSIS: prevention of nonvalvular atrial fibrilliation stroke and systemic embolism  - Patient is projected to be on anticoagulation indefinitely   - [id number] Score: [5] (only included if diagnosis is atrial fibrillation)   Age: [<65 (0)] [65-74 (+1)] [> 75 (+2)]: 2  Sex: [Male/Female (+1)]: 1  CHF history: [No/Yes(+1)]: 0  Hypertension history: [No/Yes(+1)]: 1  Stroke/TIA/thromboembolism history: [No/Yes(+2)]: 0  Vascular disease history (prior MI, peripheral artery disease, aortic plaque): [No/Yes(+1)]: 0  Diabetes history: [No/Yes(+1)]: 1    Pertinent Medical History  Medical history: Afib/Aflutter, PVCs, hx of ischemic stroke in 10/2023  Social history: N/A  Medication history: ASA and clopidogrel     Current anticoagulation medications include:  Eliquis 5 mg BID     Screening for dose adjustment:  No dose change recommended at this time  - Age 73, Scr 0.71, weight 109 kg     Monitoring:  Date of last BMP: 3/7/24  Adverse Effects: Some bruising but always heal.  Recent Hospitalizations: None   Recent Falls/Trauma: None   Changes in Tobacco or Alcohol Intake: None     Medication Reconciliation:  No changes    Drug Interactions  No relevant drug interactions were noted.    Medication System Management  Patient's preferred pharmacy: [facility]; [place]   Adherence/Organization: None; uses a tray for organization.   Affordability/Accessibility: [facility]   Enrolled in [facility]; expiration on 3/7/25      Objective   Allergies   Allergen Reactions     "Tetracycline Rash     Social History     Social History Narrative    Not on file      Medication Review  Current Outpatient Medications   Medication Instructions    acetaminophen (TYLENOL) 650 mg, oral, Every 4 hours PRN    aspirin 81 mg, oral, Daily    atorvastatin (LIPITOR) 80 mg, oral, Nightly    cholecalciferol (Vitamin D-3) 25 MCG (1000 UT) tablet 1 tablet, oral, Daily    clopidogrel (PLAVIX) 75 mg, oral, Daily    diclofenac sodium (VOLTAREN) 4 g, Topical, 4 times daily    Eliquis 5 mg, oral, 2 times daily    ezetimibe (ZETIA) 10 mg, oral, Daily    Gemtesa 75 mg, oral, Nightly, Lot [id number]<BR>EXP OCT 2027    metFORMIN (GLUCOPHAGE) 500 mg, oral, Every morning, And 2 tabs In the evening    metoprolol succinate XL (TOPROL-XL) 25 mg, oral, Daily, Do not crush or chew.    OneTouch Delica Plus Lancet 33 gauge misc USE TO TAKE BLOOD SUGAR 1-2 TIMES PER DAY    PARoxetine (PAXIL) 20 mg, oral, Nightly    pioglitazone (ACTOS) 30 mg, oral, Every 24 hours      Vitals  BP Readings from Last 2 Encounters:   08/02/24 116/58   06/06/24 108/76     BMI Readings from Last 1 Encounters:   08/02/24 42.69 kg/m²      Labs  A1C  Lab Results   Component Value Date    HGBA1C 7.0 (H) 10/20/2023    HGBA1C 6.2 08/02/2018    HGBA1C 6.2 08/01/2018     BMP  Lab Results   Component Value Date    CALCIUM 7.9 (L) 03/07/2024     03/07/2024    K 4.2 03/07/2024    CO2 28 03/07/2024     03/07/2024    BUN 16 03/07/2024    CREATININE 0.71 03/07/2024    EGFR 89 03/07/2024     LFTs  Lab Results   Component Value Date    ALT 14 12/25/2023    AST 13 12/25/2023    ALKPHOS 45 12/25/2023    BILITOT 0.4 12/25/2023     FLP  Lab Results   Component Value Date    TRIG 66 10/20/2023    CHOL 113 10/20/2023    LDLCALC 60 10/20/2023    HDL 40.1 10/20/2023     Urine Microalbumin  No results found for: \"MICROALBCREA\"  Weight Management  Wt Readings from Last 3 Encounters:   08/02/24 109 kg (241 lb)   07/17/24 108 kg (239 lb)   06/06/24 109 kg (240 lb)    "   There is no height or weight on file to calculate BMI.     Assessment/Plan   Problem List Items Addressed This Visit       Atrial flutter, unspecified type (Multi)     ASSESSMENT/PLAN ANTICOAGULATION  Patient is to be on anticoagulation indefinitely.  Rationale for plan: Patient denies any side effects or concerns related to current anticoagulation regimen. No updates in lab work from previous visit and no need for dose adjustment at this time. Patient is enrolled [facility] for Eliquis copay assistance. Will be due for renewal next year, 3/7/2025.     Medication Changes: None   Continue Eliquis 5 mg BID     Monitoring and Education Discussed:  Counseled patient of side effects that are indicative of bleeding such as dark tarry stool, unexplainable bruising, or vomiting up a coffee ground like substance  Reminded patient of importance of anticoagulation therapy to prevent risk of heart attack or stroke  Counseled patient on MOA, expectations, duration of therapy, contraindications, administration, and monitoring parameters   Answered all patient questions and concerns    Clinical Pharmacist follow-up: 3 months - 11/27/24 @ 10:30 am, Telehealth visit    Continue all meds under the continuation of care with the referring provider and clinical pharmacy team.    Thank you,  Milagro Mckeon, PharmD  Clinical Pharmacist  [phone]    Verbal consent to manage patient's drug therapy was obtained from the patient. They were informed they may decline to participate or withdraw from participation in pharmacy services at any time.

## 2024-08-29 ENCOUNTER — PHARMACY VISIT (OUTPATIENT)
Dept: PHARMACY | Facility: CLINIC | Age: 74
End: 2024-08-29
Payer: COMMERCIAL

## 2024-09-24 ENCOUNTER — OFFICE VISIT (OUTPATIENT)
Dept: CARDIOLOGY | Facility: HOSPITAL | Age: 74
End: 2024-09-24
Payer: MEDICARE

## 2024-09-24 VITALS
DIASTOLIC BLOOD PRESSURE: 65 MMHG | OXYGEN SATURATION: 94 % | WEIGHT: 240 LBS | BODY MASS INDEX: 42.51 KG/M2 | HEART RATE: 90 BPM | SYSTOLIC BLOOD PRESSURE: 101 MMHG

## 2024-09-24 DIAGNOSIS — I48.3 TYPICAL ATRIAL FLUTTER (MULTI): ICD-10-CM

## 2024-09-24 PROCEDURE — 3078F DIAST BP <80 MM HG: CPT | Performed by: NURSE PRACTITIONER

## 2024-09-24 PROCEDURE — 1159F MED LIST DOCD IN RCRD: CPT | Performed by: NURSE PRACTITIONER

## 2024-09-24 PROCEDURE — 1157F ADVNC CARE PLAN IN RCRD: CPT | Performed by: NURSE PRACTITIONER

## 2024-09-24 PROCEDURE — 93005 ELECTROCARDIOGRAM TRACING: CPT | Performed by: NURSE PRACTITIONER

## 2024-09-24 PROCEDURE — 3074F SYST BP LT 130 MM HG: CPT | Performed by: NURSE PRACTITIONER

## 2024-09-24 PROCEDURE — 99214 OFFICE O/P EST MOD 30 MIN: CPT | Performed by: NURSE PRACTITIONER

## 2024-09-24 PROCEDURE — 1036F TOBACCO NON-USER: CPT | Performed by: NURSE PRACTITIONER

## 2024-09-24 RX ORDER — DILTIAZEM HYDROCHLORIDE 120 MG/1
120 CAPSULE, COATED, EXTENDED RELEASE ORAL DAILY
Qty: 90 CAPSULE | Refills: 3 | Status: SHIPPED | OUTPATIENT
Start: 2024-09-24 | End: 2025-09-24

## 2024-09-24 NOTE — PROGRESS NOTES
Chief Complaint:   Follow up      History Of Present Illness:    Elsa Biggs is a 73 y.o. female from home with h/o obesity, hypertension, dyslipidemia, depression, type 2 DM, stroke in the left thalamus territory, and incidentally found MALATHI 85% s/p TCAR 3/4/2024, PAF on apixaban, PVCs, HFmrEF, coronary angiography 1/29/2024 which was negative for obstructive CAD (LM mild disease, p-mLAD 40, Lcx mid 60, RCA mild diffuse disease) presenting today for follow up.  ECG today shows tachyarrhythmia-- suspect atrial flutter, HR 128bpm.  She denies symptoms of palpitations, SOB, chest pain.  Does admit at night she feels heart flutter palpitations at times.  Denies having increased LE edema.  Stopped taking metoprolol as it was causing fatigue.       Last Recorded Vitals:  Vitals:    09/24/24 1018   BP: 101/65   Pulse: 90   SpO2: 94%   Weight: 109 kg (240 lb)       Past Medical History:  She has a past medical history of A-fib (Multi), Anxiety, Breast cancer (Multi), Cancer (Multi), Depression, Diabetes mellitus (Multi), GERD (gastroesophageal reflux disease), Hyperlipidemia, Hypertension, and Stroke (Multi).    Past Surgical History:  She has a past surgical history that includes MR angio neck w and wo IV contrast (01/14/2022); Joint replacement (Left); Fracture surgery; Breast surgery; MR angio head wo IV contrast (12/26/2023); MR angio neck wo IV contrast (12/26/2023); Cardiac catheterization (N/A, 01/29/2024); Revision total hip arthroplasty (Left); Cholecystectomy; Tubal ligation; and Other surgical history (Right).      Social History:  She reports that she quit smoking about 41 years ago. Her smoking use included cigarettes. She has never used smokeless tobacco. She reports that she does not drink alcohol and does not use drugs.    Family History:  Family History   Problem Relation Name Age of Onset    Heart attack Father  65        blood clot    Coronary artery disease Sister           Allergies:  Tetracycline    Outpatient Medications:  Current Outpatient Medications   Medication Instructions    acetaminophen (TYLENOL) 650 mg, oral, Every 4 hours PRN    apixaban (ELIQUIS) 5 mg, oral, 2 times daily    aspirin 81 mg, oral, Daily    atorvastatin (LIPITOR) 80 mg, oral, Nightly    cholecalciferol (Vitamin D-3) 25 MCG (1000 UT) tablet 1 tablet, oral, Daily    clopidogrel (PLAVIX) 75 mg, oral, Daily    diclofenac sodium (VOLTAREN) 4 g, Topical, 4 times daily    dilTIAZem CD (CARDIZEM CD) 120 mg, oral, Daily    ezetimibe (ZETIA) 10 mg, oral, Daily    Gemtesa 75 mg, oral, Nightly    metFORMIN (GLUCOPHAGE) 500 mg, oral, Every morning, And 2 tabs In the evening    OneTouch Delica Plus Lancet 33 gauge misc USE TO TAKE BLOOD SUGAR 1-2 TIMES PER DAY    PARoxetine (PAXIL) 20 mg, oral, Nightly    pioglitazone (ACTOS) 30 mg, oral, Every 24 hours       Physical Exam:  Constitutional: Pleasant, Awake/Alert/Oriented to person place and time. No distress  Head: Atraumatic, Normocephalic  Eyes: EOMI. RIP  Neck: No JVD  Cardiovascular: irreg/irreg, tachy, no murmur   Respiratory: Clear to auscultation bilaterally. No wheezing, rales or rhonchi. Good chest wall expansion  Abdomen: Soft, Nontender, Obese. Bowel sounds appreciated  Musculoskeletal: ROM intact. Muscle strength grossly intact upper and lower extremities 5/5.   Neurological: CNII-XII intact. Sensation grossly intact  Extremities: Warm and dry. No acute rashes and lesions  Psychiatric: Appropriate mood and affect       Last Labs:  CBC -  Lab Results   Component Value Date    WBC 7.0 03/07/2024    HGB 9.1 (L) 03/07/2024    HCT 28.9 (L) 03/07/2024    MCV 85 03/07/2024     03/07/2024       CMP -  Lab Results   Component Value Date    CALCIUM 7.9 (L) 03/07/2024    PHOS 3.7 03/05/2024    PROT 6.2 (L) 12/25/2023    ALBUMIN 3.0 (L) 12/25/2023    AST 13 12/25/2023    ALT 14 12/25/2023    ALKPHOS 45 12/25/2023    BILITOT 0.4 12/25/2023       LIPID PANEL -    Lab Results   Component Value Date    CHOL 113 10/20/2023    TRIG 66 10/20/2023    HDL 40.1 10/20/2023    CHHDL 2.8 10/20/2023    VLDL 13 10/20/2023    NHDL 73 10/20/2023       RENAL FUNCTION PANEL -   Lab Results   Component Value Date    GLUCOSE 101 (H) 03/07/2024     03/07/2024    K 4.2 03/07/2024     03/07/2024    CO2 28 03/07/2024    ANIONGAP 7 (L) 03/07/2024    BUN 16 03/07/2024    CREATININE 0.71 03/07/2024    CALCIUM 7.9 (L) 03/07/2024    PHOS 3.7 03/05/2024    ALBUMIN 3.0 (L) 12/25/2023        Lab Results   Component Value Date     (H) 12/25/2023    HGBA1C 7.0 (H) 10/20/2023       Last Cardiology Tests:  ECG:  A.flutter 2:1 AV conduction, HR 128bpm     Echo:  Transthoracic Echo (TTE) Complete 03/06/2024  CONCLUSIONS:   1. Left ventricular systolic function is low normal with a 50-55% estimated ejection fraction.     10/20/2023 Echo:  CONCLUSIONS:   1. Left ventricular systolic function is mildly decreased with a 45-50% estimated ejection fraction.   2. Spectral Doppler shows an impaired relaxation pattern of left ventricular diastolic filling.   3. There is mild tricuspid regurgitation.   4. Technically difficult bubble study unable to interpret.     Cath:  Cardiac catheterization - coronary 01/29/2024  CONCLUSIONS:   1. Left main: no significant angiographic disease.   2. LAD: diffuse 40% prox-mid disease.   3. LCx: 60% mid-vessel disease.   4. RCA: mild diffuse irregularities.   5. LVEDP 26mmHg, no aortic stenosis on LV-Ao gradient.     Stress Test:  Nuclear Stress Test 12/26/2023     IMPRESSION:  Mild attentuation of the anterior wall seen on the stress images.  This is either soft tissue attenuation or ischemia in LAD territory.  Unfortunately there are no prone images available. Dilated left  ventricle with mild left ventricular systolic dysfunction on post  stress gated imaging.  Summary:   1. No clinical or electrocardiographic evidence for ischemia at maximal infusion.   2.  Correlate with myocardial perfusion imaging results.   3. Nuclear image results are reported separately.        Lab review: I have personally reviewed the laboratory result(s)   Diagnostic review: I have personally reviewed the result(s) of the Echocardiogram, LHC, and stress test          Assessment/Plan   Very pleasant 73 y.o. female from home with h/o obesity, hypertension, dyslipidemia, depression, type 2 DM, stroke in the left thalamus territory, and incidentally found MALATHI 85% s/p TCAR 3/4/2024, PAF on apixaban, PVCs, HFmrEF, coronary angiography 1/29/2024 which was negative for obstructive CAD (LM mild disease, p-mLAD 40, Lcx mid 60, RCA mild diffuse disease) presenting today for follow up. Appears to be in a.flutter today, HR 128bpm-- mostly asymptomatic; reports having mild intermittent palpitations at night, but denies currently.     Plan:  Start diltiazem 120mg daily for HR control therapy as EF has recovered and there is no evidence of ADHF.  Will arrange for 1 month follow up for repeat ECG and reassessment of symptoms.  Consider EP referral if she remains tachycardic despite CCB or if symptoms worsen.      Continue apixaban 5mg BID, plavix 75mg daily, atorvastatin 80mg daily and zetia 10mg daily     No longer on ACEi due to relative hypotension, EF recovered.       ANITA Dhillon-CNP

## 2024-09-25 LAB
ATRIAL RATE: 107 BPM
Q ONSET: 223 MS
QRS COUNT: 21 BEATS
QRS DURATION: 98 MS
QT INTERVAL: 358 MS
QTC CALCULATION(BAZETT): 522 MS
QTC FREDERICIA: 461 MS
R AXIS: -1 DEGREES
T AXIS: 267 DEGREES
T OFFSET: 402 MS
VENTRICULAR RATE: 128 BPM

## 2024-10-01 NOTE — PROGRESS NOTES
"  Patient ID: Elsa Biggs is a 73 y.o. female who presents for Overactive bladder    Referring Provider: Elsa Allen PA-C      Preferred Pharmacy:    [facility] [address] [facility]  [address]  [address]  [facility] [address]  Phone: [phone] Fax: [phone]    [facility]  [address], [address]  [facility] [address]  Phone: [phone] Fax: [phone]      Subjective      Vaginal Symptoms  Vaginal Dryness: none  Dysuria (pain, burning, stinging, or itching with urination): none  Vaginal and/or vulvar irritation/itching: none  Recurrent urinary tract infections: none  No results found for: \"ESTRADIOLFRE\", \"PROGESTERONE\", \"ESTRADIOL\", \"FSH\"    Non Pharm Mgmt:   none  Previous Treatment:   none  Current Treatment:   Gemtesa 75 mg daily at bedtime    Urinary Symptoms  Medications that may contribute to symptoms:   Diltiazem - causes bladder to relax and affects ability to empty properly  Any history of:   Narrow-angle glaucoma? no  Impaired gastric emptying? no  Urinary retention? no    If diabetes, blood sugar controlled? A1c 7%, controlled  How many protective undergarments are you utilizing daily? 1 daily  Recommend Coloplast Emily Protect moisture barrier cream for incontinence associated skin irritation/breakdown.     Current medication?   When started? 8/2024  Side effects? none  Improvement in symptoms? Patient reported significant improvement, she has been sleeping through the night without getting up for the bathroom      Cardiovascular Health  The ASCVD Risk score (Omar SOTO, et al., 2019) failed to calculate for the following reasons:    The patient has a prior MI or stroke diagnosis    Lab Results   Component Value Date    CHOL 113 10/20/2023     Lab Results   Component Value Date    HDL 40.1 10/20/2023     Lab Results   Component Value Date    LDLCALC 60 10/20/2023     Lab Results   Component Value Date    TRIG 66 10/20/2023     No components " "found for: \"CHOLHDL\"        Blood Sugar Balance  Lab Results   Component Value Date    GLUCOSE 101 (H) 03/07/2024    HGBA1C 7.0 (H) 10/20/2023    HGBA1C 6.2 08/02/2018    HGBA1C 6.2 08/01/2018     No results found for: \"LEPTIN\", \"INSULFAST\", \"GLUF\"      Thyroid  Lab Results   Component Value Date    TSH 1.94 03/05/2024       Iron Status  No results found for: \"IRON\", \"TIBC\", \"FERRITIN\"     Kidney Function  Lab Results   Component Value Date    GFRF >90 11/01/2022    CREATININE 0.71 03/07/2024       Potassium  Lab Results   Component Value Date    K 4.2 03/07/2024        Vitamin D3  No results found for: \"VITD25\"    Current Outpatient Medications on File Prior to Visit   Medication Sig Dispense Refill    acetaminophen (Tylenol) 325 mg tablet Take 2 tablets (650 mg) by mouth every 4 hours if needed for mild pain (1 - 3), moderate pain (4 - 6) or fever (temp greater than 38.0 C). 30 tablet 1    apixaban (Eliquis) 5 mg tablet Take 1 tablet (5 mg) by mouth 2 times a day. 180 tablet 3    atorvastatin (Lipitor) 80 mg tablet Take 1 tablet (80 mg) by mouth once daily at bedtime. 30 tablet 1    cholecalciferol (Vitamin D-3) 25 MCG (1000 UT) tablet Take 1 tablet (25 mcg) by mouth once daily.      clopidogrel (Plavix) 75 mg tablet Take 1 tablet (75 mg) by mouth once daily. 30 tablet 1    diclofenac sodium (Voltaren) 1 % gel Apply 4.5 inches (4 g) topically 4 times a day. 450 g 0    dilTIAZem CD (Cardizem CD) 120 mg 24 hr capsule Take 1 capsule (120 mg) by mouth once daily. 90 capsule 3    ezetimibe (Zetia) 10 mg tablet Take 1 tablet (10 mg) by mouth once daily.      metFORMIN (Glucophage) 500 mg tablet Take 1 tablet (500 mg) by mouth once daily in the morning. And 2 tabs In the evening      OneTouch Delica Plus Lancet 33 gauge misc USE TO TAKE BLOOD SUGAR 1-2 TIMES PER DAY      PARoxetine (Paxil) 20 mg tablet Take 1 tablet (20 mg) by mouth once daily at bedtime.      pioglitazone (Actos) 30 mg tablet Take 1 tablet (30 mg) by " mouth once every 24 hours.      vibegron (Gemtesa) 75 mg tablet Take 1 tablet (75 mg) by mouth once daily at bedtime. 90 tablet 3     No current facility-administered medications on file prior to visit.        Drug Interactions   No significant drug interactions identified    Assessment/Plan     Gemtesa   Discussed MOA: works by activating beta-3 adrenergic receptors in the bladder resulting in relaxation of the detrusor smooth muscle during the urine storage phase, thus increasing bladder capacity.  Provided education on administration and potential side effects including but not limited to hot flashes <2%, constipation/diarrhea <2%, dry mouth <2%, and headache <4%.   Gemtesa (vibegron) starts working almost immediately - within a few days of first taking it, with noticeable improvements in urinary urgency, frequency, and incontinence noted in clinical trials at 2 weeks which were reported as significant by 12 weeks.      CONTINUE  Gemtessa 75 mg once daily    Follow-up: 2/5/2025 2:30 PM        Zahira Nicholson PharmD   Meds Clinical Pharmacist  Phone: [phone]     Continue all meds under the continuation of care with the referring provider and clinical pharmacy team.    Verbal consent to manage patient's drug therapy was obtained from the patient and/or an individual authorized to act on behalf of a patient. They were informed they may decline to participate or withdraw from participation in pharmacy services at any time.

## 2024-10-02 ENCOUNTER — APPOINTMENT (OUTPATIENT)
Dept: PHARMACY | Facility: HOSPITAL | Age: 74
End: 2024-10-02
Payer: MEDICARE

## 2024-10-02 DIAGNOSIS — N32.81 OAB (OVERACTIVE BLADDER): ICD-10-CM

## 2024-10-02 DIAGNOSIS — N39.46 MIXED INCONTINENCE URGE AND STRESS: ICD-10-CM

## 2024-10-04 PROCEDURE — RXMED WILLOW AMBULATORY MEDICATION CHARGE

## 2024-10-07 ENCOUNTER — TELEPHONE (OUTPATIENT)
Dept: CARDIOLOGY | Facility: HOSPITAL | Age: 74
End: 2024-10-07
Payer: MEDICARE

## 2024-10-07 DIAGNOSIS — I65.29 STENOSIS OF CAROTID ARTERY, UNSPECIFIED LATERALITY: Primary | ICD-10-CM

## 2024-10-07 RX ORDER — ASPIRIN 81 MG/1
81 TABLET ORAL DAILY
Start: 2024-10-07 | End: 2025-10-07

## 2024-10-07 NOTE — PROGRESS NOTES
"Telephone note update:    Received an office message stating the following:   \"Pt called and stated that she stopped the diltiazem a few days ago due to \"not feeling well\" and \"passing a few spots of blood\". I asked her if the blood is in stool and she said no \"in my underwear\". She is also on plavix and eliquis. She said she is feeling better and has not had any more blood. She requested a call from you\"    I agree with stopping diltiazem as she felt fatigued/sluggish.     Discussed case with Katiana PIERSON, Vascular who stated patient may stop plavix and start aspirin 81mg daily as it has been >90 days post TCAR.      Continue apixaban 5mg BID. Advised patient to call office for recurrent bleeding/spotting. Consider holding apixaban with referral to GI for recurrent symptoms.     Malinda Posada APRN-CNP  "

## 2024-10-08 ENCOUNTER — PHARMACY VISIT (OUTPATIENT)
Dept: PHARMACY | Facility: CLINIC | Age: 74
End: 2024-10-08
Payer: COMMERCIAL

## 2024-10-22 ENCOUNTER — APPOINTMENT (OUTPATIENT)
Dept: UROLOGY | Facility: CLINIC | Age: 74
End: 2024-10-22
Payer: MEDICARE

## 2024-10-29 ENCOUNTER — APPOINTMENT (OUTPATIENT)
Dept: CARDIOLOGY | Facility: HOSPITAL | Age: 74
End: 2024-10-29
Payer: MEDICARE

## 2024-11-11 NOTE — PROGRESS NOTES
[facility]  Outpatient Clinic Note    Patient: Elsa Biggs  Age/Sex: 73 y.o., female  MRN: [id number]  Virtual Visit: An interactive audio and video telecommunication system which permits real time communications between the patient (at the originating site) and provider (at the distant site) was utilized to provide this telehealth service. Verbal consent was requested and obtained from Elsa Biggs on this date 11/13/2024 for a telehealth visit.     Chief Complaint: 3-month follow-up         History of Present Illness  This is a 73 y.o. female, who presents for her 3-month follow-up visit.  The patient has been taking Gemtesa and reports she is 80% better with this medication.  The patient stated that her urinary urgency and frequency has significantly decreased.  The patient denies any urinary incontinence.  The patient is very satisfied with his medication and would like to continue on this treatment plan.  The patient was approved for the  cost assistance program.  The patient is currently getting this medication shipped to her home. She denies dysuria, gross hematuria, flank pain, pelvic pain, fever or chills.          Past Medical & Surgical History  Past Medical History:   Diagnosis Date    A-fib (Multi)     Anxiety     Breast cancer (Multi)     16 tx radiation left lumpectomy 2022    Cancer (Multi)     Depression     Diabetes mellitus (Multi)     GERD (gastroesophageal reflux disease)     Hyperlipidemia     Hypertension     Stroke (Multi)      Past Surgical History:   Procedure Laterality Date    BREAST SURGERY      CARDIAC CATHETERIZATION N/A 01/29/2024    Procedure: Left Heart Cath;  Surgeon: Virgil Childress MD;  Location: [facility] Cardiac Cath Lab;  Service: Cardiovascular;  Laterality: N/A;    CHOLECYSTECTOMY      FRACTURE SURGERY      left femur and LLE fabrice  knee plate    JOINT REPLACEMENT Left     MR HEAD ANGIO WO IV CONTRAST  12/26/2023    MR HEAD ANGIO WO IV CONTRAST 12/26/2023 [facility]     MR NECK ANGIO W AND WO IV CONTRAST  01/14/2022    MR NECK ANGIO W AND WO IV CONTRAST 1/14/2022 [facility]    MR NECK ANGIO WO IV CONTRAST  12/26/2023    MR NECK ANGIO WO IV CONTRAST 12/26/2023 [facility]    OTHER SURGICAL HISTORY Right     Transcarotid Artery Revascularization    REVISION TOTAL HIP ARTHROPLASTY Left     TUBAL LIGATION         Family History  Family History   Problem Relation Name Age of Onset    Heart attack Father  65        blood clot    Coronary artery disease Sister         Social History  She reports that she quit smoking about 41 years ago. Her smoking use included cigarettes. She has never used smokeless tobacco. She reports that she does not drink alcohol and does not use drugs.    Allergies  Tetracycline    Medications:  Current Outpatient Medications on File Prior to Visit   Medication Sig Dispense Refill    acetaminophen (Tylenol) 325 mg tablet Take 2 tablets (650 mg) by mouth every 4 hours if needed for mild pain (1 - 3), moderate pain (4 - 6) or fever (temp greater than 38.0 C). 30 tablet 1    apixaban (Eliquis) 5 mg tablet Take 1 tablet (5 mg) by mouth 2 times a day. 180 tablet 3    aspirin 81 mg EC tablet Take 1 tablet (81 mg) by mouth once daily.      atorvastatin (Lipitor) 80 mg tablet Take 1 tablet (80 mg) by mouth once daily at bedtime. 30 tablet 1    cholecalciferol (Vitamin D-3) 25 MCG (1000 UT) tablet Take 1 tablet (25 mcg) by mouth once daily.      diclofenac sodium (Voltaren) 1 % gel Apply 4.5 inches (4 g) topically 4 times a day. 450 g 0    dilTIAZem CD (Cardizem CD) 120 mg 24 hr capsule Take 1 capsule (120 mg) by mouth once daily. 90 capsule 3    ezetimibe (Zetia) 10 mg tablet Take 1 tablet (10 mg) by mouth once daily.      metFORMIN (Glucophage) 500 mg tablet Take 1 tablet (500 mg) by mouth once daily in the morning. And 2 tabs In the evening      OneTouch Delica Plus Lancet 33 gauge misc USE TO TAKE BLOOD SUGAR 1-2 TIMES PER DAY      PARoxetine (Paxil) 20 mg tablet  Take 1 tablet (20 mg) by mouth once daily at bedtime.      pioglitazone (Actos) 30 mg tablet Take 1 tablet (30 mg) by mouth once every 24 hours.      vibegron (Gemtesa) 75 mg tablet Take 1 tablet (75 mg) by mouth once daily at bedtime. 90 tablet 3     No current facility-administered medications on file prior to visit.        Review of Systems   A comprehensive 10+ review of systems was negative except for: see hpi          Physical Exam                                                                                                                      General: Well developed, well nourished, alert and cooperative, appears in no acute distress  Eyes: no proptosis  Lungs: Breathing is easy, non-labored while speaking in clear and complete sentences.   Neuro: alert and oriented to person, place and time  Psych: Demonstrates good judgement and reason, without hallucinations, abnormal affect or abnormal behaviors.  Skin: no obvious lesions, no rashes      Labs  N/A    Imaging  N/A    IMPRESSION AND PLAN:  Elsa Biggs is a 73 y.o. who presents with incontinence     CHAD  -discussed mechanism of UUI and ROSIE, and treatment options for both including PFT, pessary, sling for ROSIE and PFT, pharmacotherapy and third-line therapy for OAB  -Patient doing well on Gemtesa 80% better  -Continue Gemtesa once daily     Follow up in one year    All questions and concerns were answered and addressed.  The patient expressed understanding and agrees with the plan.     Reviewed and approved by DARNELL SALDANA on 11/13/24 at 8:20 AM.

## 2024-11-13 ENCOUNTER — APPOINTMENT (OUTPATIENT)
Dept: UROLOGY | Facility: CLINIC | Age: 74
End: 2024-11-13
Payer: MEDICARE

## 2024-11-13 DIAGNOSIS — N39.46 MIXED INCONTINENCE URGE AND STRESS: ICD-10-CM

## 2024-11-13 DIAGNOSIS — N32.81 OAB (OVERACTIVE BLADDER): Primary | ICD-10-CM

## 2024-11-13 PROCEDURE — 1157F ADVNC CARE PLAN IN RCRD: CPT

## 2024-11-13 PROCEDURE — 1160F RVW MEDS BY RX/DR IN RCRD: CPT

## 2024-11-13 PROCEDURE — 1159F MED LIST DOCD IN RCRD: CPT

## 2024-11-13 PROCEDURE — 1036F TOBACCO NON-USER: CPT

## 2024-11-13 PROCEDURE — 99214 OFFICE O/P EST MOD 30 MIN: CPT

## 2024-11-13 PROCEDURE — G2211 COMPLEX E/M VISIT ADD ON: HCPCS

## 2024-11-18 ENCOUNTER — HOSPITAL ENCOUNTER (OUTPATIENT)
Dept: RADIOLOGY | Facility: HOSPITAL | Age: 74
Discharge: HOME | End: 2024-11-18
Payer: MEDICARE

## 2024-11-18 VITALS — BODY MASS INDEX: 42.52 KG/M2 | WEIGHT: 240 LBS | HEIGHT: 63 IN

## 2024-11-18 DIAGNOSIS — Z12.31 ENCOUNTER FOR SCREENING MAMMOGRAM FOR MALIGNANT NEOPLASM OF BREAST: ICD-10-CM

## 2024-11-18 PROCEDURE — 77063 BREAST TOMOSYNTHESIS BI: CPT

## 2024-11-18 PROCEDURE — 77067 SCR MAMMO BI INCL CAD: CPT

## 2024-11-21 ENCOUNTER — OFFICE VISIT (OUTPATIENT)
Dept: CARDIOLOGY | Facility: HOSPITAL | Age: 74
End: 2024-11-21
Payer: MEDICARE

## 2024-11-21 VITALS
DIASTOLIC BLOOD PRESSURE: 56 MMHG | SYSTOLIC BLOOD PRESSURE: 113 MMHG | OXYGEN SATURATION: 94 % | HEART RATE: 103 BPM | BODY MASS INDEX: 42.51 KG/M2 | WEIGHT: 240 LBS

## 2024-11-21 DIAGNOSIS — I48.3 TYPICAL ATRIAL FLUTTER (MULTI): ICD-10-CM

## 2024-11-21 LAB
ATRIAL RATE: 93 BPM
P AXIS: 50 DEGREES
P OFFSET: 180 MS
P ONSET: 118 MS
PR INTERVAL: 198 MS
Q ONSET: 217 MS
QRS COUNT: 16 BEATS
QRS DURATION: 88 MS
QT INTERVAL: 386 MS
QTC CALCULATION(BAZETT): 479 MS
QTC FREDERICIA: 447 MS
R AXIS: -5 DEGREES
T AXIS: 174 DEGREES
T OFFSET: 410 MS
VENTRICULAR RATE: 93 BPM

## 2024-11-21 PROCEDURE — 1157F ADVNC CARE PLAN IN RCRD: CPT | Performed by: NURSE PRACTITIONER

## 2024-11-21 PROCEDURE — 93005 ELECTROCARDIOGRAM TRACING: CPT | Performed by: NURSE PRACTITIONER

## 2024-11-21 PROCEDURE — 99214 OFFICE O/P EST MOD 30 MIN: CPT | Performed by: NURSE PRACTITIONER

## 2024-11-21 PROCEDURE — 1159F MED LIST DOCD IN RCRD: CPT | Performed by: NURSE PRACTITIONER

## 2024-11-21 PROCEDURE — 3078F DIAST BP <80 MM HG: CPT | Performed by: NURSE PRACTITIONER

## 2024-11-21 PROCEDURE — 3074F SYST BP LT 130 MM HG: CPT | Performed by: NURSE PRACTITIONER

## 2024-11-21 RX ORDER — FUROSEMIDE 20 MG/1
20 TABLET ORAL EVERY OTHER DAY
COMMUNITY

## 2024-11-22 NOTE — PROGRESS NOTES
Chief Complaint:   Follow up      History Of Present Illness:    Elsa Biggs is a 73 y.o. 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  She is feeling well from a cardiac standpoint.  Denies SOB, chest pain.  Denies having palpitations, dizziness, or syncope.  LE edema is stable-- taking lasix every other day due to urinary frequency/incontinence with taking daily.      Last Recorded Vitals:  Vitals:    11/21/24 1451   BP: 113/56   Pulse: 103   SpO2: 94%   Weight: 109 kg (240 lb)       Past Medical History:  She has a past medical history of A-fib (Multi), Anxiety, Breast cancer (Multi), Cancer (Multi), Depression, Diabetes mellitus (Multi), GERD (gastroesophageal reflux disease), Hyperlipidemia, Hypertension, and Stroke (Multi).    Past Surgical History:  She has a past surgical history that includes MR angio neck w and wo IV contrast (01/14/2022); Joint replacement (Left); Fracture surgery; Breast surgery (Left); MR angio head wo IV contrast (12/26/2023); MR angio neck wo IV contrast (12/26/2023); Cardiac catheterization (N/A, 01/29/2024); Revision total hip arthroplasty (Left); Cholecystectomy; Tubal ligation; and Other surgical history (Right).      Social History:  She reports that she quit smoking about 41 years ago. Her smoking use included cigarettes. She has never used smokeless tobacco. She reports that she does not drink alcohol and does not use drugs.    Family History:  Family History   Problem Relation Name Age of Onset    Heart attack Father  65        blood clot    Coronary artery disease Sister           Allergies:  Tetracycline    Outpatient Medications:  Current Outpatient Medications   Medication Instructions    acetaminophen (TYLENOL) 650 mg, oral, Every 4 hours PRN    aspirin 81 mg, oral, Daily    atorvastatin (LIPITOR) 80 mg, oral, Nightly    cholecalciferol (Vitamin D-3) 25 MCG (1000 UT) tablet 1 tablet, Daily    diclofenac sodium (VOLTAREN) 4 g, Topical, 4 times daily    Eliquis 5 mg, oral, 2 times daily    ezetimibe (ZETIA) 10 mg, Daily    furosemide (LASIX) 20 mg, Every other day    Gemtesa 75 mg, oral, Nightly    metFORMIN (GLUCOPHAGE) 500 mg, Every morning    OneTouch Delica Plus Lancet 33 gauge misc USE TO TAKE BLOOD SUGAR 1-2 TIMES PER DAY    PARoxetine (PAXIL) 20 mg, Nightly    pioglitazone (ACTOS) 30 mg, Every 24 hours       Physical Exam:  Constitutional: Pleasant, Awake/Alert/Oriented to person place and time. No distress  Head: Atraumatic, Normocephalic  Eyes: EOMI. RIP  Neck:No JVD  Cardiovascular: Regular rate and rhythm, S1, S2. No extra heart sounds or murmurs  Respiratory: Clear to auscultation bilaterally. No wheezing, rales or rhonchi.   Abdomen: Soft, Nontender, Obese. Bowel sounds appreciated  Musculoskeletal: ROM intact. Muscle strength grossly intact upper and lower extremities 5/5.   Neurological: CNII-XII intact. Sensation grossly intact  Extremities: Warm and dry. 1+ pitting edema BLE   Psychiatric: Appropriate mood and affect       Last Labs:  CBC -  Lab Results   Component Value Date    WBC 7.0 03/07/2024    HGB 9.1 (L) 03/07/2024    HCT 28.9 (L) 03/07/2024    MCV 85 03/07/2024     03/07/2024       CMP -  Lab Results   Component Value Date    CALCIUM 7.9 (L) 03/07/2024    PHOS 3.7 03/05/2024    PROT 6.2 (L) 12/25/2023    ALBUMIN 3.0 (L) 12/25/2023    AST 13 12/25/2023    ALT 14 12/25/2023    ALKPHOS 45 12/25/2023    BILITOT 0.4 12/25/2023       LIPID PANEL -   Lab Results   Component Value Date    CHOL 113 10/20/2023    TRIG 66 10/20/2023    HDL 40.1 10/20/2023    CHHDL  2.8 10/20/2023    VLDL 13 10/20/2023    NHDL 73 10/20/2023       RENAL FUNCTION PANEL -   Lab Results   Component Value Date    GLUCOSE 101 (H) 03/07/2024     03/07/2024    K 4.2 03/07/2024     03/07/2024    CO2 28 03/07/2024    ANIONGAP 7 (L) 03/07/2024    BUN 16 03/07/2024    CREATININE 0.71 03/07/2024    CALCIUM 7.9 (L) 03/07/2024    PHOS 3.7 03/05/2024    ALBUMIN 3.0 (L) 12/25/2023        Lab Results   Component Value Date     (H) 12/25/2023    HGBA1C 7.0 (H) 10/20/2023       Last Cardiology Tests:  ECG:  ECG 12 lead (Clinic Performed) 11/21/2024 (Preliminary)  NSR with PVCs in bigeminy, HR 93bpm     Echo:  Transthoracic Echo (TTE) Complete 03/06/2024  PHYSICIAN INTERPRETATION:  Left Ventricle: Left ventricular systolic function is low normal, with an estimated ejection fraction of 50-55%. There are no regional wall motion abnormalities. The left ventricular cavity size is normal. Spectral Doppler shows a normal pattern of left ventricular diastolic filling.  Left Atrium: The left atrium is mildly dilated.  Right Ventricle: The right ventricle is upper limits of normal in size. There is normal right ventricular global systolic function.  Right Atrium: The right atrium is normal in size.  Aortic Valve: The aortic valve is trileaflet. There is minimal aortic valve cusp calcification. There is trivial aortic valve regurgitation. The peak instantaneous gradient of the aortic valve is 9.6 mmHg. The mean gradient of the aortic valve is 6.0 mmHg.  Mitral Valve: The mitral valve is normal in structure. There is no evidence of mitral valve regurgitation.  Tricuspid Valve: The tricuspid valve is structurally normal. There is trace tricuspid regurgitation.  Pulmonic Valve: The pulmonic valve is not well visualized. There is no indication of pulmonic valve regurgitation.  Pericardium: There is no pericardial effusion noted.  Aorta: The aortic root was not well visualized.    10/20/2023  Echo:  CONCLUSIONS:   1. Left ventricular systolic function is mildly decreased with a 45-50% estimated ejection fraction.   2. Spectral Doppler shows an impaired relaxation pattern of left ventricular diastolic filling.   3. There is mild tricuspid regurgitation.   4. Technically difficult bubble study unable to interpret.     Cath:  Cardiac catheterization - coronary 01/29/2024  CONCLUSIONS:   1. Left main: no significant angiographic disease.   2. LAD: diffuse 40% prox-mid disease.   3. LCx: 60% mid-vessel disease.   4. RCA: mild diffuse irregularities.   5. LVEDP 26mmHg, no aortic stenosis on LV-Ao gradient.     Stress Test:  Nuclear Stress Test 12/26/2023  IMPRESSION:  Mild attentuation of the anterior wall seen on the stress images.  This is either soft tissue attenuation or ischemia in LAD territory.  Unfortunately there are no prone images available. Dilated left  ventricle with mild left ventricular systolic dysfunction on post  stress gated imaging.  Summary:   1. No clinical or electrocardiographic evidence for ischemia at maximal infusion.   2. Correlate with myocardial perfusion imaging results.   3. Nuclear image results are reported separately.        Lab review: I have personally reviewed the laboratory result(s)   Diagnostic review: I have personally reviewed the result(s) of the Echocardiogram, LHC, and stress test     Assessment/Plan   Very pleasant 73 y.o. 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      ECG today shows NSR-- converted spontaneously from suspected atrial flutter which was assessed at  prior office visit.  Was seemingly asymptomatic in a.flutter.  Attempted HR control, however, was unable to tolerate diltiazem-- felt fatigued and had rectal bleeding which resolved with stopping diltiazem.     Plan:  -High PVC burden on prior holter as well as ECG today reviewed independently for NSR with ventricular bigeminy-- patient asymptomatic.  H/o NICM with recovered EF post stroke-- suspect cardiomyopathy was related to stroke, though high PVC burden may have contributed.  She is intolerant of BB/CCB-- causes fatigue.  Consider referral to EP in the future to discuss the role of AAD should symptoms develop.   -Continue lasix 20mg every other day   -Continue asa, atorvastatin, and apixaban 5mg BID   -Follow up in 6 months or sooner if needed       ANITA Dhillon-CNP

## 2024-11-27 ENCOUNTER — TELEMEDICINE (OUTPATIENT)
Dept: PHARMACY | Facility: HOSPITAL | Age: 74
End: 2024-11-27
Payer: MEDICARE

## 2024-11-27 DIAGNOSIS — I48.92 ATRIAL FLUTTER, UNSPECIFIED TYPE (MULTI): ICD-10-CM

## 2024-11-27 NOTE — PROGRESS NOTES
Clinical Pharmacy Appointment    Patient ID: Elsa Biggs is a 73 y.o. female who presents for Follow Up appointment.     Referring Provider: Malinda Posada, APRN-CNP  PCP: Marcelle Vitale MD     Subjective     ATRIAL FLUTTER  Does patient follow with cardiology? Yes  Last cardiology appointment: 11/21/24    Current atrial fibrillations medications include:  Rate Control: None, diltiazem stopped due to fatigue and rectal bleed that resolved with discontinuation.    Anticoagulation: Eliquis 5 mg BID     Adverse Effects: Frequent bruising that heals. Also noted wound on leg that has not healed for a couple months - advised patient to follow up with primary care.     Screening for dose adjustment:  No dose change recommended at this time  Age 74 yo, weight 109 kg, Scr 0.71 mg/dL     Diagnosis:  Patient is projected to be on anticoagulation indefinitely     [id number] Score: [8]  Age: [<65 (0)] [65-74 (+1)] [> 75 (+2)]: 2  Sex: [Male/Female (+1)]: 1  CHF history: [No/Yes(+1)]: 1  Hypertension history: [No/Yes(+1)]: 1  Stroke/TIA/thromboembolism history: [No/Yes(+2)]: 2  Vascular disease history (prior MI, peripheral artery disease, aortic plaque): [No/Yes(+1)]: 0  Diabetes history: [No/Yes(+1)]: 1    Monitoring:  Date of last BMP: 3/7/24  Recent Hospitalizations: None   Recent Falls/Trauma: None   Changes in Tobacco or Alcohol Intake: No changes     Medication Reconciliation:  No changes    Drug Interactions  No relevant drug interactions were noted.    Medication System Management  Patient's preferred pharmacy: [facility] [place]   Adherence/Organization: No issues   Affordability/Accessibility: Eliquis   PAP: Yes  Expiration: 3/7/25    Objective   Allergies   Allergen Reactions    Tetracycline Rash     Social History     Social History Narrative    Not on file      Medication Review  Current Outpatient Medications   Medication Instructions    acetaminophen (TYLENOL) 650 mg, oral, Every 4 hours PRN    aspirin 81  "mg, oral, Daily    atorvastatin (LIPITOR) 80 mg, oral, Nightly    cholecalciferol (Vitamin D-3) 25 MCG (1000 UT) tablet 1 tablet, Daily    diclofenac sodium (VOLTAREN) 4 g, Topical, 4 times daily    Eliquis 5 mg, oral, 2 times daily    ezetimibe (ZETIA) 10 mg, Daily    furosemide (LASIX) 20 mg, Every other day    Gemtesa 75 mg, oral, Nightly    metFORMIN (GLUCOPHAGE) 500 mg, Every morning    OneTouch Delica Plus Lancet 33 gauge misc USE TO TAKE BLOOD SUGAR 1-2 TIMES PER DAY    PARoxetine (PAXIL) 20 mg, Nightly    pioglitazone (ACTOS) 30 mg, Every 24 hours      Vitals  BP Readings from Last 2 Encounters:   11/21/24 113/56   09/24/24 101/65     BMI Readings from Last 1 Encounters:   11/21/24 42.51 kg/m²      Labs  A1C  Lab Results   Component Value Date    HGBA1C 7.0 (H) 10/20/2023    HGBA1C 6.2 08/02/2018    HGBA1C 6.2 08/01/2018     BMP  Lab Results   Component Value Date    CALCIUM 7.9 (L) 03/07/2024     03/07/2024    K 4.2 03/07/2024    CO2 28 03/07/2024     03/07/2024    BUN 16 03/07/2024    CREATININE 0.71 03/07/2024    EGFR 89 03/07/2024     LFTs  Lab Results   Component Value Date    ALT 14 12/25/2023    AST 13 12/25/2023    ALKPHOS 45 12/25/2023    BILITOT 0.4 12/25/2023     FLP  Lab Results   Component Value Date    TRIG 66 10/20/2023    CHOL 113 10/20/2023    LDLCALC 60 10/20/2023    HDL 40.1 10/20/2023     Urine Microalbumin  No results found for: \"MICROALBCREA\"  Weight Management  Wt Readings from Last 3 Encounters:   11/21/24 109 kg (240 lb)   11/18/24 109 kg (240 lb)   09/24/24 109 kg (240 lb)      There is no height or weight on file to calculate BMI.     Assessment/Plan   Problem List Items Addressed This Visit       Atrial flutter, unspecified type (Multi)     ASSESSMENT OF ATRIAL FIBRILLATION    Rationale for plan: Patient is to be on anticoagulation indefinitely. Anticoagulation with Eliquis is appropriate given Aflutter diagnosis and [id number] of 8. At this time, patient denies any " side effects or concerns with current anticoagulation regimen. Patient does report wound on leg that has been present for a couple of months. Recommended for patient to follow up with PCP for evaluation. Patient's lab work last completed on 3/7/24. No dose adjustments for anticoagulation therapy needed at this time. Patient is enrolled in  PAP for Eliquis (expiration on 3/7/25). Will continue to monitor and follow up in 3 months.     Medication Changes: None   Continue Eliquis 5 mg BID     Monitoring and Education Discussed:  Counseled patient of side effects that are indicative of bleeding such as dark tarry stool, unexplainable bruising, or vomiting up a coffee ground like substance  Reminded patient of importance of anticoagulation and rate control therapy to prevent risk of heart attack or stroke  Counseled patient on MOA, expectations, duration of therapy, contraindications, administration, and monitoring parameters   Answered all patient questions and concerns    Clinical Pharmacist follow-up: 2/6/25 @ 9 am, Telehealth visit    Continue all meds under the continuation of care with the referring provider and clinical pharmacy team.    Thank you,  Milagro Mckeon, PharmD  Clinical Pharmacist  [phone]    Verbal consent to manage patient's drug therapy was obtained from the patient. They were informed they may decline to participate or withdraw from participation in pharmacy services at any time.

## 2024-12-11 ENCOUNTER — APPOINTMENT (OUTPATIENT)
Dept: CARDIOLOGY | Facility: HOSPITAL | Age: 74
DRG: 291 | End: 2024-12-11
Payer: MEDICARE

## 2024-12-11 ENCOUNTER — APPOINTMENT (OUTPATIENT)
Dept: VASCULAR SURGERY | Facility: HOSPITAL | Age: 74
End: 2024-12-11
Payer: MEDICARE

## 2024-12-11 ENCOUNTER — APPOINTMENT (OUTPATIENT)
Dept: RADIOLOGY | Facility: HOSPITAL | Age: 74
DRG: 291 | End: 2024-12-11
Payer: MEDICARE

## 2024-12-11 ENCOUNTER — HOSPITAL ENCOUNTER (INPATIENT)
Facility: HOSPITAL | Age: 74
DRG: 291 | End: 2024-12-11
Attending: INTERNAL MEDICINE | Admitting: INTERNAL MEDICINE
Payer: MEDICARE

## 2024-12-11 DIAGNOSIS — L03.119 CELLULITIS OF LOWER EXTREMITY, UNSPECIFIED LATERALITY: ICD-10-CM

## 2024-12-11 DIAGNOSIS — I48.92 ATRIAL FLUTTER, UNSPECIFIED TYPE (MULTI): ICD-10-CM

## 2024-12-11 DIAGNOSIS — R06.00 DYSPNEA, UNSPECIFIED TYPE: ICD-10-CM

## 2024-12-11 DIAGNOSIS — E87.70 HYPERVOLEMIA, UNSPECIFIED HYPERVOLEMIA TYPE: ICD-10-CM

## 2024-12-11 DIAGNOSIS — I50.9 ACUTE EXACERBATION OF CHRONIC HEART FAILURE: Primary | ICD-10-CM

## 2024-12-11 DIAGNOSIS — I50.43 ACUTE ON CHRONIC COMBINED SYSTOLIC AND DIASTOLIC CONGESTIVE HEART FAILURE: ICD-10-CM

## 2024-12-11 PROBLEM — Z86.39 HISTORY OF TYPE 2 DIABETES MELLITUS: Status: ACTIVE | Noted: 2024-12-11

## 2024-12-11 PROBLEM — Z86.711 HISTORY OF PULMONARY EMBOLISM: Status: ACTIVE | Noted: 2024-12-11

## 2024-12-11 LAB
ABO GROUP (TYPE) IN BLOOD: NORMAL
ALBUMIN SERPL BCP-MCNC: 3 G/DL (ref 3.4–5)
ALP SERPL-CCNC: 62 U/L (ref 33–136)
ALT SERPL W P-5'-P-CCNC: 20 U/L (ref 7–45)
ANION GAP BLDV CALCULATED.4IONS-SCNC: 7 MMOL/L (ref 10–25)
ANION GAP SERPL CALC-SCNC: 12 MMOL/L (ref 10–20)
ANTIBODY SCREEN: NORMAL
APPEARANCE UR: ABNORMAL
AST SERPL W P-5'-P-CCNC: 17 U/L (ref 9–39)
BACTERIA #/AREA URNS AUTO: ABNORMAL /HPF
BASE EXCESS BLDV CALC-SCNC: 2.9 MMOL/L (ref -2–3)
BASOPHILS # BLD AUTO: 0.04 X10*3/UL (ref 0–0.1)
BASOPHILS NFR BLD AUTO: 0.5 %
BILIRUB SERPL-MCNC: 0.4 MG/DL (ref 0–1.2)
BILIRUB UR STRIP.AUTO-MCNC: NEGATIVE MG/DL
BNP SERPL-MCNC: 1187 PG/ML (ref 0–99)
BODY TEMPERATURE: ABNORMAL
BUN SERPL-MCNC: 21 MG/DL (ref 6–23)
CA-I BLDV-SCNC: 1.17 MMOL/L (ref 1.1–1.33)
CALCIUM SERPL-MCNC: 8.5 MG/DL (ref 8.6–10.3)
CARDIAC TROPONIN I PNL SERPL HS: 81 NG/L (ref 0–13)
CARDIAC TROPONIN I PNL SERPL HS: 84 NG/L (ref 0–13)
CHLORIDE BLDV-SCNC: 103 MMOL/L (ref 98–107)
CHLORIDE SERPL-SCNC: 101 MMOL/L (ref 98–107)
CO2 SERPL-SCNC: 28 MMOL/L (ref 21–32)
COLOR UR: COLORLESS
CREAT SERPL-MCNC: 0.8 MG/DL (ref 0.5–1.05)
CRP SERPL-MCNC: 0.68 MG/DL
EGFRCR SERPLBLD CKD-EPI 2021: 76 ML/MIN/1.73M*2
EOSINOPHIL # BLD AUTO: 0.05 X10*3/UL (ref 0–0.4)
EOSINOPHIL NFR BLD AUTO: 0.6 %
ERYTHROCYTE [DISTWIDTH] IN BLOOD BY AUTOMATED COUNT: 17.7 % (ref 11.5–14.5)
ERYTHROCYTE [SEDIMENTATION RATE] IN BLOOD BY WESTERGREN METHOD: 65 MM/H (ref 0–30)
FLUAV RNA RESP QL NAA+PROBE: NOT DETECTED
FLUBV RNA RESP QL NAA+PROBE: NOT DETECTED
GLUCOSE BLDV-MCNC: 159 MG/DL (ref 74–99)
GLUCOSE SERPL-MCNC: 154 MG/DL (ref 74–99)
GLUCOSE UR STRIP.AUTO-MCNC: NORMAL MG/DL
HCO3 BLDV-SCNC: 28.9 MMOL/L (ref 22–26)
HCT VFR BLD AUTO: 33.1 % (ref 36–46)
HCT VFR BLD EST: 31 % (ref 36–46)
HGB BLD-MCNC: 9.6 G/DL (ref 12–16)
HGB BLDV-MCNC: 10.3 G/DL (ref 12–16)
IMM GRANULOCYTES # BLD AUTO: 0.04 X10*3/UL (ref 0–0.5)
IMM GRANULOCYTES NFR BLD AUTO: 0.5 % (ref 0–0.9)
INHALED O2 CONCENTRATION: 21 %
INR PPP: 2.7 (ref 0.9–1.1)
KETONES UR STRIP.AUTO-MCNC: NEGATIVE MG/DL
LACTATE BLDV-SCNC: 1.6 MMOL/L (ref 0.4–2)
LEUKOCYTE ESTERASE UR QL STRIP.AUTO: ABNORMAL
LYMPHOCYTES # BLD AUTO: 1.48 X10*3/UL (ref 0.8–3)
LYMPHOCYTES NFR BLD AUTO: 18.5 %
MAGNESIUM SERPL-MCNC: 1.63 MG/DL (ref 1.6–2.4)
MCH RBC QN AUTO: 24.6 PG (ref 26–34)
MCHC RBC AUTO-ENTMCNC: 29 G/DL (ref 32–36)
MCV RBC AUTO: 85 FL (ref 80–100)
MONOCYTES # BLD AUTO: 0.56 X10*3/UL (ref 0.05–0.8)
MONOCYTES NFR BLD AUTO: 7 %
MUCOUS THREADS #/AREA URNS AUTO: ABNORMAL /LPF
NEUTROPHILS # BLD AUTO: 5.85 X10*3/UL (ref 1.6–5.5)
NEUTROPHILS NFR BLD AUTO: 72.9 %
NITRITE UR QL STRIP.AUTO: NEGATIVE
NRBC BLD-RTO: 0.4 /100 WBCS (ref 0–0)
OXYHGB MFR BLDV: 47.5 % (ref 45–75)
PCO2 BLDV: 50 MM HG (ref 41–51)
PH BLDV: 7.37 PH (ref 7.33–7.43)
PH UR STRIP.AUTO: 6 [PH]
PLATELET # BLD AUTO: 361 X10*3/UL (ref 150–450)
PO2 BLDV: 31 MM HG (ref 35–45)
POTASSIUM BLDV-SCNC: 4.1 MMOL/L (ref 3.5–5.3)
POTASSIUM SERPL-SCNC: 4 MMOL/L (ref 3.5–5.3)
PROT SERPL-MCNC: 6.4 G/DL (ref 6.4–8.2)
PROT UR STRIP.AUTO-MCNC: ABNORMAL MG/DL
PROTHROMBIN TIME: 30.2 SECONDS (ref 9.8–12.8)
RBC # BLD AUTO: 3.91 X10*6/UL (ref 4–5.2)
RBC # UR STRIP.AUTO: ABNORMAL /UL
RBC #/AREA URNS AUTO: >20 /HPF
RH FACTOR (ANTIGEN D): NORMAL
SAO2 % BLDV: 49 % (ref 45–75)
SARS-COV-2 RNA RESP QL NAA+PROBE: NOT DETECTED
SODIUM BLDV-SCNC: 135 MMOL/L (ref 136–145)
SODIUM SERPL-SCNC: 137 MMOL/L (ref 136–145)
SP GR UR STRIP.AUTO: 1.01
SQUAMOUS #/AREA URNS AUTO: ABNORMAL /HPF
UROBILINOGEN UR STRIP.AUTO-MCNC: NORMAL MG/DL
WBC # BLD AUTO: 8 X10*3/UL (ref 4.4–11.3)
WBC #/AREA URNS AUTO: ABNORMAL /HPF
YEAST BUDDING #/AREA UR COMP ASSIST: PRESENT /HPF

## 2024-12-11 PROCEDURE — 2500000004 HC RX 250 GENERAL PHARMACY W/ HCPCS (ALT 636 FOR OP/ED)

## 2024-12-11 PROCEDURE — 83735 ASSAY OF MAGNESIUM: CPT | Performed by: HEALTH CARE PROVIDER

## 2024-12-11 PROCEDURE — 96376 TX/PRO/DX INJ SAME DRUG ADON: CPT

## 2024-12-11 PROCEDURE — 85610 PROTHROMBIN TIME: CPT | Performed by: HEALTH CARE PROVIDER

## 2024-12-11 PROCEDURE — 87636 SARSCOV2 & INF A&B AMP PRB: CPT | Performed by: HEALTH CARE PROVIDER

## 2024-12-11 PROCEDURE — 85652 RBC SED RATE AUTOMATED: CPT

## 2024-12-11 PROCEDURE — 2500000002 HC RX 250 W HCPCS SELF ADMINISTERED DRUGS (ALT 637 FOR MEDICARE OP, ALT 636 FOR OP/ED)

## 2024-12-11 PROCEDURE — 87086 URINE CULTURE/COLONY COUNT: CPT | Mod: GEALAB | Performed by: HEALTH CARE PROVIDER

## 2024-12-11 PROCEDURE — 84484 ASSAY OF TROPONIN QUANT: CPT | Performed by: HEALTH CARE PROVIDER

## 2024-12-11 PROCEDURE — 94760 N-INVAS EAR/PLS OXIMETRY 1: CPT

## 2024-12-11 PROCEDURE — G0378 HOSPITAL OBSERVATION PER HR: HCPCS

## 2024-12-11 PROCEDURE — 99285 EMERGENCY DEPT VISIT HI MDM: CPT | Mod: 25 | Performed by: EMERGENCY MEDICINE

## 2024-12-11 PROCEDURE — 71045 X-RAY EXAM CHEST 1 VIEW: CPT

## 2024-12-11 PROCEDURE — 36415 COLL VENOUS BLD VENIPUNCTURE: CPT | Performed by: HEALTH CARE PROVIDER

## 2024-12-11 PROCEDURE — 71045 X-RAY EXAM CHEST 1 VIEW: CPT | Performed by: RADIOLOGY

## 2024-12-11 PROCEDURE — 2500000001 HC RX 250 WO HCPCS SELF ADMINISTERED DRUGS (ALT 637 FOR MEDICARE OP)

## 2024-12-11 PROCEDURE — 99223 1ST HOSP IP/OBS HIGH 75: CPT

## 2024-12-11 PROCEDURE — 93005 ELECTROCARDIOGRAM TRACING: CPT

## 2024-12-11 PROCEDURE — 86850 RBC ANTIBODY SCREEN: CPT | Performed by: HEALTH CARE PROVIDER

## 2024-12-11 PROCEDURE — 86140 C-REACTIVE PROTEIN: CPT

## 2024-12-11 PROCEDURE — 2500000004 HC RX 250 GENERAL PHARMACY W/ HCPCS (ALT 636 FOR OP/ED): Performed by: HEALTH CARE PROVIDER

## 2024-12-11 PROCEDURE — 84132 ASSAY OF SERUM POTASSIUM: CPT | Performed by: HEALTH CARE PROVIDER

## 2024-12-11 PROCEDURE — 96374 THER/PROPH/DIAG INJ IV PUSH: CPT

## 2024-12-11 PROCEDURE — 83880 ASSAY OF NATRIURETIC PEPTIDE: CPT | Performed by: HEALTH CARE PROVIDER

## 2024-12-11 PROCEDURE — 81001 URINALYSIS AUTO W/SCOPE: CPT | Performed by: HEALTH CARE PROVIDER

## 2024-12-11 PROCEDURE — 96375 TX/PRO/DX INJ NEW DRUG ADDON: CPT

## 2024-12-11 PROCEDURE — 85025 COMPLETE CBC W/AUTO DIFF WBC: CPT | Performed by: HEALTH CARE PROVIDER

## 2024-12-11 PROCEDURE — 84075 ASSAY ALKALINE PHOSPHATASE: CPT | Performed by: HEALTH CARE PROVIDER

## 2024-12-11 RX ORDER — FUROSEMIDE 10 MG/ML
20 INJECTION INTRAMUSCULAR; INTRAVENOUS ONCE
Status: COMPLETED | OUTPATIENT
Start: 2024-12-11 | End: 2024-12-11

## 2024-12-11 RX ORDER — PAROXETINE HYDROCHLORIDE 20 MG/1
20 TABLET, FILM COATED ORAL NIGHTLY
Status: DISCONTINUED | OUTPATIENT
Start: 2024-12-11 | End: 2024-12-16 | Stop reason: HOSPADM

## 2024-12-11 RX ORDER — FUROSEMIDE 10 MG/ML
20 INJECTION INTRAMUSCULAR; INTRAVENOUS ONCE
Status: DISCONTINUED | OUTPATIENT
Start: 2024-12-12 | End: 2024-12-12

## 2024-12-11 RX ORDER — AMOXICILLIN 250 MG
2 CAPSULE ORAL NIGHTLY
Status: DISCONTINUED | OUTPATIENT
Start: 2024-12-11 | End: 2024-12-14

## 2024-12-11 RX ORDER — CEPHALEXIN 500 MG/1
500 CAPSULE ORAL EVERY 6 HOURS SCHEDULED
Status: DISCONTINUED | OUTPATIENT
Start: 2024-12-11 | End: 2024-12-16 | Stop reason: HOSPADM

## 2024-12-11 RX ORDER — ATORVASTATIN CALCIUM 80 MG/1
80 TABLET, FILM COATED ORAL NIGHTLY
Status: DISCONTINUED | OUTPATIENT
Start: 2024-12-11 | End: 2024-12-16 | Stop reason: HOSPADM

## 2024-12-11 RX ORDER — ALBUTEROL SULFATE 90 UG/1
2 INHALANT RESPIRATORY (INHALATION) EVERY 4 HOURS PRN
Status: DISCONTINUED | OUTPATIENT
Start: 2024-12-11 | End: 2024-12-16 | Stop reason: HOSPADM

## 2024-12-11 RX ORDER — ALBUTEROL SULFATE 0.83 MG/ML
2.5 SOLUTION RESPIRATORY (INHALATION) EVERY 4 HOURS PRN
Status: DISCONTINUED | OUTPATIENT
Start: 2024-12-11 | End: 2024-12-11

## 2024-12-11 RX ORDER — CEPHALEXIN 500 MG/1
500 CAPSULE ORAL 2 TIMES DAILY
COMMUNITY
Start: 2024-12-02 | End: 2024-12-16 | Stop reason: HOSPADM

## 2024-12-11 RX ORDER — ALBUTEROL SULFATE 90 UG/1
2 INHALANT RESPIRATORY (INHALATION) EVERY 6 HOURS PRN
COMMUNITY
Start: 2024-12-02

## 2024-12-11 RX ORDER — METOPROLOL TARTRATE 1 MG/ML
5 INJECTION, SOLUTION INTRAVENOUS EVERY 6 HOURS PRN
Status: DISCONTINUED | OUTPATIENT
Start: 2024-12-11 | End: 2024-12-16 | Stop reason: HOSPADM

## 2024-12-11 RX ORDER — FUROSEMIDE 10 MG/ML
40 INJECTION INTRAMUSCULAR; INTRAVENOUS ONCE
Status: COMPLETED | OUTPATIENT
Start: 2024-12-11 | End: 2024-12-11

## 2024-12-11 RX ORDER — METOPROLOL TARTRATE 1 MG/ML
5 INJECTION, SOLUTION INTRAVENOUS ONCE
Status: COMPLETED | OUTPATIENT
Start: 2024-12-11 | End: 2024-12-11

## 2024-12-11 RX ORDER — METOPROLOL TARTRATE 1 MG/ML
INJECTION, SOLUTION INTRAVENOUS
Status: COMPLETED
Start: 2024-12-11 | End: 2024-12-11

## 2024-12-11 RX ORDER — IBUPROFEN 600 MG/1
600 TABLET ORAL AS NEEDED
COMMUNITY
Start: 2023-12-19

## 2024-12-11 RX ORDER — ASPIRIN 81 MG/1
81 TABLET ORAL DAILY
Status: DISCONTINUED | OUTPATIENT
Start: 2024-12-11 | End: 2024-12-16 | Stop reason: HOSPADM

## 2024-12-11 RX ORDER — EZETIMIBE 10 MG/1
10 TABLET ORAL DAILY
Status: DISCONTINUED | OUTPATIENT
Start: 2024-12-11 | End: 2024-12-16 | Stop reason: HOSPADM

## 2024-12-11 SDOH — SOCIAL STABILITY: SOCIAL INSECURITY: WITHIN THE LAST YEAR, HAVE YOU BEEN HUMILIATED OR EMOTIONALLY ABUSED IN OTHER WAYS BY YOUR PARTNER OR EX-PARTNER?: NO

## 2024-12-11 SDOH — ECONOMIC STABILITY: FOOD INSECURITY: WITHIN THE PAST 12 MONTHS, THE FOOD YOU BOUGHT JUST DIDN'T LAST AND YOU DIDN'T HAVE MONEY TO GET MORE.: NEVER TRUE

## 2024-12-11 SDOH — SOCIAL STABILITY: SOCIAL INSECURITY: DO YOU FEEL ANYONE HAS EXPLOITED OR TAKEN ADVANTAGE OF YOU FINANCIALLY OR OF YOUR PERSONAL PROPERTY?: NO

## 2024-12-11 SDOH — SOCIAL STABILITY: SOCIAL INSECURITY: HAVE YOU HAD ANY THOUGHTS OF HARMING ANYONE ELSE?: NO

## 2024-12-11 SDOH — ECONOMIC STABILITY: INCOME INSECURITY: IN THE PAST 12 MONTHS HAS THE ELECTRIC, GAS, OIL, OR WATER COMPANY THREATENED TO SHUT OFF SERVICES IN YOUR HOME?: NO

## 2024-12-11 SDOH — SOCIAL STABILITY: SOCIAL INSECURITY
WITHIN THE LAST YEAR, HAVE YOU BEEN RAPED OR FORCED TO HAVE ANY KIND OF SEXUAL ACTIVITY BY YOUR PARTNER OR EX-PARTNER?: NO

## 2024-12-11 SDOH — SOCIAL STABILITY: SOCIAL INSECURITY: DO YOU FEEL UNSAFE GOING BACK TO THE PLACE WHERE YOU ARE LIVING?: NO

## 2024-12-11 SDOH — SOCIAL STABILITY: SOCIAL INSECURITY: WITHIN THE LAST YEAR, HAVE YOU BEEN AFRAID OF YOUR PARTNER OR EX-PARTNER?: NO

## 2024-12-11 SDOH — SOCIAL STABILITY: SOCIAL INSECURITY: WERE YOU ABLE TO COMPLETE ALL THE BEHAVIORAL HEALTH SCREENINGS?: YES

## 2024-12-11 SDOH — SOCIAL STABILITY: SOCIAL INSECURITY: HAS ANYONE EVER THREATENED TO HURT YOUR FAMILY OR YOUR PETS?: NO

## 2024-12-11 SDOH — ECONOMIC STABILITY: FOOD INSECURITY: WITHIN THE PAST 12 MONTHS, YOU WORRIED THAT YOUR FOOD WOULD RUN OUT BEFORE YOU GOT THE MONEY TO BUY MORE.: NEVER TRUE

## 2024-12-11 SDOH — SOCIAL STABILITY: SOCIAL INSECURITY: ARE YOU OR HAVE YOU BEEN THREATENED OR ABUSED PHYSICALLY, EMOTIONALLY, OR SEXUALLY BY ANYONE?: NO

## 2024-12-11 SDOH — SOCIAL STABILITY: SOCIAL INSECURITY: DOES ANYONE TRY TO KEEP YOU FROM HAVING/CONTACTING OTHER FRIENDS OR DOING THINGS OUTSIDE YOUR HOME?: NO

## 2024-12-11 SDOH — SOCIAL STABILITY: SOCIAL INSECURITY: ARE THERE ANY APPARENT SIGNS OF INJURIES/BEHAVIORS THAT COULD BE RELATED TO ABUSE/NEGLECT?: NO

## 2024-12-11 SDOH — SOCIAL STABILITY: SOCIAL INSECURITY: ABUSE: ADULT

## 2024-12-11 SDOH — SOCIAL STABILITY: SOCIAL INSECURITY: HAVE YOU HAD THOUGHTS OF HARMING ANYONE ELSE?: NO

## 2024-12-11 ASSESSMENT — ACTIVITIES OF DAILY LIVING (ADL)
LACK_OF_TRANSPORTATION: NO
ASSISTIVE_DEVICE: WALKER;EYEGLASSES
HEARING - LEFT EAR: FUNCTIONAL
JUDGMENT_ADEQUATE_SAFELY_COMPLETE_DAILY_ACTIVITIES: YES
BATHING: NEEDS ASSISTANCE
HEARING - RIGHT EAR: FUNCTIONAL
WALKS IN HOME: NEEDS ASSISTANCE
ADEQUATE_TO_COMPLETE_ADL: YES
GROOMING: NEEDS ASSISTANCE
PATIENT'S MEMORY ADEQUATE TO SAFELY COMPLETE DAILY ACTIVITIES?: YES
TOILETING: NEEDS ASSISTANCE
DRESSING YOURSELF: NEEDS ASSISTANCE
FEEDING YOURSELF: INDEPENDENT

## 2024-12-11 ASSESSMENT — COGNITIVE AND FUNCTIONAL STATUS - GENERAL
MOBILITY SCORE: 14
TURNING FROM BACK TO SIDE WHILE IN FLAT BAD: A LITTLE
PATIENT BASELINE BEDBOUND: NO
DRESSING REGULAR LOWER BODY CLOTHING: A LITTLE
DAILY ACTIVITIY SCORE: 19
DRESSING REGULAR UPPER BODY CLOTHING: A LITTLE
WALKING IN HOSPITAL ROOM: A LOT
MOBILITY SCORE: 14
TURNING FROM BACK TO SIDE WHILE IN FLAT BAD: A LITTLE
DRESSING REGULAR LOWER BODY CLOTHING: A LITTLE
MOVING FROM LYING ON BACK TO SITTING ON SIDE OF FLAT BED WITH BEDRAILS: A LITTLE
HELP NEEDED FOR BATHING: A LITTLE
MOBILITY SCORE: 14
STANDING UP FROM CHAIR USING ARMS: A LOT
STANDING UP FROM CHAIR USING ARMS: A LOT
PERSONAL GROOMING: A LITTLE
CLIMB 3 TO 5 STEPS WITH RAILING: A LOT
TOILETING: A LITTLE
TOILETING: A LITTLE
MOVING TO AND FROM BED TO CHAIR: A LOT
MOVING FROM LYING ON BACK TO SITTING ON SIDE OF FLAT BED WITH BEDRAILS: A LITTLE
PERSONAL GROOMING: A LITTLE
MOVING TO AND FROM BED TO CHAIR: A LOT
DRESSING REGULAR UPPER BODY CLOTHING: A LITTLE
CLIMB 3 TO 5 STEPS WITH RAILING: A LOT
DRESSING REGULAR LOWER BODY CLOTHING: A LITTLE
PERSONAL GROOMING: A LITTLE
DAILY ACTIVITIY SCORE: 19
DRESSING REGULAR UPPER BODY CLOTHING: A LITTLE
MOVING TO AND FROM BED TO CHAIR: A LOT
TURNING FROM BACK TO SIDE WHILE IN FLAT BAD: A LITTLE
HELP NEEDED FOR BATHING: A LITTLE
TOILETING: A LITTLE
DAILY ACTIVITIY SCORE: 19
MOVING FROM LYING ON BACK TO SITTING ON SIDE OF FLAT BED WITH BEDRAILS: A LITTLE
WALKING IN HOSPITAL ROOM: A LOT
CLIMB 3 TO 5 STEPS WITH RAILING: A LOT
HELP NEEDED FOR BATHING: A LITTLE
WALKING IN HOSPITAL ROOM: A LOT
STANDING UP FROM CHAIR USING ARMS: A LOT

## 2024-12-11 ASSESSMENT — ENCOUNTER SYMPTOMS
UNEXPECTED WEIGHT CHANGE: 1
CONSTIPATION: 0
COUGH: 1
FEVER: 0
CHILLS: 0
DIFFICULTY URINATING: 0
ABDOMINAL PAIN: 0
NAUSEA: 0
SHORTNESS OF BREATH: 1
DIARRHEA: 0
PALPITATIONS: 0
ABDOMINAL DISTENTION: 0
VOMITING: 0
DYSURIA: 0

## 2024-12-11 ASSESSMENT — LIFESTYLE VARIABLES
HOW MANY STANDARD DRINKS CONTAINING ALCOHOL DO YOU HAVE ON A TYPICAL DAY: PATIENT DOES NOT DRINK
SKIP TO QUESTIONS 9-10: 1
HOW OFTEN DO YOU HAVE 6 OR MORE DRINKS ON ONE OCCASION: NEVER
AUDIT-C TOTAL SCORE: 0
AUDIT-C TOTAL SCORE: 0
HOW OFTEN DO YOU HAVE A DRINK CONTAINING ALCOHOL: NEVER

## 2024-12-11 ASSESSMENT — PAIN SCALES - GENERAL
PAINLEVEL_OUTOF10: 0 - NO PAIN

## 2024-12-11 ASSESSMENT — PAIN - FUNCTIONAL ASSESSMENT
PAIN_FUNCTIONAL_ASSESSMENT: 0-10

## 2024-12-11 NOTE — ED TRIAGE NOTES
Pt states for 2 weeks she has been feeling short of breath. She was seen by her doctor and was given an albuterol inhaler. She took 3 doses of inhaler prior to arrival with no relief. She states she has worsening shortness of breath when she walks. Pt is also being treated for a leg infection with keflex which she finished today.

## 2024-12-11 NOTE — CARE PLAN
The patient's goals for the shift include      The clinical goals for the shift include patient have decrease in sob during shift      Problem: Fall/Injury  Goal: Not fall by end of shift  Outcome: Progressing     Problem: Fall/Injury  Goal: Be free from injury by end of the shift  Outcome: Progressing     Problem: Fall/Injury  Goal: Verbalize understanding of personal risk factors for fall in the hospital  Outcome: Progressing     Problem: Fall/Injury  Goal: Verbalize understanding of risk factor reduction measures to prevent injury from fall in the home  Outcome: Progressing     Problem: Fall/Injury  Goal: Use assistive devices by end of the shift  Outcome: Progressing     Problem: Fall/Injury  Goal: Pace activities to prevent fatigue by end of the shift  Outcome: Progressing

## 2024-12-11 NOTE — PROGRESS NOTES
12/11/24 1417   Discharge Planning   Living Arrangements Spouse/significant other   Support Systems Spouse/significant other   Assistance Needed A&OX4; independent with ADLs with walker; drives; room air baseline and currently room air; PCP Dr Pete Mcrae; on Eliquis prior to hospitalization   Type of Residence Private residence   Number of Stairs to Enter Residence 0   Number of Stairs Within Residence 0   Do you have animals or pets at home? No   Who is requesting discharge planning? Provider   Expected Discharge Disposition Home  (DC dispo is pending workup and PT OT)   Does the patient need discharge transport arranged? Yes   RoundTrip coordination needed? Yes   Has discharge transport been arranged? No   Financial Resource Strain   How hard is it for you to pay for the very basics like food, housing, medical care, and heating? Not hard   Housing Stability   In the last 12 months, was there a time when you were not able to pay the mortgage or rent on time? N   In the past 12 months, how many times have you moved where you were living? 0   At any time in the past 12 months, were you homeless or living in a shelter (including now)? N   Transportation Needs   In the past 12 months, has lack of transportation kept you from medical appointments or from getting medications? no   In the past 12 months, has lack of transportation kept you from meetings, work, or from getting things needed for daily living? No     12/11/2024 1419pm  Spoke with patient bedside in ED

## 2024-12-11 NOTE — ED PROVIDER NOTES
HPI   No chief complaint on file.      CC: Shortness of breath on exertion  HPI:   73 y.o. 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  She denies any hemoptysis hematemesis hematochezia or melanotic stools.  Denies having any abdominal pain.  Or chest pain.    Additional Limitations to History:   External Records Reviewed: I reviewed recent and relevant outside records including   History Obtained From:     Past Medical History: Per HPI  Medications: Reviewed in EMR and with patient  Allergies:  Reviewed in EMR  Past Surgical History:   Social History:     ------------------------------------------------------------------------------------------------------  Physical Exam:  --Vital signs reviewed in nursing triage note, EMR flow sheets, and at patient's bedside  GEN:  A&Ox3, no acute distress, appears comfortable.  Conversational and appropriate.  No confusion or gross mental status changes.  EYES: EOMI, non-injected sclera.  ENT: Moist mucous membranes, no apparent injuries or lesions.   CARDIO: Irregular irregular, tachycardic no murmurs, rubs, or gallops.  2+ equal pulses of the distal extremities.   PULM: Decreased breath sounds bilaterally, no rales, rhonchi, or wheezes. Good symmetric  chest expansion.  GI: Soft, non-tender, non-distended. No rebound tenderness or guarding.  SKIN: Warm and dry, no rashes or lesions.  MSK: ROM intact the extremities without contractures.   EXT: 4+ pitting edema to the lower extremities bilaterally, minimal weepage, chronic healing wound on the left lower extremity,  NEURO: Cranial nerves II-XII grossly intact. Sensation to light touch intact and equal bilaterally in upper and lower extremities.  Symmetric 5/5 strength in upper and lower extremities.  PSYCH: Appropriate mood and behavior, converses and responds appropriately during exam.  -------------------------------------------------------------------------------------------------------        Differential Diagnoses Considered:   Chronic Medical Conditions Significantly Affecting Care:   Diagnostic testing considered: [PERC, D-Dimer, PECARN, etc.]    - EKG interpreted by myself A-fib RVR ventricular rate of 140 normal QRS duration no prolonged QT/QTc no obvious ST elevation, depression, or acute ischemic findings.  - I independently interpreted: [CXR, CT, POCUS, etc. including your interpretation]  - Labs notable for     Escalation of Care: Appropriate for   Social Determinants of Health Significantly Affecting Care: [Homelessness, lacking transportation, uninsured, unable to afford medications]  Prescription Drug Consideration: [Antibiotics, antivirals, pain medications, etc.]  Discussion of Management with Other Providers:  I discussed the patient/results with: [admitting team, consultant, radiologist, social work, EPAT, case management, PT/OT, RT, PCP, etc.]      Jason Benitez PA-C              Patient History   Past Medical History:   Diagnosis Date    A-fib (Multi)     Anxiety     Breast cancer (Multi)     16 tx radiation left lumpectomy 2022    Cancer (Multi)     Depression     Diabetes mellitus (Multi)     GERD (gastroesophageal reflux disease)     Hyperlipidemia     Hypertension     Stroke (Multi)       Past Surgical History:   Procedure Laterality Date    BREAST SURGERY Left     lumpectomy for cancer    CARDIAC CATHETERIZATION N/A 2024    Procedure: Left Heart Cath;  Surgeon: Virgil Childress MD;  Location: [facility];  Service: Cardiovascular;  Laterality: N/A;    CHOLECYSTECTOMY      FRACTURE SURGERY      left femur and LLE fabrice  knee plate    JOINT REPLACEMENT Left     MR HEAD ANGIO WO IV CONTRAST  2023    MR HEAD ANGIO WO IV CONTRAST 2023 [facility] MRI    MR NECK ANGIO W AND WO IV CONTRAST  2022    MR NECK ANGIO W AND WO IV CONTRAST 2022 [facility]    MR NECK ANGIO WO IV CONTRAST  2023    MR NECK ANGIO WO IV CONTRAST 2023 [facility] MRI    OTHER SURGICAL HISTORY Right     Transcarotid Artery Revascularization    REVISION TOTAL HIP ARTHROPLASTY Left     TUBAL LIGATION       Family History   Problem Relation Name Age of Onset    Heart attack Father  65        blood clot    Coronary artery disease Sister       Social History     Tobacco Use    Smoking status: Former     Current packs/day: 0.00     Types: Cigarettes     Quit date:      Years since quittin.9    Smokeless tobacco: Never   Vaping Use    Vaping status: Never Used   Substance Use Topics    Alcohol use: Never     Alcohol/week: 1.0 standard drink of alcohol     Types: 1 Standard drinks or equivalent per week    Drug use: Never       Physical Exam   ED Triage Vitals   Temp Pulse Resp BP   -- -- -- --      SpO2 Temp src Heart Rate Source Patient Position   -- -- -- --      BP Location FiO2 (%)     -- --       Physical Exam      ED Course & MDM   Diagnoses as of 24 0744   Hypervolemia, unspecified hypervolemia type   Dyspnea, unspecified type   Acute on chronic combined systolic and diastolic congestive heart failure   Acute exacerbation of chronic heart failure   Atrial flutter, unspecified type (Multi)   Cellulitis of lower extremity, unspecified laterality                 No data  recorded                                 Medical Decision Making  74-year-old female with increased shortness of breath likely in the setting of acute on chronic congestive heart failure exacerbation, fluid overloaded, A-fib RVR with a ventricular rate of 140 she was given 2.5 mg of metoprolol and heart rate did decrease to 106, patient also given 40 mg Lasix patient is medically stable, nontoxic, BNP 1187 significantly increased from prior labs, elevated troponins, remaining laboratory workup appears unremarkable, diffuse interstitial infiltrates bibasilar consolidation noted on imaging, patient placed in telemetry observation for further diuresis and evaluation.        Procedure  Procedures     Jason Benitez PA-C  12/11/24 1119       Jason Benitez PA-C  12/11/24 1406       Jason Benitez PA-C  12/13/24 0831       Jason Bentiez PA-C  12/17/24 0733

## 2024-12-11 NOTE — H&P
History Of Present Illness  Elsa Biggs is a 74 y.o. 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  Pt states she has had worsening dyspnea on exertion for three weeks after PCP changed Lasix dose to 20 mg three times weekly from 40 mg three times weekly due to patient reported excess urination.  Reports bilateral lower extremity edema during this time as well as dyspnea.  Also reports cough productive of clear sputum.  Denies recent sick contacts, fever, chills, nausea, vomiting, diarrhea, constipation.  Denies current smoking.  Also reports three week history of cellulitis of left lower leg, completed course of Keflex without complete resolution.  States has been spraying leg with hydrogen peroxide.      12 point Review of Systems negative unless stated above.     ED course:   Vitals T 36.4 C /104  RR 20 SpO2 94% on RA  CBC- WBC 8.0 Hb 9.6 Hematocrit 33.1 RBC 3.91 Plt 361  Na 137 K 4.0 Cl 101 Bicarb 28 Cr 0.8 BUN 21 Mg 1.63 Ca 8.5 glucose 154  BNP 1187 Troponin 81 -> 84  Imaging- CXR: Diffuse interstitial infiltrates and bibasilar airspace consolidations  Interventions - 20 mg IV Lasix, 5 mg IV Lopressor    Diagnoses as of 12/11/24 1608   Hypervolemia, unspecified hypervolemia type   Dyspnea, unspecified type   Acute on chronic combined systolic and diastolic congestive heart failure        Past Medical History  She has a past medical history of A-fib (Multi), Anxiety, Breast cancer (Multi), Cancer (Multi), Depression, Diabetes mellitus (Multi), GERD (gastroesophageal reflux disease), Hyperlipidemia, Hypertension, and Stroke (Multi).    Surgical History  She has a past surgical history that includes MR angio neck w and wo IV contrast (01/14/2022); Joint  replacement (Left); Fracture surgery; Breast surgery (Left); MR angio head wo IV contrast (12/26/2023); MR angio neck wo IV contrast (12/26/2023); Cardiac catheterization (N/A, 01/29/2024); Revision total hip arthroplasty (Left); Cholecystectomy; Tubal ligation; and Other surgical history (Right).     Social History  She reports that she quit smoking about 41 years ago. Her smoking use included cigarettes. She has never used smokeless tobacco. She reports that she does not drink alcohol and does not use drugs.    Family History  Family History   Problem Relation Name Age of Onset    Heart attack Father  65        blood clot    Coronary artery disease Sister          Allergies  Tetracycline    Review of Systems   Constitutional:  Positive for unexpected weight change. Negative for chills and fever.        Weight gain unspecified quantity   Respiratory:  Positive for cough and shortness of breath.    Cardiovascular:  Positive for leg swelling. Negative for chest pain and palpitations.   Gastrointestinal:  Negative for abdominal distention, abdominal pain, constipation, diarrhea, nausea and vomiting.   Genitourinary:  Negative for difficulty urinating and dysuria.        Physical Exam  Constitutional:       General: She is not in acute distress.     Appearance: Normal appearance. She is obese.   HENT:      Head: Normocephalic and atraumatic.   Eyes:      Extraocular Movements: Extraocular movements intact.      Conjunctiva/sclera: Conjunctivae normal.   Cardiovascular:      Rate and Rhythm: Tachycardia present. Rhythm irregular.      Pulses: Normal pulses.      Heart sounds: Normal heart sounds.      Comments: JVD present  Irregularly irregular rhythm  Pulmonary:      Breath sounds: No wheezing.      Comments: Bilateral rales in bases, diffusely diminished breath sounds  Abdominal:      General: Bowel sounds are normal.      Palpations: Abdomen is soft. There is no mass.      Tenderness: There is abdominal tenderness.       Comments: Lower midline abdomen tenderness to palpation   Musculoskeletal:      Right lower leg: Edema present.      Left lower leg: Edema present.      Comments: Erythema, warmth with open wound on left lower leg, decreased ROM with hip flexion due to pain    Skin:     General: Skin is warm and dry.      Capillary Refill: Capillary refill takes more than 3 seconds.   Neurological:      General: No focal deficit present.      Mental Status: She is alert and oriented to person, place, and time.   Psychiatric:         Mood and Affect: Mood normal.         Behavior: Behavior normal.         Thought Content: Thought content normal.         Judgment: Judgment normal.          Last Recorded Vitals  /77   Pulse (!) 123   Temp 36.4 °C (97.5 °F)   Resp (!) 22   Wt 112 kg (246 lb 4.1 oz)   SpO2 94%     Relevant Results      Results for orders placed or performed during the hospital encounter of 12/11/24 (from the past 24 hours)   CBC and Auto Differential   Result Value Ref Range    WBC 8.0 4.4 - 11.3 x10*3/uL    nRBC 0.4 (H) 0.0 - 0.0 /100 WBCs    RBC 3.91 (L) 4.00 - 5.20 x10*6/uL    Hemoglobin 9.6 (L) 12.0 - 16.0 g/dL    Hematocrit 33.1 (L) 36.0 - 46.0 %    MCV 85 80 - 100 fL    MCH 24.6 (L) 26.0 - 34.0 pg    MCHC 29.0 (L) 32.0 - 36.0 g/dL    RDW 17.7 (H) 11.5 - 14.5 %    Platelets 361 150 - 450 x10*3/uL    Neutrophils % 72.9 40.0 - 80.0 %    Immature Granulocytes %, Automated 0.5 0.0 - 0.9 %    Lymphocytes % 18.5 13.0 - 44.0 %    Monocytes % 7.0 2.0 - 10.0 %    Eosinophils % 0.6 0.0 - 6.0 %    Basophils % 0.5 0.0 - 2.0 %    Neutrophils Absolute 5.85 (H) 1.60 - 5.50 x10*3/uL    Immature Granulocytes Absolute, Automated 0.04 0.00 - 0.50 x10*3/uL    Lymphocytes Absolute 1.48 0.80 - 3.00 x10*3/uL    Monocytes Absolute 0.56 0.05 - 0.80 x10*3/uL    Eosinophils Absolute 0.05 0.00 - 0.40 x10*3/uL    Basophils Absolute 0.04 0.00 - 0.10 x10*3/uL   Magnesium   Result Value Ref Range    Magnesium 1.63 1.60 - 2.40 mg/dL    Comprehensive metabolic panel   Result Value Ref Range    Glucose 154 (H) 74 - 99 mg/dL    Sodium 137 136 - 145 mmol/L    Potassium 4.0 3.5 - 5.3 mmol/L    Chloride 101 98 - 107 mmol/L    Bicarbonate 28 21 - 32 mmol/L    Anion Gap 12 10 - 20 mmol/L    Urea Nitrogen 21 6 - 23 mg/dL    Creatinine 0.80 0.50 - 1.05 mg/dL    eGFR 76 >60 mL/min/1.73m*2    Calcium 8.5 (L) 8.6 - 10.3 mg/dL    Albumin 3.0 (L) 3.4 - 5.0 g/dL    Alkaline Phosphatase 62 33 - 136 U/L    Total Protein 6.4 6.4 - 8.2 g/dL    AST 17 9 - 39 U/L    Bilirubin, Total 0.4 0.0 - 1.2 mg/dL    ALT 20 7 - 45 U/L   Protime-INR   Result Value Ref Range    Protime 30.2 (H) 9.8 - 12.8 seconds    INR 2.7 (H) 0.9 - 1.1   B-Type Natriuretic Peptide   Result Value Ref Range    BNP 1,187 (H) 0 - 99 pg/mL   Type And Screen   Result Value Ref Range    ABO TYPE AB     Rh TYPE POS     ANTIBODY SCREEN NEG    Blood Gas Venous Full Panel   Result Value Ref Range    POCT pH, Venous 7.37 7.33 - 7.43 pH    POCT pCO2, Venous 50 41 - 51 mm Hg    POCT pO2, Venous 31 (L) 35 - 45 mm Hg    POCT SO2, Venous 49 45 - 75 %    POCT Oxy Hemoglobin, Venous 47.5 45.0 - 75.0 %    POCT Hematocrit Calculated, Venous 31.0 (L) 36.0 - 46.0 %    POCT Sodium, Venous 135 (L) 136 - 145 mmol/L    POCT Potassium, Venous 4.1 3.5 - 5.3 mmol/L    POCT Chloride, Venous 103 98 - 107 mmol/L    POCT Ionized Calicum, Venous 1.17 1.10 - 1.33 mmol/L    POCT Glucose, Venous 159 (H) 74 - 99 mg/dL    POCT Lactate, Venous 1.6 0.4 - 2.0 mmol/L    POCT Base Excess, Venous 2.9 -2.0 - 3.0 mmol/L    POCT HCO3 Calculated, Venous 28.9 (H) 22.0 - 26.0 mmol/L    POCT Hemoglobin, Venous 10.3 (L) 12.0 - 16.0 g/dL    POCT Anion Gap, Venous 7.0 (L) 10.0 - 25.0 mmol/L    Patient Temperature      FiO2 21 %   Troponin I, High Sensitivity, Initial   Result Value Ref Range    Troponin I, High Sensitivity 81 (HH) 0 - 13 ng/L   Sedimentation rate, automated   Result Value Ref Range    Sedimentation Rate 65 (H) 0 - 30 mm/h    Sars-CoV-2 PCR   Result Value Ref Range    Coronavirus 2019, PCR Not Detected Not Detected   Influenza A, and B PCR   Result Value Ref Range    Flu A Result Not Detected Not Detected    Flu B Result Not Detected Not Detected   Troponin, High Sensitivity, 1 Hour   Result Value Ref Range    Troponin I, High Sensitivity 84 (HH) 0 - 13 ng/L   C-reactive protein   Result Value Ref Range    C-Reactive Protein 0.68 <1.00 mg/dL     *Note: Due to a large number of results and/or encounters for the requested time period, some results have not been displayed. A complete set of results can be found in Results Review.     XR chest 1 view    Result Date: 12/11/2024  Interpreted By:  Scott Enamorado, STUDY: XR CHEST 1 VIEW  12/11/2024 11:42 am   INDICATION: Signs/Symptoms:sob   COMPARISON: 03/05/2024   ACCESSION NUMBER(S): [id number]   ORDERING CLINICIAN: AMNA YOO   TECHNIQUE: A single AP portable radiograph of the chest was obtained.   FINDINGS: Multiple cardiac monitoring leads are seen over the chest. Mild-to-moderate diffuse interstitial infiltrates are seen throughout the lungs bilaterally, and may represent edema and/or pneumonia. Bilateral basilar airspace consolidations are seen and may represent small pleural effusions, atelectasis and/or pneumonia. No pneumothorax is identified. The cardiac silhouette is within normal limits for size.       Diffuse interstitial infiltrates and bibasilar airspace consolidations, as above. Clinical correlation and continued follow-up until clearing is recommended.   MACRO: None.   Signed by: Scott Enamorado 12/11/2024 12:01 PM Dictation workstation:   [id number]    ECG 12 lead (Clinic Performed)    Result Date: 11/21/2024  Sinus rhythm with frequent Premature ventricular complexes Left ventricular hypertrophy with repolarization abnormality Abnormal ECG When compared with ECG of 24-SEP-2024 10:17, (unconfirmed) Sinus rhythm has replaced Junctional rhythm T wave inversion less evident  in Inferior leads Inverted T waves have replaced nonspecific T wave abnormality in Lateral leads    BI mammo bilateral screening tomosynthesis    Result Date: 11/19/2024  Interpreted By:  Jayna Ga, STUDY: BI MAMMO BILATERAL SCREENING TOMOSYNTHESIS;  11/18/2024 8:40 am   ACCESSION NUMBER(S): [id number]   ORDERING CLINICIAN: LIN BRO   INDICATION: Screening.   ,Z12.31 Encounter for screening mammogram for malignant neoplasm of breast   COMPARISON: 11/17/2023, 06/05/2023 and 07/06/2021   FINDINGS: 2D and tomosynthesis images were reviewed at 1 mm slice thickness.   Density:  There are scattered areas of fibroglandular density.   Surgical scars and treatment changes of the left breast are stable. Asymmetry in the upper-outer subareolar right breast is stable. No suspicious masses or calcifications are identified.       No mammographic evidence of malignancy.   BI-RADS CATEGORY: BI-RADS Category:  2 Benign. Recommendation:  Annual Screening. Recommended Date:  1 Year. Laterality:  Bilateral.       For any future breast imaging appointments, please call [phone] ([phone]).     MACRO: None   Signed by: Jayna Ga 11/19/2024 8:34 AM Dictation workstation:   [id number]        Assessment/Plan   Assessment & Plan  Acute exacerbation of chronic heart failure    Elsa Biggs is a 74 year old female with hx of HFmrEF (with improved EF 50-55% 3/6/2024) , PAF on Eliquis, HTN, T2DM, dyslipidemia, obesity, depression, coronary angiography 1/29/2024 which was negative for obstructive CAD (LM mild disease, p-mLAD 40, Lcx mid 60, RCA mild diffuse disease) presenting today for dyspnea.  CXR showing diffuse interstitial infiltrates and bibasilar airspace consolidations.  BNP 1187.  Pt currently tachycardic to 123, tachypneic to 22, normotensive, SpO2 94% on RA. Sp 20 mg IV lasix and 5 mg lopressor IV for symptomatic control.  Acute medical issues  #Acute on chronic diastolic CHF  - BNP 1187  - CXR: Diffuse  interstitial infiltrates and bibasilar airspace consolidations  - SpO2 high 80's en route to ED  PLAN:  - IV lasix 20 mg daily, may consider additional 20 mg if not diuresing appropriately based on I/O's  - Strict I/O's  - Salt / fluid restriction  - Elevate legs  - Compression stocking on right leg  - Consult cardio    #Atrial fibrillation with RVR  - sp 1 dose lopressor 5 mg IV in ED for rate control  PLAN  - Continue home Eliquis 5 mg BID  - Lopressor 5 mg IV sustained HR > 120  - Will defer initiating any rate or rhythm control agents to cardiology consult as patient per outpatient note doesn't tolerated BB/CCB.     #Cellulitis of left lower leg  - Prior to admission treated with Keflex without complete resolution  - CBC without leukocytosis   PLAN:  - Restart Keflex 500 mg BID  - Wound care    Chronic issues  HTN  T2DM  Dyslipidemia  Obesity    Fluids: fluid restriction  GI Ppx: none  DVT Ppx: Eliquis 5 mg BID  Diet: Cardiac  Consult: Cardiology  Code Status: DNRCC/DNI       Jeremias Awan MD

## 2024-12-11 NOTE — ED PROVIDER NOTES
The patient was seen by the midlevel/resident.  I have personally saw the patient and made/approved the management plan and take responsibility for the patient management.  I reviewed the EKG's (when done) and agree with the interpretation.  I have seen and examined the patient; agree with the workup, evaluation, MDM, and diagnosis.  The care plan has been discussed with the midlevel/resident; I have reviewed the note and agree with the documented findings.     Seen the emergency room for shortness of breath appears to be volume overloaded.  Patient has history of CHF and his A-fib RVR will be slowed down with medications metoprolol IV.  Her blood pressure is soft so we are going slowly.  She will require hospitalization for evaluation treatment we gave her Lasix as well to try to diurese her.  Her wound on her left leg does not appear infected although it is oozing serous fluid.  Diagnoses as of 12/23/24 1201   Hypervolemia, unspecified hypervolemia type   Dyspnea, unspecified type   Acute on chronic combined systolic and diastolic congestive heart failure   Acute exacerbation of chronic heart failure   Atrial flutter, unspecified type (Multi)   Cellulitis of lower extremity, unspecified laterality     MD Tyler Shipman MD  12/11/24 1459       Tyler Crystal MD  12/13/24 0905       Tyler Crystal MD  12/23/24 1201

## 2024-12-11 NOTE — PROGRESS NOTES
Pharmacy Medication History Review    Elsa Biggs is a 74 y.o. female admitted for Acute exacerbation of chronic heart failure. Pharmacy reviewed the patient's [id number] medications and allergies for accuracy.    The list below reflectives the updated PTA list. Please review each medication in order reconciliation for additional clarification and justification.  Prior to Admission Medications   Prescriptions Last Dose Informant Patient Reported? Taking?   OneTouch Delica Plus Lancet 33 gauge misc  Self     Sig: USE TO TAKE BLOOD SUGAR 1-2 TIMES PER DAY   PARoxetine (Paxil) 20 mg tablet 12/10/2024 Evening Self Yes Yes   Sig: Take 1 tablet (20 mg) by mouth once daily at bedtime.   acetaminophen (Tylenol) 325 mg tablet Not Taking Self Not taking Not taking   Sig: Take 2 tablets (650 mg) by mouth every 4 hours if needed for mild pain (1 - 3), moderate pain (4 - 6) or fever (temp greater than 38.0 C).   Patient not taking: Reported on 12/11/2024   albuterol 90 mcg/actuation inhaler 12/11/2024 Noon Self Yes Yes   Sig: Inhale 2 puffs every 6 hours if needed.   apixaban (Eliquis) 5 mg tablet 12/10/2024 Evening Self Yes Yes   Sig: Take 1 tablet (5 mg) by mouth 2 times a day.   aspirin 81 mg EC tablet 12/10/2024 Evening Self Yes Yes   Sig: Take 1 tablet (81 mg) by mouth once daily.   atorvastatin (Lipitor) 80 mg tablet 12/10/2024 Evening Self Yes Yes   Sig: Take 1 tablet (80 mg) by mouth once daily at bedtime.   cephalexin (Keflex) 500 mg capsule 12/11/2024 Noon Self Yes Yes   Sig: Take 1 capsule (500 mg) by mouth 2 times a day.   cholecalciferol (Vitamin D-3) 25 MCG (1000 UT) tablet 12/10/2024 Morning Self Yes Yes   Sig: Take 1 tablet (25 mcg) by mouth once daily.   diclofenac sodium (Voltaren) 1 % gel Not Taking Self No No   Sig: Apply 4.5 inches (4 g) topically 4 times a day.   Patient not taking: Reported on 12/11/2024   ezetimibe (Zetia) 10 mg tablet 12/10/2024 Noon Self Yes Yes   Sig: Take 1 tablet (10 mg) by  mouth once daily.   furosemide (Lasix) 20 mg tablet 12/9/2024 Self Yes Yes   Sig: Take 1 tablet (20 mg) by mouth every other day.   ibuprofen 600 mg tablet 12/10/2024 Morning Self Yes Yes   Sig: Take 1 tablet (600 mg) by mouth if needed.   metFORMIN (Glucophage) 500 mg tablet 12/10/2024 Evening Self Yes Yes   Sig: Take 1 tablet (500 mg) by mouth once daily in the morning. And 2 tabs In the evening   pioglitazone (Actos) 30 mg tablet 12/10/2024 Evening Self Yes Yes   Sig: Take 1 tablet (30 mg) by mouth once every 24 hours.   vibegron (Gemtesa) 75 mg tablet 12/10/2024 Evening Self Yes Yes   Sig: Take 1 tablet (75 mg) by mouth once daily at bedtime.      Facility-Administered Medications: None           The list below reflectives the updated allergy list. Please review each documented allergy for additional clarification and justification.  Allergies  Reviewed by Terrie Serrano RN on 12/11/2024        Severity Reactions Comments    Tetracycline Medium Rash             Below are additional concerns with the patient's PTA list.      BALDO ABEBE

## 2024-12-11 NOTE — H&P
Bedside shift change report given to TIBURCIO Teran RN (oncoming nurse) by Alexandro Manjarrez RN (offgoing nurse). Report included the following information SBAR, Kardex, MAR, Recent Results and Cardiac Rhythm NSR/paced. History Of Present Illness  Elsa Biggs is a 74 y.o. 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  Pt states she has had worsening dyspnea on exertion for three weeks after PCP changed Lasix dose to 20 mg three times weekly from 40 mg three times weekly due to patient reported excess urination.  Reports bilateral lower extremity edema during this time.  Also reports cough productive of clear sputum.  Denies recent sick contacts, fever, chills, nausea, vomiting, diarrhea, constipation.  Denies current smoking.  Also reports three week history of cellulitis of left lower leg, completed course of Keflex without complete resolution.  States has been spraying leg with hydrogen peroxide.      ED course:   Vitals T 36.4 C /104  RR 20 SpO2 94% on RA  CBC- WBC 8.0 Hb 9.6 Hematocrit 33.1 RBC 3.91 Plt 361  Na 137 K 4.0 Cl 101 Bicarb 28 Cr 0.8 BUN 21 Mg 1.63 Ca 8.5 glucose 154  BNP 1187 Troponin 81 -> 84  Imaging- CXR: Diffuse interstitial infiltrates and bibasilar airspace consolidations  Interventions - 20 mg IV Lasix, 5 mg IV Lopressor      Diagnoses as of 12/18/24 1136   Hypervolemia, unspecified hypervolemia type   Dyspnea, unspecified type   Acute on chronic combined systolic and diastolic congestive heart failure   Acute exacerbation of chronic heart failure   Atrial flutter, unspecified type (Multi)   Cellulitis of lower extremity, unspecified laterality        Past Medical History  She has a past medical history of A-fib (Multi), Anxiety, Breast cancer (Multi), Cancer (Multi), Depression, Diabetes mellitus (Multi), GERD (gastroesophageal reflux disease), Hyperlipidemia, Hypertension, and Stroke (Multi).    Surgical History  She has a past surgical history that  includes MR angio neck w and wo IV contrast (01/14/2022); Joint replacement (Left); Fracture surgery; Breast surgery (Left); MR angio head wo IV contrast (12/26/2023); MR angio neck wo IV contrast (12/26/2023); Cardiac catheterization (N/A, 01/29/2024); Revision total hip arthroplasty (Left); Cholecystectomy; Tubal ligation; and Other surgical history (Right).     Social History  She reports that she quit smoking about 41 years ago. Her smoking use included cigarettes. She has never used smokeless tobacco. She reports that she does not drink alcohol and does not use drugs.    Family History  Family History   Problem Relation Name Age of Onset    Heart attack Father  65        blood clot    Coronary artery disease Sister          Allergies  Tetracycline    Review of Systems   Constitutional:  Positive for unexpected weight change. Negative for chills and fever.        Weight gain unspecified quantity   Respiratory:  Positive for cough and shortness of breath.    Cardiovascular:  Positive for leg swelling. Negative for chest pain and palpitations.   Gastrointestinal:  Negative for abdominal distention, abdominal pain, constipation, diarrhea, nausea and vomiting.   Genitourinary:  Negative for difficulty urinating and dysuria.        Physical Exam  Constitutional:       General: She is not in acute distress.     Appearance: Normal appearance. She is obese.   HENT:      Head: Normocephalic and atraumatic.   Eyes:      Extraocular Movements: Extraocular movements intact.      Conjunctiva/sclera: Conjunctivae normal.   Cardiovascular:      Rate and Rhythm: Tachycardia present. Rhythm irregular.      Pulses: Normal pulses.      Heart sounds: Normal heart sounds.      Comments: JVD present  Irregularly irregular rhythm  Pulmonary:      Breath sounds: No wheezing.      Comments: Bilateral rales in bases, diffusely diminished breath sounds  Abdominal:      General: Bowel sounds are normal.      Palpations: Abdomen is soft.  There is no mass.      Tenderness: There is abdominal tenderness.      Comments: Lower midline abdomen tenderness to palpation   Musculoskeletal:      Right lower leg: Edema present.      Left lower leg: Edema present.      Comments: Erythema, warmth with open wound on left lower leg, decreased ROM with hip flexion due to pain    Skin:     General: Skin is warm and dry.      Capillary Refill: Capillary refill takes more than 3 seconds.   Neurological:      General: No focal deficit present.      Mental Status: She is alert and oriented to person, place, and time.   Psychiatric:         Mood and Affect: Mood normal.         Behavior: Behavior normal.         Thought Content: Thought content normal.         Judgment: Judgment normal.          Last Recorded Vitals  BP 93/77 (BP Location: Right arm, Patient Position: Lying)   Pulse 82   Temp 36.5 °C (97.7 °F) (Temporal)   Resp 24   Wt 104 kg (228 lb 9.6 oz)   SpO2 100%     Relevant Results      No results found. However, due to the size of the patient record, not all encounters were searched. Please check Results Review for a complete set of results.    XR chest 1 view    Result Date: 12/11/2024  Interpreted By:  Scott Enamorado, STUDY: XR CHEST 1 VIEW  12/11/2024 11:42 am   INDICATION: Signs/Symptoms:sob   COMPARISON: 03/05/2024   ACCESSION NUMBER(S): [id number]   ORDERING CLINICIAN: AMNA YOO   TECHNIQUE: A single AP portable radiograph of the chest was obtained.   FINDINGS: Multiple cardiac monitoring leads are seen over the chest. Mild-to-moderate diffuse interstitial infiltrates are seen throughout the lungs bilaterally, and may represent edema and/or pneumonia. Bilateral basilar airspace consolidations are seen and may represent small pleural effusions, atelectasis and/or pneumonia. No pneumothorax is identified. The cardiac silhouette is within normal limits for size.       Diffuse interstitial infiltrates and bibasilar airspace consolidations, as above.  Clinical correlation and continued follow-up until clearing is recommended.   MACRO: None.   Signed by: Scott Enamorado 12/11/2024 12:01 PM Dictation workstation:   [id number]    ECG 12 lead (Clinic Performed)    Result Date: 11/21/2024  Sinus rhythm with frequent Premature ventricular complexes Left ventricular hypertrophy with repolarization abnormality Abnormal ECG When compared with ECG of 24-SEP-2024 10:17, (unconfirmed) Sinus rhythm has replaced Junctional rhythm T wave inversion less evident in Inferior leads Inverted T waves have replaced nonspecific T wave abnormality in Lateral leads    BI mammo bilateral screening tomosynthesis    Result Date: 11/19/2024  Interpreted By:  Jayna Ga, STUDY: BI MAMMO BILATERAL SCREENING TOMOSYNTHESIS;  11/18/2024 8:40 am   ACCESSION NUMBER(S): [id number]   ORDERING CLINICIAN: LIN BRO   INDICATION: Screening.   ,Z12.31 Encounter for screening mammogram for malignant neoplasm of breast   COMPARISON: 11/17/2023, 06/05/2023 and 07/06/2021   FINDINGS: 2D and tomosynthesis images were reviewed at 1 mm slice thickness.   Density:  There are scattered areas of fibroglandular density.   Surgical scars and treatment changes of the left breast are stable. Asymmetry in the upper-outer subareolar right breast is stable. No suspicious masses or calcifications are identified.       No mammographic evidence of malignancy.   BI-RADS CATEGORY: BI-RADS Category:  2 Benign. Recommendation:  Annual Screening. Recommended Date:  1 Year. Laterality:  Bilateral.       For any future breast imaging appointments, please call [phone] ([phone]).     MACRO: None   Signed by: Jayna Ga 11/19/2024 8:34 AM Dictation workstation:   [id number]        Assessment/Plan   Assessment & Plan    Elsa Biggs is a 74 year old female with hx of HFmrEF (with improved EF 50-55% 3/6/2024) , PAF on Eliquis, HTN, T2DM, dyslipidemia, obesity, depression, coronary angiography 1/29/2024 which was  negative for obstructive CAD (LM mild disease, p-mLAD 40, Lcx mid 60, RCA mild diffuse disease) presenting today for dyspnea.  CXR showing diffuse interstitial infiltrates and bibasilar airspace consolidations.  BNP 1187.  Pt currently tachycardic to 123, tachypneic to 22, normotensive, SpO2 94% on RA. Sp 20 mg IV lasix and 5 mg lopressor IV for symptomatic control.  Acute medical issues  #Acute on chronic HFpEF  #Dyspnea  #Acute hypoxia  - BNP 1187  - CXR: Diffuse interstitial infiltrates and bibasilar airspace consolidations  - SpO2 high 80's en route to ED  PLAN:  - IV lasix 20 mg daily, may consider additional 20 mg if not diuresing appropriately based on I/O's  - Strict I/O's  - Salt / fluid restriction  - Elevate legs  - Compression stocking on right leg  - Consult cardio    #PAF  - sp 1 dose lopressor 5 mg IV in ED for rate control  PLAN  - Continue home Eliquis 5 mg BID  - Lopressor 5 mg IV HR > 120    #Cellulitis of left lower leg  - Prior to admission treated with Keflex without complete resolution  - CBC without leukocytosis   PLAN:  - Restart Keflex 500 mg BID  - Wound care    Chronic issues  HTN  T2DM  Dyslipidemia  Obesity    Fluids: fluid restriction  GI Ppx: none  DVT Ppx: Eliquis 5 mg BID  Diet: Cardiac  Consult: Cardiology  Code Status: DNRCC/DNI       CALLI PEDRO

## 2024-12-12 LAB
ALBUMIN SERPL BCP-MCNC: 3 G/DL (ref 3.4–5)
ALP SERPL-CCNC: 69 U/L (ref 33–136)
ALT SERPL W P-5'-P-CCNC: 24 U/L (ref 7–45)
ANION GAP SERPL CALC-SCNC: 11 MMOL/L (ref 10–20)
AST SERPL W P-5'-P-CCNC: 17 U/L (ref 9–39)
ATRIAL RATE: 115 BPM
BILIRUB SERPL-MCNC: 0.4 MG/DL (ref 0–1.2)
BUN SERPL-MCNC: 20 MG/DL (ref 6–23)
CALCIUM SERPL-MCNC: 8.6 MG/DL (ref 8.6–10.3)
CHLORIDE SERPL-SCNC: 100 MMOL/L (ref 98–107)
CO2 SERPL-SCNC: 32 MMOL/L (ref 21–32)
CREAT SERPL-MCNC: 0.89 MG/DL (ref 0.5–1.05)
EGFRCR SERPLBLD CKD-EPI 2021: 67 ML/MIN/1.73M*2
ERYTHROCYTE [DISTWIDTH] IN BLOOD BY AUTOMATED COUNT: 17.5 % (ref 11.5–14.5)
GLUCOSE SERPL-MCNC: 129 MG/DL (ref 74–99)
HCT VFR BLD AUTO: 33.3 % (ref 36–46)
HGB BLD-MCNC: 9.8 G/DL (ref 12–16)
HOLD SPECIMEN: NORMAL
MCH RBC QN AUTO: 24.7 PG (ref 26–34)
MCHC RBC AUTO-ENTMCNC: 29.4 G/DL (ref 32–36)
MCV RBC AUTO: 84 FL (ref 80–100)
NRBC BLD-RTO: 0 /100 WBCS (ref 0–0)
PLATELET # BLD AUTO: 373 X10*3/UL (ref 150–450)
POTASSIUM SERPL-SCNC: 4 MMOL/L (ref 3.5–5.3)
PROT SERPL-MCNC: 6.6 G/DL (ref 6.4–8.2)
Q ONSET: 215 MS
Q ONSET: 216 MS
QRS COUNT: 19 BEATS
QRS COUNT: 23 BEATS
QRS DURATION: 90 MS
QRS DURATION: 94 MS
QT INTERVAL: 308 MS
QT INTERVAL: 328 MS
QTC CALCULATION(BAZETT): 457 MS
QTC CALCULATION(BAZETT): 470 MS
QTC FREDERICIA: 408 MS
QTC FREDERICIA: 410 MS
R AXIS: -4 DEGREES
R AXIS: -6 DEGREES
RBC # BLD AUTO: 3.96 X10*6/UL (ref 4–5.2)
SODIUM SERPL-SCNC: 139 MMOL/L (ref 136–145)
T AXIS: 181 DEGREES
T AXIS: 183 DEGREES
T OFFSET: 370 MS
T OFFSET: 379 MS
VENTRICULAR RATE: 117 BPM
VENTRICULAR RATE: 140 BPM
WBC # BLD AUTO: 6.9 X10*3/UL (ref 4.4–11.3)

## 2024-12-12 PROCEDURE — 94760 N-INVAS EAR/PLS OXIMETRY 1: CPT

## 2024-12-12 PROCEDURE — G0378 HOSPITAL OBSERVATION PER HR: HCPCS

## 2024-12-12 PROCEDURE — 80053 COMPREHEN METABOLIC PANEL: CPT

## 2024-12-12 PROCEDURE — 2500000001 HC RX 250 WO HCPCS SELF ADMINISTERED DRUGS (ALT 637 FOR MEDICARE OP)

## 2024-12-12 PROCEDURE — 94667 MNPJ CHEST WALL 1ST: CPT

## 2024-12-12 PROCEDURE — 2500000002 HC RX 250 W HCPCS SELF ADMINISTERED DRUGS (ALT 637 FOR MEDICARE OP, ALT 636 FOR OP/ED)

## 2024-12-12 PROCEDURE — 2500000004 HC RX 250 GENERAL PHARMACY W/ HCPCS (ALT 636 FOR OP/ED): Performed by: INTERNAL MEDICINE

## 2024-12-12 PROCEDURE — 2500000004 HC RX 250 GENERAL PHARMACY W/ HCPCS (ALT 636 FOR OP/ED)

## 2024-12-12 PROCEDURE — 85027 COMPLETE CBC AUTOMATED: CPT

## 2024-12-12 PROCEDURE — 2500000004 HC RX 250 GENERAL PHARMACY W/ HCPCS (ALT 636 FOR OP/ED): Performed by: NURSE PRACTITIONER

## 2024-12-12 PROCEDURE — 99233 SBSQ HOSP IP/OBS HIGH 50: CPT

## 2024-12-12 PROCEDURE — 99223 1ST HOSP IP/OBS HIGH 75: CPT | Performed by: NURSE PRACTITIONER

## 2024-12-12 PROCEDURE — 2500000005 HC RX 250 GENERAL PHARMACY W/O HCPCS

## 2024-12-12 PROCEDURE — 36415 COLL VENOUS BLD VENIPUNCTURE: CPT

## 2024-12-12 RX ORDER — GUAIFENESIN 600 MG/1
600 TABLET, EXTENDED RELEASE ORAL 2 TIMES DAILY
Status: DISCONTINUED | OUTPATIENT
Start: 2024-12-12 | End: 2024-12-16 | Stop reason: HOSPADM

## 2024-12-12 RX ORDER — FUROSEMIDE 10 MG/ML
40 INJECTION INTRAMUSCULAR; INTRAVENOUS DAILY
Status: DISCONTINUED | OUTPATIENT
Start: 2024-12-12 | End: 2024-12-12

## 2024-12-12 RX ORDER — FUROSEMIDE 10 MG/ML
80 INJECTION INTRAMUSCULAR; INTRAVENOUS NIGHTLY
Status: DISCONTINUED | OUTPATIENT
Start: 2024-12-12 | End: 2024-12-12

## 2024-12-12 RX ORDER — EAR PLUGS
1 EACH OTIC (EAR) 3 TIMES DAILY
Status: DISCONTINUED | OUTPATIENT
Start: 2024-12-12 | End: 2024-12-16 | Stop reason: HOSPADM

## 2024-12-12 ASSESSMENT — COGNITIVE AND FUNCTIONAL STATUS - GENERAL
MOVING FROM LYING ON BACK TO SITTING ON SIDE OF FLAT BED WITH BEDRAILS: A LITTLE
MOVING TO AND FROM BED TO CHAIR: A LOT
DRESSING REGULAR LOWER BODY CLOTHING: A LITTLE
TOILETING: A LOT
TOILETING: A LOT
CLIMB 3 TO 5 STEPS WITH RAILING: A LOT
STANDING UP FROM CHAIR USING ARMS: A LOT
WALKING IN HOSPITAL ROOM: A LOT
HELP NEEDED FOR BATHING: A LITTLE
DRESSING REGULAR LOWER BODY CLOTHING: A LITTLE
TURNING FROM BACK TO SIDE WHILE IN FLAT BAD: A LOT
PERSONAL GROOMING: A LITTLE
DAILY ACTIVITIY SCORE: 18
PERSONAL GROOMING: A LITTLE
TURNING FROM BACK TO SIDE WHILE IN FLAT BAD: A LOT
MOBILITY SCORE: 13
WALKING IN HOSPITAL ROOM: A LOT
MOVING TO AND FROM BED TO CHAIR: A LOT
MOBILITY SCORE: 13
MOVING FROM LYING ON BACK TO SITTING ON SIDE OF FLAT BED WITH BEDRAILS: A LITTLE
CLIMB 3 TO 5 STEPS WITH RAILING: A LOT
DRESSING REGULAR UPPER BODY CLOTHING: A LITTLE
DAILY ACTIVITIY SCORE: 18
HELP NEEDED FOR BATHING: A LITTLE
DRESSING REGULAR UPPER BODY CLOTHING: A LITTLE
STANDING UP FROM CHAIR USING ARMS: A LOT

## 2024-12-12 ASSESSMENT — PAIN SCALES - GENERAL
PAINLEVEL_OUTOF10: 0 - NO PAIN
PAINLEVEL_OUTOF10: 0 - NO PAIN

## 2024-12-12 NOTE — CONSULTS
Consults  History Of Present Illness:    Elsa Biggs is a 74 y.o. 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  Recently down titrated her diuretic due to urinary frequency, however, began to feel SOB about 2-3 weeks ago.  Denies chest pain.  Is in afib with -130's, but denies palpitations  BNP 1187. CXR shows bilateral diffuse infiltrates.      Last Recorded Vitals:  Vitals:    12/12/24 0513 12/12/24 0750 12/12/24 0851 12/12/24 1547   BP: 133/85 126/90     BP Location: Right arm Right arm     Patient Position: Lying Lying     Pulse: 98 76     Resp: 18 18     Temp: 36 °C (96.8 °F) 35.6 °C (96.1 °F)     TempSrc: Temporal Temporal     SpO2: 94% 92% 94% 93%   Weight: 110 kg (241 lb 10 oz)      Height:           Last Labs:  CBC - 12/12/2024:  6:42 AM  6.9 9.8 373    33.3      CMP - 12/12/2024:  6:42 AM  8.6 6.6 17 --- 0.4   3.7 3.0 24 69      PTT - No results in last year.  2.7   30.2 _     Troponin I, High Sensitivity   Date/Time Value Ref Range Status   12/11/2024 01:15 PM 84 (HH) 0 - 13 ng/L Final     Comment:     Previous result verified on 12/11/2024 1158 on specimen/case [id number] called with component [facility] for procedure Troponin I, High Sensitivity, Initial with value 81 ng/L.   12/11/2024 11:20 AM 81 (HH) 0 - 13 ng/L Final   03/06/2024 02:57 PM 7 0 - 13 ng/L Final     BNP   Date/Time Value Ref Range Status   12/11/2024 11:20 AM 1,187 (H) 0 - 99 pg/mL Final   12/25/2023 02:18  (H) 0 - 99 pg/mL Final     Hemoglobin A1C   Date/Time Value Ref Range Status   10/20/2023 05:22 AM 7.0 (H) see  below % Final   08/02/2018 03:17 AM 6.2 % Final     Comment:          Diagnosis of Diabetes-Adults   Non-Diabetic: < or = 5.6%   Increased risk for developing diabetes: 5.7-6.4%   Diagnostic of diabetes: > or = 6.5%  .       Monitoring of Diabetes                Age (y)     Therapeutic Goal (%)   Adults:          >18           <7.0   Pediatrics:    13-18           <7.5                   7-12           <8.0                   0- 6            7.5-8.5   American Diabetes Association. Diabetes Care 33(S1), Jan 2010.     08/01/2018 11:52 AM 6.2 % Final     Comment:          Diagnosis of Diabetes-Adults   Non-Diabetic: < or = 5.6%   Increased risk for developing diabetes: 5.7-6.4%   Diagnostic of diabetes: > or = 6.5%  .       Monitoring of Diabetes                Age (y)     Therapeutic Goal (%)   Adults:          >18           <7.0   Pediatrics:    13-18           <7.5                   7-12           <8.0                   0- 6            7.5-8.5   American Diabetes Association. Diabetes Care 33(S1), Jan 2010.       LDL Calculated   Date/Time Value Ref Range Status   10/20/2023 05:22 AM 60 <=99 mg/dL Final     Comment:                                 Near   Borderline      AGE      Desirable  Optimal    High     High     Very High     0-19 Y     0 - 109     ---    110-129   >/= 130     ----    20-24 Y     0 - 119     ---    120-159   >/= 160     ----      >24 Y     0 -  99   100-129  130-159   160-189     >/=190       VLDL   Date/Time Value Ref Range Status   10/20/2023 05:22 AM 13 0 - 40 mg/dL Final      Last I/O:  I/O last 3 completed shifts:  In: 320 (2.9 mL/kg) [P.O.:300; I.V.:20 (0.2 mL/kg)]  Out: 780 (7.1 mL/kg) [Urine:780 (0.2 mL/kg/hr)]  Weight: 109.6 kg     Past Cardiology Tests (Last 3 Years):  EKG:  A.fib RVR with PVCs       Transthoracic Echo (TTE) Complete 03/06/2024  PHYSICIAN INTERPRETATION:  Left Ventricle: Left ventricular systolic function is low normal, with an estimated ejection fraction of 50-55%.  There are no regional wall motion abnormalities. The left ventricular cavity size is normal. Spectral Doppler shows a normal pattern of left ventricular diastolic filling.  Left Atrium: The left atrium is mildly dilated.  Right Ventricle: The right ventricle is upper limits of normal in size. There is normal right ventricular global systolic function.  Right Atrium: The right atrium is normal in size.  Aortic Valve: The aortic valve is trileaflet. There is minimal aortic valve cusp calcification. There is trivial aortic valve regurgitation. The peak instantaneous gradient of the aortic valve is 9.6 mmHg. The mean gradient of the aortic valve is 6.0 mmHg.  Mitral Valve: The mitral valve is normal in structure. There is no evidence of mitral valve regurgitation.  Tricuspid Valve: The tricuspid valve is structurally normal. There is trace tricuspid regurgitation.  Pulmonic Valve: The pulmonic valve is not well visualized. There is no indication of pulmonic valve regurgitation.  Pericardium: There is no pericardial effusion noted.  Aorta: The aortic root was not well visualized.     10/20/2023 Echo:  CONCLUSIONS:   1. Left ventricular systolic function is mildly decreased with a 45-50% estimated ejection fraction.   2. Spectral Doppler shows an impaired relaxation pattern of left ventricular diastolic filling.   3. There is mild tricuspid regurgitation.   4. Technically difficult bubble study unable to interpret.     Cath:  Cardiac catheterization - coronary 01/29/2024  CONCLUSIONS:   1. Left main: no significant angiographic disease.   2. LAD: diffuse 40% prox-mid disease.   3. LCx: 60% mid-vessel disease.   4. RCA: mild diffuse irregularities.   5. LVEDP 26mmHg, no aortic stenosis on LV-Ao gradient.     Stress Test:  Nuclear Stress Test 12/26/2023  IMPRESSION:  Mild attentuation of the anterior wall seen on the stress images.  This is either soft tissue attenuation or ischemia in LAD territory.  Unfortunately there  are no prone images available. Dilated left  ventricle with mild left ventricular systolic dysfunction on post  stress gated imaging.  Summary:   1. No clinical or electrocardiographic evidence for ischemia at maximal infusion.   2. Correlate with myocardial perfusion imaging results.   3. Nuclear image results are reported separately.    Past Medical History:  She has a past medical history of A-fib (Multi), Anxiety, Breast cancer (Multi), Cancer (Multi), Depression, Diabetes mellitus (Multi), GERD (gastroesophageal reflux disease), Hyperlipidemia, Hypertension, and Stroke (Multi).    Past Surgical History:  She has a past surgical history that includes MR angio neck w and wo IV contrast (01/14/2022); Joint replacement (Left); Fracture surgery; Breast surgery (Left); MR angio head wo IV contrast (12/26/2023); MR angio neck wo IV contrast (12/26/2023); Cardiac catheterization (N/A, 01/29/2024); Revision total hip arthroplasty (Left); Cholecystectomy; Tubal ligation; and Other surgical history (Right).      Social History:  She reports that she quit smoking about 41 years ago. Her smoking use included cigarettes. She has never used smokeless tobacco. She reports that she does not drink alcohol and does not use drugs.    Family History:  Family History   Problem Relation Name Age of Onset    Heart attack Father  65        blood clot    Coronary artery disease Sister          Allergies:  Tetracycline    Inpatient Medications:  Scheduled medications   Medication Dose Route Frequency    apixaban  5 mg oral BID    aspirin  81 mg oral Daily    atorvastatin  80 mg oral Nightly    cephalexin  500 mg oral q6h ALEXANDER    ezetimibe  10 mg oral Daily    furosemide  40 mg intravenous Daily    furosemide  80 mg intravenous Nightly    guaiFENesin  600 mg oral BID    PARoxetine  20 mg oral Nightly    sennosides-docusate sodium  2 tablet oral Nightly     PRN medications   Medication    albuterol    metoprolol    oxygen     Continuous  Medications   Medication Dose Last Rate     Outpatient Medications:  Current Outpatient Medications   Medication Instructions    albuterol 90 mcg/actuation inhaler 2 puffs, inhalation, Every 6 hours PRN    aspirin 81 mg, oral, Daily    atorvastatin (LIPITOR) 80 mg, oral, Nightly    cephalexin (KEFLEX) 500 mg, oral, 2 times daily    cholecalciferol (Vitamin D-3) 25 MCG (1000 UT) tablet 1 tablet, oral, Daily    Eliquis 5 mg, oral, 2 times daily    ezetimibe (ZETIA) 10 mg, oral, Daily    furosemide (LASIX) 20 mg, oral, Every other day    Gemtesa 75 mg, oral, Nightly    ibuprofen 600 mg, oral, As needed    metFORMIN (GLUCOPHAGE) 500 mg, oral, Every morning, And 2 tabs In the evening    PARoxetine (PAXIL) 20 mg, oral, Nightly    pioglitazone (ACTOS) 30 mg, oral, Every 24 hours       Physical Exam:  Constitutional: Pleasant, Awake/Alert/Oriented to person place and time. SOB in conversation   Head: Atraumatic, Normocephalic  Eyes: EOMI. RIP  Neck: +JVD  Cardiovascular: irreg, irreg, tachy   Respiratory: Bilateral crackles noted posteriorly at bases   Abdomen: Soft, Nontender, Obese. Bowel sounds appreciated  Musculoskeletal: ROM intact. Muscle strength grossly intact upper and lower extremities 5/5.   Neurological: CNII-XII intact. Sensation grossly intact  Extremities: Warm and dry. No acute rashes and lesions, 3+ pitting edema BLE   Psychiatric: Appropriate mood and affect       Assessment/Plan   Very pleasant 74 y.o. 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    Assessment:  Acute on chronic diastolic  HF  Paroxysmal a.fib with RVR  PVCs     Plan:  -Recommend starting a lasix drip 10mg/hr, 40mg bolus  -Start amiodarone drip-- consider DCCV if remains in a.fib on amio after adequate diuresis  -Continue apixaban 5mg BID, Continue asa, zetia, and atorvastatin as taking prior to admit   -Strict I/O, monitor renal function closely     Peripheral IV 20 G Distal;Right Forearm (Active)   Site Assessment Clean;Dry;Intact 12/12/24 0758   Dressing Status Clean;Dry;Occlusive 12/12/24 0758   Number of days:        Code Status:  DNR and No Intubation      ANITA Dhillon-CNP

## 2024-12-12 NOTE — PROGRESS NOTES
Elsa Biggs is a 74 y.o. female on day 0 of admission presenting with Acute exacerbation of chronic heart failure.      Subjective   No significant events overnight.  Reports improvement in her breathing, but woke this morning with continued cough productive of clear sputum.  Reports slept well last night.  780 ml urine output last night 550 ml this morning.  No bowel movement since admission.  Reports that at home she goes in and out of AF.  No questions or concerns this morning.       Objective     Last Recorded Vitals  /85 (BP Location: Right arm, Patient Position: Lying)   Pulse 98   Temp 36 °C (96.8 °F) (Temporal)   Resp 18   Wt 110 kg (241 lb 10 oz)   SpO2 94%   Intake/Output last 3 Shifts:    Intake/Output Summary (Last 24 hours) at 12/12/2024 0753  Last data filed at 12/12/2024 0513  Gross per 24 hour   Intake 320 ml   Output 780 ml   Net -460 ml       Admission Weight  Weight: 112 kg (246 lb 4.1 oz) (12/11/24 1118)    Daily Weight  12/12/24 : 110 kg (241 lb 10 oz)    Image Results  XR chest 1 view  Narrative: Interpreted By:  Scott Enamorado,   STUDY:  XR CHEST 1 VIEW  12/11/2024 11:42 am      INDICATION:  Signs/Symptoms:sob      COMPARISON:  03/05/2024      ACCESSION NUMBER(S):  [id number]      ORDERING CLINICIAN:  AMNA YOO      TECHNIQUE:  A single AP portable radiograph of the chest was obtained.      FINDINGS:  Multiple cardiac monitoring leads are seen over the chest.  Mild-to-moderate diffuse interstitial infiltrates are seen throughout  the lungs bilaterally, and may represent edema and/or pneumonia.  Bilateral basilar airspace consolidations are seen and may represent  small pleural effusions, atelectasis and/or pneumonia. No  pneumothorax is identified. The cardiac silhouette is within normal  limits for size.      Impression: Diffuse interstitial infiltrates and bibasilar airspace  consolidations, as above. Clinical correlation and continued  follow-up until clearing is  recommended.      MACRO:  None.      Signed by: Scott Perrysanjuana 12/11/2024 12:01 PM  Dictation workstation:   [id number]      Physical Exam  Constitutional:       General: She is not in acute distress.     Appearance: Normal appearance. She is obese.   HENT:      Head: Normocephalic and atraumatic.   Eyes:      Extraocular Movements: Extraocular movements intact.      Conjunctiva/sclera: Conjunctivae normal.   Cardiovascular:      Rate and Rhythm: Normal rate. Rhythm irregular.      Heart sounds: Normal heart sounds.      Comments: Irregularly irregular rhythm  Pulmonary:      Comments: Bibasilar rales, decreased breath sounds diffusely  Abdominal:      General: Bowel sounds are normal. There is no distension.      Palpations: Abdomen is soft.      Comments: Lower midline abdominal tenderness, no rebound or guarding   Musculoskeletal:      Right lower leg: Edema present.      Left lower leg: Edema present.      Comments: 2+ pitting tibial edema   Skin:     Capillary Refill: Capillary refill takes 2 to 3 seconds.   Neurological:      General: No focal deficit present.      Mental Status: She is alert and oriented to person, place, and time.   Psychiatric:         Mood and Affect: Mood normal.         Behavior: Behavior normal.         Thought Content: Thought content normal.         Judgment: Judgment normal.         Relevant Results             Assessment/Plan        Scheduled medications  apixaban, 5 mg, oral, BID  aspirin, 81 mg, oral, Daily  atorvastatin, 80 mg, oral, Nightly  cephalexin, 500 mg, oral, q6h ALEXANDER  ezetimibe, 10 mg, oral, Daily  furosemide, 40 mg, intravenous, Daily  PARoxetine, 20 mg, oral, Nightly  sennosides-docusate sodium, 2 tablet, oral, Nightly      Continuous medications     PRN medications  PRN medications: albuterol, metoprolol, oxygen     Results for orders placed or performed during the hospital encounter of 12/11/24 (from the past 24 hours)   ECG 12 lead   Result Value Ref Range     Ventricular Rate 140 BPM    QRS Duration 94 ms    QT Interval 308 ms    QTC Calculation(Bazett) 470 ms    R Axis -6 degrees    T Axis 183 degrees    QRS Count 23 beats    Q Onset 216 ms    T Offset 370 ms    QTC Fredericia 408 ms   CBC and Auto Differential   Result Value Ref Range    WBC 8.0 4.4 - 11.3 x10*3/uL    nRBC 0.4 (H) 0.0 - 0.0 /100 WBCs    RBC 3.91 (L) 4.00 - 5.20 x10*6/uL    Hemoglobin 9.6 (L) 12.0 - 16.0 g/dL    Hematocrit 33.1 (L) 36.0 - 46.0 %    MCV 85 80 - 100 fL    MCH 24.6 (L) 26.0 - 34.0 pg    MCHC 29.0 (L) 32.0 - 36.0 g/dL    RDW 17.7 (H) 11.5 - 14.5 %    Platelets 361 150 - 450 x10*3/uL    Neutrophils % 72.9 40.0 - 80.0 %    Immature Granulocytes %, Automated 0.5 0.0 - 0.9 %    Lymphocytes % 18.5 13.0 - 44.0 %    Monocytes % 7.0 2.0 - 10.0 %    Eosinophils % 0.6 0.0 - 6.0 %    Basophils % 0.5 0.0 - 2.0 %    Neutrophils Absolute 5.85 (H) 1.60 - 5.50 x10*3/uL    Immature Granulocytes Absolute, Automated 0.04 0.00 - 0.50 x10*3/uL    Lymphocytes Absolute 1.48 0.80 - 3.00 x10*3/uL    Monocytes Absolute 0.56 0.05 - 0.80 x10*3/uL    Eosinophils Absolute 0.05 0.00 - 0.40 x10*3/uL    Basophils Absolute 0.04 0.00 - 0.10 x10*3/uL   Magnesium   Result Value Ref Range    Magnesium 1.63 1.60 - 2.40 mg/dL   Comprehensive metabolic panel   Result Value Ref Range    Glucose 154 (H) 74 - 99 mg/dL    Sodium 137 136 - 145 mmol/L    Potassium 4.0 3.5 - 5.3 mmol/L    Chloride 101 98 - 107 mmol/L    Bicarbonate 28 21 - 32 mmol/L    Anion Gap 12 10 - 20 mmol/L    Urea Nitrogen 21 6 - 23 mg/dL    Creatinine 0.80 0.50 - 1.05 mg/dL    eGFR 76 >60 mL/min/1.73m*2    Calcium 8.5 (L) 8.6 - 10.3 mg/dL    Albumin 3.0 (L) 3.4 - 5.0 g/dL    Alkaline Phosphatase 62 33 - 136 U/L    Total Protein 6.4 6.4 - 8.2 g/dL    AST 17 9 - 39 U/L    Bilirubin, Total 0.4 0.0 - 1.2 mg/dL    ALT 20 7 - 45 U/L   Protime-INR   Result Value Ref Range    Protime 30.2 (H) 9.8 - 12.8 seconds    INR 2.7 (H) 0.9 - 1.1   B-Type Natriuretic Peptide    Result Value Ref Range    BNP 1,187 (H) 0 - 99 pg/mL   Type And Screen   Result Value Ref Range    ABO TYPE AB     Rh TYPE POS     ANTIBODY SCREEN NEG    Blood Gas Venous Full Panel   Result Value Ref Range    POCT pH, Venous 7.37 7.33 - 7.43 pH    POCT pCO2, Venous 50 41 - 51 mm Hg    POCT pO2, Venous 31 (L) 35 - 45 mm Hg    POCT SO2, Venous 49 45 - 75 %    POCT Oxy Hemoglobin, Venous 47.5 45.0 - 75.0 %    POCT Hematocrit Calculated, Venous 31.0 (L) 36.0 - 46.0 %    POCT Sodium, Venous 135 (L) 136 - 145 mmol/L    POCT Potassium, Venous 4.1 3.5 - 5.3 mmol/L    POCT Chloride, Venous 103 98 - 107 mmol/L    POCT Ionized Calicum, Venous 1.17 1.10 - 1.33 mmol/L    POCT Glucose, Venous 159 (H) 74 - 99 mg/dL    POCT Lactate, Venous 1.6 0.4 - 2.0 mmol/L    POCT Base Excess, Venous 2.9 -2.0 - 3.0 mmol/L    POCT HCO3 Calculated, Venous 28.9 (H) 22.0 - 26.0 mmol/L    POCT Hemoglobin, Venous 10.3 (L) 12.0 - 16.0 g/dL    POCT Anion Gap, Venous 7.0 (L) 10.0 - 25.0 mmol/L    Patient Temperature      FiO2 21 %   Troponin I, High Sensitivity, Initial   Result Value Ref Range    Troponin I, High Sensitivity 81 (HH) 0 - 13 ng/L   Sedimentation rate, automated   Result Value Ref Range    Sedimentation Rate 65 (H) 0 - 30 mm/h   Sars-CoV-2 PCR   Result Value Ref Range    Coronavirus 2019, PCR Not Detected Not Detected   Influenza A, and B PCR   Result Value Ref Range    Flu A Result Not Detected Not Detected    Flu B Result Not Detected Not Detected   Troponin, High Sensitivity, 1 Hour   Result Value Ref Range    Troponin I, High Sensitivity 84 (HH) 0 - 13 ng/L   C-reactive protein   Result Value Ref Range    C-Reactive Protein 0.68 <1.00 mg/dL   ECG 12 lead   Result Value Ref Range    Ventricular Rate 117 BPM    Atrial Rate 115 BPM    QRS Duration 90 ms    QT Interval 328 ms    QTC Calculation(Bazett) 457 ms    R Axis -4 degrees    T Axis 181 degrees    QRS Count 19 beats    Q Onset 215 ms    T Offset 379 ms    QTC Fredericia 410 ms    Urinalysis with Reflex Culture and Microscopic   Result Value Ref Range    Color, Urine Colorless (N) Light-Yellow, Yellow, Dark-Yellow    Appearance, Urine Turbid (N) Clear    Specific Gravity, Urine 1.009 1.005 - 1.035    pH, Urine 6.0 5.0, 5.5, 6.0, 6.5, 7.0, 7.5, 8.0    Protein, Urine 70 (1+) (A) NEGATIVE, 10 (TRACE), 20 (TRACE) mg/dL    Glucose, Urine Normal Normal mg/dL    Blood, Urine 0.5 (2+) (A) NEGATIVE    Ketones, Urine NEGATIVE NEGATIVE mg/dL    Bilirubin, Urine NEGATIVE NEGATIVE    Urobilinogen, Urine Normal Normal mg/dL    Nitrite, Urine NEGATIVE NEGATIVE    Leukocyte Esterase, Urine 250 Lelen/µL (A) NEGATIVE   Extra Urine Gray Tube   Result Value Ref Range    Extra Tube Hold for add-ons.    Microscopic Only, Urine   Result Value Ref Range    WBC, Urine 11-20 (A) 1-5, NONE /HPF    RBC, Urine >20 (A) NONE, 1-2, 3-5 /HPF    Squamous Epithelial Cells, Urine 1-9 (SPARSE) Reference range not established. /HPF    Bacteria, Urine 1+ (A) NONE SEEN /HPF    Budding Yeast, Urine PRESENT (A) NONE /HPF    Mucus, Urine FEW Reference range not established. /LPF   CBC   Result Value Ref Range    WBC 6.9 4.4 - 11.3 x10*3/uL    nRBC 0.0 0.0 - 0.0 /100 WBCs    RBC 3.96 (L) 4.00 - 5.20 x10*6/uL    Hemoglobin 9.8 (L) 12.0 - 16.0 g/dL    Hematocrit 33.3 (L) 36.0 - 46.0 %    MCV 84 80 - 100 fL    MCH 24.7 (L) 26.0 - 34.0 pg    MCHC 29.4 (L) 32.0 - 36.0 g/dL    RDW 17.5 (H) 11.5 - 14.5 %    Platelets 373 150 - 450 x10*3/uL   Comprehensive metabolic panel   Result Value Ref Range    Glucose 129 (H) 74 - 99 mg/dL    Sodium 139 136 - 145 mmol/L    Potassium 4.0 3.5 - 5.3 mmol/L    Chloride 100 98 - 107 mmol/L    Bicarbonate 32 21 - 32 mmol/L    Anion Gap 11 10 - 20 mmol/L    Urea Nitrogen 20 6 - 23 mg/dL    Creatinine 0.89 0.50 - 1.05 mg/dL    eGFR 67 >60 mL/min/1.73m*2    Calcium 8.6 8.6 - 10.3 mg/dL    Albumin 3.0 (L) 3.4 - 5.0 g/dL    Alkaline Phosphatase 69 33 - 136 U/L    Total Protein 6.6 6.4 - 8.2 g/dL    AST 17 9 - 39  U/L    Bilirubin, Total 0.4 0.0 - 1.2 mg/dL    ALT 24 7 - 45 U/L     *Note: Due to a large number of results and/or encounters for the requested time period, some results have not been displayed. A complete set of results can be found in Results Review.      ECG 12 lead    Result Date: 12/12/2024  Atrial fibrillation with rapid ventricular response with premature ventricular or aberrantly conducted complexes Moderate voltage criteria for LVH, may be normal variant ( R in aVL , [place] product ) Poor R-wave progression ; consider septal infarct, lead placement, or normal variant ST & T wave abnormality, consider lateral ischemia Abnormal ECG When compared with ECG of 21-NOV-2024 14:57, (unconfirmed) Atrial fibrillation has replaced Sinus rhythm Vent. rate has increased BY  47 BPM    ECG 12 lead    Result Date: 12/12/2024  Undetermined rhythm Left ventricular hypertrophy with repolarization abnormality Abnormal ECG When compared with ECG of 11-DEC-2024 11:13, (unconfirmed) Current undetermined rhythm precludes rhythm comparison, needs review T wave inversion now evident in Anterior leads    XR chest 1 view    Result Date: 12/11/2024  Interpreted By:  Scott Enamorado, STUDY: XR CHEST 1 VIEW  12/11/2024 11:42 am   INDICATION: Signs/Symptoms:sob   COMPARISON: 03/05/2024   ACCESSION NUMBER(S): [id number]   ORDERING CLINICIAN: AMNA YOO   TECHNIQUE: A single AP portable radiograph of the chest was obtained.   FINDINGS: Multiple cardiac monitoring leads are seen over the chest. Mild-to-moderate diffuse interstitial infiltrates are seen throughout the lungs bilaterally, and may represent edema and/or pneumonia. Bilateral basilar airspace consolidations are seen and may represent small pleural effusions, atelectasis and/or pneumonia. No pneumothorax is identified. The cardiac silhouette is within normal limits for size.       Diffuse interstitial infiltrates and bibasilar airspace consolidations, as above. Clinical  correlation and continued follow-up until clearing is recommended.   MACRO: None.   Signed by: Scott Enamorado 12/11/2024 12:01 PM Dictation workstation:   [id number]    ECG 12 lead (Clinic Performed)    Result Date: 11/21/2024  Sinus rhythm with frequent Premature ventricular complexes Left ventricular hypertrophy with repolarization abnormality Abnormal ECG When compared with ECG of 24-SEP-2024 10:17, (unconfirmed) Sinus rhythm has replaced Junctional rhythm T wave inversion less evident in Inferior leads Inverted T waves have replaced nonspecific T wave abnormality in Lateral leads    BI mammo bilateral screening tomosynthesis    Result Date: 11/19/2024  Interpreted By:  Jayna Ga, STUDY: BI MAMMO BILATERAL SCREENING TOMOSYNTHESIS;  11/18/2024 8:40 am   ACCESSION NUMBER(S): [id number]   ORDERING CLINICIAN: LIN BRO   INDICATION: Screening.   ,Z12.31 Encounter for screening mammogram for malignant neoplasm of breast   COMPARISON: 11/17/2023, 06/05/2023 and 07/06/2021   FINDINGS: 2D and tomosynthesis images were reviewed at 1 mm slice thickness.   Density:  There are scattered areas of fibroglandular density.   Surgical scars and treatment changes of the left breast are stable. Asymmetry in the upper-outer subareolar right breast is stable. No suspicious masses or calcifications are identified.       No mammographic evidence of malignancy.   BI-RADS CATEGORY: BI-RADS Category:  2 Benign. Recommendation:  Annual Screening. Recommended Date:  1 Year. Laterality:  Bilateral.       For any future breast imaging appointments, please call [phone] ([phone]).     MACRO: None   Signed by: Jayna Ga 11/19/2024 8:34 AM Dictation workstation:   [id number]        Assessment & Plan  Acute exacerbation of chronic heart failure      Elsa Biggs is a 74 y.o. female with hx HFmrEF (with improved EF 50-55% 3/6/2024) , PAF on Eliquis, HTN, T2DM, dyslipidemia, obesity, depression, coronary angiography  1/29/2024 which was negative for obstructive CAD (LM mild disease, p-mLAD 40, Lcx mid 60, RCA mild diffuse disease) admitted for acute exacerbation of chronic heart failure.    Acute medical issues  #Acute on chronic diastolic CHF (last EF 50-55% 03/2024)  - 2/2 to decrease in Lasix regimen 40 mg three times weekly -> 20 mg three times weekly  - BNP 1187, troponin 81 on admission  - CXR: Diffuse interstitial infiltrates and bibasilar airspace consolidations  - SpO2 high 80's en route to ED  - Continued bilateral basilar rales and diminished breath sounds diffusely today   - Cough productive of clear sputum  PLAN:  - IV lasix 80 mg 1800 today  - maintain IV lasix 40 mg daily consider increasing daily dose to 80 if not meeting diuresis goals  - Diuresis goal at least 1.5 L output dailyStrict I/O's, salt/fluid restriction, daily weights, maintain K above 4 and Mg above 2  - Cardio consulted  - Airway clearance  - Mucinex 600 mg BID     #Atrial fibrillation with RVR  - Afib known, follows with cardiology, pt reports goes in and out of Afib at home  - Per outpatient note intolerant to BB/CCB  -  on presentation to ED  - received 1 dose lopressor 5 mg IV in ED for rate control  PLAN  - Continue home Eliquis 5 mg BID  - Lopressor 5 mg IV sustained HR > 120  - Continue tele  - Will defer initiating any rate or rhythm control agents to cardiology consult as patient per outpatient note doesn't tolerated BB/CCB.      #Non-purulent cellulitis of left lower leg  - Prior to admission treated with Keflex without complete resolution  - CBC without leukocytosis   PLAN:  - Continue Keflex 500 mg BID  - Elevate leg  - Wound care     Chronic issues  HTN - monitor BP, not on medication at home  T2DM - monitor glucose levels w/ CMP  Dyslipidemia - cont ezetimibe 10 mg daily, lipitor 80 mg daily  Obesity - cardiac diet  Depression - cont Paxil 20 mg nightly     Fluids: fluid restriction  GI Ppx: none  DVT Ppx: Eliquis 5 mg  BID  Diet: Cardiac  Consult: Cardiology  Code Status: DNRCC/DNI              CALLI PEDRO

## 2024-12-12 NOTE — PROGRESS NOTES
Elsa Biggs is a 74 y.o. female on day 0 of admission presenting with Acute exacerbation of chronic heart failure.      Subjective   No significant events overnight.  Reports improvement in her breathing, but woke this morning with continued cough productive of clear sputum.  Reports slept well last night.  780 ml urine output last night 550 ml this morning.  No bowel movement since admission.  Reports that at home she goes in and out of AF.  No questions or concerns this morning.       Objective     Last Recorded Vitals  /90 (BP Location: Right arm, Patient Position: Lying)   Pulse 76   Temp 35.6 °C (96.1 °F) (Temporal)   Resp 18   Wt 110 kg (241 lb 10 oz)   SpO2 94%   Intake/Output last 3 Shifts:    Intake/Output Summary (Last 24 hours) at 12/12/2024 1149  Last data filed at 12/12/2024 1101  Gross per 24 hour   Intake 1020 ml   Output 1330 ml   Net -310 ml       Admission Weight  Weight: 112 kg (246 lb 4.1 oz) (12/11/24 1118)    Daily Weight  12/12/24 : 110 kg (241 lb 10 oz)    Image Results  ECG 12 lead  Atrial fibrillation with rapid ventricular response with premature ventricular or aberrantly conducted complexes  Moderate voltage criteria for LVH, may be normal variant ( R in aVL , [place] product )  Poor R-wave progression ; consider septal infarct, lead placement, or normal variant  ST & T wave abnormality, consider lateral ischemia  Abnormal ECG  When compared with ECG of 21-NOV-2024 14:57, (unconfirmed)  Atrial fibrillation has replaced Sinus rhythm  Vent. rate has increased BY  47 BPM  ECG 12 lead  Undetermined rhythm  Left ventricular hypertrophy with repolarization abnormality  Abnormal ECG  When compared with ECG of 11-DEC-2024 11:13, (unconfirmed)  Current undetermined rhythm precludes rhythm comparison, needs review  T wave inversion now evident in Anterior leads      Physical Exam  Constitutional:       General: She is not in acute distress.     Appearance: Normal appearance. She is  obese.   HENT:      Head: Normocephalic and atraumatic.   Eyes:      Extraocular Movements: Extraocular movements intact.      Conjunctiva/sclera: Conjunctivae normal.   Cardiovascular:      Rate and Rhythm: Normal rate. Rhythm irregular.      Heart sounds: Normal heart sounds.      Comments: Irregularly irregular rhythm  Pulmonary:      Comments: Bibasilar rales, decreased breath sounds diffusely  Abdominal:      General: Bowel sounds are normal. There is no distension.      Palpations: Abdomen is soft.      Comments: Lower midline abdominal tenderness, no rebound or guarding   Musculoskeletal:      Right lower leg: Edema present.      Left lower leg: Edema present.      Comments: 2+ pitting tibial edema   Skin:     Capillary Refill: Capillary refill takes 2 to 3 seconds.   Neurological:      General: No focal deficit present.      Mental Status: She is alert and oriented to person, place, and time.   Psychiatric:         Mood and Affect: Mood normal.         Behavior: Behavior normal.         Thought Content: Thought content normal.         Judgment: Judgment normal.         Relevant Results             Assessment/Plan        Scheduled medications  apixaban, 5 mg, oral, BID  aspirin, 81 mg, oral, Daily  atorvastatin, 80 mg, oral, Nightly  cephalexin, 500 mg, oral, q6h ALEXANDER  ezetimibe, 10 mg, oral, Daily  furosemide, 40 mg, intravenous, Daily  furosemide, 80 mg, intravenous, Nightly  guaiFENesin, 600 mg, oral, BID  PARoxetine, 20 mg, oral, Nightly  sennosides-docusate sodium, 2 tablet, oral, Nightly      Continuous medications     PRN medications  PRN medications: albuterol, metoprolol, oxygen     Results for orders placed or performed during the hospital encounter of 12/11/24 (from the past 24 hours)   Troponin, High Sensitivity, 1 Hour   Result Value Ref Range    Troponin I, High Sensitivity 84 (HH) 0 - 13 ng/L   C-reactive protein   Result Value Ref Range    C-Reactive Protein 0.68 <1.00 mg/dL   ECG 12 lead    Result Value Ref Range    Ventricular Rate 117 BPM    Atrial Rate 115 BPM    QRS Duration 90 ms    QT Interval 328 ms    QTC Calculation(Bazett) 457 ms    R Axis -4 degrees    T Axis 181 degrees    QRS Count 19 beats    Q Onset 215 ms    T Offset 379 ms    QTC Fredericia 410 ms   Urinalysis with Reflex Culture and Microscopic   Result Value Ref Range    Color, Urine Colorless (N) Light-Yellow, Yellow, Dark-Yellow    Appearance, Urine Turbid (N) Clear    Specific Gravity, Urine 1.009 1.005 - 1.035    pH, Urine 6.0 5.0, 5.5, 6.0, 6.5, 7.0, 7.5, 8.0    Protein, Urine 70 (1+) (A) NEGATIVE, 10 (TRACE), 20 (TRACE) mg/dL    Glucose, Urine Normal Normal mg/dL    Blood, Urine 0.5 (2+) (A) NEGATIVE    Ketones, Urine NEGATIVE NEGATIVE mg/dL    Bilirubin, Urine NEGATIVE NEGATIVE    Urobilinogen, Urine Normal Normal mg/dL    Nitrite, Urine NEGATIVE NEGATIVE    Leukocyte Esterase, Urine 250 Ellen/µL (A) NEGATIVE   Extra Urine Gray Tube   Result Value Ref Range    Extra Tube Hold for add-ons.    Microscopic Only, Urine   Result Value Ref Range    WBC, Urine 11-20 (A) 1-5, NONE /HPF    RBC, Urine >20 (A) NONE, 1-2, 3-5 /HPF    Squamous Epithelial Cells, Urine 1-9 (SPARSE) Reference range not established. /HPF    Bacteria, Urine 1+ (A) NONE SEEN /HPF    Budding Yeast, Urine PRESENT (A) NONE /HPF    Mucus, Urine FEW Reference range not established. /LPF   CBC   Result Value Ref Range    WBC 6.9 4.4 - 11.3 x10*3/uL    nRBC 0.0 0.0 - 0.0 /100 WBCs    RBC 3.96 (L) 4.00 - 5.20 x10*6/uL    Hemoglobin 9.8 (L) 12.0 - 16.0 g/dL    Hematocrit 33.3 (L) 36.0 - 46.0 %    MCV 84 80 - 100 fL    MCH 24.7 (L) 26.0 - 34.0 pg    MCHC 29.4 (L) 32.0 - 36.0 g/dL    RDW 17.5 (H) 11.5 - 14.5 %    Platelets 373 150 - 450 x10*3/uL   Comprehensive metabolic panel   Result Value Ref Range    Glucose 129 (H) 74 - 99 mg/dL    Sodium 139 136 - 145 mmol/L    Potassium 4.0 3.5 - 5.3 mmol/L    Chloride 100 98 - 107 mmol/L    Bicarbonate 32 21 - 32 mmol/L    Anion  Gap 11 10 - 20 mmol/L    Urea Nitrogen 20 6 - 23 mg/dL    Creatinine 0.89 0.50 - 1.05 mg/dL    eGFR 67 >60 mL/min/1.73m*2    Calcium 8.6 8.6 - 10.3 mg/dL    Albumin 3.0 (L) 3.4 - 5.0 g/dL    Alkaline Phosphatase 69 33 - 136 U/L    Total Protein 6.6 6.4 - 8.2 g/dL    AST 17 9 - 39 U/L    Bilirubin, Total 0.4 0.0 - 1.2 mg/dL    ALT 24 7 - 45 U/L      ECG 12 lead    Result Date: 12/12/2024  Atrial fibrillation with rapid ventricular response with premature ventricular or aberrantly conducted complexes Moderate voltage criteria for LVH, may be normal variant ( R in aVL , [place]or[place] product ) Poor R-wave progression ; consider septal infarct, lead placement, or normal variant ST & T wave abnormality, consider lateral ischemia Abnormal ECG When compared with ECG of 21-NOV-2024 14:57, (unconfirmed) Atrial fibrillation has replaced Sinus rhythm Vent. rate has increased BY  47 BPM    ECG 12 lead    Result Date: 12/12/2024  Undetermined rhythm Left ventricular hypertrophy with repolarization abnormality Abnormal ECG When compared with ECG of 11-DEC-2024 11:13, (unconfirmed) Current undetermined rhythm precludes rhythm comparison, needs review T wave inversion now evident in Anterior leads    XR chest 1 view    Result Date: 12/11/2024  Interpreted By:  Scott Enamorado, STUDY: XR CHEST 1 VIEW  12/11/2024 11:42 am   INDICATION: Signs/Symptoms:sob   COMPARISON: 03/05/2024   ACCESSION NUMBER(S): [id number]   ORDERING CLINICIAN: AMNA YOO   TECHNIQUE: A single AP portable radiograph of the chest was obtained.   FINDINGS: Multiple cardiac monitoring leads are seen over the chest. Mild-to-moderate diffuse interstitial infiltrates are seen throughout the lungs bilaterally, and may represent edema and/or pneumonia. Bilateral basilar airspace consolidations are seen and may represent small pleural effusions, atelectasis and/or pneumonia. No pneumothorax is identified. The cardiac silhouette is within normal limits for size.        Diffuse interstitial infiltrates and bibasilar airspace consolidations, as above. Clinical correlation and continued follow-up until clearing is recommended.   MACRO: None.   Signed by: Scott Enamorado 12/11/2024 12:01 PM Dictation workstation:   [id number]    ECG 12 lead (Clinic Performed)    Result Date: 11/21/2024  Sinus rhythm with frequent Premature ventricular complexes Left ventricular hypertrophy with repolarization abnormality Abnormal ECG When compared with ECG of 24-SEP-2024 10:17, (unconfirmed) Sinus rhythm has replaced Junctional rhythm T wave inversion less evident in Inferior leads Inverted T waves have replaced nonspecific T wave abnormality in Lateral leads    BI mammo bilateral screening tomosynthesis    Result Date: 11/19/2024  Interpreted By:  Jayna Ga, STUDY: BI MAMMO BILATERAL SCREENING TOMOSYNTHESIS;  11/18/2024 8:40 am   ACCESSION NUMBER(S): [id number]   ORDERING CLINICIAN: LIN BRO   INDICATION: Screening.   ,Z12.31 Encounter for screening mammogram for malignant neoplasm of breast   COMPARISON: 11/17/2023, 06/05/2023 and 07/06/2021   FINDINGS: 2D and tomosynthesis images were reviewed at 1 mm slice thickness.   Density:  There are scattered areas of fibroglandular density.   Surgical scars and treatment changes of the left breast are stable. Asymmetry in the upper-outer subareolar right breast is stable. No suspicious masses or calcifications are identified.       No mammographic evidence of malignancy.   BI-RADS CATEGORY: BI-RADS Category:  2 Benign. Recommendation:  Annual Screening. Recommended Date:  1 Year. Laterality:  Bilateral.       For any future breast imaging appointments, please call [phone]).     MACRO: None   Signed by: Jayna Ga 11/19/2024 8:34 AM Dictation workstation:   [id number]        Assessment & Plan  Acute exacerbation of chronic heart failure      Elsa Biggs is a 74 y.o. female with hx HFmrEF (with improved EF 50-55%  3/6/2024) , PAF on Eliquis, HTN, T2DM, dyslipidemia, obesity, depression, coronary angiography 1/29/2024 which was negative for obstructive CAD (LM mild disease, p-mLAD 40, Lcx mid 60, RCA mild diffuse disease) admitted for acute exacerbation of chronic heart failure.    Acute medical issues  #Acute on chronic diastolic CHF (last EF 50-55% 03/2024)  - 2/2 to decrease in Lasix regimen 40 mg three times weekly -> 20 mg three times weekly  - BNP 1187, troponin 81 on admission  - CXR: Diffuse interstitial infiltrates and bibasilar airspace consolidations  - SpO2 high 80's en route to ED  - Continued bilateral basilar rales and diminished breath sounds diffusely today   - Cough productive of clear sputum  PLAN:  - IV lasix 80 mg once  - maintain IV lasix 40 mg daily consider increasing daily dose to 80 if not meeting diuresis goals  - Diuresis goal at least 1.5 L output daily strict I/O's, salt/fluid restriction, daily weights, maintain K >4 and Mg >2  - Cardio consulted  - Airway clearance  - Mucinex 600 mg BID     #Atrial fibrillation with RVR  - Afib known, follows with cardiology, pt reports goes in and out of Afib at home  - Per outpatient note intolerant to BB/CCB  -  on presentation to ED  - received 1 dose lopressor 5 mg IV in ED for rate control  PLAN  - Continue home Eliquis 5 mg BID  - Lopressor 5 mg IV sustained HR > 120  - Continue tele  - Will defer initiating any rate or rhythm control agents to cardiology consult as patient per outpatient note doesn't tolerated BB/CCB.      #Non-purulent cellulitis of left lower leg  - Prior to admission treated with Keflex without complete resolution  - CBC without leukocytosis   PLAN:  - Continue Keflex 500 mg BID  - Elevate leg  - Wound care     Chronic issues  HTN - monitor BP, not on medication at home  T2DM - monitor glucose levels w/ CMP  Dyslipidemia - cont ezetimibe 10 mg daily, lipitor 80 mg daily  Obesity - cardiac diet  Depression - cont Paxil 20 mg  nightly     Fluids: fluid restriction  GI Ppx: none  DVT Ppx: Eliquis 5 mg BID  Diet: Cardiac  Consult: Cardiology  Code Status: DNRCC/DNI

## 2024-12-13 LAB
ALBUMIN SERPL BCP-MCNC: 3 G/DL (ref 3.4–5)
ALP SERPL-CCNC: 69 U/L (ref 33–136)
ALT SERPL W P-5'-P-CCNC: 22 U/L (ref 7–45)
ANION GAP SERPL CALC-SCNC: 14 MMOL/L (ref 10–20)
AST SERPL W P-5'-P-CCNC: 12 U/L (ref 9–39)
BACTERIA UR CULT: NORMAL
BILIRUB SERPL-MCNC: 0.3 MG/DL (ref 0–1.2)
BUN SERPL-MCNC: 20 MG/DL (ref 6–23)
CALCIUM SERPL-MCNC: 8.3 MG/DL (ref 8.6–10.3)
CHLORIDE SERPL-SCNC: 97 MMOL/L (ref 98–107)
CO2 SERPL-SCNC: 34 MMOL/L (ref 21–32)
CREAT SERPL-MCNC: 0.93 MG/DL (ref 0.5–1.05)
EGFRCR SERPLBLD CKD-EPI 2021: 64 ML/MIN/1.73M*2
ERYTHROCYTE [DISTWIDTH] IN BLOOD BY AUTOMATED COUNT: 17.3 % (ref 11.5–14.5)
GLUCOSE BLD MANUAL STRIP-MCNC: 153 MG/DL (ref 74–99)
GLUCOSE BLD MANUAL STRIP-MCNC: 201 MG/DL (ref 74–99)
GLUCOSE BLD MANUAL STRIP-MCNC: 206 MG/DL (ref 74–99)
GLUCOSE SERPL-MCNC: 179 MG/DL (ref 74–99)
HCT VFR BLD AUTO: 35 % (ref 36–46)
HGB BLD-MCNC: 10.5 G/DL (ref 12–16)
MAGNESIUM SERPL-MCNC: 1.51 MG/DL (ref 1.6–2.4)
MCH RBC QN AUTO: 25.2 PG (ref 26–34)
MCHC RBC AUTO-ENTMCNC: 30 G/DL (ref 32–36)
MCV RBC AUTO: 84 FL (ref 80–100)
NRBC BLD-RTO: 0 /100 WBCS (ref 0–0)
PLATELET # BLD AUTO: 356 X10*3/UL (ref 150–450)
POTASSIUM SERPL-SCNC: 3.6 MMOL/L (ref 3.5–5.3)
PROT SERPL-MCNC: 6.6 G/DL (ref 6.4–8.2)
RBC # BLD AUTO: 4.17 X10*6/UL (ref 4–5.2)
SODIUM SERPL-SCNC: 141 MMOL/L (ref 136–145)
WBC # BLD AUTO: 7.3 X10*3/UL (ref 4.4–11.3)

## 2024-12-13 PROCEDURE — 2500000001 HC RX 250 WO HCPCS SELF ADMINISTERED DRUGS (ALT 637 FOR MEDICARE OP)

## 2024-12-13 PROCEDURE — 99232 SBSQ HOSP IP/OBS MODERATE 35: CPT | Performed by: NURSE PRACTITIONER

## 2024-12-13 PROCEDURE — 94760 N-INVAS EAR/PLS OXIMETRY 1: CPT

## 2024-12-13 PROCEDURE — 2500000002 HC RX 250 W HCPCS SELF ADMINISTERED DRUGS (ALT 637 FOR MEDICARE OP, ALT 636 FOR OP/ED)

## 2024-12-13 PROCEDURE — 80053 COMPREHEN METABOLIC PANEL: CPT

## 2024-12-13 PROCEDURE — 99233 SBSQ HOSP IP/OBS HIGH 50: CPT

## 2024-12-13 PROCEDURE — 83735 ASSAY OF MAGNESIUM: CPT

## 2024-12-13 PROCEDURE — 1200000002 HC GENERAL ROOM WITH TELEMETRY DAILY

## 2024-12-13 PROCEDURE — 2500000004 HC RX 250 GENERAL PHARMACY W/ HCPCS (ALT 636 FOR OP/ED): Performed by: NURSE PRACTITIONER

## 2024-12-13 PROCEDURE — 94762 N-INVAS EAR/PLS OXIMTRY CONT: CPT

## 2024-12-13 PROCEDURE — 85027 COMPLETE CBC AUTOMATED: CPT

## 2024-12-13 PROCEDURE — 36415 COLL VENOUS BLD VENIPUNCTURE: CPT

## 2024-12-13 PROCEDURE — 2500000004 HC RX 250 GENERAL PHARMACY W/ HCPCS (ALT 636 FOR OP/ED)

## 2024-12-13 PROCEDURE — 82947 ASSAY GLUCOSE BLOOD QUANT: CPT

## 2024-12-13 PROCEDURE — 2500000005 HC RX 250 GENERAL PHARMACY W/O HCPCS

## 2024-12-13 RX ORDER — DEXTROSE 50 % IN WATER (D50W) INTRAVENOUS SYRINGE
25
Status: DISCONTINUED | OUTPATIENT
Start: 2024-12-13 | End: 2024-12-16 | Stop reason: HOSPADM

## 2024-12-13 RX ORDER — DEXTROSE 50 % IN WATER (D50W) INTRAVENOUS SYRINGE
12.5
Status: DISCONTINUED | OUTPATIENT
Start: 2024-12-13 | End: 2024-12-16 | Stop reason: HOSPADM

## 2024-12-13 RX ORDER — POTASSIUM CHLORIDE 1.5 G/1.58G
40 POWDER, FOR SOLUTION ORAL ONCE
Status: COMPLETED | OUTPATIENT
Start: 2024-12-13 | End: 2024-12-13

## 2024-12-13 RX ORDER — INSULIN LISPRO 100 [IU]/ML
0-10 INJECTION, SOLUTION INTRAVENOUS; SUBCUTANEOUS
Status: DISCONTINUED | OUTPATIENT
Start: 2024-12-13 | End: 2024-12-16 | Stop reason: HOSPADM

## 2024-12-13 RX ORDER — MAGNESIUM SULFATE HEPTAHYDRATE 40 MG/ML
4 INJECTION, SOLUTION INTRAVENOUS ONCE
Status: COMPLETED | OUTPATIENT
Start: 2024-12-13 | End: 2024-12-13

## 2024-12-13 ASSESSMENT — COGNITIVE AND FUNCTIONAL STATUS - GENERAL
WALKING IN HOSPITAL ROOM: A LOT
TURNING FROM BACK TO SIDE WHILE IN FLAT BAD: A LOT
TOILETING: A LOT
HELP NEEDED FOR BATHING: A LITTLE
DRESSING REGULAR LOWER BODY CLOTHING: A LITTLE
MOVING FROM LYING ON BACK TO SITTING ON SIDE OF FLAT BED WITH BEDRAILS: A LITTLE
MOVING TO AND FROM BED TO CHAIR: A LOT
DAILY ACTIVITIY SCORE: 18
STANDING UP FROM CHAIR USING ARMS: A LOT
DRESSING REGULAR UPPER BODY CLOTHING: A LITTLE
PERSONAL GROOMING: A LITTLE
CLIMB 3 TO 5 STEPS WITH RAILING: A LOT
MOBILITY SCORE: 13

## 2024-12-13 ASSESSMENT — PAIN - FUNCTIONAL ASSESSMENT
PAIN_FUNCTIONAL_ASSESSMENT: 0-10
PAIN_FUNCTIONAL_ASSESSMENT: 0-10

## 2024-12-13 ASSESSMENT — PAIN SCALES - GENERAL
PAINLEVEL_OUTOF10: 0 - NO PAIN

## 2024-12-13 NOTE — PROGRESS NOTES
Elsa Biggs is a 74 y.o. female on day 3 of admission presenting with Acute exacerbation of chronic heart failure.      Subjective   Pt interviewed at bedside this morning.  Reports improvement in breathing, able to inspire deeper.  Reports improvement in cough attributes to mucinex, incentive spirometry, bronchial hygiene.  Reports increased thirst and dry mouth.  Urinating appropriately.  States nursing found bowel movement when cleaning her but she did not feel it passing.  Endorses bilateral leg cramps which are chronic for her.  Denies fever, chills.  Pt on 1.5 L O2 NC.  No questions or concerns this morning.       Objective     Last Recorded Vitals  /74 (BP Location: Right leg, Patient Position: Lying)   Pulse (!) 114   Temp 35.8 °C (96.4 °F) (Temporal)   Resp 18   Wt 106 kg (234 lb 1.6 oz)   SpO2 95%   Intake/Output last 3 Shifts:    Intake/Output Summary (Last 24 hours) at 12/13/2024 1354  Last data filed at 12/13/2024 1145  Gross per 24 hour   Intake 1148.53 ml   Output 1850 ml   Net -701.47 ml       Admission Weight  Weight: 112 kg (246 lb 4.1 oz) (12/11/24 1118)    Daily Weight  12/13/24 : 106 kg (234 lb 1.6 oz)    Image Results  ECG 12 lead  Atrial fibrillation with rapid ventricular response with premature ventricular or aberrantly conducted complexes  Moderate voltage criteria for LVH, may be normal variant ( R in aVL , [place] product )  Poor R-wave progression ; consider septal infarct, lead placement, or normal variant  ST & T wave abnormality, consider lateral ischemia  Abnormal ECG  When compared with ECG of 21-NOV-2024 14:57, (unconfirmed)  Atrial fibrillation has replaced Sinus rhythm  Vent. rate has increased BY  47 BPM  ECG 12 lead  Undetermined rhythm  Left ventricular hypertrophy with repolarization abnormality  Abnormal ECG  When compared with ECG of 11-DEC-2024 11:13, (unconfirmed)  Current undetermined rhythm precludes rhythm comparison, needs review  T wave inversion now  evident in Anterior leads      Physical Exam  Constitutional:       Appearance: Normal appearance. She is obese.   HENT:      Head: Normocephalic and atraumatic.      Mouth/Throat:      Mouth: Mucous membranes are dry.      Pharynx: Oropharynx is clear.   Eyes:      Extraocular Movements: Extraocular movements intact.      Conjunctiva/sclera: Conjunctivae normal.   Cardiovascular:      Rate and Rhythm: Tachycardia present. Rhythm irregular.      Pulses: Normal pulses.      Heart sounds: Normal heart sounds.      Comments: Irregularly irregular  Pulmonary:      Breath sounds: Rales present. No wheezing.      Comments: Bibasilar rales, good air exchange  Abdominal:      General: Bowel sounds are normal. There is no distension.      Palpations: Abdomen is soft. There is no mass.      Tenderness: There is no abdominal tenderness.   Musculoskeletal:      Right lower leg: Edema present.      Left lower leg: Edema present.      Comments: 2+ pitting tibial edema in left leg, 1+ in right leg   Skin:     General: Skin is warm and dry.      Capillary Refill: Capillary refill takes 2 to 3 seconds.   Neurological:      General: No focal deficit present.      Mental Status: She is alert and oriented to person, place, and time.      Comments: Perianal sensation intact    Psychiatric:         Mood and Affect: Mood normal.         Behavior: Behavior normal.         Thought Content: Thought content normal.         Judgment: Judgment normal.      Comments: Pleasant         Relevant Results     Scheduled medications  apixaban, 5 mg, oral, BID  aspirin, 81 mg, oral, Daily  atorvastatin, 80 mg, oral, Nightly  cephalexin, 500 mg, oral, q6h ALEXANDER  ezetimibe, 10 mg, oral, Daily  guaiFENesin, 600 mg, oral, BID  insulin lispro, 0-10 Units, subcutaneous, TID AC  magnesium sulfate, 4 g, intravenous, Once  PARoxetine, 20 mg, oral, Nightly  sennosides-docusate sodium, 2 tablet, oral, Nightly  zinc oxide, 1 Application, Topical, TID      Continuous  medications  amiodarone, 0.5 mg/min, Last Rate: 0.5 mg/min (12/13/24 0904)  furosemide, 10 mg/hr, Last Rate: 10 mg/hr (12/13/24 0318)      PRN medications  PRN medications: albuterol, dextrose, dextrose, glucagon, glucagon, metoprolol, oxygen   Results for orders placed or performed during the hospital encounter of 12/11/24 (from the past 24 hours)   CBC   Result Value Ref Range    WBC 7.3 4.4 - 11.3 x10*3/uL    nRBC 0.0 0.0 - 0.0 /100 WBCs    RBC 4.17 4.00 - 5.20 x10*6/uL    Hemoglobin 10.5 (L) 12.0 - 16.0 g/dL    Hematocrit 35.0 (L) 36.0 - 46.0 %    MCV 84 80 - 100 fL    MCH 25.2 (L) 26.0 - 34.0 pg    MCHC 30.0 (L) 32.0 - 36.0 g/dL    RDW 17.3 (H) 11.5 - 14.5 %    Platelets 356 150 - 450 x10*3/uL   Comprehensive metabolic panel   Result Value Ref Range    Glucose 179 (H) 74 - 99 mg/dL    Sodium 141 136 - 145 mmol/L    Potassium 3.6 3.5 - 5.3 mmol/L    Chloride 97 (L) 98 - 107 mmol/L    Bicarbonate 34 (H) 21 - 32 mmol/L    Anion Gap 14 10 - 20 mmol/L    Urea Nitrogen 20 6 - 23 mg/dL    Creatinine 0.93 0.50 - 1.05 mg/dL    eGFR 64 >60 mL/min/1.73m*2    Calcium 8.3 (L) 8.6 - 10.3 mg/dL    Albumin 3.0 (L) 3.4 - 5.0 g/dL    Alkaline Phosphatase 69 33 - 136 U/L    Total Protein 6.6 6.4 - 8.2 g/dL    AST 12 9 - 39 U/L    Bilirubin, Total 0.3 0.0 - 1.2 mg/dL    ALT 22 7 - 45 U/L   Magnesium   Result Value Ref Range    Magnesium 1.51 (L) 1.60 - 2.40 mg/dL   POCT GLUCOSE   Result Value Ref Range    POCT Glucose 206 (H) 74 - 99 mg/dL     *Note: Due to a large number of results and/or encounters for the requested time period, some results have not been displayed. A complete set of results can be found in Results Review.    ECG 12 lead    Result Date: 12/12/2024  Atrial fibrillation with rapid ventricular response with premature ventricular or aberrantly conducted complexes Moderate voltage criteria for LVH, may be normal variant ( R in aVL , [place] product ) Poor R-wave progression ; consider septal infarct, lead placement,  or normal variant ST & T wave abnormality, consider lateral ischemia Abnormal ECG When compared with ECG of 21-NOV-2024 14:57, (unconfirmed) Atrial fibrillation has replaced Sinus rhythm Vent. rate has increased BY  47 BPM    ECG 12 lead    Result Date: 12/12/2024  Undetermined rhythm Left ventricular hypertrophy with repolarization abnormality Abnormal ECG When compared with ECG of 11-DEC-2024 11:13, (unconfirmed) Current undetermined rhythm precludes rhythm comparison, needs review T wave inversion now evident in Anterior leads    XR chest 1 view    Result Date: 12/11/2024  Interpreted By:  Scott Enamorado, STUDY: XR CHEST 1 VIEW  12/11/2024 11:42 am   INDICATION: Signs/Symptoms:sob   COMPARISON: 03/05/2024   ACCESSION NUMBER(S): [id number]   ORDERING CLINICIAN: AMNA YOO   TECHNIQUE: A single AP portable radiograph of the chest was obtained.   FINDINGS: Multiple cardiac monitoring leads are seen over the chest. Mild-to-moderate diffuse interstitial infiltrates are seen throughout the lungs bilaterally, and may represent edema and/or pneumonia. Bilateral basilar airspace consolidations are seen and may represent small pleural effusions, atelectasis and/or pneumonia. No pneumothorax is identified. The cardiac silhouette is within normal limits for size.       Diffuse interstitial infiltrates and bibasilar airspace consolidations, as above. Clinical correlation and continued follow-up until clearing is recommended.   MACRO: None.   Signed by: Scott Enamorado 12/11/2024 12:01 PM Dictation workstation:   [id number]    ECG 12 lead (Clinic Performed)    Result Date: 11/21/2024  Sinus rhythm with frequent Premature ventricular complexes Left ventricular hypertrophy with repolarization abnormality Abnormal ECG When compared with ECG of 24-SEP-2024 10:17, (unconfirmed) Sinus rhythm has replaced Junctional rhythm T wave inversion less evident in Inferior leads Inverted T waves have replaced nonspecific T wave abnormality  in Lateral leads    BI mammo bilateral screening tomosynthesis    Result Date: 11/19/2024  Interpreted By:  Jayna Ga, STUDY: BI MAMMO BILATERAL SCREENING TOMOSYNTHESIS;  11/18/2024 8:40 am   ACCESSION NUMBER(S): [id number]   ORDERING CLINICIAN: LIN BRO   INDICATION: Screening.   ,Z12.31 Encounter for screening mammogram for malignant neoplasm of breast   COMPARISON: 11/17/2023, 06/05/2023 and 07/06/2021   FINDINGS: 2D and tomosynthesis images were reviewed at 1 mm slice thickness.   Density:  There are scattered areas of fibroglandular density.   Surgical scars and treatment changes of the left breast are stable. Asymmetry in the upper-outer subareolar right breast is stable. No suspicious masses or calcifications are identified.       No mammographic evidence of malignancy.   BI-RADS CATEGORY: BI-RADS Category:  2 Benign. Recommendation:  Annual Screening. Recommended Date:  1 Year. Laterality:  Bilateral.       For any future breast imaging appointments, please call [phone]).     MACRO: None   Signed by: Jayna Ga 11/19/2024 8:34 AM Dictation workstation:   [id number]    Assessment/Plan      Assessment & Plan  Acute exacerbation of chronic heart failure    Elsa Biggs is a 74 y.o. female with hx HFmrEF (with improved EF 50-55% 3/6/2024) , PAF on Eliquis, HTN, T2DM, dyslipidemia, obesity, depression, coronary angiography 1/29/2024 which was negative for obstructive CAD (LM mild disease, p-mLAD 40, Lcx mid 60, RCA mild diffuse disease) admitted for acute exacerbation of chronic heart failure.     Acute medical issues  #Acute on chronic diastolic CHF (last EF 50-55% 03/2024)  - 2/2 to decrease in Lasix regimen 40 mg three times weekly -> 20 mg three times weekly  - BNP 1187, troponin 81 on admission  - CXR: Diffuse interstitial infiltrates and bibasilar airspace consolidations  - SpO2 high 80's en route to ED  - Continued bilateral basilar rales today, more full air exchange  today  PLAN:  - IV lasix drip 10 mg / hr per cardio  - Diuresis goal at least 1.5 L output daily strict I/O's, salt/fluid restriction, daily weights, maintain K >4 and Mg >2  - Cardio consulted  - Airway clearance  - Mucinex 600 mg BID     #Atrial fibrillation with RVR  - Afib known, follows with cardiology, pt reports goes in and out of Afib at home  - Per outpatient note intolerant to BB/CCB  -  on presentation to ED  - received 1 dose lopressor 5 mg IV in ED for rate control  PLAN  - amiodarone 0.5 mg / min IV per cardio  - Continue home Eliquis 5 mg BID  - Lopressor 5 mg IV sustained HR > 120  - Continue tele    #Non-purulent cellulitis of left lower leg  - Prior to admission treated with Keflex without complete resolution  - CBC without leukocytosis   PLAN:  - Continue Keflex 500 mg BID  - Elevate leg  - Wound care     Chronic issues  HTN - monitor BP, not on medication at home  T2DM - monitor glucose levels w/ CMP  Dyslipidemia - cont ezetimibe 10 mg daily, lipitor 80 mg daily  Obesity - cardiac diet  Depression - cont Paxil 20 mg nightly     Fluids: fluid restriction  GI Ppx: none  DVT Ppx: Eliquis 5 mg BID  Diet: Cardiac  Consult: Cardiology  Code Status: DNRCC/TRISTANI    Jeremias Awan MD

## 2024-12-13 NOTE — DOCUMENTATION CLARIFICATION NOTE
"    PATIENT:               RAFAL PFEIFFER  ACCT #:                  [id number]  MRN:                       [id number]  :                       1950  ADMIT DATE:       2024 11:05 AM  DISCH DATE:  RESPONDING PROVIDER #:        [id number]          PROVIDER RESPONSE TEXT:    Chronic Myocardial Injury    CDI QUERY TEXT:    Clarification    Instruction:    Based on your assessment of the patient and the clinical information, please provide the requested documentation by clicking on the appropriate radio button and enter any additional information if prompted.    Question: Is there a diagnosis indicative of the patient elevated Troponins and symptoms    When answering this query, please exercise your independent professional judgment. The fact that a question is being asked, does not imply that any particular answer is desired or expected.    The patient's clinical indicators include:  Clinical Information: 74 year old female, BMI 43.2, presenting with an acute exacerbation of chronic heart failure.    Clinical Indicators:  Per Labs ():  Trop 81, 84    Per  ECG: \"Atrial fibrillation with rapid ventricular response with premature ventricular or aberrantly conducted complexes...Moderate voltage criteria for LVH, may be normal variant ( R in aVL , Zoran product ) Poor R-wave progression ; consider septal infarct, lead placement, or normal variant...ST & T wave abnormality, consider lateral ischemia\"    Per  ECG: \"Undetermined rhythm...Left ventricular hypertrophy with repolarization abnormality\"    Per  CXR: \"Mild-to-moderate diffuse interstitial infiltrates are seen throughout the lungs bilaterally, and may represent edema and/or pneumonia. Bilateral basilar airspace consolidations are seen and may represent small pleural effusions, atelectasis and/or pneumonia. No pneumothorax is identified. The cardiac silhouette is within normal limits for size.\"    Per  ED Provider Note: \"BNP 1187 " "significantly increased from prior labs, elevated troponins, remaining laboratory workup appears unremarkable, diffuse interstitial infiltrates bibasilar consolidation noted on imaging, patient placed in telemetry observation for further diuresis and evaluation.\"    Treatment:  Cardiology consult, oxygen therapy, Amiodarone 1mg/min IV continuous infusion (12/12), Amiodarone 0.5mg/min IV continuous infusion (12/13), ASA 81mg po  qDay (12/11-current), Lasix 10mg/hr IV continuous infusion with 40mg bolus from bag (12/12-current), Lasix 20mg and 40mg IV x1 (12/11-12/12), Metoprolol 5mg IV q6hrs PRN (12/11), Metoprolol 5mg IV x1 (12/11), repeated ECG's, repeated labs, repeated VS, supportive care.    Risk Factors: 74 year old female, being treated for an acute exacerbation of chronic CHF, with dyspnea/hypoxia and volume overload.  Options provided:  -- Chronic Myocardial Injury  -- Other - I will add my own diagnosis  -- Refer to Clinical Documentation Reviewer    Query created by: Leah Ashton on 12/13/2024 10:48 AM      Electronically signed by:  ED MARTINEZ MD 12/13/2024 10:51 AM          "

## 2024-12-13 NOTE — PROGRESS NOTES
Subjective Data:  Remains in a.fib RVR  Breathing improved at rest  LE edema improved, not resolved        Objective Data:  Last Recorded Vitals:  Vitals:    12/13/24 0456 12/13/24 0500 12/13/24 0840 12/13/24 1021   BP:    118/74   BP Location:    Right leg   Patient Position:    Lying   Pulse:  108  (!) 114   Resp:    18   Temp:    35.8 °C (96.4 °F)   TempSrc:    Temporal   SpO2: 96%  94% 95%   Weight:       Height:           Last Labs:  CBC - 12/13/2024:  6:29 AM  7.3 10.5 356    35.0      CMP - 12/13/2024:  6:29 AM  8.3 6.6 12 --- 0.3   3.7 3.0 22 69      PTT - No results in last year.  2.7   30.2 _     TROPHS   Date/Time Value Ref Range Status   12/11/2024 01:15 PM 84 0 - 13 ng/L Final     Comment:     Previous result verified on 12/11/2024 1158 on specimen/case [id number] called with component [facility] for procedure Troponin I, High Sensitivity, Initial with value 81 ng/L.   12/11/2024 11:20 AM 81 0 - 13 ng/L Final   03/06/2024 02:57 PM 7 0 - 13 ng/L Final     BNP   Date/Time Value Ref Range Status   12/11/2024 11:20 AM 1,187 0 - 99 pg/mL Final   12/25/2023 02:18  0 - 99 pg/mL Final     HGBA1C   Date/Time Value Ref Range Status   10/20/2023 05:22 AM 7.0 see below % Final   08/02/2018 03:17 AM 6.2 % Final     Comment:          Diagnosis of Diabetes-Adults   Non-Diabetic: < or = 5.6%   Increased risk for developing diabetes: 5.7-6.4%   Diagnostic of diabetes: > or = 6.5%  .       Monitoring of Diabetes                Age (y)     Therapeutic Goal (%)   Adults:          >18           <7.0   Pediatrics:    13-18           <7.5                   7-12           <8.0                   0- 6            7.5-8.5   American Diabetes Association. Diabetes Care 33(S1), Jan 2010.     08/01/2018 11:52 AM 6.2 % Final     Comment:          Diagnosis of Diabetes-Adults   Non-Diabetic: < or = 5.6%   Increased risk for developing diabetes: 5.7-6.4%   Diagnostic of diabetes: > or = 6.5%  .       Monitoring of Diabetes                 Age (y)     Therapeutic Goal (%)   Adults:          >18           <7.0   Pediatrics:    13-18           <7.5                   7-12           <8.0                   0- 6            7.5-8.5   American Diabetes Association. Diabetes Care 33(S1), Jan 2010.       LDLCALC   Date/Time Value Ref Range Status   10/20/2023 05:22 AM 60 <=99 mg/dL Final     Comment:                                 Near   Borderline      AGE      Desirable  Optimal    High     High     Very High     0-19 Y     0 - 109     ---    110-129   >/= 130     ----    20-24 Y     0 - 119     ---    120-159   >/= 160     ----      >24 Y     0 -  99   100-129  130-159   160-189     >/=190       VLDL   Date/Time Value Ref Range Status   10/20/2023 05:22 AM 13 0 - 40 mg/dL Final      Last I/O:  I/O last 3 completed shifts:  In: 1928.5 (18.2 mL/kg) [P.O.:1680; I.V.:248.5 (2.3 mL/kg)]  Out: 3180 (29.9 mL/kg) [Urine:3180 (0.8 mL/kg/hr)]  Weight: 106.2 kg     Past Cardiology Tests (Last 3 Years):  Transthoracic Echo (TTE) Complete 03/06/2024  PHYSICIAN INTERPRETATION:  Left Ventricle: Left ventricular systolic function is low normal, with an estimated ejection fraction of 50-55%. There are no regional wall motion abnormalities. The left ventricular cavity size is normal. Spectral Doppler shows a normal pattern of left ventricular diastolic filling.  Left Atrium: The left atrium is mildly dilated.  Right Ventricle: The right ventricle is upper limits of normal in size. There is normal right ventricular global systolic function.  Right Atrium: The right atrium is normal in size.  Aortic Valve: The aortic valve is trileaflet. There is minimal aortic valve cusp calcification. There is trivial aortic valve regurgitation. The peak instantaneous gradient of the aortic valve is 9.6 mmHg. The mean gradient of the aortic valve is 6.0 mmHg.  Mitral Valve: The mitral valve is normal in structure. There is no evidence of mitral valve regurgitation.  Tricuspid Valve:  The tricuspid valve is structurally normal. There is trace tricuspid regurgitation.  Pulmonic Valve: The pulmonic valve is not well visualized. There is no indication of pulmonic valve regurgitation.  Pericardium: There is no pericardial effusion noted.  Aorta: The aortic root was not well visualized.     10/20/2023 Echo:  CONCLUSIONS:   1. Left ventricular systolic function is mildly decreased with a 45-50% estimated ejection fraction.   2. Spectral Doppler shows an impaired relaxation pattern of left ventricular diastolic filling.   3. There is mild tricuspid regurgitation.   4. Technically difficult bubble study unable to interpret.     Cath:  Cardiac catheterization - coronary 01/29/2024  CONCLUSIONS:   1. Left main: no significant angiographic disease.   2. LAD: diffuse 40% prox-mid disease.   3. LCx: 60% mid-vessel disease.   4. RCA: mild diffuse irregularities.   5. LVEDP 26mmHg, no aortic stenosis on LV-Ao gradient.     Stress Test:  Nuclear Stress Test 12/26/2023  IMPRESSION:  Mild attentuation of the anterior wall seen on the stress images.  This is either soft tissue attenuation or ischemia in LAD territory.  Unfortunately there are no prone images available. Dilated left  ventricle with mild left ventricular systolic dysfunction on post  stress gated imaging.  Summary:   1. No clinical or electrocardiographic evidence for ischemia at maximal infusion.   2. Correlate with myocardial perfusion imaging results.   3. Nuclear image results are reported separately.      Inpatient Medications:  Scheduled medications   Medication Dose Route Frequency    apixaban  5 mg oral BID    aspirin  81 mg oral Daily    atorvastatin  80 mg oral Nightly    cephalexin  500 mg oral q6h ALEXANDER    ezetimibe  10 mg oral Daily    guaiFENesin  600 mg oral BID    insulin lispro  0-10 Units subcutaneous TID AC    PARoxetine  20 mg oral Nightly    sennosides-docusate sodium  2 tablet oral Nightly    zinc oxide  1 Application Topical TID      PRN medications   Medication    albuterol    dextrose    dextrose    glucagon    glucagon    metoprolol    oxygen     Continuous Medications   Medication Dose Last Rate    amiodarone  0.5 mg/min 0.5 mg/min (12/13/24 1401)    furosemide  10 mg/hr 10 mg/hr (12/13/24 0318)       Physical Exam:  Constitutional: Pleasant, Awake/Alert/Oriented to person place and time.   Head: Atraumatic, Normocephalic  Eyes: EOMI. RIP  Neck: + JVD  Cardiovascular: irreg/irreg, no murmur   Respiratory: diminished breath sounds anteriorly   Abdomen: Soft, Nontender, Obese. Bowel sounds appreciated  Musculoskeletal: ROM intact. Muscle strength grossly intact upper and lower extremities 5/5.   Neurological: CNII-XII intact. Sensation grossly intact  Extremities: Warm and dry. 1+ pitting edema BLE   Psychiatric: Appropriate mood and affect       Assessment/Plan   Very pleasant 74 y.o. 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     Assessment:  Acute on chronic diastolic HF  Paroxysmal a.fib with RVR  PVCs     Plan:  -Continue lasix drip 10mg/hr   -Continue amio drip with plan for DCCV Monday if remains in a.fib after adequate diuresis   -Continue apixaban 5mg BID, Continue asa, zetia, and atorvastatin as taking prior to admit   -Strict I/O, monitor renal function closely    Peripheral IV 20 G Distal;Right Forearm (Active)   Site Assessment Clean;Dry;Intact 12/13/24 0900   Dressing Status Clean;Dry 12/13/24 0900   Number of days:        Peripheral IV 12/12/24 20 G Right;Dorsal Forearm (Active)   Site Assessment Clean;Intact;Dry  12/13/24 0900   Dressing Status Dry;Clean 12/13/24 0900   Number of days: 1       Code Status:  DNR and No Intubation      ANITA Dhillon-CNP

## 2024-12-13 NOTE — HOSPITAL COURSE
Patient presented to [facility] ED on 12/11 from home complaining of dyspnea.  Per patient, symptoms have been progressing over last three weeks during which time her Lasix dose was decreased from 40 mg three times weekly to 20 mg three times weekly.  Endorses weight gain of unknown quantity and cellulitis of left lower leg treated with Keflex without complete resolution.  Upon presentation to ED, patient was afebrile, tachycardic at 140, hypertensive at 143/104 with O2 saturation of 94%.  CXR showed diffuse interstitial infiltrates and bibasilar airspace consolidations.    Wound care and cardio consulted.  Patient initially started on 40 mg IV Lasix daily and Keflex 500 mg BID.  Per cardio recs, started on Lasix 10 mg / hr for diuresis and amiodarone 0.5 mg / min for control of PAF. Patient converted back to NSR with this regimen.    Wound care on discharge for venous stasis ulcer of her left lower leg.    #Acute on chronic diastolic HF  #Paroxysmal a.fib with RVR-- back in NSR     On discharge cardiology recommended:   Plan:  - PO lasix 40mg daily  -START spironolactone 25mg daily  -Transition to PO amiodarone 400mg BID for 8 days through 12/22/2024, then reduce to 200mg daily   -Continue apixaban, aspirin, atorvastatin and zetia  -follow up in [facility] cardiology as well as with EP at discharge.      On discharge podiatry   - Recommend continuing medihoney, aquacel, ABD, and light ace bandage.   - Change every other day.  Keep legs elevated and heels offloaded.  Xeroform and mepilex/cosmopore to right calf ulceration.  Change every other day.

## 2024-12-13 NOTE — CONSULTS
Wound Care Consult     Visit Date: 12/12/2024      Patient Name: Elsa Biggs         MRN: [id number]           YOB: 1950     Reason for Consult: wound        Wound History: Patient states she is incontinent causing irritation and the wound came on, was spraying with hydrogen peroxide MOHSEN, now would like Podiatrist.     Pertinent Labs:   Albumin   Date Value Ref Range Status   12/12/2024 3.0 (L) 3.4 - 5.0 g/dL Final       Wound Assessment:  Wound 12/25/23 Moisture Associated Skin Damage Breast Right;Left (Active)       Wound 12/25/23 Moisture Associated Skin Damage Abdomen Right;Anterior;Left;Lower (Active)       Wound 12/11/24 Diabetic Ulcer Pretibial Distal;Left (Active)   Wound Image   12/12/24 1627   Site Assessment Red 12/12/24 0800   Drainage Description Purulent 12/12/24 1643   Drainage Amount Small 12/12/24 1643   Dressing Xeroform;Kerlix/rolled gauze 12/11/24 1130   Dressing Changed Changed 12/12/24 1643   Dressing Status Clean;Dry 12/12/24 1643       Wound 12/11/24 Sacrum (Active)   Wound Image   12/11/24 1842   Dressing Changed Reinforced 12/11/24 2014   Dressing Status Clean;Dry 12/11/24 2014       Wound 12/12/24 Moisture Associated Skin Damage Groin Left;Proximal;Upper;Anterior (Active)   Site Assessment Red;Maceration 12/12/24 0800   Drainage Description Foul odor;Purulent 12/12/24 0800   Drainage Amount Small 12/12/24 0800   Dressing Changed New 12/12/24 0800   Dressing Status Clean;Dry 12/12/24 0800       Wound 12/12/24 Moisture Associated Skin Damage Groin Proximal;Right;Upper;Anterior (Active)   Site Assessment Red;Maceration 12/12/24 0800   Drainage Description Foul odor;Purulent 12/12/24 0800   Drainage Amount Small 12/12/24 0800   Dressing Changed New 12/12/24 0800   Dressing Status Clean;Dry 12/12/24 0800       Wound 12/12/24 Moisture Associated Skin Damage Breast Lower;Right (Active)   Site Assessment Red 12/12/24 0800   Drainage Description None 12/12/24 0800   Drainage Amount  None 12/12/24 0800   Dressing Changed New 12/12/24 0800   Dressing Status Clean;Dry 12/12/24 0800       Wound Team Summary Assessment: Patient sitting in chair, able to get in bed with 2 assist and walker for assessment.  Pure wick external catheter in place, left leg wrapped in xeroform and Kerlix.  Patient has not seen a podiatrist and would like to establish contact with one for her wound care.  -- Left lower leg 4 x 4 cm full thickness wound with 1 x 1 cm area thick slough and shreds /strings of skin? from edge of wound, multiple small scabs surrounding wound.  -- Right lower leg 1 x 0.5 cm scab, red periwound skin.  -- Medial pannus 0.2 x 9 cm fissure.  -- Abdominal pannus red striae redness (patient on Eliquis, scratches), malodor, no satellite lesions.  -- Buttock chronic lavender discoloration RAB.     Wound Team Plan:   -- Order obtained for wound care to autolyse with Medihoney, Aquacel, DSD pending Podiatry recommendations.  -- Podiatry consult.  -- Zinc to protect from urine.  -- Extra thin Duoderm to heal.  -- Interdry AG to wick away moisture.      Please message with questions.       Sugar Waite RN, [facility]  12/12/2024  7:57 PM

## 2024-12-13 NOTE — PROGRESS NOTES
Elsa Biggs is a 74 y.o. female on day 3 of admission presenting with Acute exacerbation of chronic heart failure.      Subjective   Pt interviewed at bedside this morning.  Reports improvement in breathing, able to inspire deeper.  Reports improvement in cough attributes to mucinex, incentive spirometry, bronchial hygiene.  Reports increased thirst and dry mouth.  Urinating appropriately.  States nursing found bowel movement when cleaning her but she did not feel it passing.  Endorses bilateral leg cramps which are chronic for her.  Denies fever, chills.  Pt on 1.5 L O2 NC.  No questions or concerns this morning.       Objective     Last Recorded Vitals  /79 (BP Location: Right leg, Patient Position: Lying)   Pulse 108   Temp 36.1 °C (97 °F) (Temporal)   Resp 16   Wt 106 kg (234 lb 1.6 oz)   SpO2 96%   Intake/Output last 3 Shifts:    Intake/Output Summary (Last 24 hours) at 12/13/2024 0755  Last data filed at 12/13/2024 0400  Gross per 24 hour   Intake 1608.53 ml   Output 2400 ml   Net -791.47 ml       Admission Weight  Weight: 112 kg (246 lb 4.1 oz) (12/11/24 1118)    Daily Weight  12/13/24 : 106 kg (234 lb 1.6 oz)    Image Results  ECG 12 lead  Atrial fibrillation with rapid ventricular response with premature ventricular or aberrantly conducted complexes  Moderate voltage criteria for LVH, may be normal variant ( R in aVL , [place] product )  Poor R-wave progression ; consider septal infarct, lead placement, or normal variant  ST & T wave abnormality, consider lateral ischemia  Abnormal ECG  When compared with ECG of 21-NOV-2024 14:57, (unconfirmed)  Atrial fibrillation has replaced Sinus rhythm  Vent. rate has increased BY  47 BPM  ECG 12 lead  Undetermined rhythm  Left ventricular hypertrophy with repolarization abnormality  Abnormal ECG  When compared with ECG of 11-DEC-2024 11:13, (unconfirmed)  Current undetermined rhythm precludes rhythm comparison, needs review  T wave inversion now evident  in Anterior leads      Physical Exam  Constitutional:       Appearance: Normal appearance. She is obese.   HENT:      Head: Normocephalic and atraumatic.      Mouth/Throat:      Mouth: Mucous membranes are dry.      Pharynx: Oropharynx is clear.   Eyes:      Extraocular Movements: Extraocular movements intact.      Conjunctiva/sclera: Conjunctivae normal.   Cardiovascular:      Rate and Rhythm: Tachycardia present. Rhythm irregular.      Pulses: Normal pulses.      Heart sounds: Normal heart sounds.      Comments: Irregularly irregular  Pulmonary:      Breath sounds: Rales present. No wheezing.      Comments: Bibasilar rales, good air exchange  Abdominal:      General: Bowel sounds are normal. There is no distension.      Palpations: Abdomen is soft. There is no mass.      Tenderness: There is no abdominal tenderness.   Musculoskeletal:      Right lower leg: Edema present.      Left lower leg: Edema present.      Comments: 2+ pitting tibial edema in left leg, 1+ in right leg   Skin:     General: Skin is warm and dry.      Capillary Refill: Capillary refill takes 2 to 3 seconds.   Neurological:      General: No focal deficit present.      Mental Status: She is alert and oriented to person, place, and time.      Comments: Perianal sensation intact    Psychiatric:         Mood and Affect: Mood normal.         Behavior: Behavior normal.         Thought Content: Thought content normal.         Judgment: Judgment normal.      Comments: Pleasant         Relevant Results     Scheduled medications  apixaban, 5 mg, oral, BID  aspirin, 81 mg, oral, Daily  atorvastatin, 80 mg, oral, Nightly  cephalexin, 500 mg, oral, q6h ALEXANDER  ezetimibe, 10 mg, oral, Daily  guaiFENesin, 600 mg, oral, BID  PARoxetine, 20 mg, oral, Nightly  sennosides-docusate sodium, 2 tablet, oral, Nightly  zinc oxide, 1 Application, Topical, TID      Continuous medications  amiodarone, 0.5 mg/min, Last Rate: 0.5 mg/min (12/13/24 0318)  furosemide, 10 mg/hr,  Last Rate: 10 mg/hr (12/13/24 0318)      PRN medications  PRN medications: albuterol, metoprolol, oxygen   Results for orders placed or performed during the hospital encounter of 12/11/24 (from the past 24 hours)   CBC   Result Value Ref Range    WBC 7.3 4.4 - 11.3 x10*3/uL    nRBC 0.0 0.0 - 0.0 /100 WBCs    RBC 4.17 4.00 - 5.20 x10*6/uL    Hemoglobin 10.5 (L) 12.0 - 16.0 g/dL    Hematocrit 35.0 (L) 36.0 - 46.0 %    MCV 84 80 - 100 fL    MCH 25.2 (L) 26.0 - 34.0 pg    MCHC 30.0 (L) 32.0 - 36.0 g/dL    RDW 17.3 (H) 11.5 - 14.5 %    Platelets 356 150 - 450 x10*3/uL   Comprehensive metabolic panel   Result Value Ref Range    Glucose 179 (H) 74 - 99 mg/dL    Sodium 141 136 - 145 mmol/L    Potassium 3.6 3.5 - 5.3 mmol/L    Chloride 97 (L) 98 - 107 mmol/L    Bicarbonate 34 (H) 21 - 32 mmol/L    Anion Gap 14 10 - 20 mmol/L    Urea Nitrogen 20 6 - 23 mg/dL    Creatinine 0.93 0.50 - 1.05 mg/dL    eGFR 64 >60 mL/min/1.73m*2    Calcium 8.3 (L) 8.6 - 10.3 mg/dL    Albumin 3.0 (L) 3.4 - 5.0 g/dL    Alkaline Phosphatase 69 33 - 136 U/L    Total Protein 6.6 6.4 - 8.2 g/dL    AST 12 9 - 39 U/L    Bilirubin, Total 0.3 0.0 - 1.2 mg/dL    ALT 22 7 - 45 U/L   Magnesium   Result Value Ref Range    Magnesium 1.51 (L) 1.60 - 2.40 mg/dL     *Note: Due to a large number of results and/or encounters for the requested time period, some results have not been displayed. A complete set of results can be found in Results Review.    ECG 12 lead    Result Date: 12/12/2024  Atrial fibrillation with rapid ventricular response with premature ventricular or aberrantly conducted complexes Moderate voltage criteria for LVH, may be normal variant ( R in aVL , [place] product ) Poor R-wave progression ; consider septal infarct, lead placement, or normal variant ST & T wave abnormality, consider lateral ischemia Abnormal ECG When compared with ECG of 21-NOV-2024 14:57, (unconfirmed) Atrial fibrillation has replaced Sinus rhythm Vent. rate has increased BY   47 BPM    ECG 12 lead    Result Date: 12/12/2024  Undetermined rhythm Left ventricular hypertrophy with repolarization abnormality Abnormal ECG When compared with ECG of 11-DEC-2024 11:13, (unconfirmed) Current undetermined rhythm precludes rhythm comparison, needs review T wave inversion now evident in Anterior leads    XR chest 1 view    Result Date: 12/11/2024  Interpreted By:  Scott Enamorado, STUDY: XR CHEST 1 VIEW  12/11/2024 11:42 am   INDICATION: Signs/Symptoms:sob   COMPARISON: 03/05/2024   ACCESSION NUMBER(S): [id number]   ORDERING CLINICIAN: AMNA YOO   TECHNIQUE: A single AP portable radiograph of the chest was obtained.   FINDINGS: Multiple cardiac monitoring leads are seen over the chest. Mild-to-moderate diffuse interstitial infiltrates are seen throughout the lungs bilaterally, and may represent edema and/or pneumonia. Bilateral basilar airspace consolidations are seen and may represent small pleural effusions, atelectasis and/or pneumonia. No pneumothorax is identified. The cardiac silhouette is within normal limits for size.       Diffuse interstitial infiltrates and bibasilar airspace consolidations, as above. Clinical correlation and continued follow-up until clearing is recommended.   MACRO: None.   Signed by: Scott Enamorado 12/11/2024 12:01 PM Dictation workstation:   [id number]    ECG 12 lead (Clinic Performed)    Result Date: 11/21/2024  Sinus rhythm with frequent Premature ventricular complexes Left ventricular hypertrophy with repolarization abnormality Abnormal ECG When compared with ECG of 24-SEP-2024 10:17, (unconfirmed) Sinus rhythm has replaced Junctional rhythm T wave inversion less evident in Inferior leads Inverted T waves have replaced nonspecific T wave abnormality in Lateral leads    BI mammo bilateral screening tomosynthesis    Result Date: 11/19/2024  Interpreted By:  Janya Ga, STUDY: BI MAMMO BILATERAL SCREENING TOMOSYNTHESIS;  11/18/2024 8:40 am   ACCESSION  NUMBER(S): [id number]   ORDERING CLINICIAN: LIN BRO   INDICATION: Screening.   ,Z12.31 Encounter for screening mammogram for malignant neoplasm of breast   COMPARISON: 11/17/2023, 06/05/2023 and 07/06/2021   FINDINGS: 2D and tomosynthesis images were reviewed at 1 mm slice thickness.   Density:  There are scattered areas of fibroglandular density.   Surgical scars and treatment changes of the left breast are stable. Asymmetry in the upper-outer subareolar right breast is stable. No suspicious masses or calcifications are identified.       No mammographic evidence of malignancy.   BI-RADS CATEGORY: BI-RADS Category:  2 Benign. Recommendation:  Annual Screening. Recommended Date:  1 Year. Laterality:  Bilateral.       For any future breast imaging appointments, please call [phone]).     MACRO: None   Signed by: Jayna Ga 11/19/2024 8:34 AM Dictation workstation:   [id number]    Assessment/Plan      Assessment & Plan  Acute exacerbation of chronic heart failure    Elsa Biggs is a 74 y.o. female with hx HFmrEF (with improved EF 50-55% 3/6/2024) , PAF on Eliquis, HTN, T2DM, dyslipidemia, obesity, depression, coronary angiography 1/29/2024 which was negative for obstructive CAD (LM mild disease, p-mLAD 40, Lcx mid 60, RCA mild diffuse disease) admitted for acute exacerbation of chronic heart failure.     Acute medical issues  #Acute on chronic diastolic CHF (last EF 50-55% 03/2024)  - 2/2 to decrease in Lasix regimen 40 mg three times weekly -> 20 mg three times weekly  - BNP 1187, troponin 81 on admission  - CXR: Diffuse interstitial infiltrates and bibasilar airspace consolidations  - SpO2 high 80's en route to ED  - Continued bilateral basilar rales today, more full air exchange today  PLAN:  - IV lasix drip 10 mg / hr per cardio  - Diuresis goal at least 1.5 L output daily strict I/O's, salt/fluid restriction, daily weights, maintain K >4 and Mg >2  - Cardio consulted  - Airway  clearance  - Mucinex 600 mg BID     #Atrial fibrillation with RVR  - Afib known, follows with cardiology, pt reports goes in and out of Afib at home  - Per outpatient note intolerant to BB/CCB  -  on presentation to ED  - received 1 dose lopressor 5 mg IV in ED for rate control  PLAN  - amiodarone 0.5 mg / min IV per cardio  - Continue home Eliquis 5 mg BID  - Lopressor 5 mg IV sustained HR > 120  - Continue tele    #Non-purulent cellulitis of left lower leg  - Prior to admission treated with Keflex without complete resolution  - CBC without leukocytosis   PLAN:  - Continue Keflex 500 mg BID  - Elevate leg  - Wound care     Chronic issues  HTN - monitor BP, not on medication at home  T2DM - monitor glucose levels w/ CMP  Dyslipidemia - cont ezetimibe 10 mg daily, lipitor 80 mg daily  Obesity - cardiac diet  Depression - cont Paxil 20 mg nightly     Fluids: fluid restriction  GI Ppx: none  DVT Ppx: Eliquis 5 mg BID  Diet: Cardiac  Consult: Cardiology  Code Status: DNRCC/DNI              CALLI PEDRO

## 2024-12-13 NOTE — PROGRESS NOTES
12/13/24 1155   Discharge Planning   Living Arrangements Spouse/significant other   Support Systems Spouse/significant other   Assistance Needed A&OX4; independent with ADLs with walker; drives; room air baseline and currently room air; PCP Dr Pete Mcrae; on Eliquis prior to hospitalization   Type of Residence Private residence   Number of Stairs to Enter Residence 0   Number of Stairs Within Residence 0   Do you have animals or pets at home? No   Who is requesting discharge planning? Provider   Home or Post Acute Services None   Expected Discharge Disposition Home  (TBD: PT/ OT ordered and pending)   Does the patient need discharge transport arranged? No   RoundTrip coordination needed? No   Has discharge transport been arranged? No   Stroke Family Assessment   Stroke Family Assessment Needed No   Intensity of Service   Intensity of Service 0-30 min

## 2024-12-13 NOTE — CARE PLAN
The patient's goals for the shift include      The clinical goals for the shift include pt will tolerate turns throuoghout shift

## 2024-12-13 NOTE — CONSULTS
Inpatient consult to Podiatry  Consult performed by: Mary Leavitt DPM  Consult ordered by: Chas Madison DO  Reason for consult: left  calf ulceration          Reason For Consult  Left calf ulceration    History Of Present Illness  Elsa Biggs is a 74 y.o. female presenting with an open wound to left lower leg. She relates that the wound started from her LE swelling and it was a blister to begin with.  Has some burning pain to wound site.  Relates to history of LE blisters that usually heal without incident..     Past Medical History  She has a past medical history of A-fib (Multi), Anxiety, Breast cancer (Multi), Cancer (Multi), Depression, Diabetes mellitus (Multi), GERD (gastroesophageal reflux disease), Hyperlipidemia, Hypertension, and Stroke (Multi).    Surgical History  She has a past surgical history that includes MR angio neck w and wo IV contrast (01/14/2022); Joint replacement (Left); Fracture surgery; Breast surgery (Left); MR angio head wo IV contrast (12/26/2023); MR angio neck wo IV contrast (12/26/2023); Cardiac catheterization (N/A, 01/29/2024); Revision total hip arthroplasty (Left); Cholecystectomy; Tubal ligation; and Other surgical history (Right).     Social History  She reports that she quit smoking about 41 years ago. Her smoking use included cigarettes. She has never used smokeless tobacco. She reports that she does not drink alcohol and does not use drugs.    Family History  Family History   Problem Relation Name Age of Onset    Heart attack Father  65        blood clot    Coronary artery disease Sister          Allergies  Tetracycline    Review of Systems  +LE swelling       Physical Exam  Constitutional:       General: She is not in acute distress.     Appearance: Normal appearance. She is obese.   HENT:      Head: Normocephalic and atraumatic.   Eyes:      Extraocular Movements: Extraocular movements intact.      Conjunctiva/sclera: Conjunctivae normal.   Cardiovascular:  "     Rate and Rhythm: Tachycardia present. Rhythm irregular.      Pulses: Normal pulses.      Heart sounds: Normal heart sounds.      Comments: JVD present  Irregularly irregular rhythm  Pulmonary:      Breath sounds: No wheezing.      Comments: Bilateral rales in bases, diffusely diminished breath sounds  Abdominal:      General: Bowel sounds are normal.      Palpations: Abdomen is soft. There is no mass.      Tenderness: There is abdominal tenderness.      Comments: Lower midline abdomen tenderness to palpation   Musculoskeletal:      Right lower leg: Edema present.      Left lower leg: Edema present.      Comments: palpable pedal pulses;  ulceration to distal left medial calf with thin veil of slough - remainder granulation tissue;  no malodor or purulence;  several scabs to anterior lower leg.  No erythema or malodor  Skin:     General: Skin is warm and dry.      Capillary Refill: Capillary refill takes more than 3 seconds.   Neurological:      General: No focal deficit present.      Mental Status: She is alert and oriented to person, place, and time.   Psychiatric:         Mood and Affect: Mood normal.         Behavior: Behavior normal.         Thought Content: Thought content normal.         Judgment: Judgment normal.       Last Recorded Vitals  Blood pressure 118/74, pulse (!) 114, temperature 35.8 °C (96.4 °F), temperature source Temporal, resp. rate 18, height 1.6 m (5' 3\"), weight 106 kg (234 lb 1.6 oz), SpO2 95%.    Relevant Results  ECG 12 lead    Result Date: 12/12/2024  Atrial fibrillation with rapid ventricular response with premature ventricular or aberrantly conducted complexes Moderate voltage criteria for LVH, may be normal variant ( R in aVL , [place] product ) Poor R-wave progression ; consider septal infarct, lead placement, or normal variant ST & T wave abnormality, consider lateral ischemia Abnormal ECG When compared with ECG of 21-NOV-2024 14:57, (unconfirmed) Atrial fibrillation has " replaced Sinus rhythm Vent. rate has increased BY  47 BPM    ECG 12 lead    Result Date: 12/12/2024  Undetermined rhythm Left ventricular hypertrophy with repolarization abnormality Abnormal ECG When compared with ECG of 11-DEC-2024 11:13, (unconfirmed) Current undetermined rhythm precludes rhythm comparison, needs review T wave inversion now evident in Anterior leads    XR chest 1 view    Result Date: 12/11/2024  Interpreted By:  Scott Enamorado, STUDY: XR CHEST 1 VIEW  12/11/2024 11:42 am   INDICATION: Signs/Symptoms:sob   COMPARISON: 03/05/2024   ACCESSION NUMBER(S): [id number]   ORDERING CLINICIAN: AMNA YOO   TECHNIQUE: A single AP portable radiograph of the chest was obtained.   FINDINGS: Multiple cardiac monitoring leads are seen over the chest. Mild-to-moderate diffuse interstitial infiltrates are seen throughout the lungs bilaterally, and may represent edema and/or pneumonia. Bilateral basilar airspace consolidations are seen and may represent small pleural effusions, atelectasis and/or pneumonia. No pneumothorax is identified. The cardiac silhouette is within normal limits for size.       Diffuse interstitial infiltrates and bibasilar airspace consolidations, as above. Clinical correlation and continued follow-up until clearing is recommended.   MACRO: None.   Signed by: Scott Enamorado 12/11/2024 12:01 PM Dictation workstation:   [id number]    ECG 12 lead (Clinic Performed)    Result Date: 11/21/2024  Sinus rhythm with frequent Premature ventricular complexes Left ventricular hypertrophy with repolarization abnormality Abnormal ECG When compared with ECG of 24-SEP-2024 10:17, (unconfirmed) Sinus rhythm has replaced Junctional rhythm T wave inversion less evident in Inferior leads Inverted T waves have replaced nonspecific T wave abnormality in Lateral leads    BI mammo bilateral screening tomosynthesis    Result Date: 11/19/2024  Interpreted By:  Jayna Ga, STUDY: BI MAMMO BILATERAL SCREENING  TOMOSYNTHESIS;  11/18/2024 8:40 am   ACCESSION NUMBER(S): [id number]   ORDERING CLINICIAN: LIN BRO   INDICATION: Screening.   ,Z12.31 Encounter for screening mammogram for malignant neoplasm of breast   COMPARISON: 11/17/2023, 06/05/2023 and 07/06/2021   FINDINGS: 2D and tomosynthesis images were reviewed at 1 mm slice thickness.   Density:  There are scattered areas of fibroglandular density.   Surgical scars and treatment changes of the left breast are stable. Asymmetry in the upper-outer subareolar right breast is stable. No suspicious masses or calcifications are identified.       No mammographic evidence of malignancy.   BI-RADS CATEGORY: BI-RADS Category:  2 Benign. Recommendation:  Annual Screening. Recommended Date:  1 Year. Laterality:  Bilateral.       For any future breast imaging appointments, please call [phone]).     MACRO: None   Signed by: Jayna Ga 11/19/2024 8:34 AM Dictation workstation:   [id number]   Results for orders placed or performed during the hospital encounter of 12/11/24 (from the past 24 hours)   CBC   Result Value Ref Range    WBC 7.3 4.4 - 11.3 x10*3/uL    nRBC 0.0 0.0 - 0.0 /100 WBCs    RBC 4.17 4.00 - 5.20 x10*6/uL    Hemoglobin 10.5 (L) 12.0 - 16.0 g/dL    Hematocrit 35.0 (L) 36.0 - 46.0 %    MCV 84 80 - 100 fL    MCH 25.2 (L) 26.0 - 34.0 pg    MCHC 30.0 (L) 32.0 - 36.0 g/dL    RDW 17.3 (H) 11.5 - 14.5 %    Platelets 356 150 - 450 x10*3/uL   Comprehensive metabolic panel   Result Value Ref Range    Glucose 179 (H) 74 - 99 mg/dL    Sodium 141 136 - 145 mmol/L    Potassium 3.6 3.5 - 5.3 mmol/L    Chloride 97 (L) 98 - 107 mmol/L    Bicarbonate 34 (H) 21 - 32 mmol/L    Anion Gap 14 10 - 20 mmol/L    Urea Nitrogen 20 6 - 23 mg/dL    Creatinine 0.93 0.50 - 1.05 mg/dL    eGFR 64 >60 mL/min/1.73m*2    Calcium 8.3 (L) 8.6 - 10.3 mg/dL    Albumin 3.0 (L) 3.4 - 5.0 g/dL    Alkaline Phosphatase 69 33 - 136 U/L    Total Protein 6.6 6.4 - 8.2 g/dL    AST 12 9 - 39  U/L    Bilirubin, Total 0.3 0.0 - 1.2 mg/dL    ALT 22 7 - 45 U/L   Magnesium   Result Value Ref Range    Magnesium 1.51 (L) 1.60 - 2.40 mg/dL   POCT GLUCOSE   Result Value Ref Range    POCT Glucose 206 (H) 74 - 99 mg/dL   No results found for the last 90 days.       Assessment/Plan      Left lower leg venous stasis ulceration  Much improved from admission.  Recommend continuing medihoney, aquacel, ABD, and light ace bandage.  Change daily.  Keep legs elevated and heels offloaded.    I spent 30 minutes in the professional and overall care of this patient.

## 2024-12-14 LAB
ALBUMIN SERPL BCP-MCNC: 2.9 G/DL (ref 3.4–5)
ALP SERPL-CCNC: 62 U/L (ref 33–136)
ALT SERPL W P-5'-P-CCNC: 17 U/L (ref 7–45)
ANION GAP SERPL CALC-SCNC: 14 MMOL/L (ref 10–20)
AST SERPL W P-5'-P-CCNC: 10 U/L (ref 9–39)
BILIRUB SERPL-MCNC: 0.3 MG/DL (ref 0–1.2)
BUN SERPL-MCNC: 19 MG/DL (ref 6–23)
CALCIUM SERPL-MCNC: 8.2 MG/DL (ref 8.6–10.3)
CHLORIDE SERPL-SCNC: 92 MMOL/L (ref 98–107)
CO2 SERPL-SCNC: 35 MMOL/L (ref 21–32)
CREAT SERPL-MCNC: 0.99 MG/DL (ref 0.5–1.05)
EGFRCR SERPLBLD CKD-EPI 2021: 59 ML/MIN/1.73M*2
ERYTHROCYTE [DISTWIDTH] IN BLOOD BY AUTOMATED COUNT: 17.2 % (ref 11.5–14.5)
GLUCOSE BLD MANUAL STRIP-MCNC: 143 MG/DL (ref 74–99)
GLUCOSE BLD MANUAL STRIP-MCNC: 161 MG/DL (ref 74–99)
GLUCOSE BLD MANUAL STRIP-MCNC: 169 MG/DL (ref 74–99)
GLUCOSE BLD MANUAL STRIP-MCNC: 176 MG/DL (ref 74–99)
GLUCOSE SERPL-MCNC: 147 MG/DL (ref 74–99)
HCT VFR BLD AUTO: 34.7 % (ref 36–46)
HGB BLD-MCNC: 10.6 G/DL (ref 12–16)
MAGNESIUM SERPL-MCNC: 1.77 MG/DL (ref 1.6–2.4)
MCH RBC QN AUTO: 25.4 PG (ref 26–34)
MCHC RBC AUTO-ENTMCNC: 30.5 G/DL (ref 32–36)
MCV RBC AUTO: 83 FL (ref 80–100)
NRBC BLD-RTO: 0 /100 WBCS (ref 0–0)
PLATELET # BLD AUTO: 339 X10*3/UL (ref 150–450)
POTASSIUM SERPL-SCNC: 2.9 MMOL/L (ref 3.5–5.3)
PROT SERPL-MCNC: 6.3 G/DL (ref 6.4–8.2)
RBC # BLD AUTO: 4.18 X10*6/UL (ref 4–5.2)
SODIUM SERPL-SCNC: 138 MMOL/L (ref 136–145)
WBC # BLD AUTO: 8.3 X10*3/UL (ref 4.4–11.3)

## 2024-12-14 PROCEDURE — 99232 SBSQ HOSP IP/OBS MODERATE 35: CPT | Performed by: NURSE PRACTITIONER

## 2024-12-14 PROCEDURE — 2500000001 HC RX 250 WO HCPCS SELF ADMINISTERED DRUGS (ALT 637 FOR MEDICARE OP)

## 2024-12-14 PROCEDURE — 83735 ASSAY OF MAGNESIUM: CPT

## 2024-12-14 PROCEDURE — 2500000001 HC RX 250 WO HCPCS SELF ADMINISTERED DRUGS (ALT 637 FOR MEDICARE OP): Performed by: NURSE PRACTITIONER

## 2024-12-14 PROCEDURE — 94760 N-INVAS EAR/PLS OXIMETRY 1: CPT

## 2024-12-14 PROCEDURE — 2500000004 HC RX 250 GENERAL PHARMACY W/ HCPCS (ALT 636 FOR OP/ED): Performed by: NURSE PRACTITIONER

## 2024-12-14 PROCEDURE — 2500000004 HC RX 250 GENERAL PHARMACY W/ HCPCS (ALT 636 FOR OP/ED)

## 2024-12-14 PROCEDURE — 36415 COLL VENOUS BLD VENIPUNCTURE: CPT

## 2024-12-14 PROCEDURE — 94762 N-INVAS EAR/PLS OXIMTRY CONT: CPT

## 2024-12-14 PROCEDURE — 82947 ASSAY GLUCOSE BLOOD QUANT: CPT

## 2024-12-14 PROCEDURE — 80053 COMPREHEN METABOLIC PANEL: CPT

## 2024-12-14 PROCEDURE — 1200000002 HC GENERAL ROOM WITH TELEMETRY DAILY

## 2024-12-14 PROCEDURE — 2500000002 HC RX 250 W HCPCS SELF ADMINISTERED DRUGS (ALT 637 FOR MEDICARE OP, ALT 636 FOR OP/ED): Performed by: NURSE PRACTITIONER

## 2024-12-14 PROCEDURE — 2500000005 HC RX 250 GENERAL PHARMACY W/O HCPCS

## 2024-12-14 PROCEDURE — 99232 SBSQ HOSP IP/OBS MODERATE 35: CPT

## 2024-12-14 PROCEDURE — 85027 COMPLETE CBC AUTOMATED: CPT

## 2024-12-14 PROCEDURE — 2500000002 HC RX 250 W HCPCS SELF ADMINISTERED DRUGS (ALT 637 FOR MEDICARE OP, ALT 636 FOR OP/ED)

## 2024-12-14 PROCEDURE — 94668 MNPJ CHEST WALL SBSQ: CPT

## 2024-12-14 RX ORDER — AMIODARONE HYDROCHLORIDE 200 MG/1
400 TABLET ORAL 2 TIMES DAILY
Status: DISCONTINUED | OUTPATIENT
Start: 2024-12-14 | End: 2024-12-16 | Stop reason: HOSPADM

## 2024-12-14 RX ORDER — LANOLIN ALCOHOL/MO/W.PET/CERES
400 CREAM (GRAM) TOPICAL ONCE
Status: COMPLETED | OUTPATIENT
Start: 2024-12-14 | End: 2024-12-14

## 2024-12-14 RX ORDER — POTASSIUM CHLORIDE 20 MEQ/1
40 TABLET, EXTENDED RELEASE ORAL 2 TIMES DAILY
Status: COMPLETED | OUTPATIENT
Start: 2024-12-14 | End: 2024-12-14

## 2024-12-14 RX ORDER — LANOLIN ALCOHOL/MO/W.PET/CERES
400 CREAM (GRAM) TOPICAL DAILY
Status: DISCONTINUED | OUTPATIENT
Start: 2024-12-14 | End: 2024-12-14

## 2024-12-14 RX ORDER — FUROSEMIDE 40 MG/1
40 TABLET ORAL DAILY
Status: DISCONTINUED | OUTPATIENT
Start: 2024-12-14 | End: 2024-12-16 | Stop reason: HOSPADM

## 2024-12-14 RX ORDER — AMIODARONE HYDROCHLORIDE 200 MG/1
200 TABLET ORAL DAILY
Status: DISCONTINUED | OUTPATIENT
Start: 2024-12-22 | End: 2024-12-16 | Stop reason: HOSPADM

## 2024-12-14 RX ORDER — POLYETHYLENE GLYCOL 3350 17 G/17G
17 POWDER, FOR SOLUTION ORAL DAILY
Status: DISCONTINUED | OUTPATIENT
Start: 2024-12-14 | End: 2024-12-16 | Stop reason: HOSPADM

## 2024-12-14 RX ORDER — SPIRONOLACTONE 25 MG/1
25 TABLET ORAL DAILY
Status: DISCONTINUED | OUTPATIENT
Start: 2024-12-14 | End: 2024-12-16 | Stop reason: HOSPADM

## 2024-12-14 RX ORDER — AMOXICILLIN 250 MG
2 CAPSULE ORAL 2 TIMES DAILY
Status: DISCONTINUED | OUTPATIENT
Start: 2024-12-14 | End: 2024-12-16 | Stop reason: HOSPADM

## 2024-12-14 ASSESSMENT — PAIN SCALES - GENERAL
PAINLEVEL_OUTOF10: 0 - NO PAIN

## 2024-12-14 ASSESSMENT — COGNITIVE AND FUNCTIONAL STATUS - GENERAL
TURNING FROM BACK TO SIDE WHILE IN FLAT BAD: A LOT
PERSONAL GROOMING: A LITTLE
EATING MEALS: A LITTLE
MOVING TO AND FROM BED TO CHAIR: A LOT
CLIMB 3 TO 5 STEPS WITH RAILING: A LOT
DRESSING REGULAR LOWER BODY CLOTHING: A LOT
DAILY ACTIVITIY SCORE: 15
MOBILITY SCORE: 12
HELP NEEDED FOR BATHING: A LOT
WALKING IN HOSPITAL ROOM: A LOT
TOILETING: A LOT
STANDING UP FROM CHAIR USING ARMS: A LOT
DRESSING REGULAR UPPER BODY CLOTHING: A LITTLE
MOVING FROM LYING ON BACK TO SITTING ON SIDE OF FLAT BED WITH BEDRAILS: A LOT

## 2024-12-14 ASSESSMENT — PAIN - FUNCTIONAL ASSESSMENT: PAIN_FUNCTIONAL_ASSESSMENT: 0-10

## 2024-12-14 NOTE — PROGRESS NOTES
Occupational Therapy                 Therapy Communication Note    Patient Name: Elsa Biggs  MRN: [id number]  Department: [address]  Room: [address] [place] [address]  Today's Date: 12/14/2024     Discipline: Occupational Therapy    OT Missed Visit: Yes     Missed Visit Reason: Missed Visit Reason: Patient placed on medical hold (Pt's  potassium is 2.9 and as a result is not therapeutically appropriate for OT evaluation on this date. RN made aware)    Missed Time: Attempt

## 2024-12-14 NOTE — PROGRESS NOTES
Subjective Data:  Currently on RA  Converted to NSR overnight-- NSR with PVCs on tele        Objective Data:  Last Recorded Vitals:  Vitals:    12/13/24 1021 12/13/24 1753 12/13/24 2012 12/14/24 0531   BP: 118/74  127/75 121/51   BP Location: Right leg  Right arm Right arm   Patient Position: Lying  Lying Lying   Pulse: (!) 114  (!) 115 95   Resp: 18  26 17   Temp: 35.8 °C (96.4 °F)  36.4 °C (97.5 °F) 36.1 °C (97 °F)   TempSrc: Temporal  Temporal Temporal   SpO2: 95% 100% 92% 95%   Weight:    105 kg (230 lb 12.8 oz)   Height:           Last Labs:  CBC - 12/14/2024:  6:12 AM  8.3 10.6 339    34.7      CMP - 12/14/2024:  6:12 AM  8.2 6.3 10 --- 0.3   3.7 2.9 17 62      PTT - No results in last year.  2.7   30.2 _     TROPHS   Date/Time Value Ref Range Status   12/11/2024 01:15 PM 84 0 - 13 ng/L Final     Comment:     Previous result verified on 12/11/2024 1158 on specimen/case [id number] called with component [facility] for procedure Troponin I, High Sensitivity, Initial with value 81 ng/L.   12/11/2024 11:20 AM 81 0 - 13 ng/L Final   03/06/2024 02:57 PM 7 0 - 13 ng/L Final     BNP   Date/Time Value Ref Range Status   12/11/2024 11:20 AM 1,187 0 - 99 pg/mL Final   12/25/2023 02:18  0 - 99 pg/mL Final     HGBA1C   Date/Time Value Ref Range Status   10/20/2023 05:22 AM 7.0 see below % Final   08/02/2018 03:17 AM 6.2 % Final     Comment:          Diagnosis of Diabetes-Adults   Non-Diabetic: < or = 5.6%   Increased risk for developing diabetes: 5.7-6.4%   Diagnostic of diabetes: > or = 6.5%  .       Monitoring of Diabetes                Age (y)     Therapeutic Goal (%)   Adults:          >18           <7.0   Pediatrics:    13-18           <7.5                   7-12           <8.0                   0- 6            7.5-8.5   American Diabetes Association. Diabetes Care 33(S1), Jan 2010.     08/01/2018 11:52 AM 6.2 % Final     Comment:          Diagnosis of Diabetes-Adults   Non-Diabetic: < or = 5.6%   Increased risk  for developing diabetes: 5.7-6.4%   Diagnostic of diabetes: > or = 6.5%  .       Monitoring of Diabetes                Age (y)     Therapeutic Goal (%)   Adults:          >18           <7.0   Pediatrics:    13-18           <7.5                   7-12           <8.0                   0- 6            7.5-8.5   American Diabetes Association. Diabetes Care 33(S1), Jan 2010.       LDLCALC   Date/Time Value Ref Range Status   10/20/2023 05:22 AM 60 <=99 mg/dL Final     Comment:                                 Near   Borderline      AGE      Desirable  Optimal    High     High     Very High     0-19 Y     0 - 109     ---    110-129   >/= 130     ----    20-24 Y     0 - 119     ---    120-159   >/= 160     ----      >24 Y     0 -  99   100-129  130-159   160-189     >/=190       VLDL   Date/Time Value Ref Range Status   10/20/2023 05:22 AM 13 0 - 40 mg/dL Final      Last I/O:  I/O last 3 completed shifts:  In: 2387.6 (22.8 mL/kg) [P.O.:1620; I.V.:767.6 (7.3 mL/kg)]  Out: 3550 (33.9 mL/kg) [Urine:3550 (0.9 mL/kg/hr)]  Weight: 104.7 kg     Past Cardiology Tests (Last 3 Years):  Transthoracic Echo (TTE) Complete 03/06/2024  PHYSICIAN INTERPRETATION:  Left Ventricle: Left ventricular systolic function is low normal, with an estimated ejection fraction of 50-55%. There are no regional wall motion abnormalities. The left ventricular cavity size is normal. Spectral Doppler shows a normal pattern of left ventricular diastolic filling.  Left Atrium: The left atrium is mildly dilated.  Right Ventricle: The right ventricle is upper limits of normal in size. There is normal right ventricular global systolic function.  Right Atrium: The right atrium is normal in size.  Aortic Valve: The aortic valve is trileaflet. There is minimal aortic valve cusp calcification. There is trivial aortic valve regurgitation. The peak instantaneous gradient of the aortic valve is 9.6 mmHg. The mean gradient of the aortic valve is 6.0 mmHg.  Mitral Valve:  The mitral valve is normal in structure. There is no evidence of mitral valve regurgitation.  Tricuspid Valve: The tricuspid valve is structurally normal. There is trace tricuspid regurgitation.  Pulmonic Valve: The pulmonic valve is not well visualized. There is no indication of pulmonic valve regurgitation.  Pericardium: There is no pericardial effusion noted.  Aorta: The aortic root was not well visualized.     10/20/2023 Echo:  CONCLUSIONS:   1. Left ventricular systolic function is mildly decreased with a 45-50% estimated ejection fraction.   2. Spectral Doppler shows an impaired relaxation pattern of left ventricular diastolic filling.   3. There is mild tricuspid regurgitation.   4. Technically difficult bubble study unable to interpret.     Cath:  Cardiac catheterization - coronary 01/29/2024  CONCLUSIONS:   1. Left main: no significant angiographic disease.   2. LAD: diffuse 40% prox-mid disease.   3. LCx: 60% mid-vessel disease.   4. RCA: mild diffuse irregularities.   5. LVEDP 26mmHg, no aortic stenosis on LV-Ao gradient.     Stress Test:  Nuclear Stress Test 12/26/2023  IMPRESSION:  Mild attentuation of the anterior wall seen on the stress images.  This is either soft tissue attenuation or ischemia in LAD territory.  Unfortunately there are no prone images available. Dilated left  ventricle with mild left ventricular systolic dysfunction on post  stress gated imaging.  Summary:   1. No clinical or electrocardiographic evidence for ischemia at maximal infusion.   2. Correlate with myocardial perfusion imaging results.   3. Nuclear image results are reported separately.    Inpatient Medications:  Scheduled medications   Medication Dose Route Frequency    amiodarone  400 mg oral BID    Followed by    [START ON 12/22/2024] amiodarone  200 mg oral Daily    apixaban  5 mg oral BID    aspirin  81 mg oral Daily    atorvastatin  80 mg oral Nightly    cephalexin  500 mg oral q6h ALEXANDER    ezetimibe  10 mg oral Daily     furosemide  40 mg oral Daily    guaiFENesin  600 mg oral BID    insulin lispro  0-10 Units subcutaneous TID AC    PARoxetine  20 mg oral Nightly    potassium chloride CR  40 mEq oral BID    sennosides-docusate sodium  2 tablet oral Nightly    spironolactone  25 mg oral Daily    zinc oxide  1 Application Topical TID     PRN medications   Medication    albuterol    dextrose    dextrose    glucagon    glucagon    metoprolol    oxygen     Continuous Medications   Medication Dose Last Rate       Physical Exam:  Constitutional: Pleasant, Awake/Alert/Oriented to person place and time.   Head: Atraumatic, Normocephalic  Eyes: EOMI. RIP  Neck: No JVD  Cardiovascular: Regular rate and rhythm, S1, S2. No extra heart sounds or murmurs  Respiratory: Clear to auscultation bilaterally. No wheezing, rales or rhonchi. Good chest wall expansion  Abdomen: Soft, Nontender, Obese. Bowel sounds appreciated  Musculoskeletal: ROM intact. Muscle strength grossly intact upper and lower extremities 5/5.   Neurological: CNII-XII intact. Sensation grossly intact  Extremities: Warm and dry. Trace edema BLE   Psychiatric: Appropriate mood and affect       Assessment/Plan   Very pleasant 74 y.o. 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     Assessment:  Acute on chronic diastolic HF  Paroxysmal a.fib with RVR-- back in NSR   PVCs     Plan:  -Transition to PO lasix 40mg daily  -Add spironolactone 25mg daily  -Transition to PO amiodarone 400mg BID for 8 days, then reduce to 200mg daily   -Continue apixaban, aspirin, atorvastatin and  zetia  -Recommend monitoring overnight from a cardiac standpoint   -Dispo planning per primary team, PT/OT  -Will arrange follow up in [facility] cardiology as well as with EP at discharge.     Peripheral IV 20 G Distal;Right Forearm (Active)   Site Assessment Clean;Dry;Intact 12/13/24 2059   Line Status Flushed;Infusing 12/13/24 2059   Dressing Status Dry;Clean 12/13/24 2059   Number of days:        Peripheral IV 12/12/24 20 G Right;Dorsal Forearm (Active)   Site Assessment Clean;Dry;Intact 12/13/24 2059   Line Status Flushed 12/13/24 2059   Dressing Status Clean;Dry 12/13/24 2059   Number of days: 2       Peripheral IV 12/13/24 22 G Right;Posterior Hand (Active)   Site Assessment Clean;Dry;Intact 12/13/24 2059   Line Status Blood return noted;Flushed 12/13/24 2059   Dressing Status Clean;Dry 12/13/24 2059   Number of days: 1       Peripheral IV 12/13/24 20 G Right Antecubital (Active)   Site Assessment Clean;Dry;Intact 12/13/24 2059   Line Status Flushed;Infusing 12/13/24 2059   Dressing Status Clean;Dry 12/13/24 2059   Number of days: 1       Code Status:  DNR and No Intubation      ANITA Dhillon-CNP

## 2024-12-14 NOTE — CARE PLAN
The patient's goals for the shift include      The clinical goals for the shift include pt will become NSR by end of shift

## 2024-12-14 NOTE — PROGRESS NOTES
Physical Therapy                 Therapy Communication Note    Patient Name: Elsa Biggs  MRN: [id number]  Department: [address]  Room: [address] [place], [place] [address]  Today's Date: 12/14/2024     Discipline: Physical Therapy    PT Missed Visit: Yes     Missed Visit Reason: Patient placed on medical hold. AM labs revealed low potassium (2.9). Will hold PT eval this date until labs are therapeutic. RN notified.     Missed Time: Attempt

## 2024-12-14 NOTE — PROGRESS NOTES
Elsa Biggs is a 74 y.o. female on day 1 of admission presenting with Acute exacerbation of chronic heart failure.      Subjective   No acute events overnight, converted to NSR ON. Patient continuing to improve and has no complaints at this time.    Objective     Last Recorded Vitals  /76 (BP Location: Right arm, Patient Position: Lying)   Pulse 83   Temp 37.4 °C (99.3 °F) (Temporal)   Resp 23   Wt 105 kg (230 lb 12.8 oz)   SpO2 93%   Intake/Output last 3 Shifts:    Intake/Output Summary (Last 24 hours) at 12/14/2024 1432  Last data filed at 12/14/2024 1142  Gross per 24 hour   Intake 1485.76 ml   Output 1400 ml   Net 85.76 ml       Admission Weight  Weight: 112 kg (246 lb 4.1 oz) (12/11/24 1118)    Daily Weight  12/14/24 : 105 kg (230 lb 12.8 oz)    Image Results  ECG 12 lead  Atrial fibrillation with rapid ventricular response with premature ventricular or aberrantly conducted complexes  Moderate voltage criteria for LVH, may be normal variant ( R in aVL , Zoran product )  Poor R-wave progression ; consider septal infarct, lead placement, or normal variant  ST & T wave abnormality, consider lateral ischemia  Abnormal ECG  When compared with ECG of 21-NOV-2024 14:57, (unconfirmed)  Atrial fibrillation has replaced Sinus rhythm  Vent. rate has increased BY  47 BPM  ECG 12 lead  Undetermined rhythm  Left ventricular hypertrophy with repolarization abnormality  Abnormal ECG  When compared with ECG of 11-DEC-2024 11:13, (unconfirmed)  Current undetermined rhythm precludes rhythm comparison, needs review  T wave inversion now evident in Anterior leads      Physical Exam  Vitals reviewed.   Constitutional:       General: She is not in acute distress.     Appearance: She is not ill-appearing.   Cardiovascular:      Rate and Rhythm: Normal rate and regular rhythm.      Heart sounds: Normal heart sounds. No murmur heard.  Pulmonary:      Effort: No respiratory distress.      Breath sounds: Normal breath  sounds. No wheezing.   Musculoskeletal:         General: No tenderness.      Right lower leg: Edema present.      Left lower leg: Edema present.   Neurological:      General: No focal deficit present.      Mental Status: She is alert and oriented to person, place, and time. Mental status is at baseline.         Relevant Results  Scheduled medications  amiodarone, 400 mg, oral, BID   Followed by  [START ON 12/22/2024] amiodarone, 200 mg, oral, Daily  apixaban, 5 mg, oral, BID  aspirin, 81 mg, oral, Daily  atorvastatin, 80 mg, oral, Nightly  cephalexin, 500 mg, oral, q6h ALEXANDER  ezetimibe, 10 mg, oral, Daily  furosemide, 40 mg, oral, Daily  guaiFENesin, 600 mg, oral, BID  insulin lispro, 0-10 Units, subcutaneous, TID AC  PARoxetine, 20 mg, oral, Nightly  polyethylene glycol, 17 g, oral, Daily  potassium chloride CR, 40 mEq, oral, BID  sennosides-docusate sodium, 2 tablet, oral, BID  spironolactone, 25 mg, oral, Daily  zinc oxide, 1 Application, Topical, TID      Continuous medications     PRN medications  PRN medications: albuterol, dextrose, dextrose, glucagon, glucagon, metoprolol, oxygen    Results for orders placed or performed during the hospital encounter of 12/11/24 (from the past 24 hours)   POCT GLUCOSE   Result Value Ref Range    POCT Glucose 153 (H) 74 - 99 mg/dL   POCT GLUCOSE   Result Value Ref Range    POCT Glucose 201 (H) 74 - 99 mg/dL   CBC   Result Value Ref Range    WBC 8.3 4.4 - 11.3 x10*3/uL    nRBC 0.0 0.0 - 0.0 /100 WBCs    RBC 4.18 4.00 - 5.20 x10*6/uL    Hemoglobin 10.6 (L) 12.0 - 16.0 g/dL    Hematocrit 34.7 (L) 36.0 - 46.0 %    MCV 83 80 - 100 fL    MCH 25.4 (L) 26.0 - 34.0 pg    MCHC 30.5 (L) 32.0 - 36.0 g/dL    RDW 17.2 (H) 11.5 - 14.5 %    Platelets 339 150 - 450 x10*3/uL   Comprehensive metabolic panel   Result Value Ref Range    Glucose 147 (H) 74 - 99 mg/dL    Sodium 138 136 - 145 mmol/L    Potassium 2.9 (LL) 3.5 - 5.3 mmol/L    Chloride 92 (L) 98 - 107 mmol/L    Bicarbonate 35 (H) 21 - 32  mmol/L    Anion Gap 14 10 - 20 mmol/L    Urea Nitrogen 19 6 - 23 mg/dL    Creatinine 0.99 0.50 - 1.05 mg/dL    eGFR 59 (L) >60 mL/min/1.73m*2    Calcium 8.2 (L) 8.6 - 10.3 mg/dL    Albumin 2.9 (L) 3.4 - 5.0 g/dL    Alkaline Phosphatase 62 33 - 136 U/L    Total Protein 6.3 (L) 6.4 - 8.2 g/dL    AST 10 9 - 39 U/L    Bilirubin, Total 0.3 0.0 - 1.2 mg/dL    ALT 17 7 - 45 U/L   Magnesium   Result Value Ref Range    Magnesium 1.77 1.60 - 2.40 mg/dL   POCT GLUCOSE   Result Value Ref Range    POCT Glucose 169 (H) 74 - 99 mg/dL   POCT GLUCOSE   Result Value Ref Range    POCT Glucose 161 (H) 74 - 99 mg/dL           Assessment/Plan   Elsa Biggs is a 74 y.o. female with hx HFmrEF (with improved EF 50-55% 3/6/2024) , PAF on Eliquis, HTN, T2DM, dyslipidemia, obesity, depression, coronary angiography 1/29/2024 which was negative for obstructive CAD (LM mild disease, p-mLAD 40, Lcx mid 60, RCA mild diffuse disease) admitted for acute exacerbation of chronic heart failure, with hospital stay complicated by A-fib w/ RVR     Acute medical issues  #Acute on chronic diastolic CHF (last EF 50-55% 03/2024)  - 2/2 to decrease in Lasix regimen 40 mg three times weekly -> 20 mg three times weekly  - BNP 1187, troponin 81 on admission  - CXR: Diffuse interstitial infiltrates and bibasilar airspace consolidations  - SpO2 high 80's en route to ED  - Respiratory status continuing to improve  PLAN:  - Transitioning from Lasix drip to PO Lasix 40 mg daily  - Adding spironolactone 25 mg daily  - Diuresis goal at least 1.5 L output daily strict I/O's, salt/fluid restriction, daily weights, maintain K >4 and Mg >2  - Cardiology following  - Airway clearance  - Mucinex 600 mg BID     #Atrial fibrillation with RVR  - Afib known, follows with cardiology, pt reports goes in and out of Afib at home  - Per outpatient note intolerant to BB/CCB  -  on presentation to ED  - received 1 dose lopressor 5 mg IV in ED for rate control  PLAN  - Overnight,  patient converted back to NSR  - Transitioning from amiodarone drip to PO amiodarone 400 mg BID followed by 200 mg daily  - Continue home Eliquis 5 mg BID  - Lopressor 5 mg IV sustained HR > 120  - Continue tele     #Non-purulent cellulitis of left lower leg  - Prior to admission treated with Keflex without complete resolution  - CBC without leukocytosis   PLAN:  - Continue Keflex 500 mg q6h  - Elevate legs  - Wound care    #Hypokalemia  - Repleted, monitor daily labs and replete as needed     Chronic issues  HTN - monitor BP, not on medication at home. Currently stable  T2DM - SSI in place  Dyslipidemia - cont ezetimibe 10 mg daily, lipitor 80 mg daily  Obesity - cardiac diet  Depression - cont Paxil 20 mg nightly     Fluids: fluid restriction  GI Ppx: none  DVT Ppx: Eliquis 5 mg BID  Diet: Cardiac  Consult: Cardiology  Code Status: DNRCC/DNI    Dispo: Pending cardiology clearance and PT/OT evaluation prior to discharge    Marly Velazquez MD

## 2024-12-14 NOTE — CARE PLAN
The patient's goals for the shift include      The clinical goals for the shift include Patient will remain in NSR      Problem: Fall/Injury  Goal: Not fall by end of shift  Outcome: Progressing  Goal: Be free from injury by end of the shift  Outcome: Progressing  Goal: Verbalize understanding of personal risk factors for fall in the hospital  Outcome: Progressing  Goal: Verbalize understanding of risk factor reduction measures to prevent injury from fall in the home  Outcome: Progressing  Goal: Use assistive devices by end of the shift  Outcome: Progressing  Goal: Pace activities to prevent fatigue by end of the shift  Outcome: Progressing     Problem: Pain - Adult  Goal: Verbalizes/displays adequate comfort level or baseline comfort level  Outcome: Progressing     Problem: Safety - Adult  Goal: Free from fall injury  Outcome: Progressing     Problem: Discharge Planning  Goal: Discharge to home or other facility with appropriate resources  Outcome: Progressing     Problem: Chronic Conditions and Co-morbidities  Goal: Patient's chronic conditions and co-morbidity symptoms are monitored and maintained or improved  Outcome: Progressing     Problem: Skin  Goal: Decreased wound size/increased tissue granulation at next dressing change  Outcome: Progressing  Goal: Participates in plan/prevention/treatment measures  Outcome: Progressing  Goal: Prevent/manage excess moisture  Outcome: Progressing  Goal: Prevent/minimize sheer/friction injuries  Outcome: Progressing  Goal: Promote/optimize nutrition  Outcome: Progressing  Goal: Promote skin healing  Outcome: Progressing     Problem: Diabetes  Goal: Achieve decreasing blood glucose levels by end of shift  Outcome: Progressing  Goal: Increase stability of blood glucose readings by end of shift  Outcome: Progressing  Goal: Maintain electrolyte levels within acceptable range throughout shift  Outcome: Progressing  Goal: Maintain glucose levels >70mg/dl to <250mg/dl throughout  shift  Outcome: Progressing  Goal: Vital signs within normal range for age by end of shift  Outcome: Progressing  Goal: Increase self care and/or family involovement by end of shift  Outcome: Progressing  Goal: Receive DSME education by end of shift  Outcome: Progressing     Problem: Heart Failure  Goal: Improved gas exchange this shift  Outcome: Progressing  Goal: Improved urinary output this shift  Outcome: Progressing  Goal: Reduction in peripheral edema within 24 hours  Outcome: Progressing  Goal: Report improvement of dyspnea/breathlessness this shift  Outcome: Progressing  Goal: Weight from fluid excess reduced over 2-3 days, then stabilize  Outcome: Progressing  Goal: Increase self care and/or family involvement in 24 hours  Outcome: Progressing

## 2024-12-15 VITALS
WEIGHT: 231.8 LBS | HEART RATE: 92 BPM | SYSTOLIC BLOOD PRESSURE: 118 MMHG | HEIGHT: 63 IN | DIASTOLIC BLOOD PRESSURE: 63 MMHG | RESPIRATION RATE: 20 BRPM | TEMPERATURE: 98 F | OXYGEN SATURATION: 96 % | BODY MASS INDEX: 41.07 KG/M2

## 2024-12-15 LAB
ALBUMIN SERPL BCP-MCNC: 2.8 G/DL (ref 3.4–5)
ALP SERPL-CCNC: 61 U/L (ref 33–136)
ALT SERPL W P-5'-P-CCNC: 14 U/L (ref 7–45)
ANION GAP SERPL CALC-SCNC: 12 MMOL/L (ref 10–20)
AST SERPL W P-5'-P-CCNC: 10 U/L (ref 9–39)
BILIRUB SERPL-MCNC: 0.4 MG/DL (ref 0–1.2)
BUN SERPL-MCNC: 21 MG/DL (ref 6–23)
CALCIUM SERPL-MCNC: 8.1 MG/DL (ref 8.6–10.3)
CHLORIDE SERPL-SCNC: 94 MMOL/L (ref 98–107)
CO2 SERPL-SCNC: 36 MMOL/L (ref 21–32)
CREAT SERPL-MCNC: 1.05 MG/DL (ref 0.5–1.05)
EGFRCR SERPLBLD CKD-EPI 2021: 55 ML/MIN/1.73M*2
ERYTHROCYTE [DISTWIDTH] IN BLOOD BY AUTOMATED COUNT: 17.3 % (ref 11.5–14.5)
GLUCOSE BLD MANUAL STRIP-MCNC: 132 MG/DL (ref 74–99)
GLUCOSE BLD MANUAL STRIP-MCNC: 140 MG/DL (ref 74–99)
GLUCOSE BLD MANUAL STRIP-MCNC: 165 MG/DL (ref 74–99)
GLUCOSE BLD MANUAL STRIP-MCNC: 178 MG/DL (ref 74–99)
GLUCOSE SERPL-MCNC: 137 MG/DL (ref 74–99)
HCT VFR BLD AUTO: 33.5 % (ref 36–46)
HGB BLD-MCNC: 10.1 G/DL (ref 12–16)
MAGNESIUM SERPL-MCNC: 1.8 MG/DL (ref 1.6–2.4)
MCH RBC QN AUTO: 24.8 PG (ref 26–34)
MCHC RBC AUTO-ENTMCNC: 30.1 G/DL (ref 32–36)
MCV RBC AUTO: 82 FL (ref 80–100)
NRBC BLD-RTO: 0 /100 WBCS (ref 0–0)
PLATELET # BLD AUTO: 329 X10*3/UL (ref 150–450)
POTASSIUM SERPL-SCNC: 3.5 MMOL/L (ref 3.5–5.3)
PROT SERPL-MCNC: 6.2 G/DL (ref 6.4–8.2)
RBC # BLD AUTO: 4.07 X10*6/UL (ref 4–5.2)
SODIUM SERPL-SCNC: 138 MMOL/L (ref 136–145)
WBC # BLD AUTO: 8.4 X10*3/UL (ref 4.4–11.3)

## 2024-12-15 PROCEDURE — 99231 SBSQ HOSP IP/OBS SF/LOW 25: CPT

## 2024-12-15 PROCEDURE — 36415 COLL VENOUS BLD VENIPUNCTURE: CPT

## 2024-12-15 PROCEDURE — 97162 PT EVAL MOD COMPLEX 30 MIN: CPT | Mod: GP

## 2024-12-15 PROCEDURE — 2500000001 HC RX 250 WO HCPCS SELF ADMINISTERED DRUGS (ALT 637 FOR MEDICARE OP)

## 2024-12-15 PROCEDURE — 1200000002 HC GENERAL ROOM WITH TELEMETRY DAILY

## 2024-12-15 PROCEDURE — 85027 COMPLETE CBC AUTOMATED: CPT

## 2024-12-15 PROCEDURE — 80053 COMPREHEN METABOLIC PANEL: CPT

## 2024-12-15 PROCEDURE — 2500000005 HC RX 250 GENERAL PHARMACY W/O HCPCS

## 2024-12-15 PROCEDURE — 99232 SBSQ HOSP IP/OBS MODERATE 35: CPT | Performed by: NURSE PRACTITIONER

## 2024-12-15 PROCEDURE — 2500000004 HC RX 250 GENERAL PHARMACY W/ HCPCS (ALT 636 FOR OP/ED)

## 2024-12-15 PROCEDURE — 94760 N-INVAS EAR/PLS OXIMETRY 1: CPT

## 2024-12-15 PROCEDURE — 2500000001 HC RX 250 WO HCPCS SELF ADMINISTERED DRUGS (ALT 637 FOR MEDICARE OP): Performed by: NURSE PRACTITIONER

## 2024-12-15 PROCEDURE — 82947 ASSAY GLUCOSE BLOOD QUANT: CPT

## 2024-12-15 PROCEDURE — 2500000002 HC RX 250 W HCPCS SELF ADMINISTERED DRUGS (ALT 637 FOR MEDICARE OP, ALT 636 FOR OP/ED): Performed by: NURSE PRACTITIONER

## 2024-12-15 PROCEDURE — 97165 OT EVAL LOW COMPLEX 30 MIN: CPT | Mod: GO

## 2024-12-15 PROCEDURE — 83735 ASSAY OF MAGNESIUM: CPT

## 2024-12-15 PROCEDURE — 2500000002 HC RX 250 W HCPCS SELF ADMINISTERED DRUGS (ALT 637 FOR MEDICARE OP, ALT 636 FOR OP/ED)

## 2024-12-15 ASSESSMENT — COGNITIVE AND FUNCTIONAL STATUS - GENERAL
STANDING UP FROM CHAIR USING ARMS: A LOT
TURNING FROM BACK TO SIDE WHILE IN FLAT BAD: A LOT
MOVING TO AND FROM BED TO CHAIR: A LOT
HELP NEEDED FOR BATHING: A LOT
TOILETING: A LOT
DRESSING REGULAR LOWER BODY CLOTHING: A LOT
STANDING UP FROM CHAIR USING ARMS: A LOT
PERSONAL GROOMING: A LITTLE
DAILY ACTIVITIY SCORE: 15
WALKING IN HOSPITAL ROOM: A LOT
HELP NEEDED FOR BATHING: A LOT
EATING MEALS: A LITTLE
STANDING UP FROM CHAIR USING ARMS: A LOT
MOBILITY SCORE: 11
TOILETING: A LOT
TOILETING: A LOT
MOVING FROM LYING ON BACK TO SITTING ON SIDE OF FLAT BED WITH BEDRAILS: A LOT
WALKING IN HOSPITAL ROOM: A LOT
WALKING IN HOSPITAL ROOM: A LOT
DAILY ACTIVITIY SCORE: 15
MOVING FROM LYING ON BACK TO SITTING ON SIDE OF FLAT BED WITH BEDRAILS: A LOT
DRESSING REGULAR UPPER BODY CLOTHING: A LITTLE
HELP NEEDED FOR BATHING: A LOT
DRESSING REGULAR LOWER BODY CLOTHING: A LOT
MOBILITY SCORE: 11
DAILY ACTIVITIY SCORE: 17
MOVING TO AND FROM BED TO CHAIR: A LOT
PERSONAL GROOMING: A LITTLE
TURNING FROM BACK TO SIDE WHILE IN FLAT BAD: A LOT
CLIMB 3 TO 5 STEPS WITH RAILING: A LOT
CLIMB 3 TO 5 STEPS WITH RAILING: TOTAL
DRESSING REGULAR UPPER BODY CLOTHING: A LITTLE
MOVING FROM LYING ON BACK TO SITTING ON SIDE OF FLAT BED WITH BEDRAILS: A LOT
MOBILITY SCORE: 12
TURNING FROM BACK TO SIDE WHILE IN FLAT BAD: A LOT
MOVING TO AND FROM BED TO CHAIR: A LOT
CLIMB 3 TO 5 STEPS WITH RAILING: TOTAL
DRESSING REGULAR LOWER BODY CLOTHING: A LOT
DRESSING REGULAR UPPER BODY CLOTHING: A LITTLE
EATING MEALS: A LITTLE

## 2024-12-15 ASSESSMENT — ACTIVITIES OF DAILY LIVING (ADL)
BATHING_ASSISTANCE: MAXIMAL
ADL_ASSISTANCE: INDEPENDENT

## 2024-12-15 ASSESSMENT — PAIN SCALES - GENERAL
PAINLEVEL_OUTOF10: 0 - NO PAIN

## 2024-12-15 ASSESSMENT — PAIN - FUNCTIONAL ASSESSMENT
PAIN_FUNCTIONAL_ASSESSMENT: 0-10

## 2024-12-15 NOTE — DISCHARGE INSTRUCTIONS
Left lower leg wound care  Cleanse with soap and water.  Pat dry.  Apply medihoney, aquacel, ABD, and light ace bandage. Change every other day.  Keep legs elevated and heels offloaded.  Xeroform and mepilex/cosmopore to right calf ulceration.  Change every other day.

## 2024-12-15 NOTE — PROGRESS NOTES
Elsa Biggs is a 74 y.o. female on day 2 of admission presenting with Acute exacerbation of chronic heart failure.      Subjective   No acute events overnight. Patient continuing to improve and has no complaints at this time.  She attests to continued improvement and is looking forward to discharge    Objective   Physical Exam  Constitutional:       Appearance: Normal appearance.   HENT:      Head: Normocephalic and atraumatic.      Mouth/Throat:      Mouth: Mucous membranes are dry.      Pharynx: Oropharynx is clear.   Eyes:      Extraocular Movements: Extraocular movements intact.      Conjunctiva/sclera: Conjunctivae normal.   Cardiovascular:      Pulses: Normal pulses.      Heart sounds: Normal heart sounds.      Comments: Irregularly irregular  Pulmonary:      Breath sounds: Rales present. No wheezing.      Comments: Bibasilar rales, good air exchange  Abdominal:      General: Bowel sounds are normal. There is no distension.      Palpations: Abdomen is soft. There is no mass.      Tenderness: There is no abdominal tenderness.   Musculoskeletal:      Right lower leg: Edema present.      Left lower leg: Edema present.      Comments: Leg edema better  Skin:     General: Skin is warm and dry.      Capillary Refill: Capillary refill takes 2 to 3 seconds.   Neurological:      General: No focal deficit present.      Mental Status: She is alert and oriented to person, place, and time.      Comments: Perianal sensation intact    Psychiatric:         Mood and Affect: Mood normal.         Behavior: Behavior normal.         Thought Content: Thought content normal.         Judgment: Judgment normal.      Comments: Pleasant       Last Recorded Vitals  /62 (BP Location: Right arm, Patient Position: Lying)   Pulse 85   Temp 36.3 °C (97.4 °F) (Temporal)   Resp 21   Wt 105 kg (231 lb 12.8 oz)   SpO2 93%   Intake/Output last 3 Shifts:    Intake/Output Summary (Last 24 hours) at 12/15/2024 0921  Last data filed at  12/15/2024 0832  Gross per 24 hour   Intake 606.69 ml   Output 275 ml   Net 331.69 ml       Admission Weight  Weight: 112 kg (246 lb 4.1 oz) (12/11/24 1118)    Daily Weight  12/15/24 : 105 kg (231 lb 12.8 oz)    Image Results  ECG 12 lead  Atrial fibrillation with rapid ventricular response with premature ventricular or aberrantly conducted complexes  Moderate voltage criteria for LVH, may be normal variant ( R in aVL , [place] product )  Poor R-wave progression ; consider septal infarct, lead placement, or normal variant  ST & T wave abnormality, consider lateral ischemia  Abnormal ECG  When compared with ECG of 21-NOV-2024 14:57, (unconfirmed)  Atrial fibrillation has replaced Sinus rhythm  Vent. rate has increased BY  47 BPM  ECG 12 lead  Undetermined rhythm  Left ventricular hypertrophy with repolarization abnormality  Abnormal ECG  When compared with ECG of 11-DEC-2024 11:13, (unconfirmed)  Current undetermined rhythm precludes rhythm comparison, needs review  T wave inversion now evident in Anterior leads          Relevant Results  Scheduled medications  amiodarone, 400 mg, oral, BID   Followed by  [START ON 12/22/2024] amiodarone, 200 mg, oral, Daily  apixaban, 5 mg, oral, BID  aspirin, 81 mg, oral, Daily  atorvastatin, 80 mg, oral, Nightly  cephalexin, 500 mg, oral, q6h ALEXANDER  ezetimibe, 10 mg, oral, Daily  furosemide, 40 mg, oral, Daily  guaiFENesin, 600 mg, oral, BID  insulin lispro, 0-10 Units, subcutaneous, TID AC  PARoxetine, 20 mg, oral, Nightly  polyethylene glycol, 17 g, oral, Daily  sennosides-docusate sodium, 2 tablet, oral, BID  spironolactone, 25 mg, oral, Daily  zinc oxide, 1 Application, Topical, TID      Continuous medications     PRN medications  PRN medications: albuterol, dextrose, dextrose, glucagon, glucagon, metoprolol, oxygen    Results for orders placed or performed during the hospital encounter of 12/11/24 (from the past 24 hours)   POCT GLUCOSE   Result Value Ref Range    POCT Glucose  161 (H) 74 - 99 mg/dL   POCT GLUCOSE   Result Value Ref Range    POCT Glucose 143 (H) 74 - 99 mg/dL   POCT GLUCOSE   Result Value Ref Range    POCT Glucose 176 (H) 74 - 99 mg/dL   CBC   Result Value Ref Range    WBC 8.4 4.4 - 11.3 x10*3/uL    nRBC 0.0 0.0 - 0.0 /100 WBCs    RBC 4.07 4.00 - 5.20 x10*6/uL    Hemoglobin 10.1 (L) 12.0 - 16.0 g/dL    Hematocrit 33.5 (L) 36.0 - 46.0 %    MCV 82 80 - 100 fL    MCH 24.8 (L) 26.0 - 34.0 pg    MCHC 30.1 (L) 32.0 - 36.0 g/dL    RDW 17.3 (H) 11.5 - 14.5 %    Platelets 329 150 - 450 x10*3/uL   Comprehensive metabolic panel   Result Value Ref Range    Glucose 137 (H) 74 - 99 mg/dL    Sodium 138 136 - 145 mmol/L    Potassium 3.5 3.5 - 5.3 mmol/L    Chloride 94 (L) 98 - 107 mmol/L    Bicarbonate 36 (H) 21 - 32 mmol/L    Anion Gap 12 10 - 20 mmol/L    Urea Nitrogen 21 6 - 23 mg/dL    Creatinine 1.05 0.50 - 1.05 mg/dL    eGFR 55 (L) >60 mL/min/1.73m*2    Calcium 8.1 (L) 8.6 - 10.3 mg/dL    Albumin 2.8 (L) 3.4 - 5.0 g/dL    Alkaline Phosphatase 61 33 - 136 U/L    Total Protein 6.2 (L) 6.4 - 8.2 g/dL    AST 10 9 - 39 U/L    Bilirubin, Total 0.4 0.0 - 1.2 mg/dL    ALT 14 7 - 45 U/L   Magnesium   Result Value Ref Range    Magnesium 1.80 1.60 - 2.40 mg/dL   POCT GLUCOSE   Result Value Ref Range    POCT Glucose 140 (H) 74 - 99 mg/dL           Assessment/Plan   Elsa Biggs is a 74 y.o. female with hx HFmrEF (with improved EF 50-55% 3/6/2024) , PAF on Eliquis, HTN, T2DM, dyslipidemia, obesity, depression, coronary angiography 1/29/2024 which was negative for obstructive CAD (LM mild disease, p-mLAD 40, Lcx mid 60, RCA mild diffuse disease) admitted for acute exacerbation of chronic heart failure, with hospital stay complicated by A-fib w/ RVR     Acute medical issues  #Acute on chronic diastolic CHF (last EF 50-55% 03/2024)  - 2/2 to decrease in Lasix regimen 40 mg three times weekly -> 20 mg three times weekly  - BNP 1187, troponin 81 on admission  - CXR: Diffuse interstitial infiltrates  and bibasilar airspace consolidations  - SpO2 high 80's en route to ED  - Respiratory status continuing to improve  PLAN:  - Transitioning from Lasix drip to PO Lasix 40 mg daily  - Adding spironolactone 25 mg daily  - Diuresis goal at least 1.5 L output daily strict I/O's, salt/fluid restriction, daily weights, maintain K >4 and Mg >2  - Cardiology following  - Airway clearance  - Mucinex 600 mg BID     #Atrial fibrillation with RVR  - Afib known, follows with cardiology, pt reports goes in and out of Afib at home  - Per outpatient note intolerant to BB/CCB  -  on presentation to ED  - received 1 dose lopressor 5 mg IV in ED for rate control  PLAN  - Overnight, patient converted back to NSR  - Transitioning from amiodarone drip to PO amiodarone 400 mg BID followed by 200 mg daily  - Continue home Eliquis 5 mg BID  - Lopressor 5 mg IV sustained HR > 120  - Continue tele     #Non-purulent cellulitis of left lower leg  - Prior to admission treated with Keflex without complete resolution  - CBC without leukocytosis   PLAN:  - Continue Keflex 500 mg q6h  - Elevate legs  - Wound care    #Hypokalemia  - Repleted, monitor daily labs and replete as needed     Chronic issues  HTN - monitor BP, not on medication at home. Currently stable  T2DM - SSI in place  Dyslipidemia - cont ezetimibe 10 mg daily, lipitor 80 mg daily  Obesity - cardiac diet  Depression - cont Paxil 20 mg nightly     Fluids: fluid restriction  GI Ppx: none  DVT Ppx: Eliquis 5 mg BID  Diet: Cardiac  Consult: Cardiology  Code Status: DNRCC/DNI    Dispo: Awaiting dispo, likely discharge tomorrow    Jeremias Awan MD

## 2024-12-15 NOTE — PROGRESS NOTES
Physical Therapy    Physical Therapy Evaluation    Patient Name: Elsa Biggs  MRN: [id number]  Department: [address]  Room: [address] [place] [address]  Today's Date: 12/15/2024   Time Calculation  Start Time: 0758  Stop Time: 0820  Time Calculation (min): 22 min    Assessment/Plan   PT Assessment  PT Assessment Results: Decreased strength, Decreased mobility, Obesity (deconditioning)  Rehab Prognosis: Good  Barriers to Discharge Home:  (none)  Evaluation/Treatment Tolerance: Patient limited by fatigue  Medical Staff Made Aware: Yes  Strengths: Ability to acquire knowledge  Barriers to Participation: Comorbidities  End of Session Communication: Bedside nurse  End of Session Patient Position: Up in chair, Alarm on  IP OR SWING BED PT PLAN  Inpatient or Swing Bed: Inpatient  PT Plan  Treatment/Interventions: Bed mobility, Transfer training, Gait training, Strengthening, Therapeutic exercise  PT Plan: Ongoing PT  PT Frequency: 3 times per week  PT Discharge Recommendations: Moderate intensity level of continued care  Equipment Recommended upon Discharge: Wheeled walker  PT Recommended Transfer Status: Assist x1  PT - OK to Discharge: Yes (Per PT POC)    Subjective   General Visit Information:  General  Reason for Referral: 75 yo female admitted 2' to acute exacerbation of chronic heart failure, dyspnea  Referred By: Dr. SHIRLENE Toussaint  Past Medical History Relevant to Rehab: HFmrEF(50-55%), PAF(Eliquis), HTN, T2 DM, obesity, depression  Family/Caregiver Present: No  Co-Treatment: OT  Co-Treatment Reason: to maximize Pt's outcome and safety  Prior to Session Communication: Bedside nurse  Patient Position Received: Bed, 3 rail up, Alarm off, not on at start of session  General Comment: Pt is pleasant and agreeable to work with therapy. Pt is moving cautiously and requires assist with her mobility. Based on her current status, recommend Pt have MODERATE follow up services.  Home Living:  Home Living  Type of Home: House  Lives With: Spouse  Home  Adaptive Equipment: Walker rolling or standard, Cane  Home Layout: One level  Home Access: Stairs to enter without rails (Pt states that she holds on to the door frame)  Entrance Stairs-Rails: None  Entrance Stairs-Number of Steps: 1  Bathroom Shower/Tub: Tub/shower unit  Bathroom Equipment: Tub transfer bench, Grab bars in shower, Raised toilet seat with rails  Prior Level of Function:  Prior Function Per Pt/Caregiver Report  Level of [place]: Independent with ADLs and functional transfers, Independent with homemaking with ambulation (wheeled walker)  Receives Help From: Family (spouse who is legally blind and [place])  Precautions:  Precautions  Medical Precautions: Fall precautions     Vital Signs (Past 2hrs)        Date/Time Vitals Session Patient Position Pulse Resp SpO2 BP MAP (mmHg)    12/15/24 0733 --  --  --  --  92 %  --  --                         Objective   Pain:  Pain Assessment  Pain Assessment: 0-10  0-10 (Numeric) Pain Score: 0 - No pain  Cognition:  Cognition  Overall Cognitive Status: Within Functional Limits    General Assessments:  General Observation  General Observation: Pt gave good participation during her session               Activity Tolerance  Endurance: Tolerates 10 - 20 min exercise with multiple rests         Strength  Strength Comments: B LE are 4 of 5  Static Sitting Balance  Static Sitting-Balance Support: Bilateral upper extremity supported, Feet supported  Static Sitting-Level of Assistance: Close supervision    Static Standing Balance  Static Standing-Balance Support: Bilateral upper extremity supported (wheeled walker)  Static Standing-Level of Assistance: Minimum assistance (x 2)  Dynamic Standing Balance  Dynamic Standing-Balance Support: Bilateral upper extremity supported (wheeled walker)  Dynamic Standing-Level of Assistance: Minimum assistance (x 2)  Functional Assessments:  Bed Mobility  Bed Mobility: Yes  Bed Mobility 1  Bed Mobility 1: Supine to sitting  Level of  Assistance 1: Moderate assistance (x 1)  Bed Mobility Comments 1: assist with trunk and B LE    Transfers  Transfer: Yes  Transfer 1  Transfer From 1: Bed to  Transfer to 1: Stand  Technique 1: Sit to stand, Stand to sit  Transfer Device 1:  (wheeled waker)  Transfer Level of Assistance 1: Minimum assistance (x 2)    Ambulation/Gait Training  Ambulation/Gait Training Performed: Yes  Ambulation/Gait Training 1  Surface 1: Level tile  Device 1: Rolling walker  Assistance 1: Minimum assistance (x 2)  Quality of Gait 1: Decreased step length (Decreased gonzalo)  Comments/Distance (ft) 1: 5-6 small steps from bed to recliner chair    Stairs  Stairs: No  Extremity/Trunk Assessments:  RLE   RLE :  (hip flexion is limited, otherwise R LE is WFL)  LLE   LLE :  (hip flexion is limited, otherwise L LE is WFL)  Outcome Measures:  [facility] Basic Mobility  Turning from your back to your side while in a flat bed without using bedrails: A lot  Moving from lying on your back to sitting on the side of a flat bed without using bedrails: A lot  Moving to and from bed to chair (including a wheelchair): A lot  Standing up from a chair using your arms (e.g. wheelchair or bedside chair): A lot  To walk in hospital room: A lot  Climbing 3-5 steps with railing: Total  Basic Mobility - Total Score: 11    Encounter Problems       Encounter Problems (Active)       Mobility       STG - Patient will ambulate 50-80 feet MOD I with wheeled walker (Progressing)       Start:  12/15/24    Expected End:  12/29/24               PT Transfers       STG - Patient to transfer to and from sit to supine independently (Progressing)       Start:  12/15/24    Expected End:  12/29/24            STG - Patient will transfer sit to and from stand MOD I with wheeled walker (Progressing)       Start:  12/15/24    Expected End:  12/29/24               Pain - Adult              Education Documentation  No documentation found.  Education Comments  No comments  found.

## 2024-12-15 NOTE — PROGRESS NOTES
12/15/24 1000   Discharge Planning   Expected Discharge Disposition SNF  (Therapy recommendation is SNF and patient agreeable to SNF. Patient and family preference is [facility]. Will send referral)        12/15/24 1100   Discharge Planning   Expected Discharge Disposition SNF  ([facility] accepted patient but need to check bed availability for Monday 12/16/24)   Does the patient need discharge transport arranged? Yes   RoundTrip coordination needed? Yes   Has discharge transport been arranged? No

## 2024-12-15 NOTE — PROGRESS NOTES
Elsa Biggs is a 74 y.o. female on day 2 of admission presenting with Acute exacerbation of chronic heart failure.    Subjective   Feeling better.  Some burning to left lower leg ulceration that comes and goes.    Meds;  Scheduled medications  amiodarone, 400 mg, oral, BID   Followed by  [START ON 12/22/2024] amiodarone, 200 mg, oral, Daily  apixaban, 5 mg, oral, BID  aspirin, 81 mg, oral, Daily  atorvastatin, 80 mg, oral, Nightly  cephalexin, 500 mg, oral, q6h ALEXANDER  ezetimibe, 10 mg, oral, Daily  furosemide, 40 mg, oral, Daily  guaiFENesin, 600 mg, oral, BID  insulin lispro, 0-10 Units, subcutaneous, TID AC  PARoxetine, 20 mg, oral, Nightly  polyethylene glycol, 17 g, oral, Daily  sennosides-docusate sodium, 2 tablet, oral, BID  spironolactone, 25 mg, oral, Daily  zinc oxide, 1 Application, Topical, TID      Continuous medications     PRN medications  PRN medications: albuterol, dextrose, dextrose, glucagon, glucagon, metoprolol, oxygen       Physical Exam   Constitutional:       General: She is not in acute distress.     Appearance: Normal appearance. She is obese.   HENT:      Head: Normocephalic and atraumatic.   Eyes:      Extraocular Movements: Extraocular movements intact.      Conjunctiva/sclera: Conjunctivae normal.   Cardiovascular:      Rate and Rhythm: Tachycardia present. Rhythm irregular.      Pulses: Normal pulses.      Heart sounds: Normal heart sounds.      Comments: JVD present  Irregularly irregular rhythm  Pulmonary:      Breath sounds: No wheezing.      Comments: Bilateral rales in bases, diffusely diminished breath sounds  Abdominal:      General: Bowel sounds are normal.      Palpations: Abdomen is soft. There is no mass.      Tenderness: There is abdominal tenderness.      Comments: Lower midline abdomen tenderness to palpation   Musculoskeletal:      Right lower leg: Edema present.      Left lower leg: Edema present.      Comments: palpable pedal pulses;  ulceration to distal left medial  "calf with thin veil of slough - remainder granulation tissue;  no malodor or purulence;  several scabs to anterior lower leg.  No erythema or malodor.  Small stable scab to right lateral calf.  No active drainage.  Skin:     General: Skin is warm and dry.      Capillary Refill: Capillary refill takes more than 3 seconds.   Neurological:      General: No focal deficit present.      Mental Status: She is alert and oriented to person, place, and time.   Psychiatric:         Mood and Affect: Mood normal.         Behavior: Behavior normal.         Thought Content: Thought content normal.         Judgment: Judgment normal.     Last Recorded Vitals  Blood pressure 130/62, pulse 85, temperature 36.3 °C (97.4 °F), temperature source Temporal, resp. rate 21, height 1.6 m (5' 3\"), weight 105 kg (231 lb 12.8 oz), SpO2 92%.    Intake/Output last 3 Shifts:  I/O last 3 completed shifts:  In: 1605.8 (15.3 mL/kg) [P.O.:1060; I.V.:545.8 (5.2 mL/kg)]  Out: 1675 (15.9 mL/kg) [Urine:1675 (0.4 mL/kg/hr)]  Weight: 105.1 kg     Relevant Results   ECG 12 lead    Result Date: 12/12/2024  Atrial fibrillation with rapid ventricular response with premature ventricular or aberrantly conducted complexes Moderate voltage criteria for LVH, may be normal variant ( R in aVL , [place] product ) Poor R-wave progression ; consider septal infarct, lead placement, or normal variant ST & T wave abnormality, consider lateral ischemia Abnormal ECG When compared with ECG of 21-NOV-2024 14:57, (unconfirmed) Atrial fibrillation has replaced Sinus rhythm Vent. rate has increased BY  47 BPM    ECG 12 lead    Result Date: 12/12/2024  Undetermined rhythm Left ventricular hypertrophy with repolarization abnormality Abnormal ECG When compared with ECG of 11-DEC-2024 11:13, (unconfirmed) Current undetermined rhythm precludes rhythm comparison, needs review T wave inversion now evident in Anterior leads    XR chest 1 view    Result Date: 12/11/2024  Interpreted By:  " Scott Enamorado, STUDY: XR CHEST 1 VIEW  12/11/2024 11:42 am   INDICATION: Signs/Symptoms:sob   COMPARISON: 03/05/2024   ACCESSION NUMBER(S): [id number]   ORDERING CLINICIAN: AMNA YOO   TECHNIQUE: A single AP portable radiograph of the chest was obtained.   FINDINGS: Multiple cardiac monitoring leads are seen over the chest. Mild-to-moderate diffuse interstitial infiltrates are seen throughout the lungs bilaterally, and may represent edema and/or pneumonia. Bilateral basilar airspace consolidations are seen and may represent small pleural effusions, atelectasis and/or pneumonia. No pneumothorax is identified. The cardiac silhouette is within normal limits for size.       Diffuse interstitial infiltrates and bibasilar airspace consolidations, as above. Clinical correlation and continued follow-up until clearing is recommended.   MACRO: None.   Signed by: Scott Enamorado 12/11/2024 12:01 PM Dictation workstation:   [id number]    ECG 12 lead (Clinic Performed)    Result Date: 11/21/2024  Sinus rhythm with frequent Premature ventricular complexes Left ventricular hypertrophy with repolarization abnormality Abnormal ECG When compared with ECG of 24-SEP-2024 10:17, (unconfirmed) Sinus rhythm has replaced Junctional rhythm T wave inversion less evident in Inferior leads Inverted T waves have replaced nonspecific T wave abnormality in Lateral leads    BI mammo bilateral screening tomosynthesis    Result Date: 11/19/2024  Interpreted By:  Jayna Ga, STUDY: BI MAMMO BILATERAL SCREENING TOMOSYNTHESIS;  11/18/2024 8:40 am   ACCESSION NUMBER(S): [id number]   ORDERING CLINICIAN: LIN BRO   INDICATION: Screening.   ,Z12.31 Encounter for screening mammogram for malignant neoplasm of breast   COMPARISON: 11/17/2023, 06/05/2023 and 07/06/2021   FINDINGS: 2D and tomosynthesis images were reviewed at 1 mm slice thickness.   Density:  There are scattered areas of fibroglandular density.   Surgical scars and treatment  changes of the left breast are stable. Asymmetry in the upper-outer subareolar right breast is stable. No suspicious masses or calcifications are identified.       No mammographic evidence of malignancy.   BI-RADS CATEGORY: BI-RADS Category:  2 Benign. Recommendation:  Annual Screening. Recommended Date:  1 Year. Laterality:  Bilateral.       For any future breast imaging appointments, please call [phone] ([phone]).     MACRO: None   Signed by: Jayna Ga 11/19/2024 8:34 AM Dictation workstation:   [id number]   Results for orders placed or performed during the hospital encounter of 12/11/24 (from the past 24 hours)   POCT GLUCOSE   Result Value Ref Range    POCT Glucose 161 (H) 74 - 99 mg/dL   POCT GLUCOSE   Result Value Ref Range    POCT Glucose 143 (H) 74 - 99 mg/dL   POCT GLUCOSE   Result Value Ref Range    POCT Glucose 176 (H) 74 - 99 mg/dL   CBC   Result Value Ref Range    WBC 8.4 4.4 - 11.3 x10*3/uL    nRBC 0.0 0.0 - 0.0 /100 WBCs    RBC 4.07 4.00 - 5.20 x10*6/uL    Hemoglobin 10.1 (L) 12.0 - 16.0 g/dL    Hematocrit 33.5 (L) 36.0 - 46.0 %    MCV 82 80 - 100 fL    MCH 24.8 (L) 26.0 - 34.0 pg    MCHC 30.1 (L) 32.0 - 36.0 g/dL    RDW 17.3 (H) 11.5 - 14.5 %    Platelets 329 150 - 450 x10*3/uL   Comprehensive metabolic panel   Result Value Ref Range    Glucose 137 (H) 74 - 99 mg/dL    Sodium 138 136 - 145 mmol/L    Potassium 3.5 3.5 - 5.3 mmol/L    Chloride 94 (L) 98 - 107 mmol/L    Bicarbonate 36 (H) 21 - 32 mmol/L    Anion Gap 12 10 - 20 mmol/L    Urea Nitrogen 21 6 - 23 mg/dL    Creatinine 1.05 0.50 - 1.05 mg/dL    eGFR 55 (L) >60 mL/min/1.73m*2    Calcium 8.1 (L) 8.6 - 10.3 mg/dL    Albumin 2.8 (L) 3.4 - 5.0 g/dL    Alkaline Phosphatase 61 33 - 136 U/L    Total Protein 6.2 (L) 6.4 - 8.2 g/dL    AST 10 9 - 39 U/L    Bilirubin, Total 0.4 0.0 - 1.2 mg/dL    ALT 14 7 - 45 U/L   Magnesium   Result Value Ref Range    Magnesium 1.80 1.60 - 2.40 mg/dL   POCT GLUCOSE   Result Value Ref Range    POCT Glucose 140  (H) 74 - 99 mg/dL     Assessment/Plan   Assessment & Plan  Acute exacerbation of chronic heart failure    Left lower leg venous stasis ulceration  Much improved from admission.  Recommend continuing medihoney, aquacel, ABD, and light ace bandage.  Change every other day.  Keep legs elevated and heels offloaded.  Xeroform and mepilex/cosmopore to right calf ulceration.  Change every other day.  OK for discharge from podiatry perspective once cleared by other services.  Recommend follow-up in wound care center in one-two weeks.       Mary Leavitt DPM

## 2024-12-15 NOTE — PROGRESS NOTES
12/15/24 1000   Discharge Planning   Expected Discharge Disposition SNF  (Therapy recommendation is SNF and patient agreeable to SNF. Pt has PAQN SNF list and will decide on facility. No precert but wouldn't be able to transition to SNF until 12/16/24)

## 2024-12-15 NOTE — PROGRESS NOTES
Occupational Therapy    Evaluation    Patient Name: Elsa Biggs  MRN: [id number]  Department: [address]  Room: [address] [place] [address]  Today's Date: 12/15/2024  Time Calculation  Start Time: 0757  Stop Time: 0819  Time Calculation (min): 22 min        Assessment:  OT Assessment: OT assessment complete to assess pt's ADLs, IADLs, functional t/f, functional mobility, general executive functional skills, and safety with AE and LRD in the inpatient environment to help give skilled insight for pt's need for OT services inside the inpatient setting and d/c location.  Prognosis: Good  Barriers to Discharge Home: Caregiver assistance, Physical needs (caregiver is present but can't provide a higher than min assist for the pt)  Caregiver Assistance: Caregiver assistance needed per identified barriers - however, level of patient's required assistance exceeds assistance available at home  Evaluation/Treatment Tolerance: Patient limited by fatigue, Patient tolerated treatment well  End of Session Communication: Bedside nurse  End of Session Patient Position: Up in chair (with rails with chair alarm left on)  OT Assessment Results: Decreased ADL status, Decreased endurance, Decreased functional mobility, Decreased IADLs  Prognosis: Good  Barriers to Discharge: Decreased caregiver support (pt requires more assistance than caregiver can give pt)  Evaluation/Treatment Tolerance: Patient limited by fatigue, Patient tolerated treatment well  Strengths: Ability to acquire knowledge, Support of social community  Barriers to Participation: Capable of completing ADLs semi/independent (min x2 for functional mobility and t/f)  Plan:  Treatment Interventions: ADL retraining, Functional transfer training, Endurance training  OT Frequency: 3 times per week  OT Discharge Recommendations: Moderate intensity level of continued care  Equipment Recommended upon Discharge: Wheeled walker  OT Recommended Transfer Status: Assist of 2, Minimal assist  OT - OK to  Discharge: Yes  Treatment Interventions: ADL retraining, Functional transfer training, Endurance training    Subjective   Current Problem:  1. Acute exacerbation of chronic heart failure        2. Hypervolemia, unspecified hypervolemia type        3. Dyspnea, unspecified type        4. Acute on chronic combined systolic and diastolic congestive heart failure          General:  General  Reason for Referral: OT services needed to asesss independence with ADLs, IADLs, and functional t/f and mobility.  Past Medical History Relevant to Rehab: Elsa Biggs is a 74 y.o. female with hx HFmrEF PAF on Eliquis, HTN, T2DM, dyslipidemia, obesity, depression, coronary angiography 1/29/2024 which was negative for obstructive CAD (LM mild disease, RCA mild diffuse disease presenting today for dyspnea. Admitted secondary to acute excaberation of CHF  Family/Caregiver Present: No  Co-Treatment: PT  Co-Treatment Reason: pt was a x2 for functional t/f  Prior to Session Communication: Bedside nurse  Patient Position Received: Bed, 3 rail up  General Comment: pt was compliant and willing to work with therapy; was in good spirits  Precautions:  Precautions Comment: IV x3 on R arm (hand x2 and wrist); sores and PRN on O2    Vital Sign (Past 2hrs)                 Pain:  Pain Assessment  Pain Assessment: 0-10  0-10 (Numeric) Pain Score: 0 - No pain    Objective   Cognition:  Overall Cognitive Status: Within Functional Limits  Attention: Within Functional Limits  Memory: Within Funtional Limits  Problem Solving: Within Functional Limits  Insight: Within function limits (pt was able to understand the need for SNF and agreed on this idea)  Impulsive: Within functional limits  Processing Speed: Within funtional limits           Home Living:  Type of Home: House  Lives With: Spouse (spouse is legal blind and [place])  Home Adaptive Equipment: Walker rolling or standard  Home Layout: One level (1 step inside the home)  Home Access: Stairs to enter  without rails (1 step uses walker or doorway frame)  Bathroom Shower/Tub: Tub/shower unit  Bathroom Toilet: Adaptive toilet seating (with grab bars)  Bathroom Equipment: Grab bars in shower (one grab bar right on the outside of the shower/tub combine)  Prior Function:  Level of [place]: Other (Comment) (I with ADLs and IADLs and min assist with shower tub t/f (spouse will lift leg up over the lip of the tub if pt needs help))  Receives Help From: Family  ADL Assistance: Independent  Homemaking Assistance: Independent  Ambulatory Assistance: Needs assistance (walker rolling)  Transfers: Stand by, Minimal  Hand Dominance: Right  Prior Function Comments: pt has spouse present to help if needs present during IADLs and functional t/f  IADL History:  Homemaking Responsibilities: Yes  ADL:  Eating Assistance: Independent  Eating Deficit: Setup  Grooming Assistance: Independent  Grooming Deficit: Setup  Bathing Assistance: Maximal  Bathing Deficit: Perineal area, Buttocks, Right upper leg, Left upper leg  UE Dressing Assistance: Minimal  UE Dressing Deficit: Fasteners, Setup, Increased time to complete (sitting balance)  LE Dressing Assistance: Maximal  LE Dressing Deficit: Don/doff R shoe, Don/doff L shoe, Verbal cueing, Steadying, Setup, Increased time to complete, Thread RLE into pants, Thread LLE into pants, Thread RLE into underwear, Thread LLE into underwear  Toileting Assistance with Device: Maximal  Toileting Deficit: Verbal cueing, Supervison/safety, Increased time to complete  Functional Assistance: Total  Functional Deficit: Steadying, Increased time to complete, Commode transfer, Shower transfer (min x2)  ADL Comments: pt needs min x2 for functional t/f  Activity Tolerance:  Endurance: Tolerates less than 10 min exercise, no significant change in vital signs  Bed Mobility/Transfers: Bed Mobility  Bed Mobility: Yes  Bed Mobility 1  Bed Mobility 1: Supine to sitting  Level of Assistance 1: Moderate  assistance  Bed Mobility Comments 1: stability standing on EOB    Transfers  Transfer: Yes  Transfer 1  Transfer From 1: Bed to  Transfer to 1: Stand  Technique 1: Sit to stand  Transfer Device 1: Walker  Transfer Level of Assistance 1: Minimum assistance (x2)  Transfers 2  Transfer From 2: Stand to  Transfer to 2: Bed  Technique 2: Stand pivot  Transfer Device 2: Walker  Transfer Level of Assistance 2: Minimum assistance (x2)      Functional Mobility:  Functional Mobility  Functional Mobility Performed: Yes  Functional Mobility 1  Surface 1: Level tile  Device 1: Rolling walker  Assistance 1: Minimum assistance (x2)  Comments 1: Minimum assistance x2  Sitting Balance:  Static Sitting Balance  Static Sitting-Balance Support: Right upper extremity supported, Left upper extremity supported  Static Sitting-Level of Assistance: Contact guard  Dynamic Sitting Balance  Dynamic Sitting-Balance Support: No upper extremity supported  Dynamic Sitting-Level of Assistance: Minimum assistance    Strength:  Strength Comments: 3+/5    Hand Function:  Gross Grasp: Functional  Coordination: Functional  Extremities: RUE   RUE : Within Functional Limits and LUE   LUE: Within Functional Limits    Outcome Measures:[facility] Daily Activity  Putting on and taking off regular lower body clothing: A lot  Bathing (including washing, rinsing, drying): A lot  Putting on and taking off regular upper body clothing: A little  Toileting, which includes using toilet, bedpan or urinal: A lot  Taking care of personal grooming such as brushing teeth: None  Eating Meals: None  Daily Activity - Total Score: 17    Pt would benefit from further therapy before discharge to home to increase independence with functional mobility and I with ADLs and IADLs to decrease fall risks and readmissions.       Goals:  Encounter Problems       Encounter Problems (Active)       OT Goals       ADLs        Start:  12/15/24    Expected End:  12/29/24       Pt will complete all  ADLs (I.e., bathing, toileting, hygiene, eating, dressing UE and LE) with mod assist for safety while demonstrating adequate endurance and strength without showing signs of moderate pain, SOB, fall risks and/or fatigue.          Functional t/f        Start:  12/15/24    Expected End:  12/29/24       Pt will complete functional t/f  with stand by assistance with <1 verbal cue on safety and following precaution to increase safety and decrease fall risks during functional activities in a unsupported seat position.

## 2024-12-15 NOTE — PROGRESS NOTES
Anesthesia Pre Eval Note    Anesthesia ROS/Med Hx    Overall Review:  EKG was reviewed and Echo was reviewed     Anesthetic Complication History:  Patient does not have a history of anesthetic complications      Pulmonary Review:  Patient does not have a pulmonary history      Neuro/Psych Review:    Positive for CVA (no residual)    Cardiovascular Review:  Comments: 11/3/2023 TTE:  Normal left ventricular size and systolic function, EF 60 %.  Abnormal septal wall motion.  Mildly increased right ventricular chamber size.  Normal right ventricular systolic function.  Catheter/pacemaker wire visualized in the right ventricle.  Mildly increased left atrial chamber size.  Left atrial volume index of 36.3 ml/m².  Mildly increased right atrial chamber size.  Aortic valve sclerosis without stenosis or regurgitation.  Mild mitral valve regurgitation.  Mild tricuspid valve regurgitation.  Exercise tolerance: good (>4 METS)  Positive for pacemaker   Positive for dysrhythmias - Chronic A-fib  Positive for hyperlipidemia    GI/HEPATIC/RENAL Review:  Patient does not have a GI/hepatic/renalhistory       End/Other Review:    Positive for anemia - Acute    Overall Review of Systems Comments:  3 week history SOB and black stool.  Additional Results:  EKG:  Encounter Date: 11/03/23  -ECG:        Result                      Value                           Systolic Blood Pressure     122                             Diastolic Blood Pressu*     68                              Ventricular Rate EKG/M*     81                              Atrial Rate (BPM)           111                             QRS-Interval (MSEC)         112                             QT-Interval (MSEC)          402                             QTc                         467                             R Axis (Degrees)            31                              T Axis (Degrees)            38                              REPORT TEXT                                         Subjective Data:  Remains in NSR  Off supplemental O2        Objective Data:  Last Recorded Vitals:  Vitals:    12/14/24 1942 12/14/24 2022 12/15/24 0541 12/15/24 0733   BP: 121/61  130/62    BP Location: Right arm  Right arm    Patient Position: Sitting  Lying    Pulse: 99 87 85    Resp: 23 20 21    Temp: 36.2 °C (97.1 °F)  36.3 °C (97.4 °F)    TempSrc: Temporal  Temporal    SpO2: 94% 90% 91% 92%   Weight:   105 kg (231 lb 12.8 oz)    Height:           Last Labs:  CBC - 12/15/2024:  5:45 AM  8.4 10.1 329    33.5      CMP - 12/15/2024:  5:45 AM  8.1 6.2 10 --- 0.4   3.7 2.8 14 61      PTT - No results in last year.  2.7   30.2 _     TROPHS   Date/Time Value Ref Range Status   12/11/2024 01:15 PM 84 0 - 13 ng/L Final     Comment:     Previous result verified on 12/11/2024 1158 on specimen/case [id number] called with component [facility] for procedure Troponin I, High Sensitivity, Initial with value 81 ng/L.   12/11/2024 11:20 AM 81 0 - 13 ng/L Final   03/06/2024 02:57 PM 7 0 - 13 ng/L Final     BNP   Date/Time Value Ref Range Status   12/11/2024 11:20 AM 1,187 0 - 99 pg/mL Final   12/25/2023 02:18  0 - 99 pg/mL Final     HGBA1C   Date/Time Value Ref Range Status   10/20/2023 05:22 AM 7.0 see below % Final   08/02/2018 03:17 AM 6.2 % Final     Comment:          Diagnosis of Diabetes-Adults   Non-Diabetic: < or = 5.6%   Increased risk for developing diabetes: 5.7-6.4%   Diagnostic of diabetes: > or = 6.5%  .       Monitoring of Diabetes                Age (y)     Therapeutic Goal (%)   Adults:          >18           <7.0   Pediatrics:    13-18           <7.5                   7-12           <8.0                   0- 6            7.5-8.5   American Diabetes Association. Diabetes Care 33(S1), Jan 2010.     08/01/2018 11:52 AM 6.2 % Final     Comment:          Diagnosis of Diabetes-Adults   Non-Diabetic: < or = 5.6%   Increased risk for developing diabetes: 5.7-6.4%   Diagnostic of diabetes: > or = 6.5%  .             Atrial fibrillation   , rate controlled   Abnormal ECG   When compared with ECG of   13-JUL-2023 09:39,   Nonspecific T wave abnormality now evident in   Inferior leads   Confirmed by WENDY GALDAMEZ M.D. ([id number]),  Lillian Davis ([id number]) on 11/3/2023 11:01:45 AM    Echo:  Ejection Fraction       Date                     Value               Ref Range           Status                11/03/2023               60                  %                   Final            ----------      Relevant Problems   No relevant active problems       Physical Exam     Airway   Mallampati: II  TM Distance: >3 FB  Neck ROM: Full  TMJ Mobility: Good    Cardiovascular    Cardio Rhythm: Regular  Cardio Rate: Normal    General Assessment  General Assessment: Alert and oriented and No acute distress    Dental Exam  Dental exam normal    Pulmonary Exam  Pulmonary exam normal    Abdominal Exam  Abdominal exam normal      Anesthesia Plan:    ASA Status: 3  Anesthesia Type: MAC      Post-op Pain Management: Per Surgeon      Checklist  Reviewed: Lab Results, Medications, Problem list, Allergies, Past Med History, Care Everywhere and NPO Status  Consent/Risks Discussed Statement:  The proposed anesthetic plan, including its risks and benefits, have been discussed with the Patient along with the risks and benefits of alternatives. Questions were encouraged and answered and the patient and/or representative understands and agrees to proceed.        I discussed with the patient (and/or patient's legal representative) the risks and benefits of the proposed anesthesia plan, MAC, which may include services performed by other anesthesia providers.    Alternative anesthesia plans, if available, were reviewed with the patient (and/or patient's legal representative). Discussion has been held with the patient (and/or patient's legal representative) regarding risks of anesthesia, which include conversion to general anesthesia, intra-operative awareness,  Monitoring of Diabetes                Age (y)     Therapeutic Goal (%)   Adults:          >18           <7.0   Pediatrics:    13-18           <7.5                   7-12           <8.0                   0- 6            7.5-8.5   American Diabetes Association. Diabetes Care 33(S1), Jan 2010.       LDLCALC   Date/Time Value Ref Range Status   10/20/2023 05:22 AM 60 <=99 mg/dL Final     Comment:                                 Near   Borderline      AGE      Desirable  Optimal    High     High     Very High     0-19 Y     0 - 109     ---    110-129   >/= 130     ----    20-24 Y     0 - 119     ---    120-159   >/= 160     ----      >24 Y     0 -  99   100-129  130-159   160-189     >/=190       VLDL   Date/Time Value Ref Range Status   10/20/2023 05:22 AM 13 0 - 40 mg/dL Final      Last I/O:  I/O last 3 completed shifts:  In: 1605.8 (15.3 mL/kg) [P.O.:1060; I.V.:545.8 (5.2 mL/kg)]  Out: 1675 (15.9 mL/kg) [Urine:1675 (0.4 mL/kg/hr)]  Weight: 105.1 kg     Past Cardiology Tests (Last 3 Years):  Transthoracic Echo (TTE) Complete 03/06/2024  PHYSICIAN INTERPRETATION:  Left Ventricle: Left ventricular systolic function is low normal, with an estimated ejection fraction of 50-55%. There are no regional wall motion abnormalities. The left ventricular cavity size is normal. Spectral Doppler shows a normal pattern of left ventricular diastolic filling.  Left Atrium: The left atrium is mildly dilated.  Right Ventricle: The right ventricle is upper limits of normal in size. There is normal right ventricular global systolic function.  Right Atrium: The right atrium is normal in size.  Aortic Valve: The aortic valve is trileaflet. There is minimal aortic valve cusp calcification. There is trivial aortic valve regurgitation. The peak instantaneous gradient of the aortic valve is 9.6 mmHg. The mean gradient of the aortic valve is 6.0 mmHg.  Mitral Valve: The mitral valve is normal in structure. There is no evidence of mitral valve  nausea and vomiting and emergent situations that may require change in anesthesia plan.    The patient (and/or patient's legal representative) has indicated understanding, his/her questions have been answered, and he/she wishes to proceed with the planned anesthetic.     regurgitation.  Tricuspid Valve: The tricuspid valve is structurally normal. There is trace tricuspid regurgitation.  Pulmonic Valve: The pulmonic valve is not well visualized. There is no indication of pulmonic valve regurgitation.  Pericardium: There is no pericardial effusion noted.  Aorta: The aortic root was not well visualized.     10/20/2023 Echo:  CONCLUSIONS:   1. Left ventricular systolic function is mildly decreased with a 45-50% estimated ejection fraction.   2. Spectral Doppler shows an impaired relaxation pattern of left ventricular diastolic filling.   3. There is mild tricuspid regurgitation.   4. Technically difficult bubble study unable to interpret.     Cath:  Cardiac catheterization - coronary 01/29/2024  CONCLUSIONS:   1. Left main: no significant angiographic disease.   2. LAD: diffuse 40% prox-mid disease.   3. LCx: 60% mid-vessel disease.   4. RCA: mild diffuse irregularities.   5. LVEDP 26mmHg, no aortic stenosis on LV-Ao gradient.     Stress Test:  Nuclear Stress Test 12/26/2023  IMPRESSION:  Mild attentuation of the anterior wall seen on the stress images.  This is either soft tissue attenuation or ischemia in LAD territory.  Unfortunately there are no prone images available. Dilated left  ventricle with mild left ventricular systolic dysfunction on post  stress gated imaging.  Summary:   1. No clinical or electrocardiographic evidence for ischemia at maximal infusion.   2. Correlate with myocardial perfusion imaging results.   3. Nuclear image results are reported separately.       Inpatient Medications:  Scheduled medications   Medication Dose Route Frequency    amiodarone  400 mg oral BID    Followed by    [START ON 12/22/2024] amiodarone  200 mg oral Daily    apixaban  5 mg oral BID    aspirin  81 mg oral Daily    atorvastatin  80 mg oral Nightly    cephalexin  500 mg oral q6h ALEXANDER    ezetimibe  10 mg oral Daily    furosemide  40 mg oral Daily    guaiFENesin  600 mg oral BID    insulin  lispro  0-10 Units subcutaneous TID AC    PARoxetine  20 mg oral Nightly    polyethylene glycol  17 g oral Daily    sennosides-docusate sodium  2 tablet oral BID    spironolactone  25 mg oral Daily    zinc oxide  1 Application Topical TID     PRN medications   Medication    albuterol    dextrose    dextrose    glucagon    glucagon    metoprolol    oxygen     Continuous Medications   Medication Dose Last Rate       Physical Exam:  Constitutional: Pleasant, Awake/Alert/Oriented to person place and time.   Head: Atraumatic, Normocephalic  Eyes: EOMI. RIP  Neck: No JVD  Cardiovascular: Regular rate and rhythm, S1, S2. No extra heart sounds or murmurs  Respiratory: Clear to auscultation bilaterally. No wheezing, rales or rhonchi. Good chest wall expansion  Abdomen: Soft, Nontender, Obese. Bowel sounds appreciated  Musculoskeletal: ROM intact. Muscle strength grossly intact upper and lower extremities 5/5.   Neurological: CNII-XII intact. Sensation grossly intact  Extremities: Warm and dry. Trace edema BLE   Psychiatric: Appropriate mood and affect       Assessment/Plan   Very pleasant 74 y.o. 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     Assessment:  Acute on chronic diastolic HF  Paroxysmal a.fib with RVR-- back in NSR   PVCs     Plan:  -Transition to PO lasix 40mg daily  -Add spironolactone 25mg daily  -Transition to PO amiodarone 400mg BID for 8 days through 12/22/2024, then reduce to 200mg daily   -Continue apixaban, aspirin, atorvastatin and zetia  -Recommend monitoring overnight from a cardiac standpoint    -Dispo planning per primary team, PT/OT  -Will arrange follow up in [facility] cardiology as well as with EP at discharge.   -We will sign off. Please call with questions.     Peripheral IV 20 G Distal;Right Forearm (Active)   Site Assessment Clean;Dry;Intact 12/14/24 2100   Dressing Status Clean;Dry 12/14/24 2100   Number of days:        Peripheral IV 12/12/24 20 G Right;Dorsal Forearm (Active)   Site Assessment Clean;Dry;Intact 12/14/24 2035   Dressing Status Clean;Dry 12/14/24 2035   Number of days: 3       Peripheral IV 12/13/24 22 G Right;Posterior Hand (Active)   Site Assessment Clean;Dry;Intact 12/14/24 2100   Dressing Status Clean;Dry 12/14/24 2100   Number of days: 2       Code Status:  DNR and No Intubation        ANITA Dhillon-CNP

## 2024-12-15 NOTE — CARE PLAN
The patient's goals for the shift include  rest.    The clinical goals for the shift include Pt will remain safe throughout shift.

## 2024-12-16 ENCOUNTER — APPOINTMENT (OUTPATIENT)
Dept: CARDIOLOGY | Facility: HOSPITAL | Age: 74
DRG: 291 | End: 2024-12-16
Payer: MEDICARE

## 2024-12-16 VITALS
BODY MASS INDEX: 40.5 KG/M2 | WEIGHT: 228.6 LBS | SYSTOLIC BLOOD PRESSURE: 93 MMHG | HEART RATE: 82 BPM | RESPIRATION RATE: 24 BRPM | HEIGHT: 63 IN | DIASTOLIC BLOOD PRESSURE: 77 MMHG | OXYGEN SATURATION: 100 % | TEMPERATURE: 97.7 F

## 2024-12-16 PROBLEM — I50.9 ACUTE EXACERBATION OF CHRONIC HEART FAILURE: Status: RESOLVED | Noted: 2024-12-11 | Resolved: 2024-12-16

## 2024-12-16 LAB
ALBUMIN SERPL BCP-MCNC: 2.7 G/DL (ref 3.4–5)
ALP SERPL-CCNC: 63 U/L (ref 33–136)
ALT SERPL W P-5'-P-CCNC: 11 U/L (ref 7–45)
ANION GAP SERPL CALC-SCNC: 15 MMOL/L (ref 10–20)
AST SERPL W P-5'-P-CCNC: 9 U/L (ref 9–39)
BILIRUB SERPL-MCNC: 0.5 MG/DL (ref 0–1.2)
BUN SERPL-MCNC: 22 MG/DL (ref 6–23)
CALCIUM SERPL-MCNC: 8.2 MG/DL (ref 8.6–10.3)
CHLORIDE SERPL-SCNC: 94 MMOL/L (ref 98–107)
CO2 SERPL-SCNC: 36 MMOL/L (ref 21–32)
CREAT SERPL-MCNC: 1.19 MG/DL (ref 0.5–1.05)
EGFRCR SERPLBLD CKD-EPI 2021: 47 ML/MIN/1.73M*2
ERYTHROCYTE [DISTWIDTH] IN BLOOD BY AUTOMATED COUNT: 17.4 % (ref 11.5–14.5)
GLUCOSE BLD MANUAL STRIP-MCNC: 140 MG/DL (ref 74–99)
GLUCOSE BLD MANUAL STRIP-MCNC: 156 MG/DL (ref 74–99)
GLUCOSE BLD MANUAL STRIP-MCNC: 192 MG/DL (ref 74–99)
GLUCOSE SERPL-MCNC: 166 MG/DL (ref 74–99)
HCT VFR BLD AUTO: 34.3 % (ref 36–46)
HGB BLD-MCNC: 10.2 G/DL (ref 12–16)
MAGNESIUM SERPL-MCNC: 1.7 MG/DL (ref 1.6–2.4)
MCH RBC QN AUTO: 24.9 PG (ref 26–34)
MCHC RBC AUTO-ENTMCNC: 29.7 G/DL (ref 32–36)
MCV RBC AUTO: 84 FL (ref 80–100)
NRBC BLD-RTO: 0 /100 WBCS (ref 0–0)
PLATELET # BLD AUTO: 315 X10*3/UL (ref 150–450)
POTASSIUM SERPL-SCNC: 4.1 MMOL/L (ref 3.5–5.3)
PROT SERPL-MCNC: 6.3 G/DL (ref 6.4–8.2)
RBC # BLD AUTO: 4.09 X10*6/UL (ref 4–5.2)
SODIUM SERPL-SCNC: 141 MMOL/L (ref 136–145)
WBC # BLD AUTO: 8.7 X10*3/UL (ref 4.4–11.3)

## 2024-12-16 PROCEDURE — 83735 ASSAY OF MAGNESIUM: CPT

## 2024-12-16 PROCEDURE — 80053 COMPREHEN METABOLIC PANEL: CPT

## 2024-12-16 PROCEDURE — 2500000004 HC RX 250 GENERAL PHARMACY W/ HCPCS (ALT 636 FOR OP/ED)

## 2024-12-16 PROCEDURE — 94762 N-INVAS EAR/PLS OXIMTRY CONT: CPT

## 2024-12-16 PROCEDURE — 93005 ELECTROCARDIOGRAM TRACING: CPT

## 2024-12-16 PROCEDURE — 2500000001 HC RX 250 WO HCPCS SELF ADMINISTERED DRUGS (ALT 637 FOR MEDICARE OP)

## 2024-12-16 PROCEDURE — 2500000002 HC RX 250 W HCPCS SELF ADMINISTERED DRUGS (ALT 637 FOR MEDICARE OP, ALT 636 FOR OP/ED): Performed by: NURSE PRACTITIONER

## 2024-12-16 PROCEDURE — 99239 HOSP IP/OBS DSCHRG MGMT >30: CPT

## 2024-12-16 PROCEDURE — 36415 COLL VENOUS BLD VENIPUNCTURE: CPT

## 2024-12-16 PROCEDURE — 82947 ASSAY GLUCOSE BLOOD QUANT: CPT

## 2024-12-16 PROCEDURE — 97530 THERAPEUTIC ACTIVITIES: CPT | Mod: GO,CO,59

## 2024-12-16 PROCEDURE — 2500000001 HC RX 250 WO HCPCS SELF ADMINISTERED DRUGS (ALT 637 FOR MEDICARE OP): Performed by: NURSE PRACTITIONER

## 2024-12-16 PROCEDURE — 85027 COMPLETE CBC AUTOMATED: CPT

## 2024-12-16 PROCEDURE — 2500000002 HC RX 250 W HCPCS SELF ADMINISTERED DRUGS (ALT 637 FOR MEDICARE OP, ALT 636 FOR OP/ED)

## 2024-12-16 RX ORDER — MAGNESIUM SULFATE HEPTAHYDRATE 40 MG/ML
2 INJECTION, SOLUTION INTRAVENOUS ONCE
Status: COMPLETED | OUTPATIENT
Start: 2024-12-16 | End: 2024-12-16

## 2024-12-16 RX ORDER — FUROSEMIDE 40 MG/1
40 TABLET ORAL DAILY
Start: 2024-12-17 | End: 2025-01-16

## 2024-12-16 RX ORDER — SPIRONOLACTONE 25 MG/1
25 TABLET ORAL DAILY
Qty: 30 TABLET | Refills: 0 | Status: SHIPPED | OUTPATIENT
Start: 2024-12-17 | End: 2025-01-16

## 2024-12-16 RX ORDER — AMIODARONE HYDROCHLORIDE 200 MG/1
TABLET ORAL
Qty: 54 TABLET | Refills: 0 | Status: SHIPPED | OUTPATIENT
Start: 2024-12-16 | End: 2025-01-21

## 2024-12-16 ASSESSMENT — COGNITIVE AND FUNCTIONAL STATUS - GENERAL
EATING MEALS: A LITTLE
MOBILITY SCORE: 11
TURNING FROM BACK TO SIDE WHILE IN FLAT BAD: A LOT
DRESSING REGULAR UPPER BODY CLOTHING: A LITTLE
TOILETING: A LOT
MOVING FROM LYING ON BACK TO SITTING ON SIDE OF FLAT BED WITH BEDRAILS: A LOT
MOVING TO AND FROM BED TO CHAIR: A LOT
DRESSING REGULAR LOWER BODY CLOTHING: A LOT
DAILY ACTIVITIY SCORE: 15
CLIMB 3 TO 5 STEPS WITH RAILING: TOTAL
TOILETING: A LOT
WALKING IN HOSPITAL ROOM: A LOT
DRESSING REGULAR LOWER BODY CLOTHING: A LOT
HELP NEEDED FOR BATHING: A LOT
DRESSING REGULAR UPPER BODY CLOTHING: A LITTLE
STANDING UP FROM CHAIR USING ARMS: A LOT
HELP NEEDED FOR BATHING: A LOT
DAILY ACTIVITIY SCORE: 17
PERSONAL GROOMING: A LITTLE

## 2024-12-16 ASSESSMENT — PAIN SCALES - GENERAL
PAINLEVEL_OUTOF10: 0 - NO PAIN
PAINLEVEL_OUTOF10: 0 - NO PAIN

## 2024-12-16 ASSESSMENT — PAIN - FUNCTIONAL ASSESSMENT: PAIN_FUNCTIONAL_ASSESSMENT: 0-10

## 2024-12-16 NOTE — PROGRESS NOTES
Occupational Therapy    Occupational Therapy Treatment    Name: Elsa Biggs  MRN: [id number]  Department: [address]  Room: [address] [place] [address]  Date: 12/16/24  Time Calculation  Start Time: 1135  Stop Time: 1152  Time Calculation (min): 17 min    Assessment:  OT Assessment: Pt demonstrated good benefit from skilled OT interventions including balance and body mechanics education in course of transfers and safety with LRD in the inpatient environment this am. Pt continues to demonstrate need for continued skilled OT in acute and post discharge location for safe return to prior living setting and maximum independent PLOF.  Prognosis: Good  Barriers to Discharge Home: Caregiver assistance, Physical needs (Pt continues to require assist in excess of caregiver ability for safe discharge home at this time.)  Caregiver Assistance: Caregiver assistance needed per identified barriers - however, level of patient's required assistance exceeds assistance available at home  Evaluation/Treatment Tolerance: Patient tolerated treatment well, Patient limited by fatigue  Medical Staff Made Aware: Yes  End of Session Communication: Bedside nurse, PCT/NA/CTA  End of Session Patient Position: Up in chair, Alarm on  Plan:  Treatment Interventions: ADL retraining, Functional transfer training, Endurance training, Patient/family training, Equipment evaluation/education, Compensatory technique education  OT Frequency: 3 times per week  OT Discharge Recommendations: Moderate intensity level of continued care  Equipment Recommended upon Discharge: Wheeled walker  OT Recommended Transfer Status: Assist of 1, Moderate assist  OT - OK to Discharge: Yes (In accord with OT POC)    Subjective   Previous Visit Info:  OT Last Visit  OT Received On: 12/16/24  General:  General  Reason for Referral: OT services needed to asesss independence with ADLs, IADLs, and functional transfers and mobility.  Referred By: Dr. SHIRLENE Toussaint  Past Medical History Relevant to Rehab: 74  y.o. female with hx HFmrEF PAF on Eliquis, HTN, T2DM, dyslipidemia, obesity, depression, coronary angiography 1/29/2024 which was negative for obstructive CAD (LM mild disease, RCA mild diffuse disease presenting today for dyspnea. Admitted secondary to acute excaberation of CHF  Family/Caregiver Present: No  Prior to Session Communication: Bedside nurse (Pt appropriate for treatment without new limitations, discharge to [facility] rehab later this afternoon.)  Patient Position Received: Bed, 3 rail up, Alarm on  Preferred Learning Style: verbal  General Comment: Pt cooperative and compliant with OT treatment. Session shortened to accomodate arrival of lunch.  Precautions:  Medical Precautions: Fall precautions (IV, telemetry, O2 via nasal cannula at 3 liters,)    Vital Signs (Past 2hrs)        Date/Time Vitals Session Patient Position Pulse Resp SpO2 BP MAP (mmHg)    12/16/24 1023 --  --  84  24  97 %  99/76  85     12/16/24 1135 During OT  --  82  --  100 %  --  --                    Pain Assessment:  Pain Assessment  Pain Assessment: 0-10  0-10 (Numeric) Pain Score: 0 - No pain     Objective   Cognition:  Overall Cognitive Status: Within Functional Limits    Bed Mobility/Transfers: Bed Mobility  Bed Mobility: Yes  Bed Mobility 1  Bed Mobility 1: Supine to sitting, Scooting  Level of Assistance 1: Moderate assistance, Moderate verbal cues  Bed Mobility Comments 1: Pt stable once in static sitting at bed edge.    Transfers  Transfer: Yes  Transfer 1  Transfer From 1: Bed to  Transfer to 1: Stand  Technique 1: Sit to stand, Stand to sit  Transfer Device 1: Walker  Transfer Level of Assistance 1: Minimum assistance, Moderate verbal cues, Moderate tactile cues  Trials/Comments 1: Initial retro lean with standing requiring posterior support to maintain FROILAN over COG.  Transfers 2  Transfer From 2: Stand to  Transfer to 2: Chair with arms  Technique 2: Stand pivot  Transfer Device 2: Walker  Transfer Level of  Assistance 2: Minimum assistance, Moderate verbal cues  Trials/Comments 2: Increased time and verbal cues required for problemsolving and motor planning appropriate device use.    Sitting Balance:  Static Sitting Balance  Static Sitting-Balance Support: Left upper extremity supported  Static Sitting-Level of Assistance: Contact guard  Dynamic Sitting Balance  Dynamic Sitting-Balance Support: Bilateral upper extremity supported  Dynamic Sitting-Level of Assistance: Minimum assistance  Dynamic Sitting-Balance: Forward lean  Dynamic Sitting-Comments: 5 reps  Standing Balance:  Static Standing Balance  Static Standing-Balance Support: Bilateral upper extremity supported  Static Standing-Level of Assistance: Minimum assistance (posterior support)  Static Standing-Comment/Number of Minutes: 4 minutes    Outcome Measures:  [facility] Daily Activity  Putting on and taking off regular lower body clothing: A lot  Bathing (including washing, rinsing, drying): A lot  Putting on and taking off regular upper body clothing: A little  Toileting, which includes using toilet, bedpan or urinal: A lot  Taking care of personal grooming such as brushing teeth: None  Eating Meals: None  Daily Activity - Total Score: 17    Education Documentation  Precautions, taught by MOHSEN Shannon at 12/16/2024 12:04 PM.  Learner: Patient  Readiness: Acceptance  Method: Explanation, Demonstration  Response: Verbalizes Understanding, Demonstrated Understanding, Needs Reinforcement    Body Mechanics, taught by MOHSEN Shannon at 12/16/2024 12:04 PM.  Learner: Patient  Readiness: Acceptance  Method: Explanation, Demonstration  Response: Verbalizes Understanding, Demonstrated Understanding, Needs Reinforcement    Education Comments  Pt receptive and responsive to instruction and education provided in course of session.    Goals:  Encounter Problems       Encounter Problems (Active)       OT Goals       ADLs  (Progressing)       Start:  12/15/24     Expected End:  12/29/24       Pt will complete all ADLs (I.e., bathing, toileting, hygiene, eating, dressing UE and LE) with mod assist for safety while demonstrating adequate endurance and strength without showing signs of moderate pain, SOB, fall risks and/or fatigue.          Functional t/f  (Progressing)       Start:  12/15/24    Expected End:  12/29/24       Pt will complete functional t/f  with stand by assistance with <1 verbal cue on safety and following precaution to increase safety and decrease fall risks during functional activities in a unsupported seat position.

## 2024-12-16 NOTE — NURSING NOTE
1411 Called to give report to [place] [facility] staff. Was transferred to unit. NO. Answer. Left a msg to call back.    1641 Called tog I've report to Nursing staff. No answer. Unable to leave msg.

## 2024-12-16 NOTE — CARE PLAN
The patient's goals for the shift include  rest.    The clinical goals for the shift include patient will remain safe throughout shift

## 2024-12-16 NOTE — DISCHARGE SUMMARY
Discharge Diagnosis  Acute on chronic diastolic heart failure  Atrial fibrillation with RVR  Non-purulent cellulitis of left lower extremity  Hypokalemia    Issues Requiring Follow-Up  Acute exacerbation of chronic heart failure  Acute on chronic diastolic CHF  Atrial fibrillation with RVR   -Patient to follow up with [facility] as well as with EP - arranged by cardiology  -Started on amio gtt inpatient and converted to NSR - transitioned to PO amiodarone 400mg BID for 8 days through 12/22/2024, then reduce to 200mg daily    -Transition to PO lasix 40mg daily   -Add spironolactone 25mg daily   -RFP ordered in 1 week to follow up electrolytes and kidney function    Non-purulent cellulitis of left lower extremity   -Completed course of Keflex while inpatient   -Referral placed for wound care center    Discharge Meds     Medication List      START taking these medications     amiodarone 200 mg tablet; Commonly known as: Pacerone; Take 2 tablets   (400 mg) by mouth 2 times a day for 6 days, THEN 1 tablet (200 mg) once   daily.; Start taking on: December 16, 2024   spironolactone 25 mg tablet; Commonly known as: Aldactone; Take 1 tablet   (25 mg) by mouth once daily.; Start taking on: December 17, 2024     CHANGE how you take these medications     furosemide 40 mg tablet; Commonly known as: Lasix; Take 1 tablet (40 mg)   by mouth once daily.; Start taking on: December 17, 2024; What changed:   medication strength, how much to take, when to take this     CONTINUE taking these medications     albuterol 90 mcg/actuation inhaler   aspirin 81 mg EC tablet; Take 1 tablet (81 mg) by mouth once daily.   atorvastatin 80 mg tablet; Commonly known as: Lipitor; Take 1 tablet (80   mg) by mouth once daily at bedtime.   cholecalciferol 25 MCG (1000 UT) tablet; Commonly known as: Vitamin D-3   Eliquis 5 mg tablet; Generic drug: apixaban; Take 1 tablet (5 mg) by   mouth 2 times a day.   ezetimibe 10 mg tablet; Commonly known  as: Zetia   Gemtesa 75 mg tablet; Generic drug: vibegron; Take 1 tablet (75 mg) by   mouth once daily at bedtime.   ibuprofen 600 mg tablet   metFORMIN 500 mg tablet; Commonly known as: Glucophage   PARoxetine 20 mg tablet; Commonly known as: Paxil   pioglitazone 30 mg tablet; Commonly known as: Actos     STOP taking these medications     cephalexin 500 mg capsule; Commonly known as: Keflex       Test Results Pending At Discharge  Pending Labs       No current pending labs.            Hospital Course  Patient presented to [facility] ED on 12/11 from home complaining of dyspnea.  Per patient, symptoms have been progressing over last three weeks during which time her Lasix dose was decreased from 40 mg three times weekly to 20 mg three times weekly.  Endorses weight gain of unknown quantity and cellulitis of left lower leg treated with Keflex without complete resolution.  Upon presentation to ED, patient was afebrile, tachycardic at 140, hypertensive at 143/104 with O2 saturation of 94%.  CXR showed diffuse interstitial infiltrates and bibasilar airspace consolidations.    Wound care and cardio consulted.  Patient initially started on 40 mg IV Lasix daily and Keflex 500 mg BID.  Per cardio recs, started on Lasix 10 mg / hr for diuresis and amiodarone 0.5 mg / min for control of PAF. Patient converted back to NSR with this regimen.    Wound care on discharge for venous stasis ulcer of her left lower leg.    #Acute on chronic diastolic HF  #Paroxysmal a.fib with RVR-- back in NSR     On discharge cardiology recommended:   Plan:  - PO lasix 40mg daily  -START spironolactone 25mg daily  -Transition to PO amiodarone 400mg BID for 8 days through 12/22/2024, then reduce to 200mg daily   -Continue apixaban, aspirin, atorvastatin and zetia  -follow up in [facility] cardiology as well as with EP at discharge.      On discharge podiatry   - Recommend continuing medihoney, aquacel, ABD, and light ace bandage.   - Change every other  day.  Keep legs elevated and heels offloaded.  Xeroform and mepilex/cosmopore to right calf ulceration.  Change every other day.    Pertinent Physical Exam At Time of Discharge  Physical Exam  Constitutional:       Appearance: Normal appearance. She is obese.   HENT:      Head: Normocephalic and atraumatic.      Mouth/Throat:      Mouth: Mucous membranes are moist.      Pharynx: Oropharynx is clear.   Cardiovascular:      Rate and Rhythm: Normal rate and regular rhythm.   Pulmonary:      Effort: Pulmonary effort is normal. No respiratory distress.      Breath sounds: No wheezing, rhonchi or rales.   Abdominal:      General: There is no distension.      Palpations: Abdomen is soft.      Tenderness: There is no abdominal tenderness.   Musculoskeletal:      Right lower leg: Edema present.      Left lower leg: Edema present.      Comments: Trace edema in bilateral lower extremities, significantly improved    Skin:     General: Skin is warm and dry.      Findings: Lesion (LLE wound wrapped - tender to palpation) present.   Neurological:      General: No focal deficit present.      Mental Status: She is alert.   Psychiatric:         Mood and Affect: Mood normal.         Behavior: Behavior normal.         Outpatient Follow-Up  Future Appointments   Date Time Provider Department Center   12/26/2024  9:30 AM AMANDA Dhillon GEACR1 [facility]   1/13/2025  1:40 PM MD JASWANT Gonsalez1 [facility]   2/5/2025  2:30 PM Kenn PetersonD [id number]   2/6/2025  9:00 AM Milagro Mckeon, [place]dul[place] [id number]   5/15/2025  3:00 PM AMANDA Dhillon GEDONTAE1 [facility]         Connie Mckeon MD  Internal Medicine, PGY-1

## 2024-12-16 NOTE — PROGRESS NOTES
12/16/24 1202   Discharge Planning   Expected Discharge Disposition SNF  (Patient discharging to [facility] today. Transport confirmed for 1430. Nurse provided report number. Notified spouse Kyle of dc plan.)   Does the patient need discharge transport arranged? Yes   RoundTrip coordination needed? Yes   Has discharge transport been arranged? Yes   What day is the transport expected? 12/16/24   What time is the transport expected? 1430   Patient Choice   Provider Choice list and CMS website (https://medicare.gov/care-compare#search) for post-acute Quality and Resource Measure Data were provided and reviewed with: Patient;Family   Patient / Family choosing to utilize agency / facility established prior to hospitalization No   Stroke Family Assessment   Stroke Family Assessment Needed No   Intensity of Service   Intensity of Service 0-30 min

## 2024-12-17 LAB
Q ONSET: 216 MS
QRS COUNT: 23 BEATS
QRS DURATION: 94 MS
QT INTERVAL: 308 MS
QTC CALCULATION(BAZETT): 470 MS
QTC FREDERICIA: 408 MS
R AXIS: -6 DEGREES
T AXIS: 183 DEGREES
T OFFSET: 370 MS
VENTRICULAR RATE: 140 BPM

## 2024-12-19 ENCOUNTER — NURSING HOME VISIT (OUTPATIENT)
Dept: POST ACUTE CARE | Facility: EXTERNAL LOCATION | Age: 74
End: 2024-12-19
Payer: MEDICARE

## 2024-12-19 DIAGNOSIS — Z00.00 ROUTINE GENERAL MEDICAL EXAMINATION AT A HEALTH CARE FACILITY: ICD-10-CM

## 2024-12-19 DIAGNOSIS — I50.32 CHRONIC DIASTOLIC HEART FAILURE: ICD-10-CM

## 2024-12-19 DIAGNOSIS — Z78.9 NURSING HOME RESIDENT: ICD-10-CM

## 2024-12-19 DIAGNOSIS — Z86.39 HISTORY OF TYPE 2 DIABETES MELLITUS: ICD-10-CM

## 2024-12-19 DIAGNOSIS — L03.116 CELLULITIS OF LEFT LOWER EXTREMITY: ICD-10-CM

## 2024-12-19 DIAGNOSIS — F33.9 RECURRENT MAJOR DEPRESSIVE DISORDER, REMISSION STATUS UNSPECIFIED (CMS-HCC): ICD-10-CM

## 2024-12-19 DIAGNOSIS — K59.00 CONSTIPATION, UNSPECIFIED CONSTIPATION TYPE: ICD-10-CM

## 2024-12-19 PROCEDURE — 99306 1ST NF CARE HIGH MDM 50: CPT | Performed by: INTERNAL MEDICINE

## 2024-12-19 NOTE — LETTER
Patient: Elsa Biggs  : 1950    Encounter Date: 2024    Name: Elsa Biggs  : 1950  MRN: [id number]  Visit Date: 2024  Chief Complaint: HISTORY AND PHYSICAL    HPI: 73 y/o, DNRCCA, presented to ER with dyspnea that was progressing. Her Lasix was decreased from 40mg 3x a wk to 20mg recently. She was found to have cellulitis of LLE tx'ed with PO Keflex OP without improvement. Diuresed with IV Lasix and started on IV amiodarone d/t afib. She converted while on this. Discharged on PO Lasix 40mg, started aldactone 25mg and transitioned to PO amiodarone 400mg BID x8 days (till 2024) then decrease to 200mg QD. FU with [facility] cardio and EP. Once stabilized, pt was brought to [facility] on 2024 for ongoing med mgt and therapy services.     Subjective: Seen and examined today. Denies N/V/D/C/CP. No fever or chills. Reviewed hospitalization with pt. Questions answered with understanding verbalized. Nursing team report no issues.     The patient was counseled regarding diagnostic results, instructions for management, risk factor reductions, prognosis, patient and family education, impressions, risks and benefits of treatment options and importance of compliance with treatment. I have reviewed nursing notes since my last visit and document any significant changes Reviewed orders, medications, Labs. Reviewed chart looking at current medications, treatments, labs, x-rays etc.     ROS:  As above in subjective. Otherwise, all other systems have been reviewed and are negative for complaint.    Medications:  Medications reviewed and verified in NH chart.     Past Medical History:   Diagnosis Date   • A-fib (Multi)    • Anxiety    • Breast cancer (Multi)     16 tx radiation left lumpectomy    • Cancer (Multi)    • Depression    • Diabetes mellitus (Multi)    • GERD (gastroesophageal reflux disease)    • Hyperlipidemia    • Hypertension    • Stroke (Multi)        Past Surgical History:    Procedure Laterality Date   • BREAST SURGERY Left     lumpectomy for cancer   • CARDIAC CATHETERIZATION N/A 2024    Procedure: Left Heart Cath;  Surgeon: Virgil Childress MD;  Location: [facility] Cardiac Cath Lab;  Service: Cardiovascular;  Laterality: N/A;   • CHOLECYSTECTOMY     • FRACTURE SURGERY      left femur and LLE fabrice  knee plate   • JOINT REPLACEMENT Left    • MR HEAD ANGIO WO IV CONTRAST  2023    MR HEAD ANGIO WO IV CONTRAST 2023 [facility] MRI   • MR NECK ANGIO W AND WO IV CONTRAST  2022    MR NECK ANGIO W AND WO IV CONTRAST 2022 [facility] ANCILLARY LEGACY   • MR NECK ANGIO WO IV CONTRAST  2023    MR NECK ANGIO WO IV CONTRAST 2023 [facility] MRI   • OTHER SURGICAL HISTORY Right     Transcarotid Artery Revascularization   • REVISION TOTAL HIP ARTHROPLASTY Left    • TUBAL LIGATION         Family History   Problem Relation Name Age of Onset   • Heart attack Father  65        blood clot   • Coronary artery disease Sister         Social History     Tobacco Use   • Smoking status: Former     Current packs/day: 0.00     Types: Cigarettes     Quit date:      Years since quittin.0   • Smokeless tobacco: Never   Substance Use Topics   • Alcohol use: Never     Alcohol/week: 1.0 standard drink of alcohol     Types: 1 Standard drinks or equivalent per week       Allergies   Allergen Reactions   • Tetracycline Rash        Vital Signs:   Vital Signs were reviewed in nursing home documentation.    Physical Exam  Vitals reviewed. Exam conducted with a chaperone present.   Constitutional:       Appearance: Normal appearance. She is well-developed and overweight.   HENT:      Head: Normocephalic.      Right Ear: External ear normal.      Left Ear: External ear normal.      Nose: Nose normal.      Mouth/Throat:      Lips: Pink.      Mouth: Mucous membranes are moist.   Eyes:      General: Lids are normal.      Pupils: Pupils are equal, round, and reactive to light.   Neck:      Trachea: Trachea  normal.   Cardiovascular:      Rate and Rhythm: Normal rate and regular rhythm.      Heart sounds: Normal heart sounds.   Pulmonary:      Effort: Pulmonary effort is normal.      Breath sounds: Decreased breath sounds present.   Abdominal:      General: Bowel sounds are normal.      Palpations: Abdomen is soft.   Musculoskeletal:      Cervical back: Full passive range of motion without pain.      Right lower leg: Edema present.      Left lower leg: Edema present.   Skin:     General: Skin is warm and moist.      Findings: Bruising and wound present.      Comments: LLE cellulitis wrapped with dressing in place, tender to touch   Neurological:      General: No focal deficit present.      Mental Status: She is alert and oriented to person, place, and time. Mental status is at baseline.      Motor: Weakness present.   Psychiatric:         Attention and Perception: Attention normal.         Mood and Affect: Mood normal.         Speech: Speech normal.         Behavior: Behavior is cooperative.         Thought Content: Thought content normal.         Cognition and Memory: Cognition normal.         Judgment: Judgment normal.       Lab Results   Component Value Date    WBC 6.4 12/23/2024    HGB 9.6 (L) 12/23/2024    HCT 32.4 (L) 12/23/2024     12/23/2024    CHOL 113 10/20/2023    TRIG 66 10/20/2023    HDL 40.1 10/20/2023    ALT 11 12/16/2024    AST 9 12/16/2024     (L) 12/23/2024    K 4.6 12/23/2024    CL 94 (L) 12/23/2024    CREATININE 1.25 (H) 12/23/2024    BUN 27 (H) 12/23/2024    CO2 33 (H) 12/23/2024    TSH 1.94 03/05/2024    INR 2.7 (H) 12/11/2024    HGBA1C 7.0 (H) 10/20/2023     Results were reviewed and addressed accordingly. Lab Results were also reviewed in [facility].     Provider Impression:   Dyspnea 2/2 Acute on Chronic exacerbation of chronic heart failure, AFib with RVR  - pt to follow up with [facility] cardiology as well as with EP - arranged by cardiology  - Started on amio gtt inpatient and  converted to NSR - transitioned to PO amiodarone 400mg BID for 8 days through 12/22/2024, then reduce to 200mg daily   - Transitioned to PO lasix 40mg daily  - Added spironolactone 25mg daily  - monitor electrolytes and kidney function  - c/w amiodarone for rate control  - c/w eliqus for stroke prevention    Non-purulent cellulitis of left lower extremity  - Completed course of Keflex while inpatient  - Referral placed for wound care eval and tx  - elevate LE when sedentary     HLD  - c/w ezetimibe, statin    OAB  - c/w gemtesa    DM2 - HgbA1c was 7.0 on 10/20/2024  - c/w actos and metformin  - monitor BGL    Depression  - c/w paxil  - mood stable    Constipation Prevention  - c/w stool softeners and laxatives PRN  - monitor BM's    Gastric Protection  - c/w PPI    Code Status  - Full Code  ----------------  Written by Elsa Leonard RN, acting as a scribe for Dr. Kate. This note accurately reflects the work and decisions made by Dr. Kate.     I, Dr. Kate, attest all medical record entries made by the scribe were under my direction and were personally dictated by me. I have reviewed the chart and agree that the record accurately reflects my performance of the history, physical exam, and assessment and plan.       Electronically Signed By: Tunde Lee MD   1/7/25  5:22 PM

## 2024-12-21 LAB
ATRIAL RATE: 86 BPM
P AXIS: -1 DEGREES
P OFFSET: 186 MS
P ONSET: 125 MS
PR INTERVAL: 190 MS
Q ONSET: 220 MS
QRS COUNT: 14 BEATS
QRS DURATION: 98 MS
QT INTERVAL: 418 MS
QTC CALCULATION(BAZETT): 500 MS
QTC FREDERICIA: 471 MS
R AXIS: -1 DEGREES
T AXIS: 198 DEGREES
T OFFSET: 429 MS
VENTRICULAR RATE: 86 BPM

## 2024-12-23 ENCOUNTER — LAB REQUISITION (OUTPATIENT)
Dept: LAB | Facility: HOSPITAL | Age: 74
End: 2024-12-23
Payer: MEDICARE

## 2024-12-23 DIAGNOSIS — Z79.899 OTHER LONG TERM (CURRENT) DRUG THERAPY: ICD-10-CM

## 2024-12-23 DIAGNOSIS — E11.9 TYPE 2 DIABETES MELLITUS WITHOUT COMPLICATIONS (MULTI): ICD-10-CM

## 2024-12-23 DIAGNOSIS — I50.9 ACUTE EXACERBATION OF CHRONIC HEART FAILURE: ICD-10-CM

## 2024-12-23 DIAGNOSIS — I10 ESSENTIAL (PRIMARY) HYPERTENSION: ICD-10-CM

## 2024-12-23 DIAGNOSIS — I48.92 ATRIAL FLUTTER, UNSPECIFIED TYPE (MULTI): ICD-10-CM

## 2024-12-23 LAB
ANION GAP SERPL CALC-SCNC: 12 MMOL/L (ref 10–20)
BUN SERPL-MCNC: 27 MG/DL (ref 6–23)
CALCIUM SERPL-MCNC: 8.1 MG/DL (ref 8.6–10.3)
CHLORIDE SERPL-SCNC: 94 MMOL/L (ref 98–107)
CO2 SERPL-SCNC: 33 MMOL/L (ref 21–32)
CREAT SERPL-MCNC: 1.25 MG/DL (ref 0.5–1.05)
EGFRCR SERPLBLD CKD-EPI 2021: 45 ML/MIN/1.73M*2
ERYTHROCYTE [DISTWIDTH] IN BLOOD BY AUTOMATED COUNT: 17.4 % (ref 11.5–14.5)
GLUCOSE SERPL-MCNC: 82 MG/DL (ref 74–99)
HCT VFR BLD AUTO: 32.4 % (ref 36–46)
HGB BLD-MCNC: 9.6 G/DL (ref 12–16)
MCH RBC QN AUTO: 25 PG (ref 26–34)
MCHC RBC AUTO-ENTMCNC: 29.6 G/DL (ref 32–36)
MCV RBC AUTO: 84 FL (ref 80–100)
NRBC BLD-RTO: 0 /100 WBCS (ref 0–0)
PLATELET # BLD AUTO: 288 X10*3/UL (ref 150–450)
POTASSIUM SERPL-SCNC: 4.6 MMOL/L (ref 3.5–5.3)
RBC # BLD AUTO: 3.84 X10*6/UL (ref 4–5.2)
SODIUM SERPL-SCNC: 134 MMOL/L (ref 136–145)
WBC # BLD AUTO: 6.4 X10*3/UL (ref 4.4–11.3)

## 2024-12-23 PROCEDURE — 36415 COLL VENOUS BLD VENIPUNCTURE: CPT | Mod: OUT | Performed by: INTERNAL MEDICINE

## 2024-12-23 PROCEDURE — 80048 BASIC METABOLIC PNL TOTAL CA: CPT | Mod: OUT | Performed by: INTERNAL MEDICINE

## 2024-12-23 PROCEDURE — 85027 COMPLETE CBC AUTOMATED: CPT | Mod: OUT | Performed by: INTERNAL MEDICINE

## 2024-12-23 RX ORDER — SPIRONOLACTONE 25 MG/1
25 TABLET ORAL DAILY
Qty: 30 TABLET | Refills: 0 | Status: SHIPPED | OUTPATIENT
Start: 2024-12-23 | End: 2025-01-22

## 2024-12-23 RX ORDER — AMIODARONE HYDROCHLORIDE 200 MG/1
200 TABLET ORAL DAILY
Qty: 30 TABLET | Refills: 0 | Status: SHIPPED | OUTPATIENT
Start: 2024-12-23 | End: 2025-01-22

## 2025-01-01 PROCEDURE — RXMED WILLOW AMBULATORY MEDICATION CHARGE

## 2025-01-07 ENCOUNTER — PHARMACY VISIT (OUTPATIENT)
Dept: PHARMACY | Facility: CLINIC | Age: 75
End: 2025-01-07
Payer: COMMERCIAL

## 2025-01-07 PROBLEM — I50.32 CHRONIC DIASTOLIC HEART FAILURE: Status: ACTIVE | Noted: 2025-01-07

## 2025-01-07 PROBLEM — Z00.00 ROUTINE GENERAL MEDICAL EXAMINATION AT A HEALTH CARE FACILITY: Status: ACTIVE | Noted: 2025-01-07

## 2025-01-07 PROBLEM — L03.116 CELLULITIS OF LEFT LOWER EXTREMITY: Status: ACTIVE | Noted: 2025-01-07

## 2025-01-07 NOTE — PROGRESS NOTES
Name: Elsa Biggs  : 1950  MRN: [id number]  Visit Date: 2024  Chief Complaint: HISTORY AND PHYSICAL    HPI: 75 y/o, DNRCCA, presented to ER with dyspnea that was progressing. Her Lasix was decreased from 40mg 3x a wk to 20mg recently. She was found to have cellulitis of LLE tx'ed with PO Keflex OP without improvement. Diuresed with IV Lasix and started on IV amiodarone d/t afib. She converted while on this. Discharged on PO Lasix 40mg, started aldactone 25mg and transitioned to PO amiodarone 400mg BID x8 days (till 2024) then decrease to 200mg QD. [facility]. Once stabilized, pt was brought to [facility] on 2024 for ongoing med mgt and therapy services.     Subjective: Seen and examined today. Denies N/V/D/C/CP. No fever or chills. Reviewed hospitalization with pt. Questions answered with understanding verbalized. Nursing team report no issues.     The patient was counseled regarding diagnostic results, instructions for management, risk factor reductions, prognosis, patient and family education, impressions, risks and benefits of treatment options and importance of compliance with treatment. I have reviewed nursing notes since my last visit and document any significant changes Reviewed orders, medications, Labs. Reviewed chart looking at current medications, treatments, labs, x-rays etc.     ROS:  As above in subjective. Otherwise, all other systems have been reviewed and are negative for complaint.    Medications:  Medications reviewed and verified in NH chart.     Past Medical History:   Diagnosis Date    A-fib (Multi)     Anxiety     Breast cancer (Multi)     16 tx radiation left lumpectomy     Cancer (Multi)     Depression     Diabetes mellitus (Multi)     GERD (gastroesophageal reflux disease)     Hyperlipidemia     Hypertension     Stroke (Multi)        Past Surgical History:   Procedure Laterality Date    BREAST SURGERY Left     lumpectomy for cancer    CARDIAC  CATHETERIZATION N/A 2024    Procedure: Left Heart Cath;  Surgeon: Virgil Childress MD;  Location: [facility] Cardiac Cath Lab;  Service: Cardiovascular;  Laterality: N/A;    CHOLECYSTECTOMY      FRACTURE SURGERY      left femur and LLE fabrice  knee plate    JOINT REPLACEMENT Left     MR HEAD ANGIO WO IV CONTRAST  2023    MR HEAD ANGIO WO IV CONTRAST 2023 [facility] MRI    MR NECK ANGIO W AND WO IV CONTRAST  2022    MR NECK ANGIO W AND WO IV CONTRAST 2022 [facility] ANCILLARY LEGACY    MR NECK ANGIO WO IV CONTRAST  2023    MR NECK ANGIO WO IV CONTRAST 2023 [facility] MRI    OTHER SURGICAL HISTORY Right     Transcarotid Artery Revascularization    REVISION TOTAL HIP ARTHROPLASTY Left     TUBAL LIGATION         Family History   Problem Relation Name Age of Onset    Heart attack Father  65        blood clot    Coronary artery disease Sister         Social History     Tobacco Use    Smoking status: Former     Current packs/day: 0.00     Types: Cigarettes     Quit date:      Years since quittin.0    Smokeless tobacco: Never   Substance Use Topics    Alcohol use: Never     Alcohol/week: 1.0 standard drink of alcohol     Types: 1 Standard drinks or equivalent per week       Allergies   Allergen Reactions    Tetracycline Rash        Vital Signs:   Vital Signs were reviewed in nursing home documentation.    Physical Exam  Vitals reviewed. Exam conducted with a chaperone present.   Constitutional:       Appearance: Normal appearance. She is well-developed and overweight.   HENT:      Head: Normocephalic.      Right Ear: External ear normal.      Left Ear: External ear normal.      Nose: Nose normal.      Mouth/Throat:      Lips: Pink.      Mouth: Mucous membranes are moist.   Eyes:      General: Lids are normal.      Pupils: Pupils are equal, round, and reactive to light.   Neck:      Trachea: Trachea normal.   Cardiovascular:      Rate and Rhythm: Normal rate and regular rhythm.      Heart sounds: Normal  heart sounds.   Pulmonary:      Effort: Pulmonary effort is normal.      Breath sounds: Decreased breath sounds present.   Abdominal:      General: Bowel sounds are normal.      Palpations: Abdomen is soft.   Musculoskeletal:      Cervical back: Full passive range of motion without pain.      Right lower leg: Edema present.      Left lower leg: Edema present.   Skin:     General: Skin is warm and moist.      Findings: Bruising and wound present.      Comments: LLE cellulitis wrapped with dressing in place, tender to touch   Neurological:      General: No focal deficit present.      Mental Status: She is alert and oriented to person, place, and time. Mental status is at baseline.      Motor: Weakness present.   Psychiatric:         Attention and Perception: Attention normal.         Mood and Affect: Mood normal.         Speech: Speech normal.         Behavior: Behavior is cooperative.         Thought Content: Thought content normal.         Cognition and Memory: Cognition normal.         Judgment: Judgment normal.       Lab Results   Component Value Date    WBC 6.4 12/23/2024    HGB 9.6 (L) 12/23/2024    HCT 32.4 (L) 12/23/2024     12/23/2024    CHOL 113 10/20/2023    TRIG 66 10/20/2023    HDL 40.1 10/20/2023    ALT 11 12/16/2024    AST 9 12/16/2024     (L) 12/23/2024    K 4.6 12/23/2024    CL 94 (L) 12/23/2024    CREATININE 1.25 (H) 12/23/2024    BUN 27 (H) 12/23/2024    CO2 33 (H) 12/23/2024    TSH 1.94 03/05/2024    INR 2.7 (H) 12/11/2024    HGBA1C 7.0 (H) 10/20/2023     Results were reviewed and addressed accordingly. Lab Results were also reviewed in [facility].     Provider Impression:   Dyspnea 2/2 Acute on Chronic exacerbation of chronic heart failure, AFib with RVR  - pt to [facility] cardi[facility] as well as with EP - arranged by cardiology  - Started on amio gtt inpatient and converted to NSR - transitioned to PO amiodarone 400mg BID for 8 days through 12/22/2024, then reduce to  200mg daily   - Transitioned to PO lasix 40mg daily  - Added spironolactone 25mg daily  - monitor electrolytes and kidney function  - c/w amiodarone for rate control  - c/w eliqus for stroke prevention    Non-purulent cellulitis of left lower extremity  - Completed course of Keflex while inpatient  - Referral placed for wound care eval and tx  - elevate LE when sedentary     HLD  - c/w ezetimibe, statin    OAB  - c/w gemtesa    DM2 - HgbA1c was 7.0 on 10/20/2024  - c/w actos and metformin  - monitor BGL    Depression  - c/w paxil  - mood stable    Constipation Prevention  - c/w stool softeners and laxatives PRN  - monitor BM's    Gastric Protection  - c/w PPI    Code Status  - Full Code  ----------------  Written by Elsa Leonard RN, acting as a scribe for Dr. Kate. This note accurately reflects the work and decisions made by Dr. Kate.     I, Dr. Kate, attest all medical record entries made by the scribe were under my direction and were personally dictated by me. I have reviewed the chart and agree that the record accurately reflects my performance of the history, physical exam, and assessment and plan.

## 2025-01-12 NOTE — PROGRESS NOTES
[facility] Heart and Vascular Electrophysiology    Patient Name: Elsa Biggs  Patient : 1950    Referred for  pAfib    History of Present Illness:  Elsa Biggs is a 74 y.o. year old female patient with:    pAfib : Patient on Eliquis, recently admitted to the hospital (2024) with acute diastolic HF and found to be in Afib with RVR, converted while on an Amiodarone gtt then transitioned to PO amiodarone and back in NSR with PVCs.  HTN  DLD  T2DM  Depression  Stroke  Obesity        Past Medical History:  She has a past medical history of A-fib (Multi), Anxiety, Breast cancer (Multi), Cancer (Multi), Depression, Diabetes mellitus (Multi), GERD (gastroesophageal reflux disease), Hyperlipidemia, Hypertension, and Stroke (Multi).    Past Surgical History:  She has a past surgical history that includes MR angio neck w and wo IV contrast (2022); Joint replacement (Left); Fracture surgery; Breast surgery (Left); MR angio head wo IV contrast (2023); MR angio neck wo IV contrast (2023); Cardiac catheterization (N/A, 2024); Revision total hip arthroplasty (Left); Cholecystectomy; Tubal ligation; and Other surgical history (Right).      Social History:  She reports that she quit smoking about 42 years ago. Her smoking use included cigarettes. She has never used smokeless tobacco. She reports that she does not drink alcohol and does not use drugs.    Family History:  Family History   Problem Relation Name Age of Onset    Heart attack Father  65        blood clot    Coronary artery disease Sister          Allergies:  Tetracycline    Outpatient Medications:  Current Outpatient Medications   Medication Instructions    albuterol 90 mcg/actuation inhaler 2 puffs, inhalation, Every 6 hours PRN    amiodarone (PACERONE) 200 mg, oral, Daily    aspirin 81 mg, oral, Daily    atorvastatin (LIPITOR) 80 mg, oral, Nightly    cholecalciferol (Vitamin D-3) 25 MCG (1000 UT) tablet 1 tablet, oral, Daily     Eliquis 5 mg, oral, 2 times daily    ezetimibe (ZETIA) 10 mg, oral, Daily    furosemide (LASIX) 40 mg, oral, Daily    Gemtesa 75 mg, oral, Nightly    ibuprofen 600 mg, oral, As needed    metFORMIN (GLUCOPHAGE) 500 mg, oral, Every morning, And 2 tabs In the evening    PARoxetine (PAXIL) 20 mg, oral, Nightly    pioglitazone (ACTOS) 30 mg, oral, Every 24 hours    spironolactone (ALDACTONE) 25 mg, oral, Daily        ROS:  A 14 point review of systems was done and is negative other than as stated in HPI    Physical Exam    Vitals:  There were no vitals taken for this visit.       Labs:   CBC  Lab Results   Component Value Date    WBC 6.4 12/23/2024    HGB 9.6 (L) 12/23/2024    HCT 32.4 (L) 12/23/2024    MCV 84 12/23/2024     12/23/2024        Renal Function Panel  Lab Results   Component Value Date    GLUCOSE 82 12/23/2024     (L) 12/23/2024    K 4.6 12/23/2024    CL 94 (L) 12/23/2024    CO2 33 (H) 12/23/2024    ANIONGAP 12 12/23/2024    BUN 27 (H) 12/23/2024    CREATININE 1.25 (H) 12/23/2024    GFRF >90 11/01/2022    CALCIUM 8.1 (L) 12/23/2024        CMP  Lab Results   Component Value Date    CALCIUM 8.1 (L) 12/23/2024    PHOS 3.7 03/05/2024    PROT 6.3 (L) 12/16/2024    ALBUMIN 2.7 (L) 12/16/2024    AST 9 12/16/2024    ALT 11 12/16/2024    ALKPHOS 63 12/16/2024    BILITOT 0.5 12/16/2024       TSH  Lab Results   Component Value Date    TSH 1.94 03/05/2024          Cardiac Testing:  ECG  01/13/2025      Echocardiogram  03/06/2024  PHYSICIAN INTERPRETATION:  Left Ventricle: Left ventricular systolic function is low normal, with an estimated ejection fraction of 50-55%. There are no regional wall motion abnormalities. The left ventricular cavity size is normal. Spectral Doppler shows a normal pattern of left ventricular diastolic filling.  Left Atrium: The left atrium is mildly dilated.  Right Ventricle: The right ventricle is upper limits of normal in size. There is normal right ventricular global systolic  function.  Right Atrium: The right atrium is normal in size.  Aortic Valve: The aortic valve is trileaflet. There is minimal aortic valve cusp calcification. There is trivial aortic valve regurgitation. The peak instantaneous gradient of the aortic valve is 9.6 mmHg. The mean gradient of the aortic valve is 6.0 mmHg.  Mitral Valve: The mitral valve is normal in structure. There is no evidence of mitral valve regurgitation.  Tricuspid Valve: The tricuspid valve is structurally normal. There is trace tricuspid regurgitation.  Pulmonic Valve: The pulmonic valve is not well visualized. There is no indication of pulmonic valve regurgitation.  Pericardium: There is no pericardial effusion noted.  Aorta: The aortic root was not well visualized.        CONCLUSIONS:   1. Left ventricular systolic function is low normal with a 50-55% estimated ejection fraction.    Carotid artery duplex bilateral   04/02/2024  CONCLUSIONS:  Right Carotid: The stent in the right internal carotid artery is widely patent with no evidence of stenosis. Right external carotid artery appears patent with no evidence of stenosis. The external carotid artery is not well visualized. The right vertebral artery is patent with antegrade flow. No evidence of hemodynamically significant stenosis in the right subclavian artery.  Left Carotid: Findings are consistent with less than 50% stenosis of the left proximal internal carotid artery. Left external carotid artery appears patent with no evidence of stenosis. The left vertebral artery is patent with antegrade flow. No evidence of hemodynamically significant stenosis in the left subclavian artery.  Stent:  Pre Stent 87 cm/sec  Prox Stent 83  Mid Stent 96  Dist Stent 77  Post Stent 89.  Additional Findings:  Difficult exam due to body habitus and high bifurcations.        Comparison:  Compared with study from 12/26/2023, decrease in right internal carotid artery stent velocities.    Cardiac Cath  Procedure  01/29/2024  CONCLUSIONS:   1. Left main: no significant angiographic disease.   2. LAD: diffuse 40% prox-mid disease.   3. LCx: 60% mid-vessel disease.   4. RCA: mild diffuse irregularities.   5. LVEDP 26mmHg, no aortic stenosis on LV-Ao gradient.      Assessment:       Plan:

## 2025-01-13 ENCOUNTER — APPOINTMENT (OUTPATIENT)
Dept: RADIOLOGY | Facility: HOSPITAL | Age: 75
DRG: 291 | End: 2025-01-13
Payer: MEDICARE

## 2025-01-13 ENCOUNTER — APPOINTMENT (OUTPATIENT)
Dept: CARDIOLOGY | Facility: HOSPITAL | Age: 75
End: 2025-01-13
Payer: MEDICARE

## 2025-01-13 ENCOUNTER — HOSPITAL ENCOUNTER (INPATIENT)
Facility: HOSPITAL | Age: 75
LOS: 4 days | Discharge: NURSING, REHAB OR LONG-TERM CARE | DRG: 291 | End: 2025-01-17
Attending: EMERGENCY MEDICINE | Admitting: STUDENT IN AN ORGANIZED HEALTH CARE EDUCATION/TRAINING PROGRAM
Payer: MEDICARE

## 2025-01-13 ENCOUNTER — APPOINTMENT (OUTPATIENT)
Dept: CARDIOLOGY | Facility: HOSPITAL | Age: 75
DRG: 291 | End: 2025-01-13
Payer: MEDICARE

## 2025-01-13 DIAGNOSIS — E87.70 HYPERVOLEMIA, UNSPECIFIED HYPERVOLEMIA TYPE: ICD-10-CM

## 2025-01-13 DIAGNOSIS — J90 PLEURAL EFFUSION: ICD-10-CM

## 2025-01-13 DIAGNOSIS — S09.90XA HEAD INJURY, INITIAL ENCOUNTER: ICD-10-CM

## 2025-01-13 DIAGNOSIS — I50.9 CONGESTIVE HEART FAILURE, UNSPECIFIED HF CHRONICITY, UNSPECIFIED HEART FAILURE TYPE: ICD-10-CM

## 2025-01-13 DIAGNOSIS — I50.9 HEART FAILURE, UNSPECIFIED HF CHRONICITY, UNSPECIFIED HEART FAILURE TYPE: ICD-10-CM

## 2025-01-13 DIAGNOSIS — I50.43 CHF (CONGESTIVE HEART FAILURE), NYHA CLASS I, ACUTE ON CHRONIC, COMBINED: Primary | ICD-10-CM

## 2025-01-13 DIAGNOSIS — I50.41 ACUTE COMBINED SYSTOLIC (CONGESTIVE) AND DIASTOLIC (CONGESTIVE) HEART FAILURE: ICD-10-CM

## 2025-01-13 DIAGNOSIS — D68.9 COAGULOPATHY (MULTI): ICD-10-CM

## 2025-01-13 DIAGNOSIS — M54.9 BACK PAIN, UNSPECIFIED BACK LOCATION, UNSPECIFIED BACK PAIN LATERALITY, UNSPECIFIED CHRONICITY: ICD-10-CM

## 2025-01-13 DIAGNOSIS — G47.00 INSOMNIA, UNSPECIFIED TYPE: ICD-10-CM

## 2025-01-13 LAB
ABO GROUP (TYPE) IN BLOOD: NORMAL
ALBUMIN SERPL BCP-MCNC: 3.1 G/DL (ref 3.4–5)
ALP SERPL-CCNC: 57 U/L (ref 33–136)
ALT SERPL W P-5'-P-CCNC: 13 U/L (ref 7–45)
ANION GAP SERPL CALC-SCNC: 12 MMOL/L (ref 10–20)
ANTIBODY SCREEN: NORMAL
APPEARANCE UR: ABNORMAL
AST SERPL W P-5'-P-CCNC: 13 U/L (ref 9–39)
ATRIAL RATE: 84 BPM
BASOPHILS # BLD AUTO: 0.03 X10*3/UL (ref 0–0.1)
BASOPHILS NFR BLD AUTO: 0.2 %
BILIRUB SERPL-MCNC: 0.4 MG/DL (ref 0–1.2)
BILIRUB UR STRIP.AUTO-MCNC: NEGATIVE MG/DL
BNP SERPL-MCNC: 1818 PG/ML (ref 0–99)
BUN SERPL-MCNC: 23 MG/DL (ref 6–23)
CALCIUM SERPL-MCNC: 8.2 MG/DL (ref 8.6–10.3)
CHLORIDE SERPL-SCNC: 101 MMOL/L (ref 98–107)
CK SERPL-CCNC: 37 U/L (ref 0–215)
CO2 SERPL-SCNC: 27 MMOL/L (ref 21–32)
COLOR UR: ABNORMAL
CREAT SERPL-MCNC: 1.32 MG/DL (ref 0.5–1.05)
EGFRCR SERPLBLD CKD-EPI 2021: 42 ML/MIN/1.73M*2
EOSINOPHIL # BLD AUTO: 0.02 X10*3/UL (ref 0–0.4)
EOSINOPHIL NFR BLD AUTO: 0.1 %
ERYTHROCYTE [DISTWIDTH] IN BLOOD BY AUTOMATED COUNT: 17.8 % (ref 11.5–14.5)
ETHANOL SERPL-MCNC: <10 MG/DL
FLUAV RNA RESP QL NAA+PROBE: NOT DETECTED
FLUBV RNA RESP QL NAA+PROBE: NOT DETECTED
GLUCOSE BLD MANUAL STRIP-MCNC: 140 MG/DL (ref 74–99)
GLUCOSE BLD MANUAL STRIP-MCNC: 150 MG/DL (ref 74–99)
GLUCOSE SERPL-MCNC: 146 MG/DL (ref 74–99)
GLUCOSE UR STRIP.AUTO-MCNC: NORMAL MG/DL
HCT VFR BLD AUTO: 31.1 % (ref 36–46)
HGB BLD-MCNC: 9.8 G/DL (ref 12–16)
IMM GRANULOCYTES # BLD AUTO: 0.09 X10*3/UL (ref 0–0.5)
IMM GRANULOCYTES NFR BLD AUTO: 0.6 % (ref 0–0.9)
INR PPP: 3.4 (ref 0.9–1.1)
KETONES UR STRIP.AUTO-MCNC: NEGATIVE MG/DL
LACTATE SERPL-SCNC: 1.2 MMOL/L (ref 0.4–2)
LEUKOCYTE ESTERASE UR QL STRIP.AUTO: ABNORMAL
LYMPHOCYTES # BLD AUTO: 0.96 X10*3/UL (ref 0.8–3)
LYMPHOCYTES NFR BLD AUTO: 6.9 %
MCH RBC QN AUTO: 25 PG (ref 26–34)
MCHC RBC AUTO-ENTMCNC: 31.5 G/DL (ref 32–36)
MCV RBC AUTO: 79 FL (ref 80–100)
MONOCYTES # BLD AUTO: 0.53 X10*3/UL (ref 0.05–0.8)
MONOCYTES NFR BLD AUTO: 3.8 %
MUCOUS THREADS #/AREA URNS AUTO: ABNORMAL /LPF
NEUTROPHILS # BLD AUTO: 12.31 X10*3/UL (ref 1.6–5.5)
NEUTROPHILS NFR BLD AUTO: 88.4 %
NITRITE UR QL STRIP.AUTO: NEGATIVE
NRBC BLD-RTO: 0 /100 WBCS (ref 0–0)
P AXIS: 53 DEGREES
P OFFSET: 176 MS
P ONSET: 104 MS
PH UR STRIP.AUTO: 5.5 [PH]
PLATELET # BLD AUTO: 293 X10*3/UL (ref 150–450)
POTASSIUM SERPL-SCNC: 4 MMOL/L (ref 3.5–5.3)
PR INTERVAL: 230 MS
PROT SERPL-MCNC: 6.7 G/DL (ref 6.4–8.2)
PROT UR STRIP.AUTO-MCNC: ABNORMAL MG/DL
PROTHROMBIN TIME: 38.9 SECONDS (ref 9.8–12.8)
Q ONSET: 219 MS
QRS COUNT: 14 BEATS
QRS DURATION: 98 MS
QT INTERVAL: 366 MS
QTC CALCULATION(BAZETT): 432 MS
QTC FREDERICIA: 409 MS
R AXIS: 14 DEGREES
RBC # BLD AUTO: 3.92 X10*6/UL (ref 4–5.2)
RBC # UR STRIP.AUTO: ABNORMAL /UL
RBC #/AREA URNS AUTO: >20 /HPF
RH FACTOR (ANTIGEN D): NORMAL
RSV RNA RESP QL NAA+PROBE: NOT DETECTED
SARS-COV-2 RNA RESP QL NAA+PROBE: NOT DETECTED
SODIUM SERPL-SCNC: 136 MMOL/L (ref 136–145)
SP GR UR STRIP.AUTO: 1.02
SQUAMOUS #/AREA URNS AUTO: ABNORMAL /HPF
T AXIS: 241 DEGREES
T OFFSET: 402 MS
UROBILINOGEN UR STRIP.AUTO-MCNC: NORMAL MG/DL
VENTRICULAR RATE: 84 BPM
WBC # BLD AUTO: 13.9 X10*3/UL (ref 4.4–11.3)
WBC #/AREA URNS AUTO: ABNORMAL /HPF

## 2025-01-13 PROCEDURE — 2500000005 HC RX 250 GENERAL PHARMACY W/O HCPCS: Performed by: STUDENT IN AN ORGANIZED HEALTH CARE EDUCATION/TRAINING PROGRAM

## 2025-01-13 PROCEDURE — 82077 ASSAY SPEC XCP UR&BREATH IA: CPT | Performed by: EMERGENCY MEDICINE

## 2025-01-13 PROCEDURE — 2500000005 HC RX 250 GENERAL PHARMACY W/O HCPCS: Performed by: EMERGENCY MEDICINE

## 2025-01-13 PROCEDURE — 96375 TX/PRO/DX INJ NEW DRUG ADDON: CPT | Mod: 59

## 2025-01-13 PROCEDURE — 72128 CT CHEST SPINE W/O DYE: CPT | Mod: RCN

## 2025-01-13 PROCEDURE — 83880 ASSAY OF NATRIURETIC PEPTIDE: CPT | Performed by: EMERGENCY MEDICINE

## 2025-01-13 PROCEDURE — 85610 PROTHROMBIN TIME: CPT | Performed by: EMERGENCY MEDICINE

## 2025-01-13 PROCEDURE — 2500000001 HC RX 250 WO HCPCS SELF ADMINISTERED DRUGS (ALT 637 FOR MEDICARE OP): Performed by: STUDENT IN AN ORGANIZED HEALTH CARE EDUCATION/TRAINING PROGRAM

## 2025-01-13 PROCEDURE — 51702 INSERT TEMP BLADDER CATH: CPT

## 2025-01-13 PROCEDURE — 74176 CT ABD & PELVIS W/O CONTRAST: CPT

## 2025-01-13 PROCEDURE — 86850 RBC ANTIBODY SCREEN: CPT | Performed by: EMERGENCY MEDICINE

## 2025-01-13 PROCEDURE — 99223 1ST HOSP IP/OBS HIGH 75: CPT | Performed by: STUDENT IN AN ORGANIZED HEALTH CARE EDUCATION/TRAINING PROGRAM

## 2025-01-13 PROCEDURE — 71250 CT THORAX DX C-: CPT | Performed by: RADIOLOGY

## 2025-01-13 PROCEDURE — 96374 THER/PROPH/DIAG INJ IV PUSH: CPT | Mod: 59

## 2025-01-13 PROCEDURE — 2500000002 HC RX 250 W HCPCS SELF ADMINISTERED DRUGS (ALT 637 FOR MEDICARE OP, ALT 636 FOR OP/ED): Performed by: STUDENT IN AN ORGANIZED HEALTH CARE EDUCATION/TRAINING PROGRAM

## 2025-01-13 PROCEDURE — 82947 ASSAY GLUCOSE BLOOD QUANT: CPT

## 2025-01-13 PROCEDURE — 72125 CT NECK SPINE W/O DYE: CPT | Performed by: RADIOLOGY

## 2025-01-13 PROCEDURE — 82550 ASSAY OF CK (CPK): CPT | Performed by: EMERGENCY MEDICINE

## 2025-01-13 PROCEDURE — 36415 COLL VENOUS BLD VENIPUNCTURE: CPT | Performed by: EMERGENCY MEDICINE

## 2025-01-13 PROCEDURE — 81001 URINALYSIS AUTO W/SCOPE: CPT | Performed by: EMERGENCY MEDICINE

## 2025-01-13 PROCEDURE — 72131 CT LUMBAR SPINE W/O DYE: CPT | Mod: RCN | Performed by: RADIOLOGY

## 2025-01-13 PROCEDURE — 83605 ASSAY OF LACTIC ACID: CPT | Performed by: EMERGENCY MEDICINE

## 2025-01-13 PROCEDURE — 87086 URINE CULTURE/COLONY COUNT: CPT | Mod: GEALAB | Performed by: EMERGENCY MEDICINE

## 2025-01-13 PROCEDURE — G0390 TRAUMA RESPONS W/HOSP CRITI: HCPCS

## 2025-01-13 PROCEDURE — 70450 CT HEAD/BRAIN W/O DYE: CPT

## 2025-01-13 PROCEDURE — 9420000001 HC RT PATIENT EDUCATION 5 MIN

## 2025-01-13 PROCEDURE — 51798 US URINE CAPACITY MEASURE: CPT

## 2025-01-13 PROCEDURE — 72125 CT NECK SPINE W/O DYE: CPT

## 2025-01-13 PROCEDURE — 85025 COMPLETE CBC W/AUTO DIFF WBC: CPT | Performed by: EMERGENCY MEDICINE

## 2025-01-13 PROCEDURE — 93005 ELECTROCARDIOGRAM TRACING: CPT

## 2025-01-13 PROCEDURE — 74176 CT ABD & PELVIS W/O CONTRAST: CPT | Performed by: RADIOLOGY

## 2025-01-13 PROCEDURE — 72131 CT LUMBAR SPINE W/O DYE: CPT | Mod: RCN

## 2025-01-13 PROCEDURE — 2500000004 HC RX 250 GENERAL PHARMACY W/ HCPCS (ALT 636 FOR OP/ED): Performed by: EMERGENCY MEDICINE

## 2025-01-13 PROCEDURE — 1100000001 HC PRIVATE ROOM DAILY

## 2025-01-13 PROCEDURE — 84075 ASSAY ALKALINE PHOSPHATASE: CPT | Performed by: EMERGENCY MEDICINE

## 2025-01-13 PROCEDURE — 87637 SARSCOV2&INF A&B&RSV AMP PRB: CPT | Performed by: EMERGENCY MEDICINE

## 2025-01-13 PROCEDURE — 99291 CRITICAL CARE FIRST HOUR: CPT | Mod: 25 | Performed by: EMERGENCY MEDICINE

## 2025-01-13 PROCEDURE — 72128 CT CHEST SPINE W/O DYE: CPT | Mod: RCN | Performed by: RADIOLOGY

## 2025-01-13 RX ORDER — EZETIMIBE 10 MG/1
10 TABLET ORAL DAILY
Status: DISCONTINUED | OUTPATIENT
Start: 2025-01-13 | End: 2025-01-17 | Stop reason: HOSPADM

## 2025-01-13 RX ORDER — SPIRONOLACTONE 25 MG/1
25 TABLET ORAL DAILY
Status: DISCONTINUED | OUTPATIENT
Start: 2025-01-13 | End: 2025-01-17

## 2025-01-13 RX ORDER — FUROSEMIDE 10 MG/ML
40 INJECTION INTRAMUSCULAR; INTRAVENOUS DAILY
Status: DISCONTINUED | OUTPATIENT
Start: 2025-01-14 | End: 2025-01-14

## 2025-01-13 RX ORDER — ASPIRIN 81 MG/1
81 TABLET ORAL DAILY
Status: DISCONTINUED | OUTPATIENT
Start: 2025-01-13 | End: 2025-01-15

## 2025-01-13 RX ORDER — ATORVASTATIN CALCIUM 80 MG/1
80 TABLET, FILM COATED ORAL NIGHTLY
Status: DISCONTINUED | OUTPATIENT
Start: 2025-01-13 | End: 2025-01-17 | Stop reason: HOSPADM

## 2025-01-13 RX ORDER — KETOROLAC TROMETHAMINE 15 MG/ML
15 INJECTION, SOLUTION INTRAMUSCULAR; INTRAVENOUS ONCE
Status: COMPLETED | OUTPATIENT
Start: 2025-01-13 | End: 2025-01-13

## 2025-01-13 RX ORDER — ONDANSETRON HYDROCHLORIDE 2 MG/ML
4 INJECTION, SOLUTION INTRAVENOUS EVERY 6 HOURS PRN
Status: DISCONTINUED | OUTPATIENT
Start: 2025-01-13 | End: 2025-01-17 | Stop reason: HOSPADM

## 2025-01-13 RX ORDER — FUROSEMIDE 10 MG/ML
40 INJECTION INTRAMUSCULAR; INTRAVENOUS ONCE
Status: COMPLETED | OUTPATIENT
Start: 2025-01-13 | End: 2025-01-13

## 2025-01-13 RX ORDER — FUROSEMIDE 40 MG/1
40 TABLET ORAL DAILY
Status: DISCONTINUED | OUTPATIENT
Start: 2025-01-13 | End: 2025-01-17 | Stop reason: HOSPADM

## 2025-01-13 RX ORDER — ALBUTEROL SULFATE 0.83 MG/ML
2.5 SOLUTION RESPIRATORY (INHALATION) EVERY 6 HOURS PRN
Status: DISCONTINUED | OUTPATIENT
Start: 2025-01-13 | End: 2025-01-14

## 2025-01-13 RX ORDER — INSULIN LISPRO 100 [IU]/ML
0-15 INJECTION, SOLUTION INTRAVENOUS; SUBCUTANEOUS
Status: DISCONTINUED | OUTPATIENT
Start: 2025-01-13 | End: 2025-01-17 | Stop reason: HOSPADM

## 2025-01-13 RX ORDER — CHOLECALCIFEROL (VITAMIN D3) 25 MCG
1000 TABLET ORAL DAILY
Status: DISCONTINUED | OUTPATIENT
Start: 2025-01-13 | End: 2025-01-17 | Stop reason: HOSPADM

## 2025-01-13 RX ORDER — METFORMIN HYDROCHLORIDE 500 MG/1
500 TABLET ORAL EVERY MORNING
Status: DISCONTINUED | OUTPATIENT
Start: 2025-01-14 | End: 2025-01-17 | Stop reason: HOSPADM

## 2025-01-13 RX ORDER — DEXTROSE 50 % IN WATER (D50W) INTRAVENOUS SYRINGE
12.5
Status: DISCONTINUED | OUTPATIENT
Start: 2025-01-13 | End: 2025-01-17 | Stop reason: HOSPADM

## 2025-01-13 RX ORDER — PAROXETINE HYDROCHLORIDE 20 MG/1
20 TABLET, FILM COATED ORAL NIGHTLY
Status: DISCONTINUED | OUTPATIENT
Start: 2025-01-13 | End: 2025-01-17 | Stop reason: HOSPADM

## 2025-01-13 RX ORDER — TRAMADOL HYDROCHLORIDE 50 MG/1
50 TABLET ORAL EVERY 6 HOURS PRN
Status: DISCONTINUED | OUTPATIENT
Start: 2025-01-13 | End: 2025-01-17 | Stop reason: HOSPADM

## 2025-01-13 RX ORDER — ALBUTEROL SULFATE 90 UG/1
2 INHALANT RESPIRATORY (INHALATION) EVERY 6 HOURS PRN
Status: DISCONTINUED | OUTPATIENT
Start: 2025-01-13 | End: 2025-01-13

## 2025-01-13 RX ORDER — AMIODARONE HYDROCHLORIDE 200 MG/1
200 TABLET ORAL DAILY
Status: DISCONTINUED | OUTPATIENT
Start: 2025-01-13 | End: 2025-01-17 | Stop reason: HOSPADM

## 2025-01-13 RX ORDER — AMOXICILLIN 250 MG
2 CAPSULE ORAL NIGHTLY PRN
Status: DISCONTINUED | OUTPATIENT
Start: 2025-01-13 | End: 2025-01-17 | Stop reason: HOSPADM

## 2025-01-13 RX ORDER — DEXTROSE 50 % IN WATER (D50W) INTRAVENOUS SYRINGE
25
Status: DISCONTINUED | OUTPATIENT
Start: 2025-01-13 | End: 2025-01-17 | Stop reason: HOSPADM

## 2025-01-13 RX ORDER — FAMOTIDINE 20 MG/1
10 TABLET, FILM COATED ORAL 2 TIMES DAILY PRN
Status: DISCONTINUED | OUTPATIENT
Start: 2025-01-13 | End: 2025-01-17 | Stop reason: HOSPADM

## 2025-01-13 RX ORDER — PIOGLITAZONEHYDROCHLORIDE 30 MG/1
30 TABLET ORAL EVERY 24 HOURS
Status: DISCONTINUED | OUTPATIENT
Start: 2025-01-13 | End: 2025-01-17 | Stop reason: HOSPADM

## 2025-01-13 RX ORDER — ACETAMINOPHEN 500 MG
5 TABLET ORAL NIGHTLY PRN
Status: DISCONTINUED | OUTPATIENT
Start: 2025-01-13 | End: 2025-01-17 | Stop reason: HOSPADM

## 2025-01-13 RX ADMIN — AMIODARONE HYDROCHLORIDE 200 MG: 200 TABLET ORAL at 17:11

## 2025-01-13 RX ADMIN — APIXABAN 5 MG: 5 TABLET, FILM COATED ORAL at 20:11

## 2025-01-13 RX ADMIN — FAMOTIDINE 10 MG: 20 TABLET, FILM COATED ORAL at 17:11

## 2025-01-13 RX ADMIN — ATORVASTATIN CALCIUM 80 MG: 80 TABLET, FILM COATED ORAL at 20:11

## 2025-01-13 RX ADMIN — KETOROLAC TROMETHAMINE 15 MG: 15 INJECTION, SOLUTION INTRAMUSCULAR; INTRAVENOUS at 10:56

## 2025-01-13 RX ADMIN — Medication 1000 UNITS: at 17:30

## 2025-01-13 RX ADMIN — Medication 3 L/MIN: at 20:08

## 2025-01-13 RX ADMIN — EZETIMIBE 10 MG: 10 TABLET ORAL at 17:30

## 2025-01-13 RX ADMIN — PAROXETINE 20 MG: 20 TABLET, FILM COATED ORAL at 20:11

## 2025-01-13 RX ADMIN — Medication 4 L/MIN: at 10:57

## 2025-01-13 RX ADMIN — FUROSEMIDE 40 MG: 10 INJECTION, SOLUTION INTRAMUSCULAR; INTRAVENOUS at 12:32

## 2025-01-13 RX ADMIN — ASPIRIN 81 MG: 81 TABLET, COATED ORAL at 17:11

## 2025-01-13 SDOH — SOCIAL STABILITY: SOCIAL INSECURITY: DO YOU FEEL ANYONE HAS EXPLOITED OR TAKEN ADVANTAGE OF YOU FINANCIALLY OR OF YOUR PERSONAL PROPERTY?: NO

## 2025-01-13 SDOH — ECONOMIC STABILITY: FOOD INSECURITY: WITHIN THE PAST 12 MONTHS, YOU WORRIED THAT YOUR FOOD WOULD RUN OUT BEFORE YOU GOT THE MONEY TO BUY MORE.: NEVER TRUE

## 2025-01-13 SDOH — ECONOMIC STABILITY: FOOD INSECURITY: WITHIN THE PAST 12 MONTHS, THE FOOD YOU BOUGHT JUST DIDN'T LAST AND YOU DIDN'T HAVE MONEY TO GET MORE.: NEVER TRUE

## 2025-01-13 SDOH — ECONOMIC STABILITY: FOOD INSECURITY: HOW HARD IS IT FOR YOU TO PAY FOR THE VERY BASICS LIKE FOOD, HOUSING, MEDICAL CARE, AND HEATING?: NOT VERY HARD

## 2025-01-13 SDOH — SOCIAL STABILITY: SOCIAL INSECURITY: WITHIN THE LAST YEAR, HAVE YOU BEEN AFRAID OF YOUR PARTNER OR EX-PARTNER?: NO

## 2025-01-13 SDOH — ECONOMIC STABILITY: HOUSING INSECURITY: IN THE PAST 12 MONTHS, HOW MANY TIMES HAVE YOU MOVED WHERE YOU WERE LIVING?: 0

## 2025-01-13 SDOH — SOCIAL STABILITY: SOCIAL INSECURITY: DOES ANYONE TRY TO KEEP YOU FROM HAVING/CONTACTING OTHER FRIENDS OR DOING THINGS OUTSIDE YOUR HOME?: NO

## 2025-01-13 SDOH — SOCIAL STABILITY: SOCIAL INSECURITY: WERE YOU ABLE TO COMPLETE ALL THE BEHAVIORAL HEALTH SCREENINGS?: YES

## 2025-01-13 SDOH — SOCIAL STABILITY: SOCIAL INSECURITY: ARE THERE ANY APPARENT SIGNS OF INJURIES/BEHAVIORS THAT COULD BE RELATED TO ABUSE/NEGLECT?: NO

## 2025-01-13 SDOH — ECONOMIC STABILITY: HOUSING INSECURITY: IN THE LAST 12 MONTHS, WAS THERE A TIME WHEN YOU WERE NOT ABLE TO PAY THE MORTGAGE OR RENT ON TIME?: NO

## 2025-01-13 SDOH — ECONOMIC STABILITY: HOUSING INSECURITY: AT ANY TIME IN THE PAST 12 MONTHS, WERE YOU HOMELESS OR LIVING IN A SHELTER (INCLUDING NOW)?: NO

## 2025-01-13 SDOH — SOCIAL STABILITY: SOCIAL INSECURITY: WITHIN THE LAST YEAR, HAVE YOU BEEN HUMILIATED OR EMOTIONALLY ABUSED IN OTHER WAYS BY YOUR PARTNER OR EX-PARTNER?: NO

## 2025-01-13 SDOH — ECONOMIC STABILITY: INCOME INSECURITY: IN THE PAST 12 MONTHS HAS THE ELECTRIC, GAS, OIL, OR WATER COMPANY THREATENED TO SHUT OFF SERVICES IN YOUR HOME?: NO

## 2025-01-13 SDOH — SOCIAL STABILITY: SOCIAL INSECURITY: DO YOU FEEL UNSAFE GOING BACK TO THE PLACE WHERE YOU ARE LIVING?: NO

## 2025-01-13 SDOH — ECONOMIC STABILITY: TRANSPORTATION INSECURITY: IN THE PAST 12 MONTHS, HAS LACK OF TRANSPORTATION KEPT YOU FROM MEDICAL APPOINTMENTS OR FROM GETTING MEDICATIONS?: NO

## 2025-01-13 SDOH — SOCIAL STABILITY: SOCIAL INSECURITY: ARE YOU OR HAVE YOU BEEN THREATENED OR ABUSED PHYSICALLY, EMOTIONALLY, OR SEXUALLY BY ANYONE?: NO

## 2025-01-13 SDOH — SOCIAL STABILITY: SOCIAL INSECURITY: ABUSE: ADULT

## 2025-01-13 SDOH — SOCIAL STABILITY: SOCIAL INSECURITY: HAS ANYONE EVER THREATENED TO HURT YOUR FAMILY OR YOUR PETS?: NO

## 2025-01-13 SDOH — SOCIAL STABILITY: SOCIAL INSECURITY: HAVE YOU HAD THOUGHTS OF HARMING ANYONE ELSE?: NO

## 2025-01-13 ASSESSMENT — COGNITIVE AND FUNCTIONAL STATUS - GENERAL
STANDING UP FROM CHAIR USING ARMS: A LITTLE
DRESSING REGULAR UPPER BODY CLOTHING: A LITTLE
DRESSING REGULAR LOWER BODY CLOTHING: A LITTLE
DAILY ACTIVITIY SCORE: 20
DAILY ACTIVITIY SCORE: 20
DRESSING REGULAR UPPER BODY CLOTHING: A LITTLE
DRESSING REGULAR LOWER BODY CLOTHING: A LITTLE
MOBILITY SCORE: 14
MOVING TO AND FROM BED TO CHAIR: A LOT
WALKING IN HOSPITAL ROOM: A LITTLE
DAILY ACTIVITIY SCORE: 20
TURNING FROM BACK TO SIDE WHILE IN FLAT BAD: A LITTLE
TURNING FROM BACK TO SIDE WHILE IN FLAT BAD: A LITTLE
MOVING TO AND FROM BED TO CHAIR: A LITTLE
WALKING IN HOSPITAL ROOM: A LOT
STANDING UP FROM CHAIR USING ARMS: A LITTLE
MOVING FROM LYING ON BACK TO SITTING ON SIDE OF FLAT BED WITH BEDRAILS: A LITTLE
CLIMB 3 TO 5 STEPS WITH RAILING: A LOT
MOVING TO AND FROM BED TO CHAIR: A LITTLE
HELP NEEDED FOR BATHING: A LITTLE
DRESSING REGULAR UPPER BODY CLOTHING: A LITTLE
PATIENT BASELINE BEDBOUND: NO
MOBILITY SCORE: 18
CLIMB 3 TO 5 STEPS WITH RAILING: A LITTLE
STANDING UP FROM CHAIR USING ARMS: A LOT
TOILETING: A LITTLE
MOVING FROM LYING ON BACK TO SITTING ON SIDE OF FLAT BED WITH BEDRAILS: A LITTLE
MOBILITY SCORE: 17
DRESSING REGULAR LOWER BODY CLOTHING: A LITTLE
TOILETING: A LITTLE
HELP NEEDED FOR BATHING: A LITTLE
WALKING IN HOSPITAL ROOM: A LITTLE
MOVING FROM LYING ON BACK TO SITTING ON SIDE OF FLAT BED WITH BEDRAILS: A LITTLE
CLIMB 3 TO 5 STEPS WITH RAILING: A LOT
TOILETING: A LITTLE
TURNING FROM BACK TO SIDE WHILE IN FLAT BAD: A LITTLE
HELP NEEDED FOR BATHING: A LITTLE

## 2025-01-13 ASSESSMENT — ACTIVITIES OF DAILY LIVING (ADL)
ADEQUATE_TO_COMPLETE_ADL: YES
PATIENT'S MEMORY ADEQUATE TO SAFELY COMPLETE DAILY ACTIVITIES?: YES
LACK_OF_TRANSPORTATION: NO
GROOMING: INDEPENDENT
BATHING: INDEPENDENT
JUDGMENT_ADEQUATE_SAFELY_COMPLETE_DAILY_ACTIVITIES: YES
DRESSING YOURSELF: INDEPENDENT
TOILETING: INDEPENDENT
WALKS IN HOME: INDEPENDENT
HEARING - LEFT EAR: FUNCTIONAL
FEEDING YOURSELF: INDEPENDENT
HEARING - RIGHT EAR: FUNCTIONAL

## 2025-01-13 ASSESSMENT — LIFESTYLE VARIABLES
HOW MANY STANDARD DRINKS CONTAINING ALCOHOL DO YOU HAVE ON A TYPICAL DAY: PATIENT DOES NOT DRINK
EVER FELT BAD OR GUILTY ABOUT YOUR DRINKING: NO
HOW OFTEN DO YOU HAVE A DRINK CONTAINING ALCOHOL: NEVER
HAVE PEOPLE ANNOYED YOU BY CRITICIZING YOUR DRINKING: NO
HOW OFTEN DO YOU HAVE 6 OR MORE DRINKS ON ONE OCCASION: NEVER
AUDIT-C TOTAL SCORE: 0
TOTAL SCORE: 0
HAVE YOU EVER FELT YOU SHOULD CUT DOWN ON YOUR DRINKING: NO
SKIP TO QUESTIONS 9-10: 1
EVER HAD A DRINK FIRST THING IN THE MORNING TO STEADY YOUR NERVES TO GET RID OF A HANGOVER: NO
AUDIT-C TOTAL SCORE: 0

## 2025-01-13 ASSESSMENT — PAIN SCALES - GENERAL
PAINLEVEL_OUTOF10: 0 - NO PAIN
PAINLEVEL_OUTOF10: 0 - NO PAIN

## 2025-01-13 ASSESSMENT — PAIN - FUNCTIONAL ASSESSMENT
PAIN_FUNCTIONAL_ASSESSMENT: 0-10
PAIN_FUNCTIONAL_ASSESSMENT: 0-10

## 2025-01-13 ASSESSMENT — PATIENT HEALTH QUESTIONNAIRE - PHQ9
2. FEELING DOWN, DEPRESSED OR HOPELESS: NOT AT ALL
SUM OF ALL RESPONSES TO PHQ9 QUESTIONS 1 & 2: 0
1. LITTLE INTEREST OR PLEASURE IN DOING THINGS: NOT AT ALL

## 2025-01-13 NOTE — PROGRESS NOTES
Pharmacy Medication History Review    Elsa Biggs is a 74 y.o. female admitted for CHF (congestive heart failure), NYHA class I, acute on chronic, combined. Pharmacy reviewed the patient's [id number] medications and allergies for accuracy.    The list below reflectives the updated PTA list. Please review each medication in order reconciliation for additional clarification and justification.  Prior to Admission Medications   Prescriptions Last Dose Informant Patient Reported? Taking?   PARoxetine (Paxil) 20 mg tablet 1/12/2025 Evening Self Yes Yes   Sig: Take 1 tablet (20 mg) by mouth once daily at bedtime.   albuterol 90 mcg/actuation inhaler Unknown Self Yes Yes   Sig: Inhale 2 puffs every 6 hours if needed.   amiodarone (Pacerone) 200 mg tablet 1/12/2025 Morning Self Yes Yes   Sig: Take 1 tablet (200 mg) by mouth once daily.   apixaban (Eliquis) 5 mg tablet 1/12/2025 Evening Self Yes Yes   Sig: Take 1 tablet (5 mg) by mouth 2 times a day.   aspirin 81 mg EC tablet 1/12/2025 Morning Self Yes Yes   Sig: Take 1 tablet (81 mg) by mouth once daily.   atorvastatin (Lipitor) 80 mg tablet 1/12/2025 Evening Self Yes Yes   Sig: Take 1 tablet (80 mg) by mouth once daily at bedtime.   cholecalciferol (Vitamin D-3) 25 MCG (1000 UT) tablet 1/12/2025 Morning Self Yes Yes   Sig: Take 1 tablet (25 mcg) by mouth once daily.   ezetimibe (Zetia) 10 mg tablet 1/12/2025 Morning Self Yes Yes   Sig: Take 1 tablet (10 mg) by mouth once daily.   furosemide (Lasix) 40 mg tablet 1/10/2025 Morning Self Yes Yes   Sig: Take 1 tablet (40 mg) by mouth once daily.   Patient taking differently: Take 0.5 tablets (20 mg) by mouth once a day on Monday, Wednesday, and Friday.   ibuprofen 600 mg tablet Unknown Self Yes Yes   Sig: Take 1 tablet (600 mg) by mouth if needed.   metFORMIN (Glucophage) 500 mg tablet 1/12/2025 Evening Self Yes Yes   Sig: Take 1 tablet (500 mg) by mouth once daily in the morning. And 2 tabs In the evening    pioglitazone (Actos) 30 mg tablet 1/12/2025 Morning Self Yes Yes   Sig: Take 1 tablet (30 mg) by mouth once every 24 hours.   spironolactone (Aldactone) 25 mg tablet 1/12/2025 Morning Self Yes Yes   Sig: Take 1 tablet (25 mg) by mouth once daily.   vibegron (Gemtesa) 75 mg tablet 1/12/2025 Evening Self Yes Yes   Sig: Take 1 tablet (75 mg) by mouth once daily at bedtime.      Facility-Administered Medications: None           The list below reflectives the updated allergy list. Please review each documented allergy for additional clarification and justification.  Allergies  Reviewed by Brian Berman RN on 1/13/2025        Severity Reactions Comments    Tetracycline Medium Rash             Below are additional concerns with the patient's PTA list.      Jennifer Peters

## 2025-01-13 NOTE — H&P
[facility]  Department of Hospital Medicine    HISTORY AND PHYSICAL    Chief Complaint   Patient presents with    Head Injury     Back injury sp fall in BR 0600         History Of Present Illness  Elsa Biggs is a 74 y.o. female with HFmrEF, atrial fibrillation on Eliquis, hypertension, type 2 diabetes, dyslipidemia, depression, history of breast cancer.  She presented to the ED after a fall at home.  She tripped on the way back to bed from the bathroom in the morning and was unable to get up.  She did hit her head. Was on the floor for 3 hours.  Workup in the ED revealed BNP 1818, INR 3.4, leukocytosis, impaired kidney function, and CT imaging showing pulmonary edema versus pneumonia, and a new T8 lesion suspicious for recurrent breast cancer with pathologic fracture.    Says she has been sleeping in a recliner for years.  Feels like her breathing is at its baseline.  Feels like leg swelling is at its baseline.  When she got up this morning, she felt foggy, recalls losing her balance and falling backwards.  Had COVID recently, with a cough that has mostly resolved.  Taste is starting to return.  Endorses dysuria.  Has been taking Bactrim for a UTI.      Past Medical History  She has a past medical history of A-fib (Multi), Anxiety, Breast cancer (Multi), Cancer (Multi), Depression, Diabetes mellitus (Multi), GERD (gastroesophageal reflux disease), Hyperlipidemia, Hypertension, and Stroke (Multi).    Surgical History  She has a past surgical history that includes MR angio neck w and wo IV contrast (01/14/2022); Joint replacement (Left); Fracture surgery; Breast surgery (Left); MR angio head wo IV contrast (12/26/2023); MR angio neck wo IV contrast (12/26/2023); Cardiac catheterization (N/A, 01/29/2024); Revision total hip arthroplasty (Left); Cholecystectomy; Tubal ligation; and Other surgical history (Right).     Social History  She reports that she quit smoking about 42  "years ago. Her smoking use included cigarettes. She has never used smokeless tobacco. She reports that she does not drink alcohol and does not use drugs.    Family History  Family History   Problem Relation Name Age of Onset    Heart attack Father  65        blood clot    Coronary artery disease Sister          Allergies  Tetracycline    Review of systems  11-point ROS was performed and is negative except as noted in the HPI.     Physical Exam   CON: awake, alert, moderate distress;   EYES: conjunctiva wnl; PERRL; EOMI  ENMT: hearing intact; MMM;   NECK: symmetric; thyroid and cervical nodes wnl;   CV: S1 S2 - RRR with no m/g/r; trace to 1+ lower extremity edema; normal JVP; symmetric pulses  RESP: normal work of breathing; lungs CTAB;   GI: abdomen nontender; no organomegaly;   SKIN: no lesions or rashes; no induration;   MSK: ROM wnl; digits wnl;   NEURO: language and speech wnl; sensation and motor function grossly intact   PSYCH: oriented to situation; affect anxious;     Last Recorded Vitals  Blood pressure 130/78, pulse 78, temperature 36.5 °C (97.7 °F), resp. rate 17, height 1.727 m (5' 8\"), weight 102 kg (224 lb), SpO2 94%.    Relevant Results  Lab Results   Component Value Date    WBC 13.9 (H) 01/13/2025    HGB 9.8 (L) 01/13/2025    HCT 31.1 (L) 01/13/2025    MCV 79 (L) 01/13/2025     01/13/2025      Lab Results   Component Value Date    GLUCOSE 146 (H) 01/13/2025    CALCIUM 8.2 (L) 01/13/2025     01/13/2025    K 4.0 01/13/2025    CO2 27 01/13/2025     01/13/2025    BUN 23 01/13/2025    CREATININE 1.32 (H) 01/13/2025        Scheduled medications:  oxygen, , inhalation, Continuous - Inhalation      Continuous medications:     PRN medications:  PRN medications: famotidine, melatonin, ondansetron, oxygen, sennosides-docusate sodium                Assessment/Plan   Assessment & Plan  CHF (congestive heart failure), NYHA class I, acute on chronic, combined    Elsa Biggs is a 74 y.o. female " with HF improved EF, atrial fibrillation on Eliquis, hypertension, type 2 diabetes, dyslipidemia, depression, history of breast cancer history of pulmonary embolism, history of ischemic stroke.  She presented to the ED after a fall at home.  She tripped on the way back to bed from the bathroom in the morning and was unable to get up.  She did hit her head. Was on the floor for 3 hours.  Workup in the ED revealed BNP 1818, INR 3.4, leukocytosis, impaired kidney function, and CT imaging showing pulmonary edema versus pneumonia, and a new T8 lesion suspicious for recurrent breast cancer with pathologic fracture.    Acute decompensated HF improved EF:  - diuresis with IV Lasix  - strict I/Os, daily weights, fluid/sodium restriction, telemetry  - trend BMP, Mg; maintain K>4, Mg>2  - wean O2  - Continue spironolactone  - Cardiology consulted    Fall:  Weakness:  -PT/OT    T8 lesion:  Back pain:  -Outpatient PCP and oncology follow-up  -Pain control    A-fib:  -Continue apixaban, amiodarone    DM2:  -trend fingerstick glucose; inpatient goal 140-180  -holding oral hypoglycemics; may benefit from discontinuation of pioglitazone given heart failure  -sliding scale insulin  -hypoglycemia protocol    Dyslipidemia:  Nonobstructive CAD:  History of ischemic stroke:  History of right TCAR:  -Continue aspirin, atorvastatin, ezetimibe    Depression:  -Continue paroxetine    DVT PPx:   History of pulmonary embolism:  -Apixaban    Dispo: inpatient, likely 2-3 midnights        Toi Orellana MD    Patient Name:  Elsa Biggs   MRN:   [id number]   [facility]:  [facility]

## 2025-01-13 NOTE — ED PROVIDER NOTES
HPI   Chief Complaint   Patient presents with    Head Injury     Back injury sp fall in BR 0600        Elsa is a 74-year-old woman who presents with fall at home.  She fell and was on the ground for about 3 hours.  Occurred around 6:30 AM today when she got out of bed.  She felt weak and went down.  She believes she lost her balance.  She did hit her head.  She takes Eliquis.  She denies any fever chills cough or cold.  She has some back pain.  C-collar was placed by EMS prehospital.  She has history of A-fib diabetes previous stroke breast cancer.    I spoke to the transfer center and the neurosurgeon Dr. Morejon reviewed the films and believes patient can safely be hospitalized at [facility] there is no need for neurosurgery or spine intervention at this time.              Patient History   Past Medical History:   Diagnosis Date    A-fib (Multi)     Anxiety     Breast cancer (Multi)     16 tx radiation left lumpectomy 2022    Cancer (Multi)     Depression     Diabetes mellitus (Multi)     GERD (gastroesophageal reflux disease)     Hyperlipidemia     Hypertension     Stroke (Multi)      Past Surgical History:   Procedure Laterality Date    BREAST SURGERY Left     lumpectomy for cancer    CARDIAC CATHETERIZATION N/A 01/29/2024    Procedure: Left Heart Cath;  Surgeon: Virgil Childress MD;  Location: [facility] Cardiac Cath Lab;  Service: Cardiovascular;  Laterality: N/A;    CHOLECYSTECTOMY      FRACTURE SURGERY      left femur and LLE fabrice  knee plate    JOINT REPLACEMENT Left     MR HEAD ANGIO WO IV CONTRAST  12/26/2023    MR HEAD ANGIO WO IV CONTRAST 12/26/2023 [facility]    MR NECK ANGIO W AND WO IV CONTRAST  01/14/2022    MR NECK ANGIO W AND WO IV CONTRAST 1/14/2022 [facility]    MR NECK ANGIO WO IV CONTRAST  12/26/2023    MR NECK ANGIO WO IV CONTRAST 12/26/2023 [facility]    OTHER SURGICAL HISTORY Right     Transcarotid Artery Revascularization    REVISION TOTAL HIP ARTHROPLASTY Left     TUBAL LIGATION       Family  History   Problem Relation Name Age of Onset    Heart attack Father  65        blood clot    Coronary artery disease Sister       Social History     Tobacco Use    Smoking status: Former     Current packs/day: 0.00     Types: Cigarettes     Quit date:      Years since quittin.0    Smokeless tobacco: Never   Vaping Use    Vaping status: Never Used   Substance Use Topics    Alcohol use: Never     Alcohol/week: 1.0 standard drink of alcohol     Types: 1 Standard drinks or equivalent per week    Drug use: Never       Physical Exam   ED Triage Vitals [25 1008]   Temperature Heart Rate Respirations BP   36.5 °C (97.7 °F) 76 18 140/80      Pulse Ox Temp src Heart Rate Source Patient Position   98 % -- -- Sitting      BP Location FiO2 (%)     Right arm --       Physical Exam  Vitals reviewed.   Constitutional:       General: She is awake.   HENT:      Head: Normocephalic.      Nose: Nose normal.   Neck:      Comments: C-collar in place.  Cardiovascular:      Rate and Rhythm: Normal rate and regular rhythm.   Pulmonary:      Effort: Pulmonary effort is normal.      Breath sounds: Normal breath sounds.   Abdominal:      Palpations: Abdomen is soft.   Musculoskeletal:      Comments: Some mid T spine pain to palpation.  She says mild.  No step-off.    No discomfort to palpation of L-spine.  No crepitus    Bilateral lower extremities with good range of motion left is slightly restricted due to it being her bad leg.  She is able to flex at the knees bilaterally and hips.  1+ lower extremity edema noted bilaterally.   Skin:     General: Skin is warm.      Capillary Refill: Capillary refill takes less than 2 seconds.      Comments: Dry skin near her intertriginous folds with slight erythema present   Neurological:      Mental Status: She is alert.           ED Course & MDM   ED Course as of 25 1442   Mon 2025   1010 Patient got out of bed and felt weak and fell.  She was unable to get up for about 3  hours.  She reports that her  is not around because he is recovering from COVID.  She had COVID previously.  She be worked up in ED for possible infectious etiology.  She does have some hypoxia initially.  Will be placed on some oxygen history of CHF.  CT scans and labs requested.  Talked about pain control patient given some Toradol after discussion with patient joint decision making employed.  She did not want morphine. [RZ]   1057 EKG done at 1040 interpreted by me shows sinus rhythm with prolonged TX.  Heart rate is 84 bpm TX is 230 ms.  There is a significant amount of PVCs which is similar to previous EKG December 14, 2024 no STEMI no significant changes [RZ]   1217 I removed her c-collar after the CT films came back.  She has a lesion on her CT which I suspect is from her previous breast cancer.  She does have some pain in her back.  Would like to hospitalize and treat her for her CHF which is currently requiring oxygen.  She is agreeable.  Will run the case by spine surgery. [RZ]   1311 Patient is stable and films are read by the neurosurgeon who said there is nothing to do at this time.  Plan is to hospitalize and try to pull the fluid off she was given some Lasix.  Had about 200 and change cc of urine in her bladder and does not want a urine cath. [RZ]   1442 Spoke to the hospitalist agrees with plans to hospitalize.  Patient is awaiting a bed assignment. [RZ]      ED Course User Index  [RZ] Tyler Crystal MD         Diagnoses as of 01/13/25 1442   Hypervolemia, unspecified hypervolemia type   Pleural effusion   Congestive heart failure, unspecified HF chronicity, unspecified heart failure type   Head injury, initial encounter   Coagulopathy (Multi)   CHF (congestive heart failure), NYHA class I, acute on chronic, combined                 No data recorded     Haile Coma Scale Score: 15 (01/13/25 1122 : Brian Berman RN)                           Medical Decision  Making      Procedure  Procedures     Tyler Crystal MD  01/13/25 0170

## 2025-01-13 NOTE — ED PROCEDURE NOTE
Procedure  Critical Care    Performed by: Tyler Crystal MD  Authorized by: Tyler Crystal MD    Critical care provider statement:     Critical care time (minutes):  50    Critical care time was exclusive of:  Separately billable procedures and treating other patients    Critical care was necessary to treat or prevent imminent or life-threatening deterioration of the following conditions:  Trauma    Critical care was time spent personally by me on the following activities:  Ordering and performing treatments and interventions, ordering and review of laboratory studies, ordering and review of radiographic studies, pulse oximetry, re-evaluation of patient's condition, evaluation of patient's response to treatment, development of treatment plan with patient or surrogate, obtaining history from patient or surrogate and review of old charts    Care discussed with: admitting provider                 Tyler Crystal MD  01/13/25 3345

## 2025-01-13 NOTE — CARE PLAN
The patient's goals for the shift include  remain safe     The clinical goals for the shift include pt will remain HDS

## 2025-01-13 NOTE — ED TRIAGE NOTES
BIBS ([place]) c/o henrietta pain sp fall in [place] this am.  Hit head - loc .  C/o mid back pain with paraesthesia oe weakness.

## 2025-01-13 NOTE — PROGRESS NOTES
01/13/25 1026   Discharge Planning   Living Arrangements Spouse/significant other;Other (Comment)  (Andre Bryant lives 5 minutes away)   Support Systems Spouse/significant other;Children   Assistance Needed A&OX4; independent with ADLs with walker; drives; room air baseline-currently 5L NC; PCP Dr Marcelle Vitale; on eliquis prior to hospitalization   Type of Residence Private residence   Number of Stairs to Enter Residence 0   Number of Stairs Within Residence 0   Do you have animals or pets at home? No   Who is requesting discharge planning? Provider   Home or Post Acute Services In home services   Expected Discharge Disposition Home Health  (Patient active with [facility])   Does the patient need discharge transport arranged? Yes   RoundTrip coordination needed? Yes   Has discharge transport been arranged? No   Financial Resource Strain   How hard is it for you to pay for the very basics like food, housing, medical care, and heating? Not hard   Housing Stability   In the last 12 months, was there a time when you were not able to pay the mortgage or rent on time? N   In the past 12 months, how many times have you moved where you were living? 0   At any time in the past 12 months, were you homeless or living in a shelter (including now)? N   Transportation Needs   In the past 12 months, has lack of transportation kept you from medical appointments or from getting medications? no   In the past 12 months, has lack of transportation kept you from meetings, work, or from getting things needed for daily living? No   Intensity of Service   Intensity of Service 0-30 min     01/13/2025 1030am  Spoke with patient bedside in ED. Per patient request, I attempted to phone andre Bryant at both listed phone numbers. Unable to reach patient - did NOT leave message. Patient active with [facility]

## 2025-01-14 ENCOUNTER — APPOINTMENT (OUTPATIENT)
Dept: CARDIOLOGY | Facility: HOSPITAL | Age: 75
DRG: 291 | End: 2025-01-14
Payer: MEDICARE

## 2025-01-14 LAB
ANION GAP SERPL CALC-SCNC: 11 MMOL/L (ref 10–20)
AORTIC VALVE MEAN GRADIENT: 5 MMHG
AORTIC VALVE PEAK VELOCITY: 1.4 M/S
AV PEAK GRADIENT: 8 MMHG
AVA (PEAK VEL): 1.59 CM2
AVA (VTI): 1.67 CM2
BUN SERPL-MCNC: 29 MG/DL (ref 6–23)
CALCIUM SERPL-MCNC: 8.1 MG/DL (ref 8.6–10.3)
CHLORIDE SERPL-SCNC: 103 MMOL/L (ref 98–107)
CO2 SERPL-SCNC: 25 MMOL/L (ref 21–32)
CREAT SERPL-MCNC: 1.37 MG/DL (ref 0.5–1.05)
EGFRCR SERPLBLD CKD-EPI 2021: 40 ML/MIN/1.73M*2
EJECTION FRACTION APICAL 4 CHAMBER: 26.6
EJECTION FRACTION: 28 %
ERYTHROCYTE [DISTWIDTH] IN BLOOD BY AUTOMATED COUNT: 18.1 % (ref 11.5–14.5)
GLUCOSE BLD MANUAL STRIP-MCNC: 106 MG/DL (ref 74–99)
GLUCOSE BLD MANUAL STRIP-MCNC: 124 MG/DL (ref 74–99)
GLUCOSE BLD MANUAL STRIP-MCNC: 138 MG/DL (ref 74–99)
GLUCOSE BLD MANUAL STRIP-MCNC: 153 MG/DL (ref 74–99)
GLUCOSE SERPL-MCNC: 107 MG/DL (ref 74–99)
HCT VFR BLD AUTO: 29.9 % (ref 36–46)
HGB BLD-MCNC: 8.5 G/DL (ref 12–16)
HOLD SPECIMEN: NORMAL
LEFT ATRIUM VOLUME AREA LENGTH INDEX BSA: 50.1 ML/M2
LEFT VENTRICLE INTERNAL DIMENSION DIASTOLE: 5.89 CM (ref 3.5–6)
LEFT VENTRICULAR OUTFLOW TRACT DIAMETER: 1.99 CM
MAGNESIUM SERPL-MCNC: 1.61 MG/DL (ref 1.6–2.4)
MCH RBC QN AUTO: 24.8 PG (ref 26–34)
MCHC RBC AUTO-ENTMCNC: 28.4 G/DL (ref 32–36)
MCV RBC AUTO: 87 FL (ref 80–100)
MITRAL VALVE E/A RATIO: 1.85
NRBC BLD-RTO: 0 /100 WBCS (ref 0–0)
PLATELET # BLD AUTO: 269 X10*3/UL (ref 150–450)
POTASSIUM SERPL-SCNC: 4.1 MMOL/L (ref 3.5–5.3)
PROCALCITONIN SERPL-MCNC: 0.05 NG/ML
RBC # BLD AUTO: 3.43 X10*6/UL (ref 4–5.2)
RIGHT VENTRICLE PEAK SYSTOLIC PRESSURE: 37.7 MMHG
SODIUM SERPL-SCNC: 135 MMOL/L (ref 136–145)
TRICUSPID ANNULAR PLANE SYSTOLIC EXCURSION: 2.2 CM
WBC # BLD AUTO: 8.6 X10*3/UL (ref 4.4–11.3)

## 2025-01-14 PROCEDURE — 99223 1ST HOSP IP/OBS HIGH 75: CPT | Performed by: STUDENT IN AN ORGANIZED HEALTH CARE EDUCATION/TRAINING PROGRAM

## 2025-01-14 PROCEDURE — 93010 ELECTROCARDIOGRAM REPORT: CPT | Performed by: STUDENT IN AN ORGANIZED HEALTH CARE EDUCATION/TRAINING PROGRAM

## 2025-01-14 PROCEDURE — 85027 COMPLETE CBC AUTOMATED: CPT | Performed by: STUDENT IN AN ORGANIZED HEALTH CARE EDUCATION/TRAINING PROGRAM

## 2025-01-14 PROCEDURE — 2500000004 HC RX 250 GENERAL PHARMACY W/ HCPCS (ALT 636 FOR OP/ED): Performed by: NURSE PRACTITIONER

## 2025-01-14 PROCEDURE — 36415 COLL VENOUS BLD VENIPUNCTURE: CPT | Performed by: STUDENT IN AN ORGANIZED HEALTH CARE EDUCATION/TRAINING PROGRAM

## 2025-01-14 PROCEDURE — 1100000001 HC PRIVATE ROOM DAILY

## 2025-01-14 PROCEDURE — 2500000005 HC RX 250 GENERAL PHARMACY W/O HCPCS: Performed by: STUDENT IN AN ORGANIZED HEALTH CARE EDUCATION/TRAINING PROGRAM

## 2025-01-14 PROCEDURE — 93306 TTE W/DOPPLER COMPLETE: CPT | Performed by: STUDENT IN AN ORGANIZED HEALTH CARE EDUCATION/TRAINING PROGRAM

## 2025-01-14 PROCEDURE — 97165 OT EVAL LOW COMPLEX 30 MIN: CPT | Mod: GO

## 2025-01-14 PROCEDURE — 2500000004 HC RX 250 GENERAL PHARMACY W/ HCPCS (ALT 636 FOR OP/ED): Performed by: STUDENT IN AN ORGANIZED HEALTH CARE EDUCATION/TRAINING PROGRAM

## 2025-01-14 PROCEDURE — C8929 TTE W OR WO FOL WCON,DOPPLER: HCPCS

## 2025-01-14 PROCEDURE — 2500000001 HC RX 250 WO HCPCS SELF ADMINISTERED DRUGS (ALT 637 FOR MEDICARE OP): Performed by: STUDENT IN AN ORGANIZED HEALTH CARE EDUCATION/TRAINING PROGRAM

## 2025-01-14 PROCEDURE — 84145 PROCALCITONIN (PCT): CPT | Mod: GEALAB | Performed by: STUDENT IN AN ORGANIZED HEALTH CARE EDUCATION/TRAINING PROGRAM

## 2025-01-14 PROCEDURE — 80048 BASIC METABOLIC PNL TOTAL CA: CPT | Performed by: STUDENT IN AN ORGANIZED HEALTH CARE EDUCATION/TRAINING PROGRAM

## 2025-01-14 PROCEDURE — 82947 ASSAY GLUCOSE BLOOD QUANT: CPT

## 2025-01-14 PROCEDURE — 93005 ELECTROCARDIOGRAM TRACING: CPT

## 2025-01-14 PROCEDURE — 99233 SBSQ HOSP IP/OBS HIGH 50: CPT | Performed by: NURSE PRACTITIONER

## 2025-01-14 PROCEDURE — 97161 PT EVAL LOW COMPLEX 20 MIN: CPT | Mod: GP

## 2025-01-14 PROCEDURE — 2500000002 HC RX 250 W HCPCS SELF ADMINISTERED DRUGS (ALT 637 FOR MEDICARE OP, ALT 636 FOR OP/ED): Performed by: STUDENT IN AN ORGANIZED HEALTH CARE EDUCATION/TRAINING PROGRAM

## 2025-01-14 PROCEDURE — 83735 ASSAY OF MAGNESIUM: CPT | Performed by: STUDENT IN AN ORGANIZED HEALTH CARE EDUCATION/TRAINING PROGRAM

## 2025-01-14 RX ORDER — ALBUTEROL SULFATE 0.83 MG/ML
2.5 SOLUTION RESPIRATORY (INHALATION) EVERY 2 HOUR PRN
Status: DISCONTINUED | OUTPATIENT
Start: 2025-01-14 | End: 2025-01-17 | Stop reason: HOSPADM

## 2025-01-14 RX ORDER — FUROSEMIDE 10 MG/ML
40 INJECTION INTRAMUSCULAR; INTRAVENOUS 2 TIMES DAILY
Status: DISCONTINUED | OUTPATIENT
Start: 2025-01-14 | End: 2025-01-16

## 2025-01-14 RX ORDER — CEFTRIAXONE 1 G/50ML
1 INJECTION, SOLUTION INTRAVENOUS EVERY 24 HOURS
Status: DISCONTINUED | OUTPATIENT
Start: 2025-01-14 | End: 2025-01-17 | Stop reason: HOSPADM

## 2025-01-14 RX ADMIN — Medication 3 L/MIN: at 07:59

## 2025-01-14 RX ADMIN — SPIRONOLACTONE 25 MG: 25 TABLET ORAL at 08:00

## 2025-01-14 RX ADMIN — Medication 1000 UNITS: at 08:00

## 2025-01-14 RX ADMIN — Medication 2 L/MIN: at 21:00

## 2025-01-14 RX ADMIN — FUROSEMIDE 40 MG: 10 INJECTION, SOLUTION INTRAMUSCULAR; INTRAVENOUS at 08:00

## 2025-01-14 RX ADMIN — AMIODARONE HYDROCHLORIDE 200 MG: 200 TABLET ORAL at 08:01

## 2025-01-14 RX ADMIN — APIXABAN 5 MG: 5 TABLET, FILM COATED ORAL at 21:00

## 2025-01-14 RX ADMIN — TRAMADOL HYDROCHLORIDE 50 MG: 50 TABLET, COATED ORAL at 07:04

## 2025-01-14 RX ADMIN — ATORVASTATIN CALCIUM 80 MG: 80 TABLET, FILM COATED ORAL at 21:00

## 2025-01-14 RX ADMIN — TRAMADOL HYDROCHLORIDE 50 MG: 50 TABLET, COATED ORAL at 15:06

## 2025-01-14 RX ADMIN — FUROSEMIDE 40 MG: 10 INJECTION, SOLUTION INTRAMUSCULAR; INTRAVENOUS at 20:58

## 2025-01-14 RX ADMIN — PAROXETINE 20 MG: 20 TABLET, FILM COATED ORAL at 21:00

## 2025-01-14 RX ADMIN — CEFTRIAXONE SODIUM 1 G: 1 INJECTION, SOLUTION INTRAVENOUS at 10:37

## 2025-01-14 RX ADMIN — PERFLUTREN 0.2 ML OF DILUTION: 6.52 INJECTION, SUSPENSION INTRAVENOUS at 11:53

## 2025-01-14 RX ADMIN — HUMAN ALBUMIN MICROSPHERES AND PERFLUTREN 0.2 ML: 10; .22 INJECTION, SOLUTION INTRAVENOUS at 11:53

## 2025-01-14 RX ADMIN — ASPIRIN 81 MG: 81 TABLET, COATED ORAL at 08:00

## 2025-01-14 RX ADMIN — EZETIMIBE 10 MG: 10 TABLET ORAL at 08:00

## 2025-01-14 RX ADMIN — APIXABAN 5 MG: 5 TABLET, FILM COATED ORAL at 08:01

## 2025-01-14 ASSESSMENT — COGNITIVE AND FUNCTIONAL STATUS - GENERAL
HELP NEEDED FOR BATHING: A LOT
DRESSING REGULAR LOWER BODY CLOTHING: A LITTLE
DRESSING REGULAR UPPER BODY CLOTHING: A LITTLE
MOVING TO AND FROM BED TO CHAIR: A LOT
DAILY ACTIVITIY SCORE: 20
DRESSING REGULAR LOWER BODY CLOTHING: A LITTLE
TURNING FROM BACK TO SIDE WHILE IN FLAT BAD: A LITTLE
TURNING FROM BACK TO SIDE WHILE IN FLAT BAD: A LITTLE
DAILY ACTIVITIY SCORE: 14
DRESSING REGULAR LOWER BODY CLOTHING: TOTAL
WALKING IN HOSPITAL ROOM: A LOT
MOBILITY SCORE: 8
MOBILITY SCORE: 15
STANDING UP FROM CHAIR USING ARMS: A LOT
TOILETING: A LITTLE
MOVING TO AND FROM BED TO CHAIR: A LOT
MOVING TO AND FROM BED TO CHAIR: A LOT
PERSONAL GROOMING: A LITTLE
WALKING IN HOSPITAL ROOM: TOTAL
DRESSING REGULAR UPPER BODY CLOTHING: A LITTLE
CLIMB 3 TO 5 STEPS WITH RAILING: TOTAL
STANDING UP FROM CHAIR USING ARMS: A LOT
TOILETING: A LITTLE
HELP NEEDED FOR BATHING: A LITTLE
HELP NEEDED FOR BATHING: A LITTLE
WALKING IN HOSPITAL ROOM: A LOT
STANDING UP FROM CHAIR USING ARMS: A LOT
MOBILITY SCORE: 14
CLIMB 3 TO 5 STEPS WITH RAILING: TOTAL
CLIMB 3 TO 5 STEPS WITH RAILING: A LOT
DAILY ACTIVITIY SCORE: 20
MOVING FROM LYING ON BACK TO SITTING ON SIDE OF FLAT BED WITH BEDRAILS: TOTAL
TOILETING: TOTAL
DRESSING REGULAR UPPER BODY CLOTHING: A LITTLE
TURNING FROM BACK TO SIDE WHILE IN FLAT BAD: TOTAL

## 2025-01-14 ASSESSMENT — PAIN DESCRIPTION - LOCATION: LOCATION: BACK

## 2025-01-14 ASSESSMENT — PAIN SCALES - GENERAL
PAINLEVEL_OUTOF10: 6
PAINLEVEL_OUTOF10: 3
PAINLEVEL_OUTOF10: 0 - NO PAIN
PAINLEVEL_OUTOF10: 8
PAINLEVEL_OUTOF10: 5 - MODERATE PAIN
PAINLEVEL_OUTOF10: 1

## 2025-01-14 ASSESSMENT — PAIN - FUNCTIONAL ASSESSMENT
PAIN_FUNCTIONAL_ASSESSMENT: 0-10
PAIN_FUNCTIONAL_ASSESSMENT: WONG-BAKER FACES
PAIN_FUNCTIONAL_ASSESSMENT: 0-10

## 2025-01-14 ASSESSMENT — PAIN DESCRIPTION - ORIENTATION: ORIENTATION: LOWER

## 2025-01-14 ASSESSMENT — ACTIVITIES OF DAILY LIVING (ADL)
ADL_ASSISTANCE: INDEPENDENT
ADLS_ADDRESSED: YES
ADL_ASSISTANCE: INDEPENDENT
BATHING_ASSISTANCE: MODERATE

## 2025-01-14 ASSESSMENT — PAIN SCALES - WONG BAKER: WONGBAKER_NUMERICALRESPONSE: HURTS LITTLE BIT

## 2025-01-14 ASSESSMENT — ENCOUNTER SYMPTOMS
BACK PAIN: 1
UNEXPECTED WEIGHT CHANGE: 1

## 2025-01-14 NOTE — PROGRESS NOTES
Occupational Therapy    Evaluation    Patient Name: Elsa Biggs  MRN: [id number]  Department: [facility] NONV1  Room: [address] [place], [place] [address]  Today's Date: 1/14/2025  Time Calculation  Start Time: 1039  Stop Time: 1057  Time Calculation (min): 18 min    Assessment  IP OT Assessment  OT Assessment: Pt presents with decreased ADL performance, decreased functional mobility, decreased endurance. Continued skilled OT recommended to maximize pt safety and independence.  Prognosis: Good  Barriers to Discharge Home: No anticipated barriers  Evaluation/Treatment Tolerance: Patient limited by fatigue  Medical Staff Made Aware: Yes  End of Session Communication: Bedside nurse  End of Session Patient Position: Up in chair, Alarm on  Plan:  Treatment Interventions: ADL retraining, Functional transfer training, UE strengthening/ROM, Endurance training  OT Frequency: 3 times per week  OT Discharge Recommendations: Moderate intensity level of continued care  Equipment Recommended upon Discharge: Wheeled walker  OT Recommended Transfer Status: Assist of 1  OT - OK to Discharge: Yes (per OT POC)    Subjective   Current Problem:  1. CHF (congestive heart failure), NYHA class I, acute on chronic, combined  Transthoracic Echo (TTE) Complete    Transthoracic Echo (TTE) Complete      2. Hypervolemia, unspecified hypervolemia type        3. Pleural effusion        4. Congestive heart failure, unspecified HF chronicity, unspecified heart failure type        5. Head injury, initial encounter        6. Coagulopathy (Multi)          General:  General  Reason for Referral: 75 yo female referred to OT for CHF, impaired ADLs/mobility  Referred By: Toi Orellana MD  Past Medical History Relevant to Rehab: HFmrEF, atrial fibrillation on Eliquis, hypertension, type 2 diabetes, dyslipidemia, depression, history of breast cancer  Co-Treatment: PT  Co-Treatment Reason: maximize pt safety and outcomes  Prior to Session Communication: Bedside nurse  Patient Position  Received: Bed, 3 rail up, Alarm on  General Comment: Pt pleasant, cooperative with therapy evaluation.  Precautions:  Medical Precautions: Fall precautions, Oxygen therapy device and L/min (3L NC, tele, scherer)    Vital Sign (Past 2hrs)                Pain:  Pain Assessment  Pain Assessment: 0-10  0-10 (Numeric) Pain Score: 0 - No pain    Objective   Cognition:  Overall Cognitive Status: Within Functional Limits  Arousal/Alertness: Appropriate responses to stimuli  Orientation Level: Oriented X4           Home Living:  Type of Home: House  Lives With: Spouse (daughter lives nearby)  Home Adaptive Equipment: Walker rolling or standard, Reacher, Sock aid  Home Layout: One level  Home Access: Stairs to enter without rails  Entrance Stairs-Number of Steps: 1 Step entry  Bathroom Shower/Tub: Tub/shower unit  Bathroom Equipment: Grab bars in shower, Tub transfer bench   Prior Function:  Level of [place]: Independent with ADLs and functional transfers, Independent with homemaking with ambulation  Receives Help From: Family  ADL Assistance: Independent (Uses AE (reacher, sock aid))  Homemaking Assistance: Needs assistance (Shares with spouse and HHAs)  Ambulatory Assistance: Independent (with FWW)  Prior Function Comments: Has PRN HHA to assist with IADLs  IADL History:     ADL:  Eating Assistance: Independent  Grooming Assistance: Stand by  Bathing Assistance: Moderate  UE Dressing Assistance: Stand by  LE Dressing Assistance: Maximal  Toileting Assistance with Device: Maximal  Functional Assistance: Minimal  ADL Comments: Required assist to thread feet through briefs and arrange over hips in stance. Dependent to don socks. Other ADL performances anticipated.  Activity Tolerance:  Endurance: Tolerates 10 - 20 min exercise with multiple rests  Bed Mobility/Transfers: Bed Mobility  Bed Mobility: Yes  Bed Mobility 1  Bed Mobility 1: Supine to sitting  Level of Assistance 1: Maximum assistance (x2)    Transfers  Transfer:  Yes  Transfer 1  Transfer From 1: Sit to  Transfer to 1: Stand  Technique 1: Sit to stand, Stand to sit  Transfer Device 1: Walker  Transfer Level of Assistance 1: Minimum assistance (x2)      Functional Mobility:  Functional Mobility  Functional Mobility Performed: Yes  Functional Mobility 1  Surface 1: Level tile  Device 1: Rolling walker  Assistance 1: Minimum assistance  Comments 1: Sidestepped from bed to chair with assist for balance, limited endurance demonstrated  Sitting Balance:  Static Sitting Balance  Static Sitting-Balance Support: Feet supported  Static Sitting-Level of Assistance: Minimum assistance  Standing Balance:  Static Standing Balance  Static Standing-Balance Support: Bilateral upper extremity supported  Static Standing-Level of Assistance: Minimum assistance     Strength:  Strength Comments: BUE grossly 4/5     Coordination:  Movements are Fluid and Coordinated: Yes   Hand Function:  Hand Function  Gross Grasp: Functional  Coordination: Functional  Extremities: RUE   RUE : Within Functional Limits and LUE   LUE: Within Functional Limits    Outcome Measures: [facility] Daily Activity  Putting on and taking off regular lower body clothing: Total  Bathing (including washing, rinsing, drying): A lot  Putting on and taking off regular upper body clothing: A little  Toileting, which includes using toilet, bedpan or urinal: Total  Taking care of personal grooming such as brushing teeth: A little  Eating Meals: None  Daily Activity - Total Score: 14      Education Documentation  Precautions, taught by Flynn Gutierrez OT at 1/14/2025 11:32 AM.  Learner: Patient  Readiness: Acceptance  Method: Explanation  Response: Verbalizes Understanding    Body Mechanics, taught by Flynn Gutierrez OT at 1/14/2025 11:32 AM.  Learner: Patient  Readiness: Acceptance  Method: Explanation  Response: Verbalizes Understanding    ADL Training, taught by Flynn Gutierrez OT at 1/14/2025 11:32 AM.  Learner:  Patient  Readiness: Acceptance  Method: Explanation  Response: Verbalizes Understanding    Education Comments  No comments found.      Goals:   Encounter Problems       Encounter Problems (Active)       OT Goals       Pt will demo LE ADL completion with modified independence, using AE if needed.        Start:  01/14/25    Expected End:  01/28/25            Pt will complete cxil-jf-kiap transfers using LRD in preparation for ADLs with supervision        Start:  01/14/25    Expected End:  01/28/25            Pt will increase endurance to tolerate 15min of OOB activity with no more than 1 rest break in order to increase ability to engage in ADL completion.        Start:  01/14/25    Expected End:  01/28/25            Pt will complete naina hygiene and clothing management during toileting ADL with supervision, using DME and adaptive strategies as neccesary        Start:  01/14/25    Expected End:  01/28/25            Pt will demo Grooming/UE ADL completion with modified independence, using adaptive strategies and energy conservation techniques as applicable.        Start:  01/14/25    Expected End:  01/28/25

## 2025-01-14 NOTE — CONSULTS
Inpatient consult to Cardiology  Consult performed by: ANITA Sanders-CNP  Consult ordered by: Toi Orellana MD        History Of Present Illness:    Elsa Biggs is a 74 y.o. female with PMH of  besity, hypertension, dyslipidemia, depression, type 2 DM, stroke in the left thalamus territory, and incidentally found MALATHI 85% s/p TCAR 3/4/2024, PAF on apixaban, PVCs, HFmrEF-- with improved EF 50-55% 3/6/2024, coronary angiography 1/29/2024 which was negative for obstructive CAD (LM mild disease, p-mLAD 40, Lcx mid 60, RCA mild diffuse disease) presenting with a mechanical fall at home. Was on the floor for 3 hours.  Workup in the ED revealed BNP 1818, INR 3.4, leukocytosis, impaired kidney function, and CT imaging showing pulmonary edema versus pneumonia, and a new T8 lesion suspicious for recurrent breast cancer with pathologic fracture. Recently admitted 12/11-12/16 with PAF and HFpEF. Diuresed with Lasix gtt, placed on amiodarone, and EP follow up arranged.     She follows with Malinda Posada CNP as OP. Home medications include amiodarone 200 mg daily, Eliquis 5 mg BD, aspirin 81 mg daily, atorvastatin 80 mg daily, Zetia 10 mg daily, spironolactone 25 mg daily, Lasix 20 mg MWF.     Review of Systems   Constitutional:  Positive for unexpected weight change (4lb increase).   Cardiovascular:  Positive for leg swelling.   Musculoskeletal:  Positive for back pain and gait problem.   All other systems reviewed and are negative.    Last Recorded Vitals:  Vitals:    01/13/25 2008 01/13/25 2300 01/14/25 0533 01/14/25 0743   BP:   107/55 102/64   BP Location:   Right arm Right arm   Patient Position:   Lying Lying   Pulse:   78 75   Resp:   18 16   Temp:   36.3 °C (97.3 °F) 36.4 °C (97.5 °F)   TempSrc:   Temporal Temporal   SpO2: 99% 99% 99% 99%   Weight:       Height:           Last Labs:  CBC - 1/14/2025:  6:06 AM  8.6 8.5 269    29.9      CMP - 1/14/2025:  6:05 AM  8.1 6.7 13 --- 0.4   3.7 3.1 13 57      PTT -  No results in last year.  3.4   38.9 _     Troponin I, High Sensitivity   Date/Time Value Ref Range Status   12/11/2024 01:15 PM 84 (HH) 0 - 13 ng/L Final     Comment:     Previous result verified on 12/11/2024 1158 on specimen/case [id number] called with component [facility] for procedure Troponin I, High Sensitivity, Initial with value 81 ng/L.   12/11/2024 11:20 AM 81 (HH) 0 - 13 ng/L Final   03/06/2024 02:57 PM 7 0 - 13 ng/L Final     BNP   Date/Time Value Ref Range Status   01/13/2025 10:17 AM 1,818 (H) 0 - 99 pg/mL Final   12/11/2024 11:20 AM 1,187 (H) 0 - 99 pg/mL Final     Hemoglobin A1C   Date/Time Value Ref Range Status   10/20/2023 05:22 AM 7.0 (H) see below % Final   08/02/2018 03:17 AM 6.2 % Final     Comment:          Diagnosis of Diabetes-Adults   Non-Diabetic: < or = 5.6%   Increased risk for developing diabetes: 5.7-6.4%   Diagnostic of diabetes: > or = 6.5%  .       Monitoring of Diabetes                Age (y)     Therapeutic Goal (%)   Adults:          >18           <7.0   Pediatrics:    13-18           <7.5                   7-12           <8.0                   0- 6            7.5-8.5   American Diabetes Association. Diabetes Care 33(S1), Jan 2010.     08/01/2018 11:52 AM 6.2 % Final     Comment:          Diagnosis of Diabetes-Adults   Non-Diabetic: < or = 5.6%   Increased risk for developing diabetes: 5.7-6.4%   Diagnostic of diabetes: > or = 6.5%  .       Monitoring of Diabetes                Age (y)     Therapeutic Goal (%)   Adults:          >18           <7.0   Pediatrics:    13-18           <7.5                   7-12           <8.0                   0- 6            7.5-8.5   American Diabetes Association. Diabetes Care 33(S1), Jan 2010.       LDL Calculated   Date/Time Value Ref Range Status   10/20/2023 05:22 AM 60 <=99 mg/dL Final     Comment:                                 Near   Borderline      AGE      Desirable  Optimal    High     High     Very High     0-19 Y     0 - 109     ---     110-129   >/= 130     ----    20-24 Y     0 - 119     ---    120-159   >/= 160     ----      >24 Y     0 -  99   100-129  130-159   160-189     >/=190       VLDL   Date/Time Value Ref Range Status   10/20/2023 05:22 AM 13 0 - 40 mg/dL Final      Last I/O:  I/O last 3 completed shifts:  In: 220 (2.2 mL/kg) [P.O.:220]  Out: 350 (3.4 mL/kg) [Urine:350 (0.1 mL/kg/hr)]  Weight: 101.6 kg     Past Cardiology Tests (Last 3 Years):  EKG:  ECG 12 lead 01/13/2025 (Preliminary)      Echo:  Transthoracic Echo (TTE) Complete 03/06/2024  PHYSICIAN INTERPRETATION:  Left Ventricle: Left ventricular systolic function is low normal, with an estimated ejection fraction of 50-55%. There are no regional wall motion abnormalities. The left ventricular cavity size is normal. Spectral Doppler shows a normal pattern of left ventricular diastolic filling.  Left Atrium: The left atrium is mildly dilated.  Right Ventricle: The right ventricle is upper limits of normal in size. There is normal right ventricular global systolic function.  Right Atrium: The right atrium is normal in size.  Aortic Valve: The aortic valve is trileaflet. There is minimal aortic valve cusp calcification. There is trivial aortic valve regurgitation. The peak instantaneous gradient of the aortic valve is 9.6 mmHg. The mean gradient of the aortic valve is 6.0 mmHg.  Mitral Valve: The mitral valve is normal in structure. There is no evidence of mitral valve regurgitation.  Tricuspid Valve: The tricuspid valve is structurally normal. There is trace tricuspid regurgitation.  Pulmonic Valve: The pulmonic valve is not well visualized. There is no indication of pulmonic valve regurgitation.  Pericardium: There is no pericardial effusion noted.  Aorta: The aortic root was not well visualized.        CONCLUSIONS:   1. Left ventricular systolic function is low normal with a 50-55% estimated ejection fraction.    Transthoracic Echo (TTE) Complete 10/20/2023      PHYSICIAN  INTERPRETATION:  Left Ventricle: The left ventricular systolic function is mildly decreased, with an estimated ejection fraction of 45-50%. There are no regional wall motion abnormalities. The left ventricular cavity size is normal. Spectral Doppler shows an impaired relaxation pattern of left ventricular diastolic filling.  Left Atrium: The left atrium is mildly dilated.  Right Ventricle: The right ventricle is normal in size. There is normal right ventricular global systolic function.  Right Atrium: The right atrium is mildly dilated.  Aortic Valve: The aortic valve appears structurally normal. There is no evidence of aortic valve regurgitation. The peak instantaneous gradient of the aortic valve is 8.3 mmHg. The mean gradient of the aortic valve is 4.6 mmHg.  Mitral Valve: The mitral valve is normal in structure. There is no evidence of mitral valve regurgitation.  Tricuspid Valve: The tricuspid valve is structurally normal. There is mild tricuspid regurgitation.  Pulmonic Valve: The pulmonic valve is not well visualized. The pulmonic valve regurgitation was not well visualized.  Pericardium: There is no pericardial effusion noted.  Aorta: The aortic root is normal.  Pulmonary Artery: The tricuspid regurgitant velocity is 1.57 m/s, and with an estimated right atrial pressure of 10 mmHg, the estimated pulmonary artery pressure is normal with the RVSP at 19.8 mmHg.  Systemic Veins: The inferior vena cava was not well visualized.        CONCLUSIONS:   1. Left ventricular systolic function is mildly decreased with a 45-50% estimated ejection fraction.   2. Spectral Doppler shows an impaired relaxation pattern of left ventricular diastolic filling.   3. There is mild tricuspid regurgitation.   4. Technically difficult bubble study unable to interpret.    Ejection Fractions:  EF   Date/Time Value Ref Range Status   03/06/2024 03:57 PM 62 %      Cath:  Cardiac catheterization - coronary 01/29/2024  CONCLUSIONS:   1.  Left main: no significant angiographic disease.   2. LAD: diffuse 40% prox-mid disease.   3. LCx: 60% mid-vessel disease.   4. RCA: mild diffuse irregularities.   5. LVEDP 26mmHg, no aortic stenosis on LV-Ao gradient.    Stress Test:  Nuclear Stress Test 12/26/2023  IMPRESSION:  Mild attentuation of the anterior wall seen on the stress images.  This is either soft tissue attenuation or ischemia in LAD territory.  Unfortunately there are no prone images available. Dilated left  ventricle with mild left ventricular systolic dysfunction on post  stress gated imaging.    Cardiac Imaging:  No results found for this or any previous visit from the past 1095 days.      Past Medical History:  She has a past medical history of A-fib (Multi), Anxiety, Breast cancer (Multi), Cancer (Multi), Depression, Diabetes mellitus (Multi), GERD (gastroesophageal reflux disease), Hyperlipidemia, Hypertension, and Stroke (Multi).    Past Surgical History:  She has a past surgical history that includes MR angio neck w and wo IV contrast (01/14/2022); Joint replacement (Left); Fracture surgery; Breast surgery (Left); MR angio head wo IV contrast (12/26/2023); MR angio neck wo IV contrast (12/26/2023); Cardiac catheterization (N/A, 01/29/2024); Revision total hip arthroplasty (Left); Cholecystectomy; Tubal ligation; and Other surgical history (Right).      Social History:  She reports that she quit smoking about 42 years ago. Her smoking use included cigarettes. She has never used smokeless tobacco. She reports that she does not drink alcohol and does not use drugs.    Family History:  Family History   Problem Relation Name Age of Onset    Heart attack Father  65        blood clot    Coronary artery disease Sister          Allergies:  Tetracycline    Inpatient Medications:  Scheduled medications   Medication Dose Route Frequency    amiodarone  200 mg oral Daily    apixaban  5 mg oral BID    aspirin  81 mg oral Daily    atorvastatin  80 mg oral  Nightly    cefTRIAXone  1 g intravenous q24h    cholecalciferol  1,000 Units oral Daily    ezetimibe  10 mg oral Daily    furosemide  40 mg intravenous Daily    [Held by provider] furosemide  40 mg oral Daily    insulin lispro  0-15 Units subcutaneous TID AC    [Held by provider] metFORMIN  500 mg oral q AM    oxygen   inhalation Continuous - Inhalation    PARoxetine  20 mg oral Nightly    [Held by provider] pioglitazone  30 mg oral q24h    spironolactone  25 mg oral Daily     PRN medications   Medication    albuterol    dextrose    dextrose    famotidine    glucagon    glucagon    melatonin    ondansetron    oxygen    sennosides-docusate sodium    traMADol     Continuous Medications   Medication Dose Last Rate     Outpatient Medications:  Current Outpatient Medications   Medication Instructions    albuterol 90 mcg/actuation inhaler 2 puffs, inhalation, Every 6 hours PRN    amiodarone (PACERONE) 200 mg, oral, Daily    aspirin 81 mg, oral, Daily    atorvastatin (LIPITOR) 80 mg, oral, Nightly    cholecalciferol (Vitamin D-3) 25 MCG (1000 UT) tablet 1 tablet, Daily    Eliquis 5 mg, oral, 2 times daily    ezetimibe (ZETIA) 10 mg, Daily    furosemide (LASIX) 40 mg, oral, Daily    Gemtesa 75 mg, oral, Nightly    ibuprofen 600 mg, oral, As needed    metFORMIN (GLUCOPHAGE) 500 mg, Every morning    PARoxetine (PAXIL) 20 mg, Nightly    pioglitazone (ACTOS) 30 mg, Every 24 hours    spironolactone (ALDACTONE) 25 mg, oral, Daily     Physical Exam  Vitals reviewed.   Constitutional:       Appearance: Normal appearance. She is normal weight.   HENT:      Head: Normocephalic and atraumatic.   Neck:      Vascular: JVD present. No carotid bruit.   Cardiovascular:      Rate and Rhythm: Normal rate. Rhythm regularly irregular.      Pulses: Normal pulses.      Heart sounds: Normal heart sounds.   Pulmonary:      Effort: Pulmonary effort is normal.      Breath sounds: Normal breath sounds.   Chest:      Chest wall: No tenderness.    Abdominal:      General: Abdomen is flat. Bowel sounds are normal.      Palpations: Abdomen is soft.   Musculoskeletal:      Right lower leg: Edema present.      Left lower leg: Edema present.   Skin:     General: Skin is warm and dry.   Neurological:      General: No focal deficit present.      Mental Status: She is alert and oriented to person, place, and time. Mental status is at baseline.   Psychiatric:         Mood and Affect: Mood normal.         Behavior: Behavior normal.       Assessment/Plan   Assessment  74 y.o. female with HF improved EF, atrial fibrillation on Eliquis, nonobstructive CAD, HTN, DM2, dyslipidemia, depression, history of breast cancer, PE, CVA history presenting with mechanical fall. Found to be volume up. CT imaging showing pulmonary edema versus pneumonia, and a new T8 lesion suspicious for recurrent breast cancer with pathologic fracture.     Acute on chronic systolic HF  NICM   [facility]obstructive [facility] - [facility] 1/2024  PAF  Anemia - Hgb 8.5  MARSHALL - creatinine 1.37 (BL 1)  Dyslipidemia  Frequent PVCs    Plan  -Repeat TTE  -Continue Lasix IV 40 mg BID  -Continue amiodarone 200 mg daily, Eliquis 5 mg BID  -Continue aspirin 81 mg daily, atorvastatin 80 mg daily, Zetia 10 mg daily  -Continue spironolactone 25 mg daily  -Will follow    Peripheral IV 01/13/25 20 G Right Antecubital (Active)   Site Assessment Clean;Dry;Intact 01/13/25 2000   Dressing Status Clean;Dry 01/13/25 2000   Number of days: 1       Urethral Catheter (Active)   Collection Container Standard drainage bag 01/14/25 0430   Reason for Continuing Urinary Catheterization acute urinary retention 01/14/25 0430   Output (mL) 200 mL 01/14/25 0430   Number of days: 1       Code Status:  DNR and No Intubation    I spent minutes in the professional and overall care of this patient.        Drake Ge, APRN-CNP

## 2025-01-14 NOTE — PROGRESS NOTES
01/14/25 0900   Advance Directives (For Healthcare)   Have you reviewed your Advance Directive and is it valid for this stay? Yes   Advance Directive Patient has advance directive, copy in chart   Type of Healthcare Directive Health care treatment directive     SAWYER located POA documents in EPIC, updated chart.  Placed printed copy in chart.

## 2025-01-14 NOTE — PROGRESS NOTES
Rafal Pfeiffer is a 74 y.o. female on day 1 of admission presenting with CHF (congestive heart failure), NYHA class I, acute on chronic, combined.      Subjective   The patient has no complaints of pain       Objective     Last Recorded Vitals  /64 (BP Location: Right arm, Patient Position: Lying)   Pulse 75   Temp 36.4 °C (97.5 °F) (Temporal)   Resp 16   Wt 102 kg (224 lb)   SpO2 99%   Intake/Output last 3 Shifts:    Intake/Output Summary (Last 24 hours) at 1/14/2025 1357  Last data filed at 1/14/2025 1343  Gross per 24 hour   Intake 660 ml   Output 350 ml   Net 310 ml       Admission Weight  Weight: 102 kg (224 lb) (01/13/25 1008)    Daily Weight  01/13/25 : 102 kg (224 lb)    Image Results  Transthoracic Echo (TTE) Complete     [facility], [address] [place] [address]                Tel [phone] and Fax [phone]    TRANSTHORACIC ECHOCARDIOGRAM REPORT       Patient Name:       RAFAL PFEIFFER       Reading Physician:    [id number] Chan MD  Study Date:         1/14/2025           Ordering Provider:    [id number] LIZ OROZCO  MRN/PID:            [id number]            Fellow:  Accession#:         [id number]        Nurse:                Mikey Harvey RN  Date of Birth/Age:  1950 / 74      Sonographer:          Milagro morrison RDCS  Gender assigned at  F                   Additional Staff:  Birth:  Height:             172.72 cm           Admit Date:           1/13/2025  Weight:             101.60 kg           Admission Status:     Inpatient -                                                                Routine  BSA / BMI:          2.14 m2 / 34.06     Encounter#:           [id number]                      kg/m2  Blood Pressure:     102/64 mmHg         Department Location:  [facility]  HHVI Non                                                                Invasive    Study Type:    TRANSTHORACIC ECHO (TTE) COMPLETE  Diagnosis/ICD: Acute combined systolic (congestive) and diastolic (congestive)                 heart failure (CHF)-I50.41  Indication:    Congestive Heart Failure  CPT Code:      Echo Complete w Full Doppler-[id number]    Patient History:  Diabetes:         Yes  Pertinent         CHF, A-Fib, Anticoagulation, HTN and Hyperlipidemia. Elevated  History:          BNP.    Study Detail: The following Echo studies were performed: 2D, M-Mode, Doppler and                color flow. Technically challenging study due to poor acoustic                windows. Definity used as a contrast agent for endocardial border                definition. Total contrast used for this procedure was 2 mL via IV                push.       PHYSICIAN INTERPRETATION:  Left Ventricle: The left ventricular systolic function is severely decreased, with a visually estimated ejection fraction of 25-30%. There is severely increased eccentric left ventricular hypertrophy. There is global hypokinesis of the left ventricle with minor regional variations. The left ventricular cavity size is severely dilated. There is moderately increased septal and mildly increased posterior left ventricular wall thickness. Spectral Doppler shows a Grade II (pseudonormal pattern) of left ventricular diastolic filling with an elevated left atrial pressure.  Left Atrium: The left atrium is severely dilated.  Right Ventricle: The right ventricle is mildly enlarged. There is normal right ventricular global systolic function.  Right Atrium: The right atrium is mildly dilated.  Aortic Valve: The aortic valve is trileaflet. There is mild aortic valve cusp calcification. The aortic valve dimensionless index is 0.54. There is no evidence of aortic valve regurgitation. The peak instantaneous gradient of the aortic valve is 8 mmHg. The mean gradient of the  aortic valve is 5 mmHg.  Mitral Valve: The mitral valve is mildly thickened. There is mild mitral annular calcification. There is mild to moderate mitral valve regurgitation.  Tricuspid Valve: The tricuspid valve is structurally normal. There is mild tricuspid regurgitation. The Doppler estimated RVSP is mildly elevated at 37.7 mmHg.  Pulmonic Valve: The pulmonic valve is structurally normal. There is physiologic pulmonic valve regurgitation.  Pericardium: Trivial pericardial effusion.  Aorta: The aortic root is normal.  Systemic Veins: The inferior vena cava appears normal in size, with IVC inspiratory collapse less than 50%.  In comparison to the previous echocardiogram(s): Compared with study dated 10/20/2023, Significant drop in LVEF.       CONCLUSIONS:   1. The left ventricular systolic function is severely decreased, with a visually estimated ejection fraction of 25-30%.   2. There is global hypokinesis of the left ventricle with minor regional variations.   3. Spectral Doppler shows a Grade II (pseudonormal pattern) of left ventricular diastolic filling with an elevated left atrial pressure.   4. Left ventricular cavity size is severely dilated.   5. There is moderately increased septal thickness.   6. There is severely increased eccentric left ventricular hypertrophy.   7. There is normal right ventricular global systolic function.   8. Mildly enlarged right ventricle.   9. The left atrium is severely dilated.  10. Mild to moderate mitral valve regurgitation.  11. Mildly elevated right ventricular systolic pressure.    QUANTITATIVE DATA SUMMARY:     2D MEASUREMENTS:          Normal Ranges:  IVSd:            1.32 cm  (0.6-1.1cm)  LVPWd:           1.09 cm  (0.6-1.1cm)  LVIDd:           5.89 cm  (3.9-5.9cm)  LVIDs:           5.34 cm  LV Mass Index:   143 g/m2  LVEDV Index:     89 ml/m2  LV % FS          9.5 %       LA VOLUME:                    Normal Ranges:  LA Vol A4C:        111.3 ml   (22+/-6mL/m2)  LA Vol  A2C:        100.8 ml  LA Vol BP:         107.5 ml  LA Vol Index A4C:  51.9 ml/m2  LA Vol Index A2C:  47.0 ml/m2  LA Vol Index BP:   50.1 ml/m2  LA Area A4C:       29.4 cm2  LA Area A2C:       28.4 cm2  LA Major Axis A4C: 6.6 cm  LA Major Axis A2C: 6.8 cm  LA Volume Index:   49.7 ml/m2  LA Vol A4C:        105.1 ml  LA Vol A2C:        93.8 ml  LA Vol Index BSA:  46.4 ml/m2       RA VOLUME BY A/L METHOD:          Normal Ranges:  RA Area A4C:             19.7 cm2       M-MODE MEASUREMENTS:         Normal Ranges:  Ao Root:             3.00 cm (2.0-3.7cm)  LAs:                 5.27 cm (2.7-4.0cm)       LV SYSTOLIC FUNCTION BY 2D PLANIMETRY (MOD):                       Normal Ranges:  EF-A4C View:    27 % (>=55%)  EF-A2C View:    24 %  EF-Biplane:     29 %  EF-Visual:      28 %  LV EF Reported: 28 %       LV DIASTOLIC FUNCTION:          Normal Ranges:  MV Peak E:             1.10 m/s (0.7-1.2 m/s)  MV Peak A:             0.60 m/s (0.42-0.7 m/s)  E/A Ratio:             1.85     (1.0-2.2)       MITRAL VALVE:          Normal Ranges:  MV DT:        154 msec (150-240msec)       AORTIC VALVE:                     Normal Ranges:  AoV Vmax:                1.40 m/s (<=1.7m/s)  AoV Peak P.9 mmHg (<20mmHg)  AoV Mean P.2 mmHg (1.7-11.5mmHg)  LVOT Max Dread:            0.72 m/s (<=1.1m/s)  AoV VTI:                 28.08 cm (18-25cm)  LVOT VTI:                15.06 cm  LVOT Diameter:           1.99 cm  (1.8-2.4cm)  AoV Area, VTI:           1.67 cm2 (2.5-5.5cm2)  AoV Area,Vmax:           1.59 cm2 (2.5-4.5cm2)  AoV Dimensionless Index: 0.54       RIGHT VENTRICLE:  RV Basal 4.10 cm  RV Mid   2.70 cm  RV Major 7.0 cm  TAPSE:   22.0 mm       TRICUSPID VALVE/RVSP:          Normal Ranges:  Peak TR Velocity:     2.72 m/s  RV Syst Pressure:     38 mmHg  (< 30mmHg)  IVC Diam:             1.80 cm       PULMONIC VALVE:          Normal Ranges:  PV Max Dread:     0.7 m/s  (0.6-0.9m/s)  PV Max P.1 mmHg       [id number]  Terrence Chan MD  Electronically signed on 1/14/2025 at 1:17:46 PM       ** Final **    Results for orders placed or performed during the hospital encounter of 01/13/25 (from the past 24 hours)   POCT GLUCOSE   Result Value Ref Range    POCT Glucose 150 (H) 74 - 99 mg/dL   POCT GLUCOSE   Result Value Ref Range    POCT Glucose 140 (H) 74 - 99 mg/dL   Basic Metabolic Panel   Result Value Ref Range    Glucose 107 (H) 74 - 99 mg/dL    Sodium 135 (L) 136 - 145 mmol/L    Potassium 4.1 3.5 - 5.3 mmol/L    Chloride 103 98 - 107 mmol/L    Bicarbonate 25 21 - 32 mmol/L    Anion Gap 11 10 - 20 mmol/L    Urea Nitrogen 29 (H) 6 - 23 mg/dL    Creatinine 1.37 (H) 0.50 - 1.05 mg/dL    eGFR 40 (L) >60 mL/min/1.73m*2    Calcium 8.1 (L) 8.6 - 10.3 mg/dL   Magnesium   Result Value Ref Range    Magnesium 1.61 1.60 - 2.40 mg/dL   CBC   Result Value Ref Range    WBC 8.6 4.4 - 11.3 x10*3/uL    nRBC 0.0 0.0 - 0.0 /100 WBCs    RBC 3.43 (L) 4.00 - 5.20 x10*6/uL    Hemoglobin 8.5 (L) 12.0 - 16.0 g/dL    Hematocrit 29.9 (L) 36.0 - 46.0 %    MCV 87 80 - 100 fL    MCH 24.8 (L) 26.0 - 34.0 pg    MCHC 28.4 (L) 32.0 - 36.0 g/dL    RDW 18.1 (H) 11.5 - 14.5 %    Platelets 269 150 - 450 x10*3/uL   POCT GLUCOSE   Result Value Ref Range    POCT Glucose 106 (H) 74 - 99 mg/dL   POCT GLUCOSE   Result Value Ref Range    POCT Glucose 138 (H) 74 - 99 mg/dL   Transthoracic Echo (TTE) Complete   Result Value Ref Range    AV pk carolyn 1.40 m/s    AV mn grad 5 mmHg    LVOT diam 1.99 cm    MV E/A ratio 1.85     LA vol index A/L 50.1 ml/m2    Tricuspid annular plane systolic excursion 2.2 cm    LV EF 28 %    LVIDd 5.89 cm    RVSP 37.7 mmHg    Aortic Valve Area by Continuity of VTI 1.67 cm2    Aortic Valve Area by Continuity of Peak Velocity 1.59 cm2    AV pk grad 8 mmHg    LV A4C EF 26.6      *Note: Due to a large number of results and/or encounters for the requested time period, some results have not been displayed. A complete set of results can be found in Results  Review.        Physical Exam    Relevant Results               Assessment/Plan          This patient has a urinary catheter   Reason for the urinary catheter remaining today? critically ill patient who need accurate urinary output measurements          Elsa Biggs is a 74 y.o. female with HF improved EF, atrial fibrillation on Eliquis, hypertension, type 2 diabetes, dyslipidemia, depression, history of breast cancer history of pulmonary embolism, history of ischemic stroke.  She presented to the ED after a fall at home.  She tripped on the way back to bed from the bathroom in the morning and was unable to get up.  She did hit her head. Was on the floor for 3 hours.  Workup in the ED revealed BNP 1818, INR 3.4, leukocytosis, impaired kidney function, and CT imaging showing pulmonary edema versus pneumonia, and a new T8 lesion suspicious for recurrent breast cancer with pathologic fracture.     #Acute on chronic decompensated HF   #NICM  -EF 25-30%  -diuresis with IV Lasix  -strict I/Os, daily weights, fluid/sodium restriction, telemetry  - trend BMP, Mg; maintain K>4, Mg>2  - wean O2  - Continue spironolactone  - Cardiology consulted      #T8 lesion (pathologic fracture vs metastatic disease)  #Mechanical fall  #Back pain  -Outpatient PCP and oncology follow-up  -Pain control  -PT/OT recommends moderate intensity therapy     #Atrial fibrillation  -Continue apixaban, amiodarone     #DM2  -trend fingerstick glucose; inpatient goal 140-180  -holding oral hypoglycemics; may benefit from discontinuation of pioglitazone given heart failure  -sliding scale insulin  -hypoglycemia protocol     Dyslipidemia:  Nonobstructive CAD:  History of ischemic stroke:  History of right TCAR:  -Continue aspirin, atorvastatin, ezetimibe     #Depression:  -Continue paroxetine    Dispo: Anticipate medical clearance in 2-3 days. Needs SNF.                  Alexandra Chavez, APRN-CNP

## 2025-01-14 NOTE — PROGRESS NOTES
Physical Therapy    Physical Therapy Evaluation    Patient Name: Elsa Biggs  MRN: [id number]  Department: [place] [address]  Room: [address] [place] [address]  Today's Date: 1/14/2025   Time Calculation  Start Time: 1039  Stop Time: 1057  Time Calculation (min): 18 min    Assessment/Plan   PT Assessment  PT Assessment Results: Decreased strength, Decreased endurance, Impaired balance, Decreased mobility  Rehab Prognosis: Good  Evaluation/Treatment Tolerance: Patient tolerated treatment well  Medical Staff Made Aware: Yes  Strengths: Ability to acquire knowledge, Housing layout, Insight into problems  Barriers to Participation: Comorbidities  End of Session Communication: Bedside nurse  Assessment Comment: Patient tolerates mobility well however requires increased assist compared to baseline function. Reports generalized pain d/t being on floor x 3 hrs s/p fall. Patient reports  is currently hospitalized at [facility] s/p fall. Rec MOD intensity PT intervention.  End of Session Patient Position: Up in chair, Alarm on (all needs within reach)  IP OR SWING BED PT PLAN  Inpatient or Swing Bed: Inpatient  PT Plan  Treatment/Interventions: Bed mobility, Transfer training, Gait training, Balance training, Stair training, Strengthening, Endurance training  PT Plan: Ongoing PT  PT Frequency: 3 times per week  PT Discharge Recommendations: Moderate intensity level of continued care  Equipment Recommended upon Discharge: Wheeled walker (owns)  PT Recommended Transfer Status: Assist x2  PT - OK to Discharge: Yes (per PT POC)    Subjective   General Visit Information:  General  Reason for Referral: Impaired functional mobility; CHF  Referred By: Toi Orellana MD  Past Medical History Relevant to Rehab: HFmrEF, atrial fibrillation on Eliquis, hypertension, type 2 diabetes, dyslipidemia, depression, history of breast cancer  Co-Treatment: OT  Co-Treatment Reason: maximize pt safety and outcomes  Prior to Session Communication: Bedside  nurse  Patient Position Received: Bed, 3 rail up, Alarm on  General Comment: Patient pleasant, cooperative and agreeable to therapy assessment  Home Living:  Home Living  Type of Home: House  Lives With: Spouse (Spouse is currently at [facility] s/p fall)  Home Adaptive Equipment: Walker rolling or standard (2WW)  Home Layout: One level  Home Access: Stairs to enter without rails  Entrance Stairs-Rails: None  Entrance Stairs-Number of Steps: 1  Bathroom Shower/Tub: Tub/shower unit  Bathroom Equipment: Tub transfer bench, Grab bars in shower  Prior Level of Function:  Prior Function Per Pt/Caregiver Report  Level of [place]: Independent with ADLs and functional transfers, Independent with homemaking with ambulation  Receives Help From: Family  ADL Assistance: Independent (with AE)  Homemaking Assistance: Independent  Ambulatory Assistance: Independent (2WW)  Precautions:  Precautions  Medical Precautions: Fall precautions, Oxygen therapy device and L/min (3L O2, tele, scherer)    Objective   Pain:  Pain Assessment  Pain Assessment: Ferrari-Baker FACES  Ferrari-Baker FACES Pain Rating: Hurts little bit  Pain Type: Acute pain  Pain Location: Generalized  Pain Interventions: Repositioned, Ambulation/increased activity  Cognition:  Cognition  Overall Cognitive Status: Within Functional Limits  Orientation Level: Oriented X4    General Assessments:     Activity Tolerance  Endurance: Tolerates 10 - 20 min exercise with multiple rests    Sensation  Light Touch: No apparent deficits    Strength  Strength Comments: Functionally B LE 4/5      Coordination  Movements are Fluid and Coordinated: Yes    Postural Control  Postural Control: Within Functional Limits    Static Sitting Balance  Static Sitting-Balance Support: Bilateral upper extremity supported, Feet supported  Static Sitting-Level of Assistance: Close supervision    Static Standing Balance  Static Standing-Balance Support: Bilateral upper extremity  supported  Static Standing-Level of Assistance: Minimum assistance (x 2)  Functional Assessments:  ADL  ADL's Addressed: Yes (max A donning socks and undergarment)    Bed Mobility  Bed Mobility: Yes  Bed Mobility 1  Bed Mobility 1: Supine to sitting  Level of Assistance 1: Maximum assistance (x 2)    Transfers  Transfer: Yes  Transfer 1  Transfer From 1: Sit to, Stand to  Transfer to 1: Sit, Stand  Technique 1: Sit to stand, Stand to sit  Transfer Device 1: Walker  Transfer Level of Assistance 1: Minimum assistance (x 2)    Ambulation/Gait Training  Ambulation/Gait Training Performed: Yes  Ambulation/Gait Training 1  Surface 1: Level tile  Device 1: Rolling walker  Assistance 1: Minimum assistance (x 2)  Quality of Gait 1: Decreased step length  Comments/Distance (ft) 1: 3' (bed>chair)    Outcome Measures:  [facility] Basic Mobility  Turning from your back to your side while in a flat bed without using bedrails: Total  Moving from lying on your back to sitting on the side of a flat bed without using bedrails: Total  Moving to and from bed to chair (including a wheelchair): A lot  Standing up from a chair using your arms (e.g. wheelchair or bedside chair): A lot  To walk in hospital room: Total  Climbing 3-5 steps with railing: Total  Basic Mobility - Total Score: 8    Encounter Problems       Encounter Problems (Active)       Balance       STG - Maintains dynamic standing balance without upper extremity support x 3' with dual task CGA  (Progressing)       Start:  01/14/25    Expected End:  01/28/25               Mobility       STG - Patient will ambulate up and down a curb/step with 2WW CGA  (Progressing)       Start:  01/14/25    Expected End:  01/28/25               PT Transfers       STG - Patient will perform bed mobility CGA  (Progressing)       Start:  01/14/25    Expected End:  01/28/25            STG - Patient will transfer sit to and from stand CGA  (Progressing)       Start:  01/14/25    Expected End:  01/28/25                Pain - Adult              Education Documentation  Body Mechanics, taught by Ann-Marie Patel, PT at 1/14/2025 11:33 AM.  Learner: Patient  Readiness: Acceptance  Method: Explanation  Response: Verbalizes Understanding    Mobility Training, taught by Ann-Marie Patel PT at 1/14/2025 11:33 AM.  Learner: Patient  Readiness: Acceptance  Method: Explanation  Response: Verbalizes Understanding    Education Comments  No comments found.

## 2025-01-15 LAB
ANION GAP SERPL CALC-SCNC: 11 MMOL/L (ref 10–20)
BACTERIA UR CULT: NORMAL
BUN SERPL-MCNC: 31 MG/DL (ref 6–23)
CALCIUM SERPL-MCNC: 7.6 MG/DL (ref 8.6–10.3)
CHLORIDE SERPL-SCNC: 100 MMOL/L (ref 98–107)
CO2 SERPL-SCNC: 29 MMOL/L (ref 21–32)
CREAT SERPL-MCNC: 1.38 MG/DL (ref 0.5–1.05)
EGFRCR SERPLBLD CKD-EPI 2021: 40 ML/MIN/1.73M*2
ERYTHROCYTE [DISTWIDTH] IN BLOOD BY AUTOMATED COUNT: 17.7 % (ref 11.5–14.5)
EST. AVERAGE GLUCOSE BLD GHB EST-MCNC: 137 MG/DL
GLUCOSE BLD MANUAL STRIP-MCNC: 111 MG/DL (ref 74–99)
GLUCOSE BLD MANUAL STRIP-MCNC: 112 MG/DL (ref 74–99)
GLUCOSE BLD MANUAL STRIP-MCNC: 135 MG/DL (ref 74–99)
GLUCOSE BLD MANUAL STRIP-MCNC: 158 MG/DL (ref 74–99)
GLUCOSE SERPL-MCNC: 113 MG/DL (ref 74–99)
HBA1C MFR BLD: 6.4 %
HCT VFR BLD AUTO: 26.7 % (ref 36–46)
HGB BLD-MCNC: 8.3 G/DL (ref 12–16)
MAGNESIUM SERPL-MCNC: 1.4 MG/DL (ref 1.6–2.4)
MCH RBC QN AUTO: 24.9 PG (ref 26–34)
MCHC RBC AUTO-ENTMCNC: 31.1 G/DL (ref 32–36)
MCV RBC AUTO: 80 FL (ref 80–100)
NRBC BLD-RTO: 0 /100 WBCS (ref 0–0)
PLATELET # BLD AUTO: 255 X10*3/UL (ref 150–450)
POTASSIUM SERPL-SCNC: 4.1 MMOL/L (ref 3.5–5.3)
RBC # BLD AUTO: 3.33 X10*6/UL (ref 4–5.2)
SODIUM SERPL-SCNC: 136 MMOL/L (ref 136–145)
WBC # BLD AUTO: 7.5 X10*3/UL (ref 4.4–11.3)

## 2025-01-15 PROCEDURE — 2500000005 HC RX 250 GENERAL PHARMACY W/O HCPCS: Performed by: NURSE PRACTITIONER

## 2025-01-15 PROCEDURE — 80048 BASIC METABOLIC PNL TOTAL CA: CPT | Performed by: STUDENT IN AN ORGANIZED HEALTH CARE EDUCATION/TRAINING PROGRAM

## 2025-01-15 PROCEDURE — 1100000001 HC PRIVATE ROOM DAILY

## 2025-01-15 PROCEDURE — 2500000001 HC RX 250 WO HCPCS SELF ADMINISTERED DRUGS (ALT 637 FOR MEDICARE OP): Performed by: NURSE PRACTITIONER

## 2025-01-15 PROCEDURE — 2500000002 HC RX 250 W HCPCS SELF ADMINISTERED DRUGS (ALT 637 FOR MEDICARE OP, ALT 636 FOR OP/ED): Performed by: STUDENT IN AN ORGANIZED HEALTH CARE EDUCATION/TRAINING PROGRAM

## 2025-01-15 PROCEDURE — 97535 SELF CARE MNGMENT TRAINING: CPT | Mod: GO,CO

## 2025-01-15 PROCEDURE — 36415 COLL VENOUS BLD VENIPUNCTURE: CPT | Performed by: STUDENT IN AN ORGANIZED HEALTH CARE EDUCATION/TRAINING PROGRAM

## 2025-01-15 PROCEDURE — 2500000004 HC RX 250 GENERAL PHARMACY W/ HCPCS (ALT 636 FOR OP/ED): Performed by: NURSE PRACTITIONER

## 2025-01-15 PROCEDURE — 99233 SBSQ HOSP IP/OBS HIGH 50: CPT | Performed by: STUDENT IN AN ORGANIZED HEALTH CARE EDUCATION/TRAINING PROGRAM

## 2025-01-15 PROCEDURE — 85027 COMPLETE CBC AUTOMATED: CPT | Performed by: STUDENT IN AN ORGANIZED HEALTH CARE EDUCATION/TRAINING PROGRAM

## 2025-01-15 PROCEDURE — 97530 THERAPEUTIC ACTIVITIES: CPT | Mod: GO,CO

## 2025-01-15 PROCEDURE — 83735 ASSAY OF MAGNESIUM: CPT | Performed by: STUDENT IN AN ORGANIZED HEALTH CARE EDUCATION/TRAINING PROGRAM

## 2025-01-15 PROCEDURE — 2500000004 HC RX 250 GENERAL PHARMACY W/ HCPCS (ALT 636 FOR OP/ED): Performed by: STUDENT IN AN ORGANIZED HEALTH CARE EDUCATION/TRAINING PROGRAM

## 2025-01-15 PROCEDURE — 82947 ASSAY GLUCOSE BLOOD QUANT: CPT

## 2025-01-15 PROCEDURE — 99233 SBSQ HOSP IP/OBS HIGH 50: CPT | Performed by: NURSE PRACTITIONER

## 2025-01-15 PROCEDURE — 83036 HEMOGLOBIN GLYCOSYLATED A1C: CPT | Mod: GEALAB | Performed by: NURSE PRACTITIONER

## 2025-01-15 PROCEDURE — 2500000005 HC RX 250 GENERAL PHARMACY W/O HCPCS: Performed by: STUDENT IN AN ORGANIZED HEALTH CARE EDUCATION/TRAINING PROGRAM

## 2025-01-15 PROCEDURE — 2500000001 HC RX 250 WO HCPCS SELF ADMINISTERED DRUGS (ALT 637 FOR MEDICARE OP): Performed by: STUDENT IN AN ORGANIZED HEALTH CARE EDUCATION/TRAINING PROGRAM

## 2025-01-15 RX ORDER — METHOCARBAMOL 500 MG/1
500 TABLET, FILM COATED ORAL EVERY 8 HOURS SCHEDULED
Status: DISCONTINUED | OUTPATIENT
Start: 2025-01-15 | End: 2025-01-17 | Stop reason: HOSPADM

## 2025-01-15 RX ORDER — MAGNESIUM SULFATE 1 G/100ML
1 INJECTION INTRAVENOUS ONCE
Status: COMPLETED | OUTPATIENT
Start: 2025-01-15 | End: 2025-01-15

## 2025-01-15 RX ORDER — EAR PLUGS
1 EACH OTIC (EAR) 3 TIMES DAILY
Status: DISCONTINUED | OUTPATIENT
Start: 2025-01-15 | End: 2025-01-17 | Stop reason: HOSPADM

## 2025-01-15 RX ORDER — ACETAMINOPHEN 325 MG/1
975 TABLET ORAL EVERY 8 HOURS
Status: DISCONTINUED | OUTPATIENT
Start: 2025-01-15 | End: 2025-01-17 | Stop reason: HOSPADM

## 2025-01-15 RX ORDER — LIDOCAINE 560 MG/1
1 PATCH PERCUTANEOUS; TOPICAL; TRANSDERMAL DAILY
Status: DISCONTINUED | OUTPATIENT
Start: 2025-01-15 | End: 2025-01-17 | Stop reason: HOSPADM

## 2025-01-15 RX ORDER — METOPROLOL SUCCINATE 25 MG/1
25 TABLET, EXTENDED RELEASE ORAL DAILY
Status: DISCONTINUED | OUTPATIENT
Start: 2025-01-15 | End: 2025-01-16

## 2025-01-15 RX ORDER — SACUBITRIL AND VALSARTAN 24; 26 MG/1; MG/1
1 TABLET, FILM COATED ORAL 2 TIMES DAILY
Status: DISCONTINUED | OUTPATIENT
Start: 2025-01-15 | End: 2025-01-16

## 2025-01-15 RX ADMIN — ACETAMINOPHEN 975 MG: 325 TABLET, FILM COATED ORAL at 15:26

## 2025-01-15 RX ADMIN — EZETIMIBE 10 MG: 10 TABLET ORAL at 08:30

## 2025-01-15 RX ADMIN — TRAMADOL HYDROCHLORIDE 50 MG: 50 TABLET, COATED ORAL at 11:55

## 2025-01-15 RX ADMIN — TRAMADOL HYDROCHLORIDE 50 MG: 50 TABLET, COATED ORAL at 05:29

## 2025-01-15 RX ADMIN — ONDANSETRON 4 MG: 2 INJECTION, SOLUTION INTRAMUSCULAR; INTRAVENOUS at 06:17

## 2025-01-15 RX ADMIN — METHOCARBAMOL TABLETS 500 MG: 500 TABLET, COATED ORAL at 15:26

## 2025-01-15 RX ADMIN — SACUBITRIL AND VALSARTAN 1 TABLET: 24; 26 TABLET, FILM COATED ORAL at 11:55

## 2025-01-15 RX ADMIN — ATORVASTATIN CALCIUM 80 MG: 80 TABLET, FILM COATED ORAL at 21:43

## 2025-01-15 RX ADMIN — SACUBITRIL AND VALSARTAN 1 TABLET: 24; 26 TABLET, FILM COATED ORAL at 21:43

## 2025-01-15 RX ADMIN — APIXABAN 5 MG: 5 TABLET, FILM COATED ORAL at 21:43

## 2025-01-15 RX ADMIN — MAGNESIUM SULFATE IN DEXTROSE 1 G: 10 INJECTION, SOLUTION INTRAVENOUS at 10:30

## 2025-01-15 RX ADMIN — FUROSEMIDE 40 MG: 10 INJECTION, SOLUTION INTRAMUSCULAR; INTRAVENOUS at 21:43

## 2025-01-15 RX ADMIN — PAROXETINE 20 MG: 20 TABLET, FILM COATED ORAL at 21:43

## 2025-01-15 RX ADMIN — Medication 1.5 L/MIN: at 08:33

## 2025-01-15 RX ADMIN — ASPIRIN 81 MG: 81 TABLET, COATED ORAL at 08:30

## 2025-01-15 RX ADMIN — CEFTRIAXONE SODIUM 1 G: 1 INJECTION, SOLUTION INTRAVENOUS at 09:34

## 2025-01-15 RX ADMIN — APIXABAN 5 MG: 5 TABLET, FILM COATED ORAL at 08:30

## 2025-01-15 RX ADMIN — SPIRONOLACTONE 25 MG: 25 TABLET ORAL at 08:30

## 2025-01-15 RX ADMIN — Medication 1000 UNITS: at 08:30

## 2025-01-15 RX ADMIN — METOPROLOL SUCCINATE 25 MG: 25 TABLET, EXTENDED RELEASE ORAL at 11:55

## 2025-01-15 RX ADMIN — TRAMADOL HYDROCHLORIDE 50 MG: 50 TABLET, COATED ORAL at 21:43

## 2025-01-15 RX ADMIN — AMIODARONE HYDROCHLORIDE 200 MG: 200 TABLET ORAL at 08:30

## 2025-01-15 RX ADMIN — FUROSEMIDE 40 MG: 10 INJECTION, SOLUTION INTRAMUSCULAR; INTRAVENOUS at 08:30

## 2025-01-15 RX ADMIN — Medication 2 L/MIN: at 19:02

## 2025-01-15 ASSESSMENT — PAIN SCALES - GENERAL
PAINLEVEL_OUTOF10: 7
PAINLEVEL_OUTOF10: 5 - MODERATE PAIN
PAINLEVEL_OUTOF10: 1
PAINLEVEL_OUTOF10: 5 - MODERATE PAIN
PAINLEVEL_OUTOF10: 6
PAINLEVEL_OUTOF10: 4
PAINLEVEL_OUTOF10: 5 - MODERATE PAIN
PAINLEVEL_OUTOF10: 0 - NO PAIN

## 2025-01-15 ASSESSMENT — COGNITIVE AND FUNCTIONAL STATUS - GENERAL
DRESSING REGULAR LOWER BODY CLOTHING: TOTAL
DAILY ACTIVITIY SCORE: 14
DRESSING REGULAR LOWER BODY CLOTHING: A LITTLE
STANDING UP FROM CHAIR USING ARMS: A LOT
MOVING TO AND FROM BED TO CHAIR: A LOT
DAILY ACTIVITIY SCORE: 20
CLIMB 3 TO 5 STEPS WITH RAILING: A LOT
HELP NEEDED FOR BATHING: A LOT
MOBILITY SCORE: 15
PERSONAL GROOMING: A LITTLE
TURNING FROM BACK TO SIDE WHILE IN FLAT BAD: A LITTLE
DRESSING REGULAR UPPER BODY CLOTHING: A LITTLE
DRESSING REGULAR UPPER BODY CLOTHING: A LITTLE
HELP NEEDED FOR BATHING: A LITTLE
WALKING IN HOSPITAL ROOM: A LOT
TOILETING: TOTAL
TOILETING: A LITTLE

## 2025-01-15 ASSESSMENT — PAIN DESCRIPTION - ORIENTATION: ORIENTATION: LOWER

## 2025-01-15 ASSESSMENT — PAIN SCALES - WONG BAKER: WONGBAKER_NUMERICALRESPONSE: NO HURT

## 2025-01-15 ASSESSMENT — PAIN - FUNCTIONAL ASSESSMENT
PAIN_FUNCTIONAL_ASSESSMENT: 0-10

## 2025-01-15 ASSESSMENT — PAIN DESCRIPTION - LOCATION: LOCATION: BACK

## 2025-01-15 ASSESSMENT — ACTIVITIES OF DAILY LIVING (ADL): HOME_MANAGEMENT_TIME_ENTRY: 14

## 2025-01-15 NOTE — PROGRESS NOTES
Subjective Data:  Denies CP or dyspnea    Overnight Events:    NSR with frequent PVCs on telemetry     Objective Data:  Last Recorded Vitals:  Vitals:    01/14/25 1628 01/14/25 2013 01/14/25 2100 01/15/25 0525   BP: (!) 103/48 107/72  115/85   BP Location: Right arm Right arm  Right arm   Patient Position: Sitting Lying  Lying   Pulse: 86 78  73   Resp: 18 18  16   Temp: 36.3 °C (97.3 °F) 36.2 °C (97.2 °F)  36.2 °C (97.2 °F)   TempSrc: Temporal Temporal  Temporal   SpO2: 97% 97% 97% 97%   Weight:       Height:           Last Labs:  CBC - 1/15/2025:  6:35 AM  7.5 8.3 255    26.7      CMP - 1/15/2025:  6:35 AM  7.6 6.7 13 --- 0.4   3.7 3.1 13 57      PTT - No results in last year.  3.4   38.9 _     TROPHS   Date/Time Value Ref Range Status   12/11/2024 01:15 PM 84 0 - 13 ng/L Final     Comment:     Previous result verified on 12/11/2024 1158 on specimen/case [id number] called with component [facility] for procedure Troponin I, High Sensitivity, Initial with value 81 ng/L.   12/11/2024 11:20 AM 81 0 - 13 ng/L Final   03/06/2024 02:57 PM 7 0 - 13 ng/L Final     BNP   Date/Time Value Ref Range Status   01/13/2025 10:17 AM 1,818 0 - 99 pg/mL Final   12/11/2024 11:20 AM 1,187 0 - 99 pg/mL Final     HGBA1C   Date/Time Value Ref Range Status   10/20/2023 05:22 AM 7.0 see below % Final   08/02/2018 03:17 AM 6.2 % Final     Comment:          Diagnosis of Diabetes-Adults   Non-Diabetic: < or = 5.6%   Increased risk for developing diabetes: 5.7-6.4%   Diagnostic of diabetes: > or = 6.5%  .       Monitoring of Diabetes                Age (y)     Therapeutic Goal (%)   Adults:          >18           <7.0   Pediatrics:    13-18           <7.5                   7-12           <8.0                   0- 6            7.5-8.5   American Diabetes Association. Diabetes Care 33(S1), Jan 2010.     08/01/2018 11:52 AM 6.2 % Final     Comment:          Diagnosis of Diabetes-Adults   Non-Diabetic: < or = 5.6%   Increased risk for developing  diabetes: 5.7-6.4%   Diagnostic of diabetes: > or = 6.5%  .       Monitoring of Diabetes                Age (y)     Therapeutic Goal (%)   Adults:          >18           <7.0   Pediatrics:    13-18           <7.5                   7-12           <8.0                   0- 6            7.5-8.5   American Diabetes Association. Diabetes Care 33(S1), Jan 2010.       LDLCALC   Date/Time Value Ref Range Status   10/20/2023 05:22 AM 60 <=99 mg/dL Final     Comment:                                 Near   Borderline      AGE      Desirable  Optimal    High     High     Very High     0-19 Y     0 - 109     ---    110-129   >/= 130     ----    20-24 Y     0 - 119     ---    120-159   >/= 160     ----      >24 Y     0 -  99   100-129  130-159   160-189     >/=190       VLDL   Date/Time Value Ref Range Status   10/20/2023 05:22 AM 13 0 - 40 mg/dL Final      Last I/O:  I/O last 3 completed shifts:  In: 950 (9.3 mL/kg) [P.O.:900; IV Piggyback:50]  Out: 1900 (18.7 mL/kg) [Urine:1900 (0.5 mL/kg/hr)]  Weight: 101.6 kg     Past Cardiology Tests (Last 3 Years):  EKG:  ECG 12 Lead 01/14/2025 (Preliminary)  NSR 1st degree AV block, frequent PVCs    Echo:  Transthoracic Echo (TTE) Complete 01/14/2025  CONCLUSIONS:   1. The left ventricular systolic function is severely decreased, with a visually estimated ejection fraction of 25-30%.   2. There is global hypokinesis of the left ventricle with minor regional variations.   3. Spectral Doppler shows a Grade II (pseudonormal pattern) of left ventricular diastolic filling with an elevated left atrial pressure.   4. Left ventricular cavity size is severely dilated.   5. There is moderately increased septal thickness.   6. There is severely increased eccentric left ventricular hypertrophy.   7. There is normal right ventricular global systolic function.   8. Mildly enlarged right ventricle.   9. The left atrium is severely dilated.  10. Mild to moderate mitral valve regurgitation.  11. Mildly  elevated right ventricular systolic pressure.    Transthoracic Echo (TTE) Complete 03/06/2024  CONCLUSIONS:   1. Left ventricular systolic function is low normal with a 50-55% estimated ejection fraction.     Transthoracic Echo (TTE) Complete 10/20/2023  CONCLUSIONS:   1. Left ventricular systolic function is mildly decreased with a 45-50% estimated ejection fraction.   2. Spectral Doppler shows an impaired relaxation pattern of left ventricular diastolic filling.   3. There is mild tricuspid regurgitation.   4. Technically difficult bubble study unable to interpret.     Ejection Fractions:        EF   Date/Time Value Ref Range Status   03/06/2024 03:57 PM 62 %        Cath:  Cardiac catheterization - coronary 01/29/2024  CONCLUSIONS:   1. Left main: no significant angiographic disease.   2. LAD: diffuse 40% prox-mid disease.   3. LCx: 60% mid-vessel disease.   4. RCA: mild diffuse irregularities.   5. LVEDP 26mmHg, no aortic stenosis on LV-Ao gradient.     Stress Test:  Nuclear Stress Test 12/26/2023  IMPRESSION:  Mild attentuation of the anterior wall seen on the stress images.  This is either soft tissue attenuation or ischemia in LAD territory.  Unfortunately there are no prone images available. Dilated left  ventricle with mild left ventricular systolic dysfunction on post  stress gated imaging.     Cardiac Imaging:  No results found for this or any previous visit from the past 1095 days.      Inpatient Medications:  Scheduled medications   Medication Dose Route Frequency    amiodarone  200 mg oral Daily    apixaban  5 mg oral BID    aspirin  81 mg oral Daily    atorvastatin  80 mg oral Nightly    cefTRIAXone  1 g intravenous q24h    cholecalciferol  1,000 Units oral Daily    ezetimibe  10 mg oral Daily    furosemide  40 mg intravenous BID    [Held by provider] furosemide  40 mg oral Daily    insulin lispro  0-15 Units subcutaneous TID AC    [Held by provider] metFORMIN  500 mg oral q AM    oxygen   inhalation  Continuous - Inhalation    PARoxetine  20 mg oral Nightly    [Held by provider] pioglitazone  30 mg oral q24h    spironolactone  25 mg oral Daily     PRN medications   Medication    albuterol    dextrose    dextrose    famotidine    glucagon    glucagon    melatonin    ondansetron    oxygen    sennosides-docusate sodium    traMADol     Continuous Medications   Medication Dose Last Rate     Physical Exam  Vitals reviewed.   Constitutional:       Appearance: Normal appearance. She is normal weight.   HENT:      Head: Normocephalic and atraumatic.   Neck:      Vascular: JVD present. No carotid bruit.   Cardiovascular:      Rate and Rhythm: Normal rate. Rhythm regularly irregular.      Pulses: Normal pulses.      Heart sounds: Normal heart sounds.   Pulmonary:      Effort: Pulmonary effort is normal.      Breath sounds: Normal breath sounds.   Chest:      Chest wall: No tenderness.   Abdominal:      General: Abdomen is flat. Bowel sounds are normal.      Palpations: Abdomen is soft.   Musculoskeletal:      Right lower leg: Edema present.      Left lower leg: Edema present.   Skin:     General: Skin is warm and dry.   Neurological:      General: No focal deficit present.      Mental Status: She is alert and oriented to person, place, and time. Mental status is at baseline.   Psychiatric:         Mood and Affect: Mood normal.         Behavior: Behavior normal.         Assessment/Plan   Assessment  74 y.o. female with HF improved EF, atrial fibrillation on Eliquis, nonobstructive CAD, HTN, DM2, dyslipidemia, depression, history of breast cancer, PE, CVA history presenting with mechanical fall. Found to be volume up. CT imaging showing pulmonary edema versus pneumonia, and a new T8 lesion suspicious for recurrent breast cancer with pathologic fracture. TTE with LVEF 25-30%. /85.     Acute on chronic systolic HFrEF 25-30%  NICM   Nonobstructive CAD - [facility] 1/2024  PAF  Anemia - Hgb 8.5  MARSHALL - creatinine 1.38 (BL  1)  Dyslipidemia  Frequent PVCs  Hypomagnesemia - 1.4 - replacement per primary     Plan  -I&O - 650 mL  -Daily weights  -Continue Lasix IV 40 mg BID  -Continue amiodarone 200 mg daily, Eliquis 5 mg BID  -Continue atorvastatin 80 mg daily, Zetia 10 mg daily  -Continue spironolactone 25 mg daily  -Begin Toprol 25 mg daily, Entresto 24/26 mg BID - monitor BP and renal function  -Consider addition of SGLT2  -Keep K>4, Mg>2  -Will follow    Peripheral IV 01/13/25 20 G Right Antecubital (Active)   Site Assessment Clean;Dry;Intact 01/15/25 0736   Dressing Status Clean;Dry 01/15/25 0736   Number of days: 2       Urethral Catheter (Active)   Reason for Continuing Urinary Catheterization acute urinary retention 01/15/25 0736   Number of days: 2       Code Status:  DNR and No Intubation    I spent minutes in the professional and overall care of this patient.        Drake Ge, ANITA-CNP

## 2025-01-15 NOTE — PROGRESS NOTES
01/15/25 1341   Discharge Planning   Expected Discharge Disposition SNF  (pt reviewed SNF list and would now like to dc to [facility] when medically ready. Anticipate dc 1/16.)

## 2025-01-15 NOTE — PROGRESS NOTES
01/15/25 1149   Discharge Planning   Living Arrangements Spouse/significant other   Support Systems Spouse/significant other;Children  (dtr, Annette, lives 5 mins away)   Assistance Needed A&Ox4, independent with ADLs, utilizes a walker, room air at baseline, PCP Dr. Marcelle Vitale. Active with [facility] (SN/PT)   Type of Residence Private residence   Number of Stairs to Enter Residence 0   Number of Stairs Within Residence 0   Do you have animals or pets at home? No   Home or Post Acute Services Post acute facilities (Rehab/SNF/etc)   Type of Post Acute Facility Services Skilled nursing   Expected Discharge Disposition SNF  (PT/OT recommending mod, discussed with the pt. She is unsure about SNF but does not want to return to [facility]. SNF list provided. [facility] able to accept tomorrow. No precert.)   Does the patient need discharge transport arranged? Yes   RoundTrip coordination needed? Yes   Has discharge transport been arranged? No   Patient Choice   Provider Choice list and CMS website (https://medicare.gov/care-compare#search) for post-acute Quality and Resource Measure Data were provided and reviewed with: Patient   Stroke Family Assessment   Stroke Family Assessment Needed No

## 2025-01-15 NOTE — PROGRESS NOTES
Occupational Therapy    Occupational Therapy Treatment    Name: Elsa Biggs  MRN: [id number]  Department: [address]  Room: [address] [place] [address]  Date: 01/15/25  Time Calculation  Start Time: 1106  Stop Time: 1130  Time Calculation (min): 24 min    Assessment:  OT Assessment: Pt presents with decreased ADL performance, decreased functional mobility, decreased endurance. Continued skilled OT recommended to maximize pt safety and independence.  Prognosis: Good  Barriers to Discharge Home: No anticipated barriers  Evaluation/Treatment Tolerance: Patient limited by fatigue, Patient tolerated treatment well  Medical Staff Made Aware: Yes  End of Session Communication: Bedside nurse, PCT/NA/CTA (Discussed level of assist needed for selfcare and mobility components. Pt agreeable to remain up in chair >1 hr.)  End of Session Patient Position: Up in chair, Alarm on (all needs in reach.)  Plan:  Treatment Interventions: ADL retraining, Functional transfer training, UE strengthening/ROM, Endurance training  OT Frequency: 3 times per week  OT Discharge Recommendations: Moderate intensity level of continued care  Equipment Recommended upon Discharge: Wheeled walker (owns)  OT Recommended Transfer Status: Assist of 1  OT - OK to Discharge: Yes (in accord with OT POC)    Subjective   Previous Visit Info:  OT Last Visit  OT Received On: 01/15/25  General:  General  Reason for Referral: Impaired ADL and related mobility; CHF  Referred By: Toi Orellana MD  Past Medical History Relevant to Rehab: HFmrEF, atrial fibrillation on Eliquis, hypertension, type 2 diabetes, dyslipidemia, depression, history of breast cancer  Family/Caregiver Present: No  Prior to Session Communication: Bedside nurse, PCT/NA/CTA (Pt appropriate for treatment without new limitations per nurse.)  Patient Position Received: Bed, 3 rail up, Alarm on  Preferred Learning Style: verbal  General Comment: Pt pleasant, though anxious with mobility components verbalizing fear of  pain and fear of falling. Engaged in OT treatment with minor difficulty with verbal encouragement and reassurance.  Precautions:  Medical Precautions: Fall precautions, Oxygen therapy device and L/min (3 liters O2 via nasal cannula, Fontenot catheter, telemetry.)    Vital Signs (Past 2hrs)        Date/Time Vitals Session Patient Position Pulse Resp SpO2 BP MAP (mmHg)    01/15/25 1140 --  --  77  18  95 %  131/72  92                    Pain Assessment:  Pain Assessment  Pain Assessment: 0-10  0-10 (Numeric) Pain Score: 5 - Moderate pain  Pain Type: Acute pain  Pain Location: Back  Pain Orientation: Lower  Patient's Stated Pain Goal: No pain  Pain Interventions: Repositioned, Ambulation/increased activity  Response to Interventions: Decrease in pain (at rest reports pain to be improved)     Objective   Cognition:  Overall Cognitive Status: Within Functional Limits  Arousal/Alertness: Appropriate responses to stimuli  Orientation Level: Oriented X4  Activities of Daily Living: LE Dressing  LE Dressing: Yes  Pants Level of Assistance: Maximum assistance, Moderate verbal cues  LE Dressing Where Assessed: Edge of bed  LE Dressing Comments: Started over feet to knee level, pulled up over hips from knee level in standing with front wheel walker.     Bed Mobility/Transfers: Bed Mobility  Bed Mobility: Yes  Bed Mobility 1  Bed Mobility 1: Supine to sitting, Scooting  Level of Assistance 1: Maximum assistance  Bed Mobility Comments 1: HOB ~20 degrees using bed rail and chux for modified log roll.    Transfers  Transfer: Yes  Transfer 1  Transfer From 1: Bed to  Transfer to 1: Stand  Technique 1: Sit to stand, Stand to sit  Transfer Device 1: Walker  Transfer Level of Assistance 1: Minimum assistance, Moderate verbal cues  Trials/Comments 1: (x2) Increased time and reassurance required  Transfers 2  Transfer From 2: Stand to  Transfer to 2: Chair with arms  Technique 2: Stand pivot  Transfer Device 2: Walker  Transfer Level of  Assistance 2: Minimum assistance, Moderate verbal cues  Trials/Comments 2: Increased time required for motor planning and device management including safe transition hands to/from device.    Functional Mobility:  Functional Mobility  Functional Mobility Performed: Yes  Functional Mobility 1  Surface 1: Level tile  Device 1: Rolling walker  Assistance 1: Minimum assistance, Moderate assistance (cues required for device management in backing, turning and sidestepping to chair.)  Comments 1: bed>bedside chair including turning and backing to sit.    Outcome Measures:  [facility]  Putting on and taking off regular lower body clothing: Total  Bathing (including washing, rinsing, drying): A lot  Putting on and taking off regular upper body clothing: A little  Toileting, which includes using toilet, bedpan or urinal: Total  Taking care of personal grooming such as brushing teeth: A little  Eating Meals: None  Daily Activity - Total Score: 14    Education Documentation  Precautions, taught by MOHSEN Shannon at 1/15/2025 11:54 AM.  Learner: Patient  Readiness: Acceptance  Method: Explanation, Demonstration  Response: Verbalizes Understanding, Demonstrated Understanding, Needs Reinforcement  Comment: Pt responsive to instruction provided in course of session with encouragement and reassurance to facilitate safe and effective body mechanics and effective use of selfcare strategies involving sitting and standing. Encouraged increased OOB activity..    Body Mechanics, taught by MOHSEN Shannon at 1/15/2025 11:54 AM.  Learner: Patient  Readiness: Acceptance  Method: Explanation, Demonstration  Response: Verbalizes Understanding, Demonstrated Understanding, Needs Reinforcement  Comment: Pt responsive to instruction provided in course of session with encouragement and reassurance to facilitate safe and effective body mechanics and effective use of selfcare strategies involving sitting and standing. Encouraged  increased OOB activity..    ADL Training, taught by MOHSEN Shannon at 1/15/2025 11:54 AM.  Learner: Patient  Readiness: Acceptance  Method: Explanation, Demonstration  Response: Verbalizes Understanding, Demonstrated Understanding, Needs Reinforcement  Comment: Pt responsive to instruction provided in course of session with encouragement and reassurance to facilitate safe and effective body mechanics and effective use of selfcare strategies involving sitting and standing. Encouraged increased OOB activity..    Goals:  Encounter Problems       Encounter Problems (Active)       OT Goals       Pt will demo LE ADL completion with modified independence, using AE if needed.  (Progressing)       Start:  01/14/25    Expected End:  01/28/25            Pt will complete [id number] transfers using LRD in preparation for ADLs with supervision  (Progressing)       Start:  01/14/25    Expected End:  01/28/25            Pt will increase endurance to tolerate 15min of OOB activity with no more than 1 rest break in order to increase ability to engage in ADL completion.  (Progressing)       Start:  01/14/25    Expected End:  01/28/25            Pt will complete naina hygiene and clothing management during toileting ADL with supervision, using DME and adaptive strategies as neccesary  (Progressing)       Start:  01/14/25    Expected End:  01/28/25            Pt will demo Grooming/UE ADL completion with modified independence, using adaptive strategies and energy conservation techniques as applicable.  (Progressing)       Start:  01/14/25    Expected End:  01/28/25

## 2025-01-15 NOTE — PROGRESS NOTES
Rafal Pfeiffer is a 74 y.o. female on day 2 of admission presenting with CHF (congestive heart failure), NYHA class I, acute on chronic, combined.      Subjective   The patient is up to a chair with complaints of back pain this morning.        Objective     Last Recorded Vitals  /72 (BP Location: Right arm, Patient Position: Sitting)   Pulse 77   Temp 36.5 °C (97.7 °F) (Temporal)   Resp 18   Wt 102 kg (224 lb)   SpO2 95%   Intake/Output last 3 Shifts:    Intake/Output Summary (Last 24 hours) at 1/15/2025 1445  Last data filed at 1/15/2025 1253  Gross per 24 hour   Intake 840 ml   Output 1550 ml   Net -710 ml       Admission Weight  Weight: 102 kg (224 lb) (01/13/25 1008)    Daily Weight  01/13/25 : 102 kg (224 lb)    Image Results  ECG 12 Lead  Sinus rhythm with 1st degree AV block with frequent atrial-paced complexes and with frequent Premature ventricular complexes in a pattern of bigeminy  Left ventricular hypertrophy with repolarization abnormality  Abnormal ECG  When compared with ECG of 13-JAN-2025 10:48, (unconfirmed)  Electronic atrial pacemaker has replaced Sinus rhythm  ST no longer depressed in Inferior leads  T wave inversion no longer evident in Inferior leads  Transthoracic Echo (TTE) [facility], [address] [place] [address]                Tel [phone] and Fax [phone]    TRANSTHORACIC ECHOCARDIOGRAM REPORT       Patient Name:       RAFAL PFEIFFER       Reading Physician:    Bailee Chan MD  Study Date:         1/14/2025           Ordering Provider:    [id number] LIZ OROZCO  MRN/PID:            [id number]            Fellow:  Accession#:         [id number]        Nurse:                Mikey Harvey RN  Date of Birth/Age:  1950 / 74      Sonographer:          Milagro morrison                                      RDCS  Gender assigned at  F                   Additional Staff:  Birth:  Height:             172.72 cm           Admit Date:           1/13/2025  Weight:             101.60 kg           Admission Status:     Inpatient -                                                                Routine  BSA / BMI:          2.14 m2 / 34.06     Encounter#:           [id number]                      kg/m2  Blood Pressure:     102/64 mmHg         Department Location:  [facility]                                                                Invasive    Study Type:    TRANSTHORACIC ECHO (TTE) COMPLETE  Diagnosis/ICD: Acute combined systolic (congestive) and diastolic (congestive)                 heart failure (CHF)-I50.41  Indication:    Congestive Heart Failure  CPT Code:      Echo Complete w Full Doppler-99700    Patient History:  Diabetes:         Yes  Pertinent         CHF, A-Fib, Anticoagulation, HTN and Hyperlipidemia. Elevated  History:          BNP.    Study Detail: The following Echo studies were performed: 2D, M-Mode, Doppler and                color flow. Technically challenging study due to poor acoustic                windows. Definity used as a contrast agent for endocardial border                definition. Total contrast used for this procedure was 2 mL via IV                push.       PHYSICIAN INTERPRETATION:  Left Ventricle: The left ventricular systolic function is severely decreased, with a visually estimated ejection fraction of 25-30%. There is severely increased eccentric left ventricular hypertrophy. There is global hypokinesis of the left ventricle with minor regional variations. The left ventricular cavity size is severely dilated. There is moderately increased septal and mildly increased posterior left ventricular wall thickness. Spectral Doppler shows a Grade II (pseudonormal pattern) of left ventricular diastolic filling with an elevated left atrial pressure.  Left Atrium:  The left atrium is severely dilated.  Right Ventricle: The right ventricle is mildly enlarged. There is normal right ventricular global systolic function.  Right Atrium: The right atrium is mildly dilated.  Aortic Valve: The aortic valve is trileaflet. There is mild aortic valve cusp calcification. The aortic valve dimensionless index is 0.54. There is no evidence of aortic valve regurgitation. The peak instantaneous gradient of the aortic valve is 8 mmHg. The mean gradient of the aortic valve is 5 mmHg.  Mitral Valve: The mitral valve is mildly thickened. There is mild mitral annular calcification. There is mild to moderate mitral valve regurgitation.  Tricuspid Valve: The tricuspid valve is structurally normal. There is mild tricuspid regurgitation. The Doppler estimated RVSP is mildly elevated at 37.7 mmHg.  Pulmonic Valve: The pulmonic valve is structurally normal. There is physiologic pulmonic valve regurgitation.  Pericardium: Trivial pericardial effusion.  Aorta: The aortic root is normal.  Systemic Veins: The inferior vena cava appears normal in size, with IVC inspiratory collapse less than 50%.  In comparison to the previous echocardiogram(s): Compared with study dated 10/20/2023, Significant drop in LVEF.       CONCLUSIONS:   1. The left ventricular systolic function is severely decreased, with a visually estimated ejection fraction of 25-30%.   2. There is global hypokinesis of the left ventricle with minor regional variations.   3. Spectral Doppler shows a Grade II (pseudonormal pattern) of left ventricular diastolic filling with an elevated left atrial pressure.   4. Left ventricular cavity size is severely dilated.   5. There is moderately increased septal thickness.   6. There is severely increased eccentric left ventricular hypertrophy.   7. There is normal right ventricular global systolic function.   8. Mildly enlarged right ventricle.   9. The left atrium is severely dilated.  10. Mild to moderate  mitral valve regurgitation.  11. Mildly elevated right ventricular systolic pressure.    QUANTITATIVE DATA SUMMARY:     2D MEASUREMENTS:          Normal Ranges:  IVSd:            1.32 cm  (0.6-1.1cm)  LVPWd:           1.09 cm  (0.6-1.1cm)  LVIDd:           5.89 cm  (3.9-5.9cm)  LVIDs:           5.34 cm  LV Mass Index:   143 g/m2  LVEDV Index:     89 ml/m2  LV % FS          9.5 %       LA VOLUME:                    Normal Ranges:  LA Vol A4C:        111.3 ml   (22+/-6mL/m2)  LA Vol A2C:        100.8 ml  LA Vol BP:         107.5 ml  LA Vol Index A4C:  51.9 ml/m2  LA Vol Index A2C:  47.0 ml/m2  LA Vol Index BP:   50.1 ml/m2  LA Area A4C:       29.4 cm2  LA Area A2C:       28.4 cm2  LA Major Axis A4C: 6.6 cm  LA Major Axis A2C: 6.8 cm  LA Volume Index:   49.7 ml/m2  LA Vol A4C:        105.1 ml  LA Vol A2C:        93.8 ml  LA Vol Index BSA:  46.4 ml/m2       RA VOLUME BY A/L METHOD:          Normal Ranges:  RA Area A4C:             19.7 cm2       M-MODE MEASUREMENTS:         Normal Ranges:  Ao Root:             3.00 cm (2.0-3.7cm)  LAs:                 5.27 cm (2.7-4.0cm)       LV SYSTOLIC FUNCTION BY 2D PLANIMETRY (MOD):                       Normal Ranges:  EF-A4C View:    27 % (>=55%)  EF-A2C View:    24 %  EF-Biplane:     29 %  EF-Visual:      28 %  LV EF Reported: 28 %       LV DIASTOLIC FUNCTION:          Normal Ranges:  MV Peak E:             1.10 m/s (0.7-1.2 m/s)  MV Peak A:             0.60 m/s (0.42-0.7 m/s)  E/A Ratio:             1.85     (1.0-2.2)       MITRAL VALVE:          Normal Ranges:  MV DT:        154 msec (150-240msec)       AORTIC VALVE:                     Normal Ranges:  AoV Vmax:                1.40 m/s (<=1.7m/s)  AoV Peak P.9 mmHg (<20mmHg)  AoV Mean P.2 mmHg (1.7-11.5mmHg)  LVOT Max Dread:            0.72 m/s (<=1.1m/s)  AoV VTI:                 28.08 cm (18-25cm)  LVOT VTI:                15.06 cm  LVOT Diameter:           1.99 cm  (1.8-2.4cm)  AoV Area, VTI:            1.67 cm2 (2.5-5.5cm2)  AoV Area,Vmax:           1.59 cm2 (2.5-4.5cm2)  AoV Dimensionless Index: 0.54       RIGHT VENTRICLE:  RV Basal 4.10 cm  RV Mid   2.70 cm  RV Major 7.0 cm  TAPSE:   22.0 mm       TRICUSPID VALVE/RVSP:          Normal Ranges:  Peak TR Velocity:     2.72 m/s  RV Syst Pressure:     38 mmHg  (< 30mmHg)  IVC Diam:             1.80 cm       PULMONIC VALVE:          Normal Ranges:  PV Max Dread:     0.7 m/s  (0.6-0.9m/s)  PV Max P.1 mmHg       [id number] Terrence Chan MD  Electronically signed on 2025 at 1:17:46 PM       ** Final **    Results for orders placed or performed during the hospital encounter of 25 (from the past 24 hours)   POCT GLUCOSE   Result Value Ref Range    POCT Glucose 124 (H) 74 - 99 mg/dL   POCT GLUCOSE   Result Value Ref Range    POCT Glucose 153 (H) 74 - 99 mg/dL   CBC   Result Value Ref Range    WBC 7.5 4.4 - 11.3 x10*3/uL    nRBC 0.0 0.0 - 0.0 /100 WBCs    RBC 3.33 (L) 4.00 - 5.20 x10*6/uL    Hemoglobin 8.3 (L) 12.0 - 16.0 g/dL    Hematocrit 26.7 (L) 36.0 - 46.0 %    MCV 80 80 - 100 fL    MCH 24.9 (L) 26.0 - 34.0 pg    MCHC 31.1 (L) 32.0 - 36.0 g/dL    RDW 17.7 (H) 11.5 - 14.5 %    Platelets 255 150 - 450 x10*3/uL   Basic Metabolic Panel   Result Value Ref Range    Glucose 113 (H) 74 - 99 mg/dL    Sodium 136 136 - 145 mmol/L    Potassium 4.1 3.5 - 5.3 mmol/L    Chloride 100 98 - 107 mmol/L    Bicarbonate 29 21 - 32 mmol/L    Anion Gap 11 10 - 20 mmol/L    Urea Nitrogen 31 (H) 6 - 23 mg/dL    Creatinine 1.38 (H) 0.50 - 1.05 mg/dL    eGFR 40 (L) >60 mL/min/1.73m*2    Calcium 7.6 (L) 8.6 - 10.3 mg/dL   Magnesium   Result Value Ref Range    Magnesium 1.40 (L) 1.60 - 2.40 mg/dL   POCT GLUCOSE   Result Value Ref Range    POCT Glucose 112 (H) 74 - 99 mg/dL   POCT GLUCOSE   Result Value Ref Range    POCT Glucose 135 (H) 74 - 99 mg/dL     *Note: Due to a large number of results and/or encounters for the requested time period, some results have not been displayed.  A complete set of results can be found in Results Review.        Physical Exam  Constitutional:       Appearance: Normal appearance.   HENT:      Mouth/Throat:      Mouth: Mucous membranes are moist.   Eyes:      Extraocular Movements: Extraocular movements intact.   Cardiovascular:      Rate and Rhythm: Regular rhythm.      Pulses: Normal pulses.      Heart sounds: Normal heart sounds.   Abdominal:      Palpations: Abdomen is soft.   Musculoskeletal:         General: Tenderness present.      Right lower leg: Edema present.      Left lower leg: Edema present.   Skin:     General: Skin is warm and dry.   Neurological:      Mental Status: She is oriented to person, place, and time.   Psychiatric:         Mood and Affect: Mood normal.         Behavior: Behavior normal.         Thought Content: Thought content normal.         Judgment: Judgment normal.         Relevant Results      Results for orders placed or performed during the hospital encounter of 01/13/25 (from the past 24 hours)   POCT GLUCOSE   Result Value Ref Range    POCT Glucose 124 (H) 74 - 99 mg/dL   POCT GLUCOSE   Result Value Ref Range    POCT Glucose 153 (H) 74 - 99 mg/dL   CBC   Result Value Ref Range    WBC 7.5 4.4 - 11.3 x10*3/uL    nRBC 0.0 0.0 - 0.0 /100 WBCs    RBC 3.33 (L) 4.00 - 5.20 x10*6/uL    Hemoglobin 8.3 (L) 12.0 - 16.0 g/dL    Hematocrit 26.7 (L) 36.0 - 46.0 %    MCV 80 80 - 100 fL    MCH 24.9 (L) 26.0 - 34.0 pg    MCHC 31.1 (L) 32.0 - 36.0 g/dL    RDW 17.7 (H) 11.5 - 14.5 %    Platelets 255 150 - 450 x10*3/uL   Basic Metabolic Panel   Result Value Ref Range    Glucose 113 (H) 74 - 99 mg/dL    Sodium 136 136 - 145 mmol/L    Potassium 4.1 3.5 - 5.3 mmol/L    Chloride 100 98 - 107 mmol/L    Bicarbonate 29 21 - 32 mmol/L    Anion Gap 11 10 - 20 mmol/L    Urea Nitrogen 31 (H) 6 - 23 mg/dL    Creatinine 1.38 (H) 0.50 - 1.05 mg/dL    eGFR 40 (L) >60 mL/min/1.73m*2    Calcium 7.6 (L) 8.6 - 10.3 mg/dL   Magnesium   Result Value Ref Range     Magnesium 1.40 (L) 1.60 - 2.40 mg/dL   POCT GLUCOSE   Result Value Ref Range    POCT Glucose 112 (H) 74 - 99 mg/dL   POCT GLUCOSE   Result Value Ref Range    POCT Glucose 135 (H) 74 - 99 mg/dL             Assessment/Plan          This patient has a urinary catheter   Reason for the urinary catheter remaining today? critically ill patient who need accurate urinary output measurements          Elsa Biggs is a 74 y.o. female with HF improved EF, atrial fibrillation on Eliquis, hypertension, type 2 diabetes, dyslipidemia, depression, history of breast cancer history of pulmonary embolism, history of ischemic stroke.  She presented to the ED after a fall at home.  She tripped on the way back to bed from the bathroom in the morning and was unable to get up.  She did hit her head. Was on the floor for 3 hours.  Workup in the ED revealed BNP 1818, INR 3.4, leukocytosis, impaired kidney function, and CT imaging showing pulmonary edema versus pneumonia, and a new T8 lesion suspicious for recurrent breast cancer with pathologic fracture.     #Acute on chronic decompensated HF   #NICM  -EF 25-30%  -Diuresis with IV Lasix  -Strict I/Os, daily weights, fluid/sodium restriction, telemetry  -Trend BMP, Mg; maintain K>4, Mg>2  -Wean O2  -Continue spironolactone  -Cardiology consulted  -Anticipate transition to PO antibiotics tomorrow    #Pyuria  -UC negative  -Stopping the ceftriaxone     #Hypomagnesemia   -Magnesium replaced, will repeat level      #T8 lesion (pathologic fracture vs metastatic disease)  #Mechanical fall  #Back pain  -Outpatient PCP and oncology follow-up  -Scheduled Robaxin, Tylenol and added lidocaine patch  -PT/OT recommends moderate intensity therapy     #Atrial fibrillation  -Continue apixaban, amiodarone     #DM2  -Trend fingerstick glucose; inpatient goal 140-180  -Holding oral hypoglycemics; may benefit from discontinuation of pioglitazone given heart failure  -Sliding scale insulin  -Hypoglycemia  protocol     #Dyslipidemia:  #Nonobstructive CAD:  #History of ischemic stroke:  #History of right TCAR:  -Continue aspirin, atorvastatin, ezetimibe     #Depression:  -Continue paroxetine    Dispo: Anticipate medical clearance in 2-3 days.                  Alexandra Chavez, APRN-CNP

## 2025-01-16 DIAGNOSIS — I50.23 ACUTE ON CHRONIC SYSTOLIC HEART FAILURE: Primary | ICD-10-CM

## 2025-01-16 LAB
ANION GAP SERPL CALC-SCNC: 11 MMOL/L (ref 10–20)
ATRIAL RATE: 80 BPM
BUN SERPL-MCNC: 34 MG/DL (ref 6–23)
CALCIUM SERPL-MCNC: 7.5 MG/DL (ref 8.6–10.3)
CHLORIDE SERPL-SCNC: 99 MMOL/L (ref 98–107)
CO2 SERPL-SCNC: 30 MMOL/L (ref 21–32)
CREAT SERPL-MCNC: 1.56 MG/DL (ref 0.5–1.05)
EGFRCR SERPLBLD CKD-EPI 2021: 34 ML/MIN/1.73M*2
ERYTHROCYTE [DISTWIDTH] IN BLOOD BY AUTOMATED COUNT: 17.4 % (ref 11.5–14.5)
GLUCOSE BLD MANUAL STRIP-MCNC: 106 MG/DL (ref 74–99)
GLUCOSE BLD MANUAL STRIP-MCNC: 112 MG/DL (ref 74–99)
GLUCOSE BLD MANUAL STRIP-MCNC: 158 MG/DL (ref 74–99)
GLUCOSE BLD MANUAL STRIP-MCNC: 189 MG/DL (ref 74–99)
GLUCOSE SERPL-MCNC: 105 MG/DL (ref 74–99)
HCT VFR BLD AUTO: 26.8 % (ref 36–46)
HGB BLD-MCNC: 8.4 G/DL (ref 12–16)
MAGNESIUM SERPL-MCNC: 1.5 MG/DL (ref 1.6–2.4)
MCH RBC QN AUTO: 24.8 PG (ref 26–34)
MCHC RBC AUTO-ENTMCNC: 31.3 G/DL (ref 32–36)
MCV RBC AUTO: 79 FL (ref 80–100)
NRBC BLD-RTO: 0 /100 WBCS (ref 0–0)
P AXIS: 22 DEGREES
P OFFSET: 173 MS
P ONSET: 113 MS
PLATELET # BLD AUTO: 268 X10*3/UL (ref 150–450)
POTASSIUM SERPL-SCNC: 3.9 MMOL/L (ref 3.5–5.3)
PR INTERVAL: 214 MS
Q ONSET: 220 MS
QRS COUNT: 14 BEATS
QRS DURATION: 98 MS
QT INTERVAL: 406 MS
QTC CALCULATION(BAZETT): 468 MS
QTC FREDERICIA: 447 MS
R AXIS: -7 DEGREES
RBC # BLD AUTO: 3.39 X10*6/UL (ref 4–5.2)
SODIUM SERPL-SCNC: 136 MMOL/L (ref 136–145)
T AXIS: 149 DEGREES
T OFFSET: 423 MS
VENTRICULAR RATE: 80 BPM
WBC # BLD AUTO: 6.9 X10*3/UL (ref 4.4–11.3)

## 2025-01-16 PROCEDURE — 82947 ASSAY GLUCOSE BLOOD QUANT: CPT

## 2025-01-16 PROCEDURE — 97530 THERAPEUTIC ACTIVITIES: CPT | Mod: GP

## 2025-01-16 PROCEDURE — 36415 COLL VENOUS BLD VENIPUNCTURE: CPT | Performed by: STUDENT IN AN ORGANIZED HEALTH CARE EDUCATION/TRAINING PROGRAM

## 2025-01-16 PROCEDURE — 2500000001 HC RX 250 WO HCPCS SELF ADMINISTERED DRUGS (ALT 637 FOR MEDICARE OP): Performed by: NURSE PRACTITIONER

## 2025-01-16 PROCEDURE — 83735 ASSAY OF MAGNESIUM: CPT | Performed by: STUDENT IN AN ORGANIZED HEALTH CARE EDUCATION/TRAINING PROGRAM

## 2025-01-16 PROCEDURE — 2500000002 HC RX 250 W HCPCS SELF ADMINISTERED DRUGS (ALT 637 FOR MEDICARE OP, ALT 636 FOR OP/ED): Performed by: STUDENT IN AN ORGANIZED HEALTH CARE EDUCATION/TRAINING PROGRAM

## 2025-01-16 PROCEDURE — 80048 BASIC METABOLIC PNL TOTAL CA: CPT | Performed by: STUDENT IN AN ORGANIZED HEALTH CARE EDUCATION/TRAINING PROGRAM

## 2025-01-16 PROCEDURE — 2500000004 HC RX 250 GENERAL PHARMACY W/ HCPCS (ALT 636 FOR OP/ED): Performed by: NURSE PRACTITIONER

## 2025-01-16 PROCEDURE — 85027 COMPLETE CBC AUTOMATED: CPT | Performed by: STUDENT IN AN ORGANIZED HEALTH CARE EDUCATION/TRAINING PROGRAM

## 2025-01-16 PROCEDURE — 9420000001 HC RT PATIENT EDUCATION 5 MIN

## 2025-01-16 PROCEDURE — 2500000004 HC RX 250 GENERAL PHARMACY W/ HCPCS (ALT 636 FOR OP/ED): Performed by: STUDENT IN AN ORGANIZED HEALTH CARE EDUCATION/TRAINING PROGRAM

## 2025-01-16 PROCEDURE — 2500000001 HC RX 250 WO HCPCS SELF ADMINISTERED DRUGS (ALT 637 FOR MEDICARE OP): Performed by: FAMILY MEDICINE

## 2025-01-16 PROCEDURE — 2500000001 HC RX 250 WO HCPCS SELF ADMINISTERED DRUGS (ALT 637 FOR MEDICARE OP): Performed by: STUDENT IN AN ORGANIZED HEALTH CARE EDUCATION/TRAINING PROGRAM

## 2025-01-16 PROCEDURE — 97110 THERAPEUTIC EXERCISES: CPT | Mod: GP

## 2025-01-16 PROCEDURE — 99232 SBSQ HOSP IP/OBS MODERATE 35: CPT | Performed by: NURSE PRACTITIONER

## 2025-01-16 PROCEDURE — 1100000001 HC PRIVATE ROOM DAILY

## 2025-01-16 PROCEDURE — 2500000005 HC RX 250 GENERAL PHARMACY W/O HCPCS: Performed by: STUDENT IN AN ORGANIZED HEALTH CARE EDUCATION/TRAINING PROGRAM

## 2025-01-16 PROCEDURE — 99233 SBSQ HOSP IP/OBS HIGH 50: CPT | Performed by: NURSE PRACTITIONER

## 2025-01-16 RX ORDER — SACUBITRIL AND VALSARTAN 24; 26 MG/1; MG/1
0.5 TABLET, FILM COATED ORAL 2 TIMES DAILY
Status: DISCONTINUED | OUTPATIENT
Start: 2025-01-16 | End: 2025-01-17

## 2025-01-16 RX ORDER — MAGNESIUM SULFATE 1 G/100ML
1 INJECTION INTRAVENOUS ONCE
Status: COMPLETED | OUTPATIENT
Start: 2025-01-16 | End: 2025-01-16

## 2025-01-16 RX ORDER — METOPROLOL SUCCINATE 25 MG/1
12.5 TABLET, EXTENDED RELEASE ORAL DAILY
Status: DISCONTINUED | OUTPATIENT
Start: 2025-01-17 | End: 2025-01-17 | Stop reason: HOSPADM

## 2025-01-16 RX ADMIN — APIXABAN 5 MG: 5 TABLET, FILM COATED ORAL at 20:03

## 2025-01-16 RX ADMIN — SENNOSIDES AND DOCUSATE SODIUM 2 TABLET: 50; 8.6 TABLET ORAL at 20:17

## 2025-01-16 RX ADMIN — SPIRONOLACTONE 25 MG: 25 TABLET ORAL at 09:36

## 2025-01-16 RX ADMIN — SACUBITRIL AND VALSARTAN 0.5 TABLET: 24; 26 TABLET, FILM COATED ORAL at 16:40

## 2025-01-16 RX ADMIN — ATORVASTATIN CALCIUM 80 MG: 80 TABLET, FILM COATED ORAL at 20:03

## 2025-01-16 RX ADMIN — FUROSEMIDE 40 MG: 10 INJECTION, SOLUTION INTRAMUSCULAR; INTRAVENOUS at 09:36

## 2025-01-16 RX ADMIN — METHOCARBAMOL TABLETS 500 MG: 500 TABLET, COATED ORAL at 08:57

## 2025-01-16 RX ADMIN — INSULIN LISPRO 3 UNITS: 100 INJECTION, SOLUTION INTRAVENOUS; SUBCUTANEOUS at 12:02

## 2025-01-16 RX ADMIN — Medication 2 L/MIN: at 19:49

## 2025-01-16 RX ADMIN — ACETAMINOPHEN 975 MG: 325 TABLET, FILM COATED ORAL at 08:57

## 2025-01-16 RX ADMIN — CEFTRIAXONE SODIUM 1 G: 1 INJECTION, SOLUTION INTRAVENOUS at 10:05

## 2025-01-16 RX ADMIN — Medication 1 APPLICATION: at 14:36

## 2025-01-16 RX ADMIN — TRAMADOL HYDROCHLORIDE 50 MG: 50 TABLET, COATED ORAL at 21:30

## 2025-01-16 RX ADMIN — AMIODARONE HYDROCHLORIDE 200 MG: 200 TABLET ORAL at 16:40

## 2025-01-16 RX ADMIN — METOPROLOL SUCCINATE 12.5 MG: 25 TABLET, EXTENDED RELEASE ORAL at 16:38

## 2025-01-16 RX ADMIN — TRAMADOL HYDROCHLORIDE 50 MG: 50 TABLET, COATED ORAL at 14:34

## 2025-01-16 RX ADMIN — METHOCARBAMOL TABLETS 500 MG: 500 TABLET, COATED ORAL at 00:17

## 2025-01-16 RX ADMIN — APIXABAN 5 MG: 5 TABLET, FILM COATED ORAL at 08:58

## 2025-01-16 RX ADMIN — MAGNESIUM SULFATE IN DEXTROSE 1 G: 10 INJECTION, SOLUTION INTRAVENOUS at 10:42

## 2025-01-16 RX ADMIN — Medication 1 APPLICATION: at 10:12

## 2025-01-16 RX ADMIN — Medication 1000 UNITS: at 08:58

## 2025-01-16 RX ADMIN — EZETIMIBE 10 MG: 10 TABLET ORAL at 11:06

## 2025-01-16 RX ADMIN — ONDANSETRON 4 MG: 2 INJECTION, SOLUTION INTRAMUSCULAR; INTRAVENOUS at 11:11

## 2025-01-16 RX ADMIN — ACETAMINOPHEN 975 MG: 325 TABLET, FILM COATED ORAL at 00:17

## 2025-01-16 RX ADMIN — ACETAMINOPHEN 975 MG: 325 TABLET, FILM COATED ORAL at 17:01

## 2025-01-16 RX ADMIN — METHOCARBAMOL TABLETS 500 MG: 500 TABLET, COATED ORAL at 17:01

## 2025-01-16 RX ADMIN — MAGNESIUM SULFATE IN DEXTROSE 1 G: 10 INJECTION, SOLUTION INTRAVENOUS at 13:41

## 2025-01-16 RX ADMIN — TRAMADOL HYDROCHLORIDE 50 MG: 50 TABLET, COATED ORAL at 06:45

## 2025-01-16 RX ADMIN — Medication 2 L/MIN: at 09:01

## 2025-01-16 RX ADMIN — PAROXETINE 20 MG: 20 TABLET, FILM COATED ORAL at 20:03

## 2025-01-16 RX ADMIN — ONDANSETRON 4 MG: 2 INJECTION, SOLUTION INTRAMUSCULAR; INTRAVENOUS at 20:03

## 2025-01-16 ASSESSMENT — COGNITIVE AND FUNCTIONAL STATUS - GENERAL
TOILETING: A LITTLE
CLIMB 3 TO 5 STEPS WITH RAILING: A LOT
DAILY ACTIVITIY SCORE: 20
DRESSING REGULAR UPPER BODY CLOTHING: A LITTLE
TURNING FROM BACK TO SIDE WHILE IN FLAT BAD: TOTAL
MOVING TO AND FROM BED TO CHAIR: A LOT
MOVING TO AND FROM BED TO CHAIR: A LOT
TOILETING: A LITTLE
DRESSING REGULAR UPPER BODY CLOTHING: A LITTLE
DRESSING REGULAR LOWER BODY CLOTHING: A LITTLE
MOVING TO AND FROM BED TO CHAIR: A LOT
CLIMB 3 TO 5 STEPS WITH RAILING: TOTAL
STANDING UP FROM CHAIR USING ARMS: A LOT
CLIMB 3 TO 5 STEPS WITH RAILING: A LOT
MOBILITY SCORE: 15
WALKING IN HOSPITAL ROOM: A LOT
DRESSING REGULAR LOWER BODY CLOTHING: A LITTLE
DAILY ACTIVITIY SCORE: 20
DRESSING REGULAR LOWER BODY CLOTHING: A LITTLE
STANDING UP FROM CHAIR USING ARMS: A LOT
MOBILITY SCORE: 15
TURNING FROM BACK TO SIDE WHILE IN FLAT BAD: A LITTLE
HELP NEEDED FOR BATHING: A LITTLE
TURNING FROM BACK TO SIDE WHILE IN FLAT BAD: A LITTLE
WALKING IN HOSPITAL ROOM: A LOT
HELP NEEDED FOR BATHING: A LITTLE
MOVING TO AND FROM BED TO CHAIR: A LOT
WALKING IN HOSPITAL ROOM: A LOT
TURNING FROM BACK TO SIDE WHILE IN FLAT BAD: A LITTLE
HELP NEEDED FOR BATHING: A LITTLE
WALKING IN HOSPITAL ROOM: TOTAL
STANDING UP FROM CHAIR USING ARMS: A LOT
MOBILITY SCORE: 15
MOVING FROM LYING ON BACK TO SITTING ON SIDE OF FLAT BED WITH BEDRAILS: TOTAL
TOILETING: A LITTLE
STANDING UP FROM CHAIR USING ARMS: A LOT
CLIMB 3 TO 5 STEPS WITH RAILING: A LOT
DRESSING REGULAR UPPER BODY CLOTHING: A LITTLE
MOBILITY SCORE: 8
DAILY ACTIVITIY SCORE: 20

## 2025-01-16 ASSESSMENT — PAIN SCALES - GENERAL
PAINLEVEL_OUTOF10: 8
PAINLEVEL_OUTOF10: 3
PAINLEVEL_OUTOF10: 8
PAINLEVEL_OUTOF10: 8
PAINLEVEL_OUTOF10: 4
PAINLEVEL_OUTOF10: 5 - MODERATE PAIN
PAINLEVEL_OUTOF10: 0 - NO PAIN
PAINLEVEL_OUTOF10: 6
PAINLEVEL_OUTOF10: 0 - NO PAIN

## 2025-01-16 ASSESSMENT — PAIN - FUNCTIONAL ASSESSMENT
PAIN_FUNCTIONAL_ASSESSMENT: 0-10

## 2025-01-16 NOTE — PROGRESS NOTES
Subjective Data:  Denies CP or dyspnea    Overnight Events:    NSR with frequent PVCs on telemetry     Objective Data:  Last Recorded Vitals:  Vitals:    01/15/25 1902 01/15/25 1957 01/16/25 0420 01/16/25 0853   BP:  96/53 (!) 80/43 98/58   BP Location:  Right arm Right arm Right arm   Patient Position:  Sitting Lying Lying   Pulse:  64 60 60   Resp:  20 20    Temp:  36 °C (96.8 °F) 35.8 °C (96.4 °F)    TempSrc:  Temporal Temporal    SpO2: 93% 94% 98%    Weight:       Height:           Last Labs:  CBC - 1/16/2025:  6:23 AM  6.9 8.4 268    26.8      CMP - 1/16/2025:  6:23 AM  7.5 6.7 13 --- 0.4   3.7 3.1 13 57      PTT - No results in last year.  3.4   38.9 _     TROPHS   Date/Time Value Ref Range Status   12/11/2024 01:15 PM 84 0 - 13 ng/L Final     Comment:     Previous result verified on 12/11/2024 1158 on specimen/case [id number] called with component [facility] for procedure Troponin I, High Sensitivity, Initial with value 81 ng/L.   12/11/2024 11:20 AM 81 0 - 13 ng/L Final   03/06/2024 02:57 PM 7 0 - 13 ng/L Final     BNP   Date/Time Value Ref Range Status   01/13/2025 10:17 AM 1,818 0 - 99 pg/mL Final   12/11/2024 11:20 AM 1,187 0 - 99 pg/mL Final     HGBA1C   Date/Time Value Ref Range Status   01/15/2025 06:35 AM 6.4 See comment % Final   10/20/2023 05:22 AM 7.0 see below % Final     LDLCALC   Date/Time Value Ref Range Status   10/20/2023 05:22 AM 60 <=99 mg/dL Final     Comment:                                 Near   Borderline      AGE      Desirable  Optimal    High     High     Very High     0-19 Y     0 - 109     ---    110-129   >/= 130     ----    20-24 Y     0 - 119     ---    120-159   >/= 160     ----      >24 Y     0 -  99   100-129  130-159   160-189     >/=190       VLDL   Date/Time Value Ref Range Status   10/20/2023 05:22 AM 13 0 - 40 mg/dL Final      Last I/O:  I/O last 3 completed shifts:  In: 1080 (10.6 mL/kg) [P.O.:1080]  Out: 3550 (34.9 mL/kg) [Urine:3550 (1 mL/kg/hr)]  Weight: 101.6 kg      Past Cardiology Tests (Last 3 Years):  EKG:  ECG 12 Lead 01/14/2025 (Preliminary)  NSR 1st degree AV block, frequent PVCs    Echo:  Transthoracic Echo (TTE) Complete 01/14/2025  CONCLUSIONS:   1. The left ventricular systolic function is severely decreased, with a visually estimated ejection fraction of 25-30%.   2. There is global hypokinesis of the left ventricle with minor regional variations.   3. Spectral Doppler shows a Grade II (pseudonormal pattern) of left ventricular diastolic filling with an elevated left atrial pressure.   4. Left ventricular cavity size is severely dilated.   5. There is moderately increased septal thickness.   6. There is severely increased eccentric left ventricular hypertrophy.   7. There is normal right ventricular global systolic function.   8. Mildly enlarged right ventricle.   9. The left atrium is severely dilated.  10. Mild to moderate mitral valve regurgitation.  11. Mildly elevated right ventricular systolic pressure.    Transthoracic Echo (TTE) Complete 03/06/2024  CONCLUSIONS:   1. Left ventricular systolic function is low normal with a 50-55% estimated ejection fraction.     Transthoracic Echo (TTE) Complete 10/20/2023  CONCLUSIONS:   1. Left ventricular systolic function is mildly decreased with a 45-50% estimated ejection fraction.   2. Spectral Doppler shows an impaired relaxation pattern of left ventricular diastolic filling.   3. There is mild tricuspid regurgitation.   4. Technically difficult bubble study unable to interpret.     Ejection Fractions:        EF   Date/Time Value Ref Range Status   03/06/2024 03:57 PM 62 %        Cath:  Cardiac catheterization - coronary 01/29/2024  CONCLUSIONS:   1. Left main: no significant angiographic disease.   2. LAD: diffuse 40% prox-mid disease.   3. LCx: 60% mid-vessel disease.   4. RCA: mild diffuse irregularities.   5. LVEDP 26mmHg, no aortic stenosis on LV-Ao gradient.     Stress Test:  Nuclear Stress Test  12/26/2023  IMPRESSION:  Mild attentuation of the anterior wall seen on the stress images.  This is either soft tissue attenuation or ischemia in LAD territory.  Unfortunately there are no prone images available. Dilated left  ventricle with mild left ventricular systolic dysfunction on post  stress gated imaging.     Cardiac Imaging:  No results found for this or any previous visit from the past 1095 days.      Inpatient Medications:  Scheduled medications   Medication Dose Route Frequency    acetaminophen  975 mg oral q8h    amiodarone  200 mg oral Daily    apixaban  5 mg oral BID    atorvastatin  80 mg oral Nightly    cefTRIAXone  1 g intravenous q24h    cholecalciferol  1,000 Units oral Daily    ezetimibe  10 mg oral Daily    furosemide  40 mg intravenous BID    [Held by provider] furosemide  40 mg oral Daily    insulin lispro  0-15 Units subcutaneous TID AC    lidocaine  1 patch transdermal Daily    [Held by provider] metFORMIN  500 mg oral q AM    methocarbamol  500 mg oral q8h ALEXANDER    metoprolol succinate XL  25 mg oral Daily    oxygen   inhalation Continuous - Inhalation    PARoxetine  20 mg oral Nightly    [Held by provider] pioglitazone  30 mg oral q24h    sacubitriL-valsartan  1 tablet oral BID    spironolactone  25 mg oral Daily    zinc oxide  1 Application Topical TID     PRN medications   Medication    albuterol    dextrose    dextrose    famotidine    glucagon    glucagon    melatonin    ondansetron    oxygen    sennosides-docusate sodium    traMADol     Continuous Medications   Medication Dose Last Rate     Physical Exam  Vitals reviewed.   Constitutional:       Appearance: Normal appearance. She is normal weight.   HENT:      Head: Normocephalic and atraumatic.   Neck:      Vascular: JVD present. No carotid bruit.   Cardiovascular:      Rate and Rhythm: Normal rate. Rhythm regularly irregular.      Pulses: Normal pulses.      Heart sounds: Normal heart sounds.   Pulmonary:      Effort: Pulmonary  effort is normal.      Breath sounds: Normal breath sounds.   Chest:      Chest wall: No tenderness.   Abdominal:      General: Abdomen is flat. Bowel sounds are normal.      Palpations: Abdomen is soft.   Musculoskeletal:      Right lower leg: Edema present.      Left lower leg: Edema present.   Skin:     General: Skin is warm and dry.   Neurological:      General: No focal deficit present.      Mental Status: She is alert and oriented to person, place, and time. Mental status is at baseline.   Psychiatric:         Mood and Affect: Mood normal.         Behavior: Behavior normal.         Assessment/Plan   Assessment  74 y.o. female with HF improved EF, atrial fibrillation on Eliquis, nonobstructive CAD, HTN, DM2, dyslipidemia, depression, history of breast cancer, PE, CVA history presenting with mechanical fall. Found to be volume up. CT imaging showing pulmonary edema versus pneumonia, and a new T8 lesion suspicious for recurrent breast cancer with pathologic fracture. TTE with LVEF 25-30%. /85.     Acute on chronic systolic HFrEF 25-30%  NICM   Nonobstructive CAD - [facility] 1/2024  PAF  Anemia - Hgb 8.5  MARSHALL - creatinine 1.56 (BL 1)  Dyslipidemia  Frequent PVCs  Hypomagnesemia - 1.5 - replacement per primary    Plan  -I&O - 2.1L  -Daily weights  -Stop IV Lasix IV, begin 40 mg po daily  -Continue amiodarone 200 mg daily, Eliquis 5 mg BID  -Continue atorvastatin 80 mg daily, Zetia 10 mg daily  -Continue spironolactone 25 mg daily  -Reduce Toprol to 12.5 mg daily, Entresto 24/26 mg to 0.5 tab BID due to hypotension - monitor BP and renal function  -Consider addition of SGLT2 as OP  -OP follow up and labs prior  -Will sign off    Peripheral IV 01/13/25 20 G Right Antecubital (Active)   Site Assessment Clean;Dry;Intact 01/15/25 0736   Dressing Status Clean;Dry 01/15/25 0736   Number of days: 2       Urethral Catheter (Active)   Reason for Continuing Urinary Catheterization acute urinary retention 01/15/25 0736    Number of days: 2       Code Status:  DNR and No Intubation    I spent minutes in the professional and overall care of this patient.        Drake Ge, APRN-CNP

## 2025-01-16 NOTE — CONSULTS
Wound Care Consult     Visit Date: 1/15/2025      Patient Name: Elsa Biggs         MRN: [id number]           YOB: 1950     Reason for Consult: right hip redness/shear, buttocks redness, discoloration        Wound History: POA     Pertinent Labs:   Albumin   Date Value Ref Range Status   01/13/2025 3.1 (L) 3.4 - 5.0 g/dL Final       Wound Assessment:  Wound 12/25/23 Moisture Associated Skin Damage Breast Right;Left (Active)       Wound 12/25/23 Moisture Associated Skin Damage Abdomen Right;Anterior;Left;Lower (Active)       Wound 12/11/24 Diabetic Ulcer Pretibial Distal;Left (Active)       Wound 12/11/24 Sacrum (Active)   Wound Image   01/13/25 1818   Dressing Foam 01/14/25 2100   Dressing Status Clean;Dry 01/15/25 0731       Wound 12/12/24 Moisture Associated Skin Damage Groin Left;Proximal;Upper;Anterior (Active)       Wound 12/12/24 Moisture Associated Skin Damage Groin Proximal;Right;Upper;Anterior (Active)       Wound 12/12/24 Moisture Associated Skin Damage Breast Lower;Right (Active)       Wound 01/13/25 Pelvis Right (Active)   Wound Image   01/13/25 1817   Shape linear 01/15/25 0731   Drainage Description None 01/14/25 0800   Drainage Amount None 01/14/25 0800   Dressing Open to air 01/15/25 0731       Wound Team Summary Assessment: Patient in chair, heavy assist x 2 with walker back to bed, Allyn RN at bedside.    Buttocks chronic discoloration with epidermal erosion and abdominal pannus with moisture, redness.     Wound Team Plan: goal healing/ protection with Zinc and Interdry AG to wick away moisture. No other needs per RN and patient.  Moisturize with Sween lotion.  Message with questions.     Sugar Waite, RN, [facility]  1/15/2025  9:01 PM

## 2025-01-16 NOTE — CARE PLAN
The patient's goals for the shift include      The clinical goals for the shift include pt will ambulate to chair with controlled pain by 11pm  Patient had pain controlled with ordered medications throughout shift was up x 2 to chair for bed change

## 2025-01-16 NOTE — PROGRESS NOTES
Elsa Biggs is a 74 y.o. female on day 3 of admission presenting with CHF (congestive heart failure), NYHA class I, acute on chronic, combined.      Subjective   The patient is hypotensive and nauseous this morning.    Objective     Last Recorded Vitals  /56   Pulse 62   Temp 36 °C (96.8 °F)   Resp 16   Wt 102 kg (224 lb)   SpO2 96%   Intake/Output last 3 Shifts:    Intake/Output Summary (Last 24 hours) at 1/16/2025 1712  Last data filed at 1/16/2025 1300  Gross per 24 hour   Intake 680 ml   Output 2400 ml   Net -1720 ml       Admission Weight  Weight: 102 kg (224 lb) (01/13/25 1008)    Daily Weight  01/13/25 : 102 kg (224 lb)    Image Results  ECG 12 Lead  Sinus rhythm with 1st degree AV block with frequent atrial-paced complexes and with frequent Premature ventricular complexes in a pattern of bigeminy  Left ventricular hypertrophy with repolarization abnormality  Abnormal ECG  When compared with ECG of 13-JAN-2025 10:48, (unconfirmed)  Electronic atrial pacemaker has replaced Sinus rhythm  ST no longer depressed in Inferior leads  T wave inversion no longer evident in Inferior leads  Confirmed by Terrence Chan ([id number]) on 1/16/2025 10:14:28 AM    Results for orders placed or performed during the hospital encounter of 01/13/25 (from the past 24 hours)   POCT GLUCOSE   Result Value Ref Range    POCT Glucose 158 (H) 74 - 99 mg/dL   CBC   Result Value Ref Range    WBC 6.9 4.4 - 11.3 x10*3/uL    nRBC 0.0 0.0 - 0.0 /100 WBCs    RBC 3.39 (L) 4.00 - 5.20 x10*6/uL    Hemoglobin 8.4 (L) 12.0 - 16.0 g/dL    Hematocrit 26.8 (L) 36.0 - 46.0 %    MCV 79 (L) 80 - 100 fL    MCH 24.8 (L) 26.0 - 34.0 pg    MCHC 31.3 (L) 32.0 - 36.0 g/dL    RDW 17.4 (H) 11.5 - 14.5 %    Platelets 268 150 - 450 x10*3/uL   Basic Metabolic Panel   Result Value Ref Range    Glucose 105 (H) 74 - 99 mg/dL    Sodium 136 136 - 145 mmol/L    Potassium 3.9 3.5 - 5.3 mmol/L    Chloride 99 98 - 107 mmol/L    Bicarbonate 30 21 - 32 mmol/L    Anion Gap  11 10 - 20 mmol/L    Urea Nitrogen 34 (H) 6 - 23 mg/dL    Creatinine 1.56 (H) 0.50 - 1.05 mg/dL    eGFR 34 (L) >60 mL/min/1.73m*2    Calcium 7.5 (L) 8.6 - 10.3 mg/dL   Magnesium   Result Value Ref Range    Magnesium 1.50 (L) 1.60 - 2.40 mg/dL   POCT GLUCOSE   Result Value Ref Range    POCT Glucose 106 (H) 74 - 99 mg/dL   POCT GLUCOSE   Result Value Ref Range    POCT Glucose 189 (H) 74 - 99 mg/dL   POCT GLUCOSE   Result Value Ref Range    POCT Glucose 112 (H) 74 - 99 mg/dL        Physical Exam  Constitutional:       Appearance: Normal appearance.   HENT:      Mouth/Throat:      Mouth: Mucous membranes are moist.   Eyes:      Extraocular Movements: Extraocular movements intact.   Cardiovascular:      Rate and Rhythm: Regular rhythm.      Pulses: Normal pulses.      Heart sounds: Normal heart sounds.   Abdominal:      Palpations: Abdomen is soft.   Musculoskeletal:         General: Tenderness present.      Right lower leg: Edema present.      Left lower leg: Edema present.   Skin:     General: Skin is warm and dry.   Neurological:      Mental Status: She is oriented to person, place, and time.   Psychiatric:         Mood and Affect: Mood normal.         Behavior: Behavior normal.         Thought Content: Thought content normal.         Judgment: Judgment normal.         Relevant Results      Results for orders placed or performed during the hospital encounter of 01/13/25 (from the past 24 hours)   POCT GLUCOSE   Result Value Ref Range    POCT Glucose 158 (H) 74 - 99 mg/dL   CBC   Result Value Ref Range    WBC 6.9 4.4 - 11.3 x10*3/uL    nRBC 0.0 0.0 - 0.0 /100 WBCs    RBC 3.39 (L) 4.00 - 5.20 x10*6/uL    Hemoglobin 8.4 (L) 12.0 - 16.0 g/dL    Hematocrit 26.8 (L) 36.0 - 46.0 %    MCV 79 (L) 80 - 100 fL    MCH 24.8 (L) 26.0 - 34.0 pg    MCHC 31.3 (L) 32.0 - 36.0 g/dL    RDW 17.4 (H) 11.5 - 14.5 %    Platelets 268 150 - 450 x10*3/uL   Basic Metabolic Panel   Result Value Ref Range    Glucose 105 (H) 74 - 99 mg/dL    Sodium  136 136 - 145 mmol/L    Potassium 3.9 3.5 - 5.3 mmol/L    Chloride 99 98 - 107 mmol/L    Bicarbonate 30 21 - 32 mmol/L    Anion Gap 11 10 - 20 mmol/L    Urea Nitrogen 34 (H) 6 - 23 mg/dL    Creatinine 1.56 (H) 0.50 - 1.05 mg/dL    eGFR 34 (L) >60 mL/min/1.73m*2    Calcium 7.5 (L) 8.6 - 10.3 mg/dL   Magnesium   Result Value Ref Range    Magnesium 1.50 (L) 1.60 - 2.40 mg/dL   POCT GLUCOSE   Result Value Ref Range    POCT Glucose 106 (H) 74 - 99 mg/dL   POCT GLUCOSE   Result Value Ref Range    POCT Glucose 189 (H) 74 - 99 mg/dL   POCT GLUCOSE   Result Value Ref Range    POCT Glucose 112 (H) 74 - 99 mg/dL             Assessment/Plan          This patient has a urinary catheter   Reason for the urinary catheter remaining today? critically ill patient who need accurate urinary output measurements          Elsa Biggs is a 74 y.o. female with HF improved EF, atrial fibrillation on Eliquis, hypertension, type 2 diabetes, dyslipidemia, depression, history of breast cancer history of pulmonary embolism, history of ischemic stroke.  She presented to the ED after a fall at home.  She tripped on the way back to bed from the bathroom in the morning and was unable to get up.  She did hit her head. Was on the floor for 3 hours.  Workup in the ED revealed BNP 1818, INR 3.4, leukocytosis, impaired kidney function, and CT imaging showing pulmonary edema versus pneumonia, and a new T8 lesion suspicious for recurrent breast cancer with pathologic fracture.     #Acute on chronic decompensated HF   #NICM  -EF 25-30%  -Diuresis with IV Lasix  -Strict I/Os, daily weights, fluid/sodium restriction, telemetry  -Trend BMP, Mg; maintain K>4, Mg>2  -Wean O2  -Continue spironolactone  -Cardiology consulted  -Transition to p.o. Lasix this morning  -Patient hypotensive and nauseous this morning.  Working with cardiology to adjust meds.    #Pyuria  -UC negative  -Stopping the ceftriaxone     #Hypomagnesemia   -Magnesium replaced, will repeat  level      #T8 lesion (pathologic fracture vs metastatic disease)  #Mechanical fall  #Back pain  -Outpatient PCP and oncology follow-up  -Scheduled Robaxin, Tylenol and added lidocaine patch  -PT/OT recommends moderate intensity therapy     #Atrial fibrillation  -Continue apixaban, amiodarone     #DM2  -Trend fingerstick glucose; inpatient goal 140-180  -Holding oral hypoglycemics; may benefit from discontinuation of pioglitazone given heart failure  -Sliding scale insulin  -Hypoglycemia protocol     #Dyslipidemia:  #Nonobstructive CAD:  #History of ischemic stroke:  #History of right TCAR:  -Continue aspirin, atorvastatin, ezetimibe     #Depression:  -Continue paroxetine    Dispo: Anticipate discharge to SNF in the morning on 1/17/2025.  Update provided to daughter.                 Alexandra Chavez APRN-CNP

## 2025-01-16 NOTE — CARE PLAN
"The patient's goals for the shift include  \"feeling better.\"    The clinical goals for the shift include pt will have improved pain throughout shift    Problem: Pain - Adult  Goal: Verbalizes/displays adequate comfort level or baseline comfort level  Outcome: Progressing  Flowsheets (Taken 1/16/2025 1832)  Verbalizes/displays adequate comfort level or baseline comfort level:   Encourage patient to monitor pain and request assistance   Assess pain using appropriate pain scale   Administer analgesics based on type and severity of pain and evaluate response     Problem: Safety - Adult  Goal: Free from fall injury  Outcome: Met     Problem: Discharge Planning  Goal: Discharge to home or other facility with appropriate resources  Outcome: Progressing     Problem: Chronic Conditions and Co-morbidities  Goal: Patient's chronic conditions and co-morbidity symptoms are monitored and maintained or improved  Outcome: Progressing     Problem: Skin  Goal: Prevent/manage excess moisture  Outcome: Met  Flowsheets (Taken 1/16/2025 1832)  Prevent/manage excess moisture:   Cleanse incontinence/protect with barrier cream   Moisturize dry skin   Monitor for/manage infection if present  Note: Interdry      "

## 2025-01-16 NOTE — PROGRESS NOTES
Physical Therapy    Physical Therapy Evaluation    Patient Name: Elsa Biggs  MRN: [id number]  Department: [address]  Room: [address] [place], [place] [address]  Today's Date: 1/16/2025   Time Calculation  Start Time: 1354  Stop Time: 1417  Time Calculation (min): 23 min    Assessment/Plan   PT Assessment  PT Assessment Results: Decreased strength, Decreased endurance, Impaired balance, Decreased mobility  Rehab Prognosis: Good  Barriers to Discharge Home: Caregiver assistance, Physical needs  Caregiver Assistance: Caregiver assistance needed per identified barriers - however, level of patient's required assistance exceeds assistance available at home  Physical Needs: Intermittent mobility assistance needed, Intermittent ADL assistance needed  Evaluation/Treatment Tolerance: Patient limited by pain  Medical Staff Made Aware: Yes  Strengths: Ability to acquire knowledge, Housing layout ( is currently at [facility])  Barriers to Participation: Comorbidities, Support of Caregivers  End of Session Communication: Bedside nurse, PCT/NA/CTA  Assessment Comment: Patient tolerates mobility well however requires increased assist compared to baseline function. Increased lumbar pain on this date, RN is awarel. Patient reports  is currently hospitalized at [facility] s/p fall. Rec MOD intensity PT intervention.  End of Session Patient Position: Bed, 3 rail up, Alarm on (all needs within reach)  IP OR SWING BED PT PLAN  Inpatient or Swing Bed: Inpatient  PT Plan  Treatment/Interventions: Bed mobility, Transfer training, Gait training, Stair training, Balance training, Strengthening, Endurance training, Therapeutic exercise  PT Plan: Ongoing PT  PT Frequency: 3 times per week  PT Discharge Recommendations: Moderate intensity level of continued care  Equipment Recommended upon Discharge: Wheeled walker (owns)  PT Recommended Transfer Status: Assist x2  PT - OK to Discharge: Yes (per PT POC)    Subjective   General Visit  Information:  General  Reason for Referral: Impaired functional mobility; CHF  Referred By: Diego Waters  Past Medical History Relevant to Rehab: HFrEF, atrial fibrillation on Eliquis, hypertension, type 2 diabetes, dyslipidemia, depression, history of breast cancer  Family/Caregiver Present: No  Prior to Session Communication: Bedside nurse  Patient Position Received: Bed, 3 rail up, Alarm on  General Comment: Patient pleasant, cooperative and agreeable to PT tx  Home Living:     Prior Level of Function:     Precautions:  Precautions  Medical Precautions: Fall precautions (2L O2, scherer, IV)    Objective   Pain:  Pain Assessment  Pain Assessment: 0-10  0-10 (Numeric) Pain Score: 8  Pain Type: Acute pain  Pain Location: Back  Pain Orientation: Lower  Pain Interventions: Repositioned, Ambulation/increased activity  Cognition:  Cognition  Overall Cognitive Status: Within Functional Limits  Orientation Level: Oriented X4    General Assessments:    Activity Tolerance  Endurance: Tolerates 10 - 20 min exercise with multiple rests    Coordination  Movements are Fluid and Coordinated: Yes    Postural Control  Postural Control: Within Functional Limits    Static Sitting Balance  Static Sitting-Balance Support: Bilateral upper extremity supported, Feet supported  Static Sitting-Level of Assistance: Contact guard       Functional Assessments:       Bed Mobility  Bed Mobility: Yes  Bed Mobility 1  Bed Mobility 1: Supine to sitting, Sitting to supine  Level of Assistance 1: Maximum assistance  Bed Mobility 2  Bed Mobility  2:  (repositioning in bed)  Level of Assistance 2: Maximum assistance (x 2)    Transfers  Transfer: No (Patient declines to complete d/t pain, states she was up in chair for lunch and plans to return to chair for dinner)    Outcome Measures:  [facility] Basic Mobility  Turning from your back to your side while in a flat bed without using bedrails: Total  Moving from lying on your back to sitting on the side of a  flat bed without using bedrails: Total  Moving to and from bed to chair (including a wheelchair): A lot  Standing up from a chair using your arms (e.g. wheelchair or bedside chair): A lot  To walk in hospital room: Total  Climbing 3-5 steps with railing: Total  Basic Mobility - Total Score: 8    Encounter Problems       Encounter Problems (Active)       Balance       STG - Maintains dynamic standing balance without upper extremity support x 3' with dual task CGA  (Progressing)       Start:  01/14/25    Expected End:  01/28/25               Mobility       STG - Patient will ambulate up and down a curb/step with 2WW CGA  (Progressing)       Start:  01/14/25    Expected End:  01/28/25               PT Transfers       STG - Patient will perform bed mobility CGA  (Progressing)       Start:  01/14/25    Expected End:  01/28/25            STG - Patient will transfer sit to and from stand CGA  (Progressing)       Start:  01/14/25    Expected End:  01/28/25               Pain - Adult              Education Documentation  Body Mechanics, taught by Ann-Marie Patel, PT at 1/16/2025  2:51 PM.  Learner: Patient  Readiness: Acceptance  Method: Explanation  Response: Verbalizes Understanding    Mobility Training, taught by Ann-Marie Patel, PT at 1/16/2025  2:51 PM.  Learner: Patient  Readiness: Acceptance  Method: Explanation  Response: Verbalizes Understanding    Education Comments  No comments found.

## 2025-01-17 VITALS
HEIGHT: 68 IN | RESPIRATION RATE: 16 BRPM | OXYGEN SATURATION: 97 % | SYSTOLIC BLOOD PRESSURE: 98 MMHG | DIASTOLIC BLOOD PRESSURE: 58 MMHG | WEIGHT: 228.18 LBS | BODY MASS INDEX: 34.58 KG/M2 | TEMPERATURE: 96.8 F | HEART RATE: 67 BPM

## 2025-01-17 LAB
ANION GAP SERPL CALC-SCNC: 12 MMOL/L (ref 10–20)
BUN SERPL-MCNC: 41 MG/DL (ref 6–23)
CALCIUM SERPL-MCNC: 7.6 MG/DL (ref 8.6–10.3)
CHLORIDE SERPL-SCNC: 98 MMOL/L (ref 98–107)
CO2 SERPL-SCNC: 29 MMOL/L (ref 21–32)
CREAT SERPL-MCNC: 1.83 MG/DL (ref 0.5–1.05)
EGFRCR SERPLBLD CKD-EPI 2021: 28 ML/MIN/1.73M*2
ERYTHROCYTE [DISTWIDTH] IN BLOOD BY AUTOMATED COUNT: 17.4 % (ref 11.5–14.5)
GLUCOSE BLD MANUAL STRIP-MCNC: 120 MG/DL (ref 74–99)
GLUCOSE BLD MANUAL STRIP-MCNC: 160 MG/DL (ref 74–99)
GLUCOSE SERPL-MCNC: 124 MG/DL (ref 74–99)
HCT VFR BLD AUTO: 29 % (ref 36–46)
HGB BLD-MCNC: 8.5 G/DL (ref 12–16)
MAGNESIUM SERPL-MCNC: 1.98 MG/DL (ref 1.6–2.4)
MCH RBC QN AUTO: 24.9 PG (ref 26–34)
MCHC RBC AUTO-ENTMCNC: 29.3 G/DL (ref 32–36)
MCV RBC AUTO: 85 FL (ref 80–100)
NRBC BLD-RTO: 0 /100 WBCS (ref 0–0)
PLATELET # BLD AUTO: 296 X10*3/UL (ref 150–450)
POTASSIUM SERPL-SCNC: 4.3 MMOL/L (ref 3.5–5.3)
RBC # BLD AUTO: 3.41 X10*6/UL (ref 4–5.2)
SODIUM SERPL-SCNC: 135 MMOL/L (ref 136–145)
WBC # BLD AUTO: 8.5 X10*3/UL (ref 4.4–11.3)

## 2025-01-17 PROCEDURE — 2500000005 HC RX 250 GENERAL PHARMACY W/O HCPCS: Performed by: STUDENT IN AN ORGANIZED HEALTH CARE EDUCATION/TRAINING PROGRAM

## 2025-01-17 PROCEDURE — 94760 N-INVAS EAR/PLS OXIMETRY 1: CPT

## 2025-01-17 PROCEDURE — 2500000004 HC RX 250 GENERAL PHARMACY W/ HCPCS (ALT 636 FOR OP/ED): Performed by: STUDENT IN AN ORGANIZED HEALTH CARE EDUCATION/TRAINING PROGRAM

## 2025-01-17 PROCEDURE — 36415 COLL VENOUS BLD VENIPUNCTURE: CPT | Performed by: STUDENT IN AN ORGANIZED HEALTH CARE EDUCATION/TRAINING PROGRAM

## 2025-01-17 PROCEDURE — 85027 COMPLETE CBC AUTOMATED: CPT | Performed by: STUDENT IN AN ORGANIZED HEALTH CARE EDUCATION/TRAINING PROGRAM

## 2025-01-17 PROCEDURE — 83735 ASSAY OF MAGNESIUM: CPT | Performed by: STUDENT IN AN ORGANIZED HEALTH CARE EDUCATION/TRAINING PROGRAM

## 2025-01-17 PROCEDURE — 80048 BASIC METABOLIC PNL TOTAL CA: CPT | Performed by: STUDENT IN AN ORGANIZED HEALTH CARE EDUCATION/TRAINING PROGRAM

## 2025-01-17 PROCEDURE — 82947 ASSAY GLUCOSE BLOOD QUANT: CPT

## 2025-01-17 PROCEDURE — 2500000004 HC RX 250 GENERAL PHARMACY W/ HCPCS (ALT 636 FOR OP/ED): Performed by: NURSE PRACTITIONER

## 2025-01-17 PROCEDURE — 2500000001 HC RX 250 WO HCPCS SELF ADMINISTERED DRUGS (ALT 637 FOR MEDICARE OP): Performed by: NURSE PRACTITIONER

## 2025-01-17 PROCEDURE — 2500000002 HC RX 250 W HCPCS SELF ADMINISTERED DRUGS (ALT 637 FOR MEDICARE OP, ALT 636 FOR OP/ED): Performed by: NURSE PRACTITIONER

## 2025-01-17 PROCEDURE — 99239 HOSP IP/OBS DSCHRG MGMT >30: CPT | Performed by: NURSE PRACTITIONER

## 2025-01-17 PROCEDURE — 2500000001 HC RX 250 WO HCPCS SELF ADMINISTERED DRUGS (ALT 637 FOR MEDICARE OP): Performed by: STUDENT IN AN ORGANIZED HEALTH CARE EDUCATION/TRAINING PROGRAM

## 2025-01-17 PROCEDURE — 2500000002 HC RX 250 W HCPCS SELF ADMINISTERED DRUGS (ALT 637 FOR MEDICARE OP, ALT 636 FOR OP/ED): Performed by: STUDENT IN AN ORGANIZED HEALTH CARE EDUCATION/TRAINING PROGRAM

## 2025-01-17 RX ORDER — EAR PLUGS
1 EACH OTIC (EAR) 3 TIMES DAILY
Start: 2025-01-17 | End: 2025-02-16

## 2025-01-17 RX ORDER — TRAMADOL HYDROCHLORIDE 50 MG/1
50 TABLET ORAL EVERY 6 HOURS PRN
Qty: 10 TABLET | Refills: 0 | Status: SHIPPED | OUTPATIENT
Start: 2025-01-17

## 2025-01-17 RX ORDER — METOPROLOL SUCCINATE 25 MG/1
12.5 TABLET, EXTENDED RELEASE ORAL DAILY
Start: 2025-01-17 | End: 2025-02-16

## 2025-01-17 RX ORDER — METHOCARBAMOL 500 MG/1
500 TABLET, FILM COATED ORAL EVERY 8 HOURS SCHEDULED
Start: 2025-01-17 | End: 2025-02-16

## 2025-01-17 RX ORDER — SACUBITRIL AND VALSARTAN 24; 26 MG/1; MG/1
0.5 TABLET, FILM COATED ORAL
Start: 2025-01-18 | End: 2025-02-17

## 2025-01-17 RX ORDER — SACUBITRIL AND VALSARTAN 24; 26 MG/1; MG/1
0.5 TABLET, FILM COATED ORAL
Status: DISCONTINUED | OUTPATIENT
Start: 2025-01-18 | End: 2025-01-17 | Stop reason: HOSPADM

## 2025-01-17 RX ORDER — SPIRONOLACTONE 25 MG/1
12.5 TABLET ORAL DAILY
Status: DISCONTINUED | OUTPATIENT
Start: 2025-01-17 | End: 2025-01-17 | Stop reason: HOSPADM

## 2025-01-17 RX ORDER — ACETAMINOPHEN 325 MG/1
975 TABLET ORAL EVERY 8 HOURS
Start: 2025-01-17 | End: 2025-01-24

## 2025-01-17 RX ORDER — ACETAMINOPHEN 500 MG
5 TABLET ORAL NIGHTLY PRN
Start: 2025-01-17 | End: 2025-02-16

## 2025-01-17 RX ORDER — LIDOCAINE 560 MG/1
1 PATCH PERCUTANEOUS; TOPICAL; TRANSDERMAL DAILY
Start: 2025-01-17 | End: 2025-01-24

## 2025-01-17 RX ADMIN — ACETAMINOPHEN 975 MG: 325 TABLET, FILM COATED ORAL at 09:48

## 2025-01-17 RX ADMIN — SPIRONOLACTONE 12.5 MG: 25 TABLET ORAL at 09:51

## 2025-01-17 RX ADMIN — Medication 2 L/MIN: at 09:57

## 2025-01-17 RX ADMIN — TRAMADOL HYDROCHLORIDE 50 MG: 50 TABLET, COATED ORAL at 03:25

## 2025-01-17 RX ADMIN — APIXABAN 5 MG: 5 TABLET, FILM COATED ORAL at 09:52

## 2025-01-17 RX ADMIN — Medication 1000 UNITS: at 09:52

## 2025-01-17 RX ADMIN — METHOCARBAMOL TABLETS 500 MG: 500 TABLET, COATED ORAL at 02:37

## 2025-01-17 RX ADMIN — AMIODARONE HYDROCHLORIDE 200 MG: 200 TABLET ORAL at 09:51

## 2025-01-17 RX ADMIN — Medication 2 L/MIN: at 11:31

## 2025-01-17 RX ADMIN — CEFTRIAXONE SODIUM 1 G: 1 INJECTION, SOLUTION INTRAVENOUS at 09:47

## 2025-01-17 RX ADMIN — ONDANSETRON 4 MG: 2 INJECTION, SOLUTION INTRAMUSCULAR; INTRAVENOUS at 09:42

## 2025-01-17 RX ADMIN — Medication 1 APPLICATION: at 09:57

## 2025-01-17 RX ADMIN — EZETIMIBE 10 MG: 10 TABLET ORAL at 09:52

## 2025-01-17 RX ADMIN — SACUBITRIL AND VALSARTAN 0.5 TABLET: 24; 26 TABLET, FILM COATED ORAL at 09:50

## 2025-01-17 RX ADMIN — METHOCARBAMOL TABLETS 500 MG: 500 TABLET, COATED ORAL at 09:51

## 2025-01-17 RX ADMIN — FUROSEMIDE 40 MG: 40 TABLET ORAL at 09:52

## 2025-01-17 RX ADMIN — ONDANSETRON 4 MG: 2 INJECTION, SOLUTION INTRAMUSCULAR; INTRAVENOUS at 03:25

## 2025-01-17 ASSESSMENT — PAIN - FUNCTIONAL ASSESSMENT
PAIN_FUNCTIONAL_ASSESSMENT: 0-10

## 2025-01-17 ASSESSMENT — PAIN SCALES - GENERAL
PAINLEVEL_OUTOF10: 7
PAINLEVEL_OUTOF10: 5 - MODERATE PAIN
PAINLEVEL_OUTOF10: 0 - NO PAIN

## 2025-01-17 ASSESSMENT — ACTIVITIES OF DAILY LIVING (ADL): LACK_OF_TRANSPORTATION: NO

## 2025-01-17 ASSESSMENT — PAIN DESCRIPTION - ORIENTATION: ORIENTATION: LOWER

## 2025-01-17 NOTE — PROGRESS NOTES
Occupational Therapy                 Therapy Communication Note    Patient Name: Elsa Biggs  MRN: [id number]  Department: [address]  Room: [address] [place], [place] [address]  Today's Date: 1/17/2025     Discipline: Occupational Therapy          Missed Visit Reason:   Therapist attempted therapy tx, with nurse clearing pt for tx prior to session. Pt encouraged to participate in session however, pt declined  d/t being DC to another facility later this date.    Missed Time: Attempt @ 10:23

## 2025-01-17 NOTE — PROGRESS NOTES
01/17/25 0911   Discharge Planning   Living Arrangements Spouse/significant other;Other (Comment)  (Daughter Annette lives 5 minutes away)   Support Systems Spouse/significant other;Children   Assistance Needed A&OX4; independent with ADLs with walker; drives; room air baseline-currently 2L NC; PCP Dr Marcelle Vitale; on eliquis prior to hospitalization   Type of Residence Private residence   Number of Stairs to Enter Residence 0   Number of Stairs Within Residence 0   Do you have animals or pets at home? No   Who is requesting discharge planning? Provider   Home or Post Acute Services In home services   Expected Discharge Disposition SNF  (Therapy recommended snf and patient agreeable to [facility] and they are able to accept no precert. Bed available today if needed and anticipated dc today-patient aware)   Does the patient need discharge transport arranged? Yes   RoundTrip coordination needed? Yes   Has discharge transport been arranged? No   Financial Resource Strain   How hard is it for you to pay for the very basics like food, housing, medical care, and heating? Not hard   Housing Stability   In the last 12 months, was there a time when you were not able to pay the mortgage or rent on time? N   In the past 12 months, how many times have you moved where you were living? 0   At any time in the past 12 months, were you homeless or living in a shelter (including now)? N   Transportation Needs   In the past 12 months, has lack of transportation kept you from medical appointments or from getting medications? no   In the past 12 months, has lack of transportation kept you from meetings, work, or from getting things needed for daily living? No        01/17/25 1000   Discharge Planning   Expected Discharge Disposition SNF  [facility]. DC confirmed. Pt aware and fine with dc-IMM obtained. Transport confirmed for 1215p stretcher via [facility]. AVS and orders sent to snf and they are aware of transport time.  Clarisa KIM to complete 7000 in [facility]

## 2025-01-17 NOTE — CARE PLAN
The patient's goals for the shift include      The clinical goals for the shift include patient will have adequate fluid intake and return educate about precipitating factors and relieving factors of constipation by end of shift    Patient remained constipated throughout shift, education reinforced on proper hydration and mobility.

## 2025-01-19 NOTE — DISCHARGE SUMMARY
Discharge Diagnosis  CHF (congestive heart failure), NYHA class I, acute on chronic, combined    Issues Requiring Follow-Up  T8 lesion (pathologic fracture vs metastatic disease)  Acute on chronic decompensated HF     Discharge Meds     Medication List      START taking these medications     acetaminophen 325 mg tablet; Commonly known as: Tylenol; Take 3 tablets   (975 mg) by mouth every 8 hours for 7 days.   lidocaine 4 % patch; Place 1 patch over 12 hours on the skin once daily   for 7 days. Remove & discard patch within 12 hours or as directed by MD.   melatonin 5 mg tablet; Take 1 tablet (5 mg) by mouth as needed at   bedtime for sleep.   methocarbamol 500 mg tablet; Commonly known as: Robaxin; Take 1 tablet   (500 mg) by mouth every 8 hours.   metoprolol succinate XL 25 mg 24 hr tablet; Commonly known as:   Toprol-XL; Take 0.5 tablets (12.5 mg) by mouth once daily. Do not crush or   chew.   sacubitriL-valsartan 24-26 mg tablet; Commonly known as: Entresto; Take   0.5 tablets by mouth once daily in the morning. Take before meals. Do not   fill before January 18, 2025.   traMADol 50 mg tablet; Commonly known as: Ultram; Take 1 tablet (50 mg)   by mouth every 6 hours if needed for moderate pain (4 - 6).   zinc oxide 40 % ointment ointment; Apply 1 Application topically 3 times   a day.     CHANGE how you take these medications     furosemide 40 mg tablet; Commonly known as: Lasix; Take 1 tablet (40 mg)   by mouth once daily.; What changed: how much to take, when to take this     CONTINUE taking these medications     albuterol 90 mcg/actuation inhaler   amiodarone 200 mg tablet; Commonly known as: Pacerone; Take 1 tablet   (200 mg) by mouth once daily.   aspirin 81 mg EC tablet; Take 1 tablet (81 mg) by mouth once daily.   atorvastatin 80 mg tablet; Commonly known as: Lipitor; Take 1 tablet (80   mg) by mouth once daily at bedtime.   cholecalciferol 25 MCG (1000 UT) tablet; Commonly known as: Vitamin D-3   Eliquis  5 mg tablet; Generic drug: apixaban; Take 1 tablet (5 mg) by   mouth 2 times a day.   ezetimibe 10 mg tablet; Commonly known as: Zetia   Gemtesa 75 mg tablet; Generic drug: vibegron; Take 1 tablet (75 mg) by   mouth once daily at bedtime.   ibuprofen 600 mg tablet   metFORMIN 500 mg tablet; Commonly known as: Glucophage   PARoxetine 20 mg tablet; Commonly known as: Paxil   pioglitazone 30 mg tablet; Commonly known as: Actos   spironolactone 25 mg tablet; Commonly known as: Aldactone; Take 1 tablet   (25 mg) by mouth once daily.       Test Results Pending At Discharge  Pending Labs       No current pending labs.            Hospital Course   Elsa Biggs is a 74 y.o. female with HF improved EF, atrial fibrillation on Eliquis, hypertension, type 2 diabetes, dyslipidemia, depression, history of breast cancer history of pulmonary embolism, history of ischemic stroke.  She presented to the ED after a fall at home.  She tripped on the way back to bed from the bathroom in the morning and was unable to get up.  She did hit her head. Was on the floor for 3 hours.  Workup in the ED revealed BNP 1818, INR 3.4, leukocytosis, impaired kidney function, and CT imaging showing pulmonary edema versus pneumonia, and a new T8 lesion suspicious for recurrent breast cancer with pathologic fracture.  The patient was admitted with acute on chronic decompensated heart failure, pyuria, hypomagnesemia and T8 lesion (pathologic fracture versus metastatic disease).  She was treated with IV Lasix for diuresis.  During treatment, the patient developed hypotension which resolved after transitioning to p.o. Lasix.  I spoke with her daughter about follow-up for the T8 lesion.  The patient plans to follow-up with her oncologist.  Patient discharged to SNF with cardiology's recommendations embedded in the discharge.    Discharge process greater than 30 minutes           Pertinent Physical Exam At Time of Discharge  Physical Exam  Constitutional:        Appearance: Normal appearance.   HENT:      Mouth/Throat:      Mouth: Mucous membranes are moist.   Eyes:      Extraocular Movements: Extraocular movements intact.   Cardiovascular:      Rate and Rhythm: Regular rhythm.      Pulses: Normal pulses.      Heart sounds: Normal heart sounds.   Abdominal:      Palpations: Abdomen is soft.   Musculoskeletal:         General: Less tenderness present.      Right lower leg: Less edema present.      Left lower leg: Less edema present.   Skin:     General: Skin is warm and dry.   Neurological:      Mental Status: She is oriented to person, place, and time.   Psychiatric:         Mood and Affect: Mood normal.         Behavior: Behavior normal.         Thought Content: Thought content normal.         Judgment: Judgment normal.     Outpatient Follow-Up  Future Appointments   Date Time Provider Department Center   1/30/2025 10:30 AM AMANDA Dhillon [facility]   2/5/2025  2:30 PM Dorian Nicholson, PharmD [id number]   2/6/2025  9:00 AM Milagro Mckeon PharmD [id number]   5/15/2025  3:00 PM AMANDA Dhillon [facility]         Alexandra Chavez APRN-CNP

## 2025-01-23 ENCOUNTER — NURSING HOME VISIT (OUTPATIENT)
Dept: POST ACUTE CARE | Facility: EXTERNAL LOCATION | Age: 75
End: 2025-01-23
Payer: MEDICARE

## 2025-01-23 DIAGNOSIS — Z86.39 HISTORY OF TYPE 2 DIABETES MELLITUS: ICD-10-CM

## 2025-01-23 DIAGNOSIS — Z78.9 NURSING HOME RESIDENT: ICD-10-CM

## 2025-01-23 DIAGNOSIS — L03.116 CELLULITIS OF LEFT LOWER EXTREMITY: ICD-10-CM

## 2025-01-23 DIAGNOSIS — K59.00 CONSTIPATION, UNSPECIFIED CONSTIPATION TYPE: ICD-10-CM

## 2025-01-23 DIAGNOSIS — I50.32 CHRONIC DIASTOLIC HEART FAILURE: ICD-10-CM

## 2025-01-23 DIAGNOSIS — Z00.00 ROUTINE GENERAL MEDICAL EXAMINATION AT A HEALTH CARE FACILITY: ICD-10-CM

## 2025-01-23 DIAGNOSIS — F33.9 RECURRENT MAJOR DEPRESSIVE DISORDER, REMISSION STATUS UNSPECIFIED (CMS-HCC): ICD-10-CM

## 2025-01-23 PROCEDURE — 99306 1ST NF CARE HIGH MDM 50: CPT | Performed by: INTERNAL MEDICINE

## 2025-01-23 NOTE — LETTER
Patient: Elsa Biggs  : 1950    Encounter Date: 2025    Name: Elsa Biggs  : 1950  MRN: [id number]  Visit Date: 2025  Chief Complaint: HISTORY AND PHYSICAL    HPI: 74 y.o. female with HF improved EF, atrial fibrillation on Eliquis, hypertension, type 2 diabetes, dyslipidemia, depression, history of breast cancer history of pulmonary embolism, history of ischemic stroke.  She presented to the ED after a fall at home.  She tripped on the way back to bed from the bathroom in the morning and was unable to get up.  She did hit her head. Was on the floor for 3 hours.  Workup in the ED revealed BNP 1818, INR 3.4, leukocytosis, impaired kidney function, and CT imaging showing pulmonary edema versus pneumonia, and a new T8 lesion suspicious for recurrent breast cancer with pathologic fracture.  The patient was admitted with acute on chronic decompensated heart failure, pyuria, hypomagnesemia and T8 lesion (pathologic fracture versus metastatic disease).  She was treated with IV Lasix for diuresis.  During treatment, the patient developed hypotension which resolved after transitioning to p.o. Lasix.Plans to follow with oncologist regarding the T8 lesion. Once stabilized, pt was brought to [facility] on 2025 for a readmission for ongoing med mgt and therapy services.     Subjective: Seen and examined today. Denies N/V/D/C/CP. No fever or chills. Reviewed hospitalization with pt. Questions answered with understanding verbalized. Nursing team report no issues.     The patient was counseled regarding diagnostic results, instructions for management, risk factor reductions, prognosis, patient and family education, impressions, risks and benefits of treatment options and importance of compliance with treatment. I have reviewed nursing notes since my last visit and document any significant changes Reviewed orders, medications, Labs. Reviewed chart looking at current medications, treatments, labs, x-rays etc.      ROS:  As above in subjective. Otherwise, all other systems have been reviewed and are negative for complaint.    Medications:  Medications reviewed and verified in NH chart.     Past Medical History:   Diagnosis Date   • A-fib (Multi)    • Anxiety    • Breast cancer (Multi)     16 tx radiation left lumpectomy    • Cancer (Multi)    • Depression    • Diabetes mellitus (Multi)    • GERD (gastroesophageal reflux disease)    • Hyperlipidemia    • Hypertension    • Stroke (Multi)        Past Surgical History:   Procedure Laterality Date   • BREAST SURGERY Left     lumpectomy for cancer   • CARDIAC CATHETERIZATION N/A 2024    Procedure: Left Heart Cath;  Surgeon: Virgil Childress MD;  Location: [facility] Cardiac [facility];  Service: Cardiovascular;  Laterality: N/A;   • CHOLECYSTECTOMY     • FRACTURE SURGERY      left femur and LLE fabrice  knee plate   • JOINT REPLACEMENT Left    • MR HEAD ANGIO WO IV CONTRAST  2023    MR HEAD ANGIO WO IV CONTRAST 2023 P[facility] MRI   • MR NECK ANGIO W AND WO IV CONTRAST  2022    MR NECK ANGIO W AND WO IV CONTRAST 2022 [facility] ANCILLARY LEGACY   • MR NECK ANGIO WO IV CONTRAST  2023    MR NECK ANGIO WO IV CONTRAST 2023 P[facility] MRI   • OTHER SURGICAL HISTORY Right     Transcarotid Artery Revascularization   • REVISION TOTAL HIP ARTHROPLASTY Left    • TUBAL LIGATION         Family History   Problem Relation Name Age of Onset   • Heart attack Father  65        blood clot   • Coronary artery disease Sister         Social History     Tobacco Use   • Smoking status: Former     Current packs/day: 0.00     Types: Cigarettes     Quit date:      Years since quittin.1   • Smokeless tobacco: Never   Substance Use Topics   • Alcohol use: Never     Alcohol/week: 1.0 standard drink of alcohol     Types: 1 Standard drinks or equivalent per week       Allergies   Allergen Reactions   • Tetracycline Rash        Vital Signs:   Vital Signs were reviewed in nursing home  documentation.    Physical Exam  Vitals reviewed. Exam conducted with a chaperone present.   Constitutional:       Appearance: Normal appearance. She is well-developed and overweight.      Interventions: Nasal cannula in place.   HENT:      Head: Normocephalic.      Right Ear: External ear normal.      Left Ear: External ear normal.      Nose: Nose normal.      Mouth/Throat:      Lips: Pink.      Mouth: Mucous membranes are moist.   Eyes:      General: Lids are normal.      Pupils: Pupils are equal, round, and reactive to light.   Neck:      Trachea: Trachea normal.   Cardiovascular:      Rate and Rhythm: Normal rate and regular rhythm.      Heart sounds: Normal heart sounds.   Pulmonary:      Effort: Pulmonary effort is normal.      Breath sounds: Decreased breath sounds present.      Comments: Crackles in bases  Abdominal:      General: Bowel sounds are normal.      Palpations: Abdomen is soft.   Musculoskeletal:      Cervical back: Full passive range of motion without pain.      Lumbar back: Tenderness present.      Right lower le+ Edema present.      Left lower le+ Edema present.   Skin:     General: Skin is warm and moist.      Findings: Bruising present.      Comments: LLE wrapped   Neurological:      General: No focal deficit present.      Mental Status: She is alert and oriented to person, place, and time. Mental status is at baseline.      Motor: Weakness present.   Psychiatric:         Attention and Perception: Attention normal.         Mood and Affect: Mood normal.         Speech: Speech normal.         Behavior: Behavior is cooperative.         Thought Content: Thought content normal.         Cognition and Memory: Cognition normal.         Judgment: Judgment normal.         Results/Data:   Lab Results   Component Value Date    WBC 8.5 2025    HGB 8.5 (L) 2025    HCT 29.0 (L) 2025     2025    CHOL 113 10/20/2023    TRIG 66 10/20/2023    HDL 40.1 10/20/2023    ALT 13  01/13/2025    AST 13 01/13/2025     (L) 01/17/2025    K 4.3 01/17/2025    CL 98 01/17/2025    CREATININE 1.83 (H) 01/17/2025    BUN 41 (H) 01/17/2025    CO2 29 01/17/2025    TSH 1.94 03/05/2024    INR 3.4 (H) 01/13/2025    HGBA1C 6.4 (H) 01/15/2025     Results were reviewed and addressed accordingly. Lab Results were also reviewed in [facility].     Provider Impression:   Fall, on ground ~3hrs  - Workup in the ED revealed BNP 1818, INR 3.4, leukocytosis, impaired kidney function, and CT imaging showing pulmonary edema versus pneumonia, and a new T8 lesion suspicious for recurrent breast cancer with pathologic fracture.    - fall precautions  - consulted PT OT ST to eval and tx    Acute on chronic decompensated heart failure  - treated with IV lasix during hospitalization  - During treatment, the patient developed hypotension which resolved after transitioning to p.o. Lasix.  - c/w PO lasix   - monitor fluid status    New T8 Lesion  - incidentally seen on recent imaging  - FU with oncology regarding this as discussed.     CV- chronic heart failure, AFib with RVR  - pt to follow up with [facility] cardiology as well as with EP - arranged by cardiology  - c/w lasix, aldactone  - monitor electrolytes and kidney function  - c/w amiodarone for rate control  - c/w eliqus for stroke prevention    Non-purulent cellulitis of left lower extremity  - Completed course of Keflex while inpatient  - Referral placed for wound care eval and tx  - elevate LE when sedentary     HLD  - c/w ezetimibe, statin    OAB  - c/w gemtesa    DM2 - HgbA1c was 7.0 on 10/20/2024  - c/w actos and metformin  - monitor BGL    Depression  - c/w paxil  - mood stable    Constipation Prevention  - c/w stool softeners and laxatives PRN  - monitor BM's    Gastric Protection  - c/w PPI    Code Status  - Full Code  ----------------  Written by Elsa Leonard RN, acting as a scribe for Dr. Kate. This note accurately reflects the work and decisions made  by Dr. Kate.     I, Dr. Kate, attest all medical record entries made by the scribe were under my direction and were personally dictated by me. I have reviewed the chart and agree that the record accurately reflects my performance of the history, physical exam, and assessment and plan.       Electronically Signed By: Tunde Lee MD   2/21/25 11:57 AM

## 2025-01-30 ENCOUNTER — APPOINTMENT (OUTPATIENT)
Dept: CARDIOLOGY | Facility: HOSPITAL | Age: 75
End: 2025-01-30
Payer: MEDICARE

## 2025-02-05 ENCOUNTER — APPOINTMENT (OUTPATIENT)
Dept: PHARMACY | Facility: HOSPITAL | Age: 75
End: 2025-02-05
Payer: MEDICARE

## 2025-02-05 DIAGNOSIS — N32.81 OAB (OVERACTIVE BLADDER): Primary | ICD-10-CM

## 2025-02-05 DIAGNOSIS — N39.46 MIXED INCONTINENCE URGE AND STRESS: ICD-10-CM

## 2025-02-05 LAB
ATRIAL RATE: 84 BPM
P AXIS: 53 DEGREES
P OFFSET: 176 MS
P ONSET: 104 MS
PR INTERVAL: 230 MS
Q ONSET: 219 MS
QRS COUNT: 14 BEATS
QRS DURATION: 98 MS
QT INTERVAL: 366 MS
QTC CALCULATION(BAZETT): 432 MS
QTC FREDERICIA: 409 MS
R AXIS: 14 DEGREES
T AXIS: 241 DEGREES
T OFFSET: 402 MS
VENTRICULAR RATE: 84 BPM

## 2025-02-05 PROCEDURE — RXMED WILLOW AMBULATORY MEDICATION CHARGE

## 2025-02-05 RX ORDER — VIBEGRON 75 MG/1
75 TABLET, FILM COATED ORAL NIGHTLY
Qty: 90 TABLET | Refills: 3 | Status: SHIPPED | OUTPATIENT
Start: 2025-02-05 | End: 2026-02-05

## 2025-02-05 NOTE — PROGRESS NOTES
"  Patient ID: Elsa Biggs is a 74 y.o. female who presents for Overactive bladder    Referring Provider: Elsa Allen PA and Oswald Isabel MD  Patient was referred for overactive bladder     Patient Assistance for Gemtesa approved through 3/7/2025. Will have to be renewed prior to that date to prevent lapse in coverage. Medication(s) will be received at no cost to patient from [facility].      Preferred Pharmacy:    [facility] [address] [address].  [address] [address].  [address]  [facility] [address]  Phone: [phone] Fax: [phone]    [facility]  [address], [address]  [facility] [address]  Phone: [phone] Fax: [phone]    [facility]  [address] [address]  Phone: [phone] Fax: [phone]      Subjective      Vaginal Symptoms  Vaginal Dryness: none  Dysuria (pain, burning, stinging, or itching with urination): none  Vaginal and/or vulvar irritation/itching: none  Recurrent urinary tract infections: none  No results found for: \"ESTRADIOLFRE\", \"PROGESTERONE\", \"ESTRADIOL\", \"FSH\"    Non Pharm Mgmt:   none  Previous Treatment:   none  Current Treatment:   Gemtesa 75 mg daily at bedtime    Urinary Symptoms  Medications that may contribute to symptoms:   Diltiazem - causes bladder to relax and affects ability to empty properly  Any history of:   Narrow-angle glaucoma? no  Impaired gastric emptying? no  Urinary retention? no    If diabetes, blood sugar controlled? A1c 7%, controlled  How many protective undergarments are you utilizing daily? 1 daily  Recommend Coloplast Emily Protect moisture barrier cream for incontinence associated skin irritation/breakdown.     Current medication?   When started? 8/2024  Side effects? none  Improvement in symptoms? Patient reported significant improvement, she has been sleeping through the night without getting up for the bathroom      Cardiovascular Health  The ASCVD Risk score (Omar " "CHARLES, et al., 2019) failed to calculate for the following reasons:    Risk score cannot be calculated because patient has a medical history suggesting prior/existing ASCVD    Lab Results   Component Value Date    CHOL 113 10/20/2023     Lab Results   Component Value Date    HDL 40.1 10/20/2023     Lab Results   Component Value Date    LDLCALC 60 10/20/2023     Lab Results   Component Value Date    TRIG 66 10/20/2023     No components found for: \"CHOLHDL\"        Blood Sugar Balance  Lab Results   Component Value Date    GLUCOSE 124 (H) 01/17/2025    HGBA1C 6.4 (H) 01/15/2025    HGBA1C 7.0 (H) 10/20/2023    HGBA1C 6.2 08/02/2018     No results found for: \"LEPTIN\", \"INSULFAST\", \"GLUF\"      Thyroid  Lab Results   Component Value Date    TSH 1.94 03/05/2024       Iron Status  No results found for: \"IRON\", \"TIBC\", \"FERRITIN\"     Kidney Function  Lab Results   Component Value Date    GFRF >90 11/01/2022    CREATININE 1.83 (H) 01/17/2025       Potassium  Lab Results   Component Value Date    K 4.3 01/17/2025        Vitamin D3  No results found for: \"VITD25\"    Current Outpatient Medications on File Prior to Visit   Medication Sig Dispense Refill    albuterol 90 mcg/actuation inhaler Inhale 2 puffs every 6 hours if needed.      amiodarone (Pacerone) 200 mg tablet Take 1 tablet (200 mg) by mouth once daily. 30 tablet 0    apixaban (Eliquis) 5 mg tablet Take 1 tablet (5 mg) by mouth 2 times a day. 180 tablet 3    aspirin 81 mg EC tablet Take 1 tablet (81 mg) by mouth once daily.      atorvastatin (Lipitor) 80 mg tablet Take 1 tablet (80 mg) by mouth once daily at bedtime. 30 tablet 1    cholecalciferol (Vitamin D-3) 25 MCG (1000 UT) tablet Take 1 tablet (25 mcg) by mouth once daily.      ezetimibe (Zetia) 10 mg tablet Take 1 tablet (10 mg) by mouth once daily.      furosemide (Lasix) 40 mg tablet Take 1 tablet (40 mg) by mouth once daily. (Patient taking differently: Take 0.5 tablets (20 mg) by mouth once a day on Monday, " Wednesday, and Friday.)      ibuprofen 600 mg tablet Take 1 tablet (600 mg) by mouth if needed.      melatonin 5 mg tablet Take 1 tablet (5 mg) by mouth as needed at bedtime for sleep.      metFORMIN (Glucophage) 500 mg tablet Take 1 tablet (500 mg) by mouth once daily in the morning. And 2 tabs In the evening      methocarbamol (Robaxin) 500 mg tablet Take 1 tablet (500 mg) by mouth every 8 hours.      metoprolol succinate XL (Toprol-XL) 25 mg 24 hr tablet Take 0.5 tablets (12.5 mg) by mouth once daily. Do not crush or chew.      PARoxetine (Paxil) 20 mg tablet Take 1 tablet (20 mg) by mouth once daily at bedtime.      pioglitazone (Actos) 30 mg tablet Take 1 tablet (30 mg) by mouth once every 24 hours.      sacubitriL-valsartan (Entresto) 24-26 mg tablet Take 0.5 tablets by mouth once daily in the morning. Take before meals. Do not fill before January 18, 2025.      spironolactone (Aldactone) 25 mg tablet Take 1 tablet (25 mg) by mouth once daily. 30 tablet 0    traMADol (Ultram) 50 mg tablet Take 1 tablet (50 mg) by mouth every 6 hours if needed for moderate pain (4 - 6). 10 tablet 0    vibegron (Gemtesa) 75 mg tablet Take 1 tablet (75 mg) by mouth once daily at bedtime. 90 tablet 3    zinc oxide 40 % ointment ointment Apply 1 Application topically 3 times a day.       No current facility-administered medications on file prior to visit.        Drug Interactions   No significant drug interactions identified    Assessment/Plan   Patient reported urinary symptoms improvement with Gemtesa. Patient experienced no side effect.    Gemtesa   Discussed MOA: works by activating beta-3 adrenergic receptors in the bladder resulting in relaxation of the detrusor smooth muscle during the urine storage phase, thus increasing bladder capacity.  Provided education on administration and potential side effects including but not limited to hot flashes <2%, constipation/diarrhea <2%, dry mouth <2%, and headache <4%.   Gemtesa  (vibegron) starts working almost immediately - within a few days of first taking it, with noticeable improvements in urinary urgency, frequency, and incontinence noted in clinical trials at 2 weeks which were reported as significant by 12 weeks.      CONTINUE  Gemtessa 75 mg once daily  New rx sent, pending delivery    Other assessment: patient also sees cardio pharmacist for Eliquis. Defer renewal process to cardio pharmacist.      Follow-up: 8/19/2025 2:20 PM        Zahira Nicholson PharmD   Meds Clinical Pharmacist  Phone: [phone]     Continue all meds under the continuation of care with the referring provider and clinical pharmacy team.    Verbal consent to manage patient's drug therapy was obtained from the patient and/or an individual authorized to act on behalf of a patient. They were informed they may decline to participate or withdraw from participation in pharmacy services at any time.

## 2025-02-06 ENCOUNTER — APPOINTMENT (OUTPATIENT)
Dept: PHARMACY | Facility: HOSPITAL | Age: 75
End: 2025-02-06
Payer: MEDICARE

## 2025-02-06 DIAGNOSIS — I48.92 ATRIAL FLUTTER, UNSPECIFIED TYPE (MULTI): ICD-10-CM

## 2025-02-06 NOTE — PROGRESS NOTES
Clinical Pharmacy Appointment    Patient ID: Elsa Biggs is a 74 y.o. female who presents for Follow Up appointment.     Referring Provider: Malinda Posada, APRN-CNP  PCP: Marcelle Vitale MD     Subjective     ATRIAL FLUTTER  Does patient follow with cardiology? Yes  Last cardiology appointment: 11/21/24    Current atrial fibrillations medications include:  Rate Control: None, diltiazem stopped due to fatigue and rectal bleed that resolved with discontinuation.    Anticoagulation: Eliquis 5 mg BID     Adverse Effects: Frequent bruising that heals. Wound on leg that has healed    Screening for dose adjustment:  No dose change recommended at this time  Age 75 yo, weight 104 kg, Scr 1.83 mg/dL     Diagnosis:  Patient is projected to be on anticoagulation indefinitely     [id number] Score: [8]  Age: [<65 (0)] [65-74 (+1)] [> 75 (+2)]: 2  Sex: [Male/Female (+1)]: 1  CHF history: [No/Yes(+1)]: 1  Hypertension history: [No/Yes(+1)]: 1  Stroke/TIA/thromboembolism history: [No/Yes(+2)]: 2  Vascular disease history (prior MI, peripheral artery disease, aortic plaque): [No/Yes(+1)]: 0  Diabetes history: [No/Yes(+1)]: 1    Monitoring:  Date of last BMP: 1/17/25  Recent Hospitalizations: Yes - 1/2025  Recent Falls/Trauma: Yes - home last week. Fell one month ago. Finished rehab.   Changes in Tobacco or Alcohol Intake: No changes     Medication Reconciliation:  No changes    Drug Interactions  No relevant drug interactions were noted.    Medication System Management  [facility]   Adherence/Organization: No issues   Affordability/Accessibility: Eliquis   PAP: Yes  Expiration: 3/7/25    Objective   Allergies   Allergen Reactions    Tetracycline Rash     Social History     Social History Narrative    Not on file      Medication Review  Current Outpatient Medications   Medication Instructions    albuterol 90 mcg/actuation inhaler 2 puffs, inhalation, Every 6 hours PRN    amiodarone  "(PACERONE) 200 mg, oral, Daily    aspirin 81 mg, oral, Daily    atorvastatin (LIPITOR) 80 mg, oral, Nightly    cholecalciferol (Vitamin D-3) 25 MCG (1000 UT) tablet 1 tablet, Daily    Eliquis 5 mg, oral, 2 times daily    ezetimibe (ZETIA) 10 mg, Daily    furosemide (LASIX) 40 mg, oral, Daily    Gemtesa 75 mg, oral, Nightly    ibuprofen 600 mg, oral, As needed    melatonin 5 mg, oral, Nightly PRN    metFORMIN (GLUCOPHAGE) 500 mg, Every morning    methocarbamol (ROBAXIN) 500 mg, oral, Every 8 hours scheduled    metoprolol succinate XL (TOPROL-XL) 12.5 mg, oral, Daily, Do not crush or chew.    PARoxetine (PAXIL) 20 mg, Nightly    pioglitazone (ACTOS) 30 mg, Every 24 hours    sacubitriL-valsartan (Entresto) 24-26 mg tablet 0.5 tablets, oral, Daily before breakfast    spironolactone (ALDACTONE) 25 mg, oral, Daily    traMADol (ULTRAM) 50 mg, oral, Every 6 hours PRN    zinc oxide 40 % ointment ointment 1 Application, Topical, 3 times daily      Vitals  BP Readings from Last 2 Encounters:   01/17/25 98/58   12/16/24 93/77     BMI Readings from Last 1 Encounters:   01/17/25 34.69 kg/m²      Labs  A1C  Lab Results   Component Value Date    HGBA1C 6.4 (H) 01/15/2025    HGBA1C 7.0 (H) 10/20/2023    HGBA1C 6.2 08/02/2018     BMP  Lab Results   Component Value Date    CALCIUM 7.6 (L) 01/17/2025     (L) 01/17/2025    K 4.3 01/17/2025    CO2 29 01/17/2025    CL 98 01/17/2025    BUN 41 (H) 01/17/2025    CREATININE 1.83 (H) 01/17/2025    EGFR 28 (L) 01/17/2025     LFTs  Lab Results   Component Value Date    ALT 13 01/13/2025    AST 13 01/13/2025    ALKPHOS 57 01/13/2025    BILITOT 0.4 01/13/2025     FLP  Lab Results   Component Value Date    TRIG 66 10/20/2023    CHOL 113 10/20/2023    LDLCALC 60 10/20/2023    HDL 40.1 10/20/2023     Urine Microalbumin  No results found for: \"MICROALBCREA\"  Weight Management  Wt Readings from Last 3 Encounters:   01/17/25 104 kg (228 lb 2.8 oz)   12/16/24 104 kg (228 lb 9.6 oz)   11/21/24 109 " kg (240 lb)      There is no height or weight on file to calculate BMI.     Assessment/Plan   Problem List Items Addressed This Visit       Atrial flutter, unspecified type (Multi)       ASSESSMENT OF ATRIAL FIBRILLATION    Rationale for plan: Patient is to be on anticoagulation indefinitely. Anticoagulation with Eliquis is appropriate given Aflutter diagnosis and [id number] of 8. At this time, patient denies any side effects or concerns with current anticoagulation regimen. Patient's lab work last completed on 1/17/25.  Worsening in renal function noted, although no dose adjustments for anticoagulation therapy needed at this time. Will continue to monitor. Patient is enrolled in  PAP for Eliquis (expiration on 3/7/25). Discussed renewal and patient to submit updated financials to pharmacist. Will continue to monitor and follow up in 1 month to ensure successful renewal.     Medication Changes: None   Continue Eliquis 5 mg BID     Monitoring and Education Discussed:  Counseled patient of side effects that are indicative of bleeding such as dark tarry stool, unexplainable bruising, or vomiting up a coffee ground like substance  Reminded patient of importance of anticoagulation and rate control therapy to prevent risk of heart attack or stroke  Counseled patient on MOA, expectations, duration of therapy, contraindications, administration, and monitoring parameters   Answered all patient questions and concerns    Clinical Pharmacist follow-up: 3/6/25 @ 9 am, Telehealth visit    Continue all meds under the continuation of care with the referring provider and clinical pharmacy team.    Thank you,  Milagro Mckeon, PharmD  Clinical Pharmacist  [phone]    Verbal consent to manage patient's drug therapy was obtained from the patient. They were informed they may decline to participate or withdraw from participation in pharmacy services at any time.

## 2025-02-07 ENCOUNTER — PHARMACY VISIT (OUTPATIENT)
Dept: PHARMACY | Facility: CLINIC | Age: 75
End: 2025-02-07
Payer: COMMERCIAL

## 2025-02-20 NOTE — PROGRESS NOTES
Name: Elsa Biggs  : 1950  MRN: [id number]  Visit Date: 2025  Chief Complaint: HISTORY AND PHYSICAL    HPI: 74 y.o. female with HF improved EF, atrial fibrillation on Eliquis, hypertension, type 2 diabetes, dyslipidemia, depression, history of breast cancer history of pulmonary embolism, history of ischemic stroke.  She presented to the ED after a fall at home.  She tripped on the way back to bed from the bathroom in the morning and was unable to get up.  She did hit her head. Was on the floor for 3 hours.  Workup in the ED revealed BNP 1818, INR 3.4, leukocytosis, impaired kidney function, and CT imaging showing pulmonary edema versus pneumonia, and a new T8 lesion suspicious for recurrent breast cancer with pathologic fracture.  The patient was admitted with acute on chronic decompensated heart failure, pyuria, hypomagnesemia and T8 lesion (pathologic fracture versus metastatic disease).  She was treated with IV Lasix for diuresis.  During treatment, the patient developed hypotension which resolved after transitioning to p.o. Lasix.Plans to follow with oncologist regarding the T8 lesion. Once stabilized, pt was brought to [facility] on 2025 for a readmission for ongoing med mgt and therapy services.     Subjective: Seen and examined today. Denies N/V/D/C/CP. No fever or chills. Reviewed hospitalization with pt. Questions answered with understanding verbalized. Nursing team report no issues.     The patient was counseled regarding diagnostic results, instructions for management, risk factor reductions, prognosis, patient and family education, impressions, risks and benefits of treatment options and importance of compliance with treatment. I have reviewed nursing notes since my last visit and document any significant changes Reviewed orders, medications, Labs. Reviewed chart looking at current medications, treatments, labs, x-rays etc.     ROS:  As above in subjective. Otherwise, all other systems have  been reviewed and are negative for complaint.    Medications:  Medications reviewed and verified in NH chart.     Past Medical History:   Diagnosis Date    A-fib (Multi)     Anxiety     Breast cancer (Multi)     16 tx radiation left lumpectomy     Cancer (Multi)     Depression     Diabetes mellitus (Multi)     GERD (gastroesophageal reflux disease)     Hyperlipidemia     Hypertension     Stroke (Multi)        Past Surgical History:   Procedure Laterality Date    BREAST SURGERY Left     lumpectomy for cancer    CARDIAC CATHETERIZATION N/A 2024    Procedure: Left Heart Cath;  Surgeon: Virgil Childress MD;  Location: [facility] Cardiac Cath Lab;  Service: Cardiovascular;  Laterality: N/A;    CHOLECYSTECTOMY      FRACTURE SURGERY      left femur and LLE fabrice  knee plate    JOINT REPLACEMENT Left     MR HEAD ANGIO WO IV CONTRAST  2023    MR HEAD ANGIO WO IV CONTRAST 2023 [facility] MRI    MR NECK ANGIO W AND WO IV CONTRAST  2022    MR NECK ANGIO W AND WO IV CONTRAST 2022 [facility] ANCILLARY LEGACY    MR NECK ANGIO WO IV CONTRAST  2023    MR NECK ANGIO WO IV CONTRAST 2023 [facility] MRI    OTHER SURGICAL HISTORY Right     Transcarotid Artery Revascularization    REVISION TOTAL HIP ARTHROPLASTY Left     TUBAL LIGATION         Family History   Problem Relation Name Age of Onset    Heart attack Father  65        blood clot    Coronary artery disease Sister         Social History     Tobacco Use    Smoking status: Former     Current packs/day: 0.00     Types: Cigarettes     Quit date:      Years since quittin.1    Smokeless tobacco: Never   Substance Use Topics    Alcohol use: Never     Alcohol/week: 1.0 standard drink of alcohol     Types: 1 Standard drinks or equivalent per week       Allergies   Allergen Reactions    Tetracycline Rash        Vital Signs:   Vital Signs were reviewed in nursing home documentation.    Physical Exam  Vitals reviewed. Exam conducted with a chaperone present.    Constitutional:       Appearance: Normal appearance. She is well-developed and overweight.      Interventions: Nasal cannula in place.   HENT:      Head: Normocephalic.      Right Ear: External ear normal.      Left Ear: External ear normal.      Nose: Nose normal.      Mouth/Throat:      Lips: Pink.      Mouth: Mucous membranes are moist.   Eyes:      General: Lids are normal.      Pupils: Pupils are equal, round, and reactive to light.   Neck:      Trachea: Trachea normal.   Cardiovascular:      Rate and Rhythm: Normal rate and regular rhythm.      Heart sounds: Normal heart sounds.   Pulmonary:      Effort: Pulmonary effort is normal.      Breath sounds: Decreased breath sounds present.      Comments: Crackles in bases  Abdominal:      General: Bowel sounds are normal.      Palpations: Abdomen is soft.   Musculoskeletal:      Cervical back: Full passive range of motion without pain.      Lumbar back: Tenderness present.      Right lower le+ Edema present.      Left lower le+ Edema present.   Skin:     General: Skin is warm and moist.      Findings: Bruising present.      Comments: LLE wrapped   Neurological:      General: No focal deficit present.      Mental Status: She is alert and oriented to person, place, and time. Mental status is at baseline.      Motor: Weakness present.   Psychiatric:         Attention and Perception: Attention normal.         Mood and Affect: Mood normal.         Speech: Speech normal.         Behavior: Behavior is cooperative.         Thought Content: Thought content normal.         Cognition and Memory: Cognition normal.         Judgment: Judgment normal.         Results/Data:   Lab Results   Component Value Date    WBC 8.5 2025    HGB 8.5 (L) 2025    HCT 29.0 (L) 2025     2025    CHOL 113 10/20/2023    TRIG 66 10/20/2023    HDL 40.1 10/20/2023    ALT 13 2025    AST 13 2025     (L) 2025    K 4.3 2025    CL 98  01/17/2025    CREATININE 1.83 (H) 01/17/2025    BUN 41 (H) 01/17/2025    CO2 29 01/17/2025    TSH 1.94 03/05/2024    INR 3.4 (H) 01/13/2025    HGBA1C 6.4 (H) 01/15/2025     Results were reviewed and addressed accordingly. Lab Results were also reviewed in [facility].     Provider Impression:   Fall, on ground ~3hrs  - Workup in the ED revealed BNP 1818, INR 3.4, leukocytosis, impaired kidney function, and CT imaging showing pulmonary edema versus pneumonia, and a new T8 lesion suspicious for recurrent breast cancer with pathologic fracture.    - fall precautions  - consulted PT OT ST to eval and tx    Acute on chronic decompensated heart failure  - treated with IV lasix during hospitalization  - During treatment, the patient developed hypotension which resolved after transitioning to p.o. Lasix.  - c/w PO lasix   - monitor fluid status    New T8 Lesion  - incidentally seen on recent imaging  - FU with oncology regarding this as discussed.     CV- chronic heart failure, AFib with RVR  - pt to follow up with [facility] cardiology as well as with EP - arranged by cardiology  - c/w lasix, aldactone  - monitor electrolytes and kidney function  - c/w amiodarone for rate control  - c/w eliqus for stroke prevention    Non-purulent cellulitis of left lower extremity  - Completed course of Keflex while inpatient  - Referral placed for wound care eval and tx  - elevate LE when sedentary     HLD  - c/w ezetimibe, statin    OAB  - c/w gemtesa    DM2 - HgbA1c was 7.0 on 10/20/2024  - c/w actos and metformin  - monitor BGL    Depression  - c/w paxil  - mood stable    Constipation Prevention  - c/w stool softeners and laxatives PRN  - monitor BM's    Gastric Protection  - c/w PPI    Code Status  - Full Code  ----------------  Written by Elsa Leonard RN, acting as a scribe for Dr. Kate. This note accurately reflects the work and decisions made by Dr. Kate.     I, Dr. Kate, attest all medical record entries made by the scribe were  under my direction and were personally dictated by me. I have reviewed the chart and agree that the record accurately reflects my performance of the history, physical exam, and assessment and plan.

## 2025-03-06 ENCOUNTER — APPOINTMENT (OUTPATIENT)
Dept: PHARMACY | Facility: HOSPITAL | Age: 75
End: 2025-03-06
Payer: MEDICARE

## 2025-03-06 DIAGNOSIS — I48.92 ATRIAL FLUTTER, UNSPECIFIED TYPE (MULTI): ICD-10-CM

## 2025-03-06 NOTE — PROGRESS NOTES
Clinical Pharmacy Appointment    Patient ID: Elsa Biggs is a 74 y.o. female who presents for Follow Up appointment.     Referring Provider: Malinda Posada, APRN-CNP  PCP: Marcelle Vitale MD     Subjective     ATRIAL FLUTTER  Does patient follow with cardiology? Yes  Last cardiology appointment: 11/21/24    Current atrial fibrillations medications include:  Rate Control: None, diltiazem stopped due to fatigue and rectal bleed that resolved with discontinuation.    Anticoagulation: Eliquis 5 mg BID     Adverse Effects: None    Screening for dose adjustment:  No dose change recommended at this time  Age 73 yo, weight 104 kg, Scr 1.83 mg/dL     Diagnosis:  Patient is projected to be on anticoagulation indefinitely     [id number] Score: [8]  Age: [<65 (0)] [65-74 (+1)] [> 75 (+2)]: 2  Sex: [Male/Female (+1)]: 1  CHF history: [No/Yes(+1)]: 1  Hypertension history: [No/Yes(+1)]: 1  Stroke/TIA/thromboembolism history: [No/Yes(+2)]: 2  Vascular disease history (prior MI, peripheral artery disease, aortic plaque): [No/Yes(+1)]: 0  Diabetes history: [No/Yes(+1)]: 1    Monitoring:  Date of last BMP: 1/17/25  Recent Hospitalizations: No  Recent Falls/Trauma: No  Changes in Tobacco or Alcohol Intake: No changes     Medication Reconciliation:  No changes    Drug Interactions  No relevant drug interactions were noted.    Medication System Management  Patient's preferred pharmacy: [facility]   Adherence/Organization: No issues   Affordability/Accessibility: Eliquis   PAP: Yes  Expiration: 3/7/25    Objective   Allergies   Allergen Reactions    Tetracycline Rash     Social History     Social History Narrative    Not on file      Medication Review  Current Outpatient Medications   Medication Instructions    albuterol 90 mcg/actuation inhaler 2 puffs, inhalation, Every 6 hours PRN    amiodarone (PACERONE) 200 mg, oral, Daily    aspirin 81 mg, oral, Daily    atorvastatin (LIPITOR) 80 mg, oral, Nightly    cholecalciferol  "(Vitamin D-3) 25 MCG (1000 UT) tablet 1 tablet, Daily    Eliquis 5 mg, oral, 2 times daily    ezetimibe (ZETIA) 10 mg, Daily    furosemide (LASIX) 40 mg, oral, Daily    Gemtesa 75 mg, oral, Nightly    ibuprofen 600 mg, oral, As needed    metFORMIN (GLUCOPHAGE) 500 mg, Every morning    methocarbamol (ROBAXIN) 500 mg, oral, Every 8 hours scheduled    metoprolol succinate XL (TOPROL-XL) 12.5 mg, oral, Daily, Do not crush or chew.    PARoxetine (PAXIL) 20 mg, Nightly    pioglitazone (ACTOS) 30 mg, Every 24 hours    sacubitriL-valsartan (Entresto) 24-26 mg tablet 0.5 tablets, oral, Daily before breakfast    spironolactone (ALDACTONE) 25 mg, oral, Daily    traMADol (ULTRAM) 50 mg, oral, Every 6 hours PRN      Vitals  BP Readings from Last 2 Encounters:   01/17/25 98/58   12/16/24 93/77     BMI Readings from Last 1 Encounters:   01/17/25 34.69 kg/m²      Labs  A1C  Lab Results   Component Value Date    HGBA1C 6.4 (H) 01/15/2025    HGBA1C 7.0 (H) 10/20/2023    HGBA1C 6.2 08/02/2018     BMP  Lab Results   Component Value Date    CALCIUM 7.6 (L) 01/17/2025     (L) 01/17/2025    K 4.3 01/17/2025    CO2 29 01/17/2025    CL 98 01/17/2025    BUN 41 (H) 01/17/2025    CREATININE 1.83 (H) 01/17/2025    EGFR 28 (L) 01/17/2025     LFTs  Lab Results   Component Value Date    ALT 13 01/13/2025    AST 13 01/13/2025    ALKPHOS 57 01/13/2025    BILITOT 0.4 01/13/2025     FLP  Lab Results   Component Value Date    TRIG 66 10/20/2023    CHOL 113 10/20/2023    LDLCALC 60 10/20/2023    HDL 40.1 10/20/2023     Urine Microalbumin  No results found for: \"MICROALBCREA\"  Weight Management  Wt Readings from Last 3 Encounters:   01/17/25 104 kg (228 lb 2.8 oz)   12/16/24 104 kg (228 lb 9.6 oz)   11/21/24 109 kg (240 lb)      There is no height or weight on file to calculate BMI.     Assessment/Plan   Problem List Items Addressed This Visit       Atrial flutter, unspecified type (Multi)       ASSESSMENT OF ATRIAL FIBRILLATION    Rationale for " plan: Patient is to be on anticoagulation indefinitely. Anticoagulation with Eliquis is appropriate given Aflutter diagnosis and [id number] of 8. At this time, patient denies any side effects or concerns with current anticoagulation regimen. Patient's lab work last completed on 1/17/25.  Worsening in renal function noted, although no dose adjustments for anticoagulation therapy needed at this time. Will continue to monitor. Patient is enrolled in  PAP for Eliquis (expiration on 3/7/25). Discussed renewal at prior visit although financials not received. Patient to fax financials to pharmacist at [phone]. Will continue to monitor and follow up in 1 month to ensure successful renewal.     Medication Changes: None   Continue Eliquis 5 mg BID     Monitoring and Education Discussed:  Counseled patient of side effects that are indicative of bleeding such as dark tarry stool, unexplainable bruising, or vomiting up a coffee ground like substance  Reminded patient of importance of anticoagulation and rate control therapy to prevent risk of heart attack or stroke  Counseled patient on MOA, expectations, duration of therapy, contraindications, administration, and monitoring parameters   Answered all patient questions and concerns    Clinical Pharmacist follow-up: 4/3/25 @ 9 am, Telehealth visit    Continue all meds under the continuation of care with the referring provider and clinical pharmacy team.    Thank you,  Milagro Mckeon, PharmD  Clinical Pharmacist  [phone]    Verbal consent to manage patient's drug therapy was obtained from the patient. They were informed they may decline to participate or withdraw from participation in pharmacy services at any time.

## 2025-03-27 ENCOUNTER — HOSPITAL ENCOUNTER (OUTPATIENT)
Dept: RADIOLOGY | Facility: HOSPITAL | Age: 75
Discharge: HOME | End: 2025-03-27
Payer: MEDICARE

## 2025-03-27 DIAGNOSIS — M54.50 LOW BACK PAIN, UNSPECIFIED: ICD-10-CM

## 2025-03-27 PROCEDURE — 72100 X-RAY EXAM L-S SPINE 2/3 VWS: CPT

## 2025-04-03 ENCOUNTER — APPOINTMENT (OUTPATIENT)
Dept: PHARMACY | Facility: HOSPITAL | Age: 75
End: 2025-04-03
Payer: MEDICARE

## 2025-04-03 DIAGNOSIS — I48.92 ATRIAL FLUTTER, UNSPECIFIED TYPE (MULTI): ICD-10-CM

## 2025-04-03 NOTE — PROGRESS NOTES
Clinical Pharmacy Appointment    Patient ID: Elsa Biggs is a 74 y.o. female who presents for Follow Up appointment.     Referring Provider: Malinda Posada, APRN-CNP  PCP: Marcelle Vitale MD     Subjective     ATRIAL FLUTTER  Does patient follow with cardiology? Yes  Last cardiology appointment: 11/21/24    Current atrial fibrillations medications include:  Rate Control: Amiodarone 200 mg daily   Anticoagulation: Eliquis 5 mg BID     Adverse Effects: None    Screening for dose adjustment:  No dose change recommended at this time  Age 73 yo, weight 104 kg, Scr 1.83 mg/dL     Diagnosis:  Patient is projected to be on anticoagulation indefinitely     [id number] Score: [8]  Age: [<65 (0)] [65-74 (+1)] [> 75 (+2)]: 2  Sex: [Male/Female (+1)]: 1  CHF history: [No/Yes(+1)]: 1  Hypertension history: [No/Yes(+1)]: 1  Stroke/TIA/thromboembolism history: [No/Yes(+2)]: 2  Vascular disease history (prior MI, peripheral artery disease, aortic plaque): [No/Yes(+1)]: 0  Diabetes history: [No/Yes(+1)]: 1    Monitoring:  Date of last BMP: 1/17/25  Recent Hospitalizations: No  Recent Falls/Trauma: No  Changes in Tobacco or Alcohol Intake: No changes     Medication Reconciliation:  No changes    Drug Interactions  No relevant drug interactions were noted.    Medication System Management  Patient's preferred pharmacy: [facility]   Adherence/Organization: No issues   Affordability/Accessibility: Eliquis   PAP: Yes  Expiration: 3/7/25    Objective   Allergies   Allergen Reactions    Tetracycline Rash     Social History     Social History Narrative    Not on file      Medication Review  Current Outpatient Medications   Medication Instructions    albuterol 90 mcg/actuation inhaler 2 puffs, inhalation, Every 6 hours PRN    amiodarone (PACERONE) 200 mg, oral, Daily    aspirin 81 mg, oral, Daily    atorvastatin (LIPITOR) 80 mg, oral, Nightly    cholecalciferol (Vitamin D-3) 25 MCG (1000 UT) tablet 1 tablet, Daily    Eliquis 5  "mg, oral, 2 times daily    ezetimibe (ZETIA) 10 mg, Daily    furosemide (LASIX) 40 mg, oral, Daily    Gemtesa 75 mg, oral, Nightly    ibuprofen 600 mg, oral, As needed    metFORMIN (GLUCOPHAGE) 500 mg, Every morning    methocarbamol (ROBAXIN) 500 mg, oral, Every 8 hours scheduled    metoprolol succinate XL (TOPROL-XL) 12.5 mg, oral, Daily, Do not crush or chew.    PARoxetine (PAXIL) 20 mg, Nightly    pioglitazone (ACTOS) 30 mg, Every 24 hours    sacubitriL-valsartan (Entresto) 24-26 mg tablet 0.5 tablets, oral, Daily before breakfast    spironolactone (ALDACTONE) 25 mg, oral, Daily    traMADol (ULTRAM) 50 mg, oral, Every 6 hours PRN      Vitals  BP Readings from Last 2 Encounters:   01/17/25 98/58   12/16/24 93/77     BMI Readings from Last 1 Encounters:   01/17/25 34.69 kg/m²      Labs  A1C  Lab Results   Component Value Date    HGBA1C 6.4 (H) 01/15/2025    HGBA1C 7.0 (H) 10/20/2023    HGBA1C 6.2 08/02/2018     BMP  Lab Results   Component Value Date    CALCIUM 7.6 (L) 01/17/2025     (L) 01/17/2025    K 4.3 01/17/2025    CO2 29 01/17/2025    CL 98 01/17/2025    BUN 41 (H) 01/17/2025    CREATININE 1.83 (H) 01/17/2025    EGFR 28 (L) 01/17/2025     LFTs  Lab Results   Component Value Date    ALT 13 01/13/2025    AST 13 01/13/2025    ALKPHOS 57 01/13/2025    BILITOT 0.4 01/13/2025     FLP  Lab Results   Component Value Date    TRIG 66 10/20/2023    CHOL 113 10/20/2023    LDLCALC 60 10/20/2023    HDL 40.1 10/20/2023     Urine Microalbumin  No results found for: \"MICROALBCREA\"  Weight Management  Wt Readings from Last 3 Encounters:   01/17/25 104 kg (228 lb 2.8 oz)   12/16/24 104 kg (228 lb 9.6 oz)   11/21/24 109 kg (240 lb)      There is no height or weight on file to calculate BMI.     Assessment/Plan   Problem List Items Addressed This Visit       Atrial flutter, unspecified type (Multi)     ASSESSMENT OF ATRIAL FIBRILLATION    Rationale for plan: Patient is to be on anticoagulation indefinitely. Anticoagulation " with Eliquis is appropriate given Aflutter diagnosis and [id number] of 8. At this time, patient denies any side effects or concerns with current anticoagulation regimen. Patient's lab work last completed on 1/17/25.  Worsening in renal function noted, although no dose adjustments for anticoagulation therapy needed at this time. Will continue to monitor. Patient is enrolled in [facility] for Eliquis (expiration on 3/7/25). Discussed renewal at prior visit although financials not received. Discussed again today. Emphasized that enrollment has lapsed and refills will be usual copay if financials are not received. Patient to fax financials to pharmacist at [phone]. Will continue to monitor and follow up in 1 month to ensure successful renewal.     Medication Changes: None   Continue Eliquis 5 mg BID     Monitoring and Education Discussed:  Counseled patient of side effects that are indicative of bleeding such as dark tarry stool, unexplainable bruising, or vomiting up a coffee ground like substance  Reminded patient of importance of anticoagulation and rate control therapy to prevent risk of heart attack or stroke  Counseled patient on MOA, expectations, duration of therapy, contraindications, administration, and monitoring parameters   Answered all patient questions and concerns    Clinical Pharmacist follow-up: 5/7/25 @ 10 am, Telehealth visit    Continue all meds under the continuation of care with the referring provider and clinical pharmacy team.    Thank you,  Milagro Mckeon, PharmD  Clinical Pharmacist  [phone]    Verbal consent to manage patient's drug therapy was obtained from the patient. They were informed they may decline to participate or withdraw from participation in pharmacy services at any time.

## 2025-04-21 ENCOUNTER — HOSPITAL ENCOUNTER (EMERGENCY)
Facility: HOSPITAL | Age: 75
Discharge: HOME | End: 2025-04-21
Attending: EMERGENCY MEDICINE
Payer: MEDICARE

## 2025-04-21 ENCOUNTER — APPOINTMENT (OUTPATIENT)
Dept: RADIOLOGY | Facility: HOSPITAL | Age: 75
End: 2025-04-21
Payer: MEDICARE

## 2025-04-21 ENCOUNTER — APPOINTMENT (OUTPATIENT)
Dept: CARDIOLOGY | Facility: HOSPITAL | Age: 75
End: 2025-04-21
Payer: MEDICARE

## 2025-04-21 VITALS
DIASTOLIC BLOOD PRESSURE: 55 MMHG | RESPIRATION RATE: 20 BRPM | WEIGHT: 207.45 LBS | HEIGHT: 63 IN | BODY MASS INDEX: 36.76 KG/M2 | TEMPERATURE: 97.2 F | SYSTOLIC BLOOD PRESSURE: 141 MMHG | OXYGEN SATURATION: 94 % | HEART RATE: 75 BPM

## 2025-04-21 DIAGNOSIS — S09.90XA INJURY OF HEAD, INITIAL ENCOUNTER: ICD-10-CM
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
## 2025-04-21 DIAGNOSIS — D68.9 COAGULOPATHY (MULTI): ICD-10-CM

## 2025-04-21 LAB
ABO GROUP (TYPE) IN BLOOD: NORMAL
ALBUMIN SERPL BCP-MCNC: 3.1 G/DL (ref 3.4–5)
ALP SERPL-CCNC: 55 U/L (ref 33–136)
ALT SERPL W P-5'-P-CCNC: 6 U/L (ref 7–45)
ANION GAP SERPL CALC-SCNC: 11 MMOL/L (ref 10–20)
ANTIBODY SCREEN: NORMAL
AST SERPL W P-5'-P-CCNC: 8 U/L (ref 9–39)
BASOPHILS # BLD AUTO: 0.04 X10*3/UL (ref 0–0.1)
BASOPHILS NFR BLD AUTO: 0.5 %
BILIRUB SERPL-MCNC: 0.3 MG/DL (ref 0–1.2)
BUN SERPL-MCNC: 26 MG/DL (ref 6–23)
CALCIUM SERPL-MCNC: 8.7 MG/DL (ref 8.6–10.3)
CHLORIDE SERPL-SCNC: 103 MMOL/L (ref 98–107)
CO2 SERPL-SCNC: 29 MMOL/L (ref 21–32)
CREAT SERPL-MCNC: 1.45 MG/DL (ref 0.5–1.05)
EGFRCR SERPLBLD CKD-EPI 2021: 37 ML/MIN/1.73M*2
EOSINOPHIL # BLD AUTO: 0.13 X10*3/UL (ref 0–0.4)
EOSINOPHIL NFR BLD AUTO: 1.7 %
ERYTHROCYTE [DISTWIDTH] IN BLOOD BY AUTOMATED COUNT: 15.6 % (ref 11.5–14.5)
ETHANOL SERPL-MCNC: <10 MG/DL
GLUCOSE SERPL-MCNC: 115 MG/DL (ref 74–99)
HCT VFR BLD AUTO: 26.6 % (ref 36–46)
HGB BLD-MCNC: 8.1 G/DL (ref 12–16)
IMM GRANULOCYTES # BLD AUTO: 0.02 X10*3/UL (ref 0–0.5)
IMM GRANULOCYTES NFR BLD AUTO: 0.3 % (ref 0–0.9)
INR PPP: 3.1 (ref 0.9–1.1)
LACTATE SERPL-SCNC: 0.6 MMOL/L (ref 0.4–2)
LYMPHOCYTES # BLD AUTO: 1.54 X10*3/UL (ref 0.8–3)
LYMPHOCYTES NFR BLD AUTO: 20.2 %
MCH RBC QN AUTO: 27.1 PG (ref 26–34)
MCHC RBC AUTO-ENTMCNC: 30.5 G/DL (ref 32–36)
MCV RBC AUTO: 89 FL (ref 80–100)
MONOCYTES # BLD AUTO: 0.59 X10*3/UL (ref 0.05–0.8)
MONOCYTES NFR BLD AUTO: 7.8 %
NEUTROPHILS # BLD AUTO: 5.29 X10*3/UL (ref 1.6–5.5)
NEUTROPHILS NFR BLD AUTO: 69.5 %
NRBC BLD-RTO: 0 /100 WBCS (ref 0–0)
PLATELET # BLD AUTO: 333 X10*3/UL (ref 150–450)
POTASSIUM SERPL-SCNC: 3.9 MMOL/L (ref 3.5–5.3)
PROT SERPL-MCNC: 6.8 G/DL (ref 6.4–8.2)
PROTHROMBIN TIME: 33.9 SECONDS (ref 9.8–12.4)
RBC # BLD AUTO: 2.99 X10*6/UL (ref 4–5.2)
RH FACTOR (ANTIGEN D): NORMAL
SODIUM SERPL-SCNC: 139 MMOL/L (ref 136–145)
WBC # BLD AUTO: 7.6 X10*3/UL (ref 4.4–11.3)

## 2025-04-21 PROCEDURE — 85025 COMPLETE CBC W/AUTO DIFF WBC: CPT | Performed by: EMERGENCY MEDICINE

## 2025-04-21 PROCEDURE — 84075 ASSAY ALKALINE PHOSPHATASE: CPT | Performed by: EMERGENCY MEDICINE

## 2025-04-21 PROCEDURE — 93005 ELECTROCARDIOGRAM TRACING: CPT

## 2025-04-21 PROCEDURE — 85610 PROTHROMBIN TIME: CPT | Performed by: EMERGENCY MEDICINE

## 2025-04-21 PROCEDURE — 99285 EMERGENCY DEPT VISIT HI MDM: CPT | Mod: 25

## 2025-04-21 PROCEDURE — 82077 ASSAY SPEC XCP UR&BREATH IA: CPT | Performed by: EMERGENCY MEDICINE

## 2025-04-21 PROCEDURE — 72125 CT NECK SPINE W/O DYE: CPT

## 2025-04-21 PROCEDURE — 72125 CT NECK SPINE W/O DYE: CPT | Performed by: RADIOLOGY

## 2025-04-21 PROCEDURE — 99291 CRITICAL CARE FIRST HOUR: CPT | Performed by: EMERGENCY MEDICINE

## 2025-04-21 PROCEDURE — 2500000001 HC RX 250 WO HCPCS SELF ADMINISTERED DRUGS (ALT 637 FOR MEDICARE OP): Performed by: EMERGENCY MEDICINE

## 2025-04-21 PROCEDURE — 83605 ASSAY OF LACTIC ACID: CPT | Performed by: EMERGENCY MEDICINE

## 2025-04-21 PROCEDURE — 70450 CT HEAD/BRAIN W/O DYE: CPT

## 2025-04-21 PROCEDURE — 86901 BLOOD TYPING SEROLOGIC RH(D): CPT | Performed by: EMERGENCY MEDICINE

## 2025-04-21 PROCEDURE — 36415 COLL VENOUS BLD VENIPUNCTURE: CPT | Performed by: EMERGENCY MEDICINE

## 2025-04-21 PROCEDURE — 70450 CT HEAD/BRAIN W/O DYE: CPT | Performed by: RADIOLOGY

## 2025-04-21 PROCEDURE — G0390 TRAUMA RESPONS W/HOSP CRITI: HCPCS

## 2025-04-21 RX ORDER — ACETAMINOPHEN 325 MG/1
650 TABLET ORAL ONCE
Status: COMPLETED | OUTPATIENT
Start: 2025-04-21 | End: 2025-04-21

## 2025-04-21 RX ADMIN — ACETAMINOPHEN 650 MG: 325 TABLET, FILM COATED ORAL at 09:15

## 2025-04-21 ASSESSMENT — PAIN DESCRIPTION - ORIENTATION
ORIENTATION: LEFT
ORIENTATION: LEFT

## 2025-04-21 ASSESSMENT — PAIN - FUNCTIONAL ASSESSMENT
PAIN_FUNCTIONAL_ASSESSMENT: 0-10
PAIN_FUNCTIONAL_ASSESSMENT: 0-10

## 2025-04-21 ASSESSMENT — LIFESTYLE VARIABLES
HAVE PEOPLE ANNOYED YOU BY CRITICIZING YOUR DRINKING: NO
EVER HAD A DRINK FIRST THING IN THE MORNING TO STEADY YOUR NERVES TO GET RID OF A HANGOVER: NO
EVER FELT BAD OR GUILTY ABOUT YOUR DRINKING: NO
HAVE YOU EVER FELT YOU SHOULD CUT DOWN ON YOUR DRINKING: NO
TOTAL SCORE: 0

## 2025-04-21 ASSESSMENT — PAIN DESCRIPTION - PAIN TYPE
TYPE: CHRONIC PAIN
TYPE: CHRONIC PAIN

## 2025-04-21 ASSESSMENT — PAIN DESCRIPTION - LOCATION
LOCATION: KNEE
LOCATION: KNEE

## 2025-04-21 ASSESSMENT — PAIN SCALES - GENERAL
PAINLEVEL_OUTOF10: 6
PAINLEVEL_OUTOF10: 6
PAINLEVEL_OUTOF10: 2

## 2025-04-21 NOTE — ED PROVIDER NOTES
HPI   Chief Complaint   Patient presents with    Fall     MARCE Hunter is a 74-year-old woman who presents after a fall at home.  She was advised to sit into her chair and decided to slide over little bit and did not adjust properly and down she went.  She bumped her head on the night table.  She did not lose consciousness.  She does take Eliquis for history of blood clots.  She had a little bit of right head pain.  She was able to ambulate with EMS to the stretcher at home.  She denies any fever chills cough or cold.  She was not drinking alcohol or take any drugs.  History comes from patient and EMS and EMR.  Patient's only complaint of discomfort is right scalp.              Patient History   Medical History[1]  Surgical History[2]  Family History[3]  Social History[4]    Physical Exam   ED Triage Vitals [04/21/25 0850]   Temperature Heart Rate Respirations BP   36.2 °C (97.2 °F) 77 16 130/54      Pulse Ox Temp src Heart Rate Source Patient Position   (!) 93 % -- -- --      BP Location FiO2 (%)     -- --       Physical Exam  Vitals reviewed.   Constitutional:       General: She is awake.      Appearance: Normal appearance.   HENT:      Head: Normocephalic.      Nose: Nose normal.   Cardiovascular:      Rate and Rhythm: Normal rate and regular rhythm.   Pulmonary:      Effort: Pulmonary effort is normal.      Breath sounds: Normal breath sounds.   Abdominal:      Palpations: Abdomen is soft.   Musculoskeletal:      Cervical back: Normal range of motion.      Comments: Good range of motion bilateral upper and lower extremity.  Left knee has some soreness with flexion and rotation.  She was able to ambulate on it earlier with EMS.  She has well-healed surgical scars left knee from previous surgeries.   Skin:     General: Skin is warm.      Capillary Refill: Capillary refill takes less than 2 seconds.   Neurological:      Mental Status: She is alert.           ED Course & MDM   ED Course as of 04/21/25 1013   Mon  Apr 21, 2025   0856 Patient worked up as an HIA due to the fact that she hit her head and is on thinners.  CT scans of the head and C-spine were requested.  I talked to her but I do not believe images are indicated of the left knee.  Labs were also requested. [RZ]   0858 EKG done at 0 849 turbid by me shows sinus rhythm at 77 bpm with a prolonged  ms.  No STEMI.  This is similar to previous EKG from January 14, 2025 which also had a first-degree AV block [RZ]   0922 CT results came back which were negative for fracture or internal bleeding.  Awaiting labs. [RZ]   1012 I reviewed the results and the labs with the patient and her daughter.  Patient is stable and her daughter will drive her home. [RZ]      ED Course User Index  [RZ] Tyler Crystal MD         Diagnoses as of 04/21/25 1013   Fall at home, initial encounter   Injury of head, initial encounter   Coagulopathy (Multi)                 No data recorded     [place] Coma Scale Score: 15 (04/21/25 0851 : Kori Lynch RN)                           Medical Decision Making      Procedure  Procedures       [1]   Past Medical History:  Diagnosis Date    A-fib (Multi)     Anxiety     Breast cancer     16 tx radiation left lumpectomy 2022    Cancer (Multi)     Depression     Diabetes mellitus (Multi)     GERD (gastroesophageal reflux disease)     Hyperlipidemia     Hypertension     Stroke (Multi)    [2]   Past Surgical History:  Procedure Laterality Date    BREAST SURGERY Left     lumpectomy for cancer    CARDIAC CATHETERIZATION N/A 01/29/2024    Procedure: Left Heart Cath;  Surgeon: Virgil Childress MD;  Location: [facility];  Service: Cardiovascular;  Laterality: N/A;    CHOLECYSTECTOMY      FRACTURE SURGERY      left femur and LLE fabrice  knee plate    JOINT REPLACEMENT Left     MR HEAD ANGIO WO IV CONTRAST  12/26/2023    MR HEAD ANGIO WO IV CONTRAST 12/26/2023 [facility]    MR NECK ANGIO W AND WO IV CONTRAST  01/14/2022    MR NECK ANGIO W AND WO IV  CONTRAST 2022 [facility]    MR NECK ANGIO WO IV CONTRAST  2023    MR NECK ANGIO WO IV CONTRAST 2023 [facility]    OTHER SURGICAL HISTORY Right     Transcarotid Artery Revascularization    REVISION TOTAL HIP ARTHROPLASTY Left     TUBAL LIGATION     [3]   Family History  Problem Relation Name Age of Onset    Heart attack Father  65        blood clot    Coronary artery disease Sister     [4]   Social History  Tobacco Use    Smoking status: Former     Current packs/day: 0.00     Types: Cigarettes     Quit date:      Years since quittin.3    Smokeless tobacco: Never   Vaping Use    Vaping status: Never Used   Substance Use Topics    Alcohol use: Never     Alcohol/week: 1.0 standard drink of alcohol     Types: 1 Standard drinks or equivalent per week    Drug use: Never        Tyler Crystal MD  25 1013

## 2025-04-21 NOTE — ED PROCEDURE NOTE
Procedure  Critical Care    Performed by: Tyler Crystal MD  Authorized by: Tyler Crystal MD    Critical care provider statement:     Critical care time (minutes):  60    Critical care time was exclusive of:  Separately billable procedures and treating other patients    Critical care was necessary to treat or prevent imminent or life-threatening deterioration of the following conditions:  Trauma    Critical care was time spent personally by me on the following activities:  Ordering and performing treatments and interventions, ordering and review of laboratory studies, ordering and review of radiographic studies, pulse oximetry, re-evaluation of patient's condition, examination of patient, development of treatment plan with patient or surrogate, review of old charts and obtaining history from patient or surrogate               Tyler Crystal MD  04/21/25 7768

## 2025-04-22 LAB
ATRIAL RATE: 77 BPM
P AXIS: 23 DEGREES
P OFFSET: 153 MS
P ONSET: 88 MS
PR INTERVAL: 240 MS
Q ONSET: 208 MS
QRS COUNT: 12 BEATS
QRS DURATION: 106 MS
QT INTERVAL: 424 MS
QTC CALCULATION(BAZETT): 479 MS
QTC FREDERICIA: 460 MS
R AXIS: -20 DEGREES
T AXIS: 84 DEGREES
T OFFSET: 420 MS
VENTRICULAR RATE: 77 BPM

## 2025-05-07 ENCOUNTER — APPOINTMENT (OUTPATIENT)
Dept: PHARMACY | Facility: HOSPITAL | Age: 75
End: 2025-05-07
Payer: MEDICARE

## 2025-05-07 DIAGNOSIS — I48.92 ATRIAL FLUTTER, UNSPECIFIED TYPE (MULTI): ICD-10-CM

## 2025-05-07 NOTE — PROGRESS NOTES
Clinical Pharmacy Appointment    Patient ID: Elsa Biggs is a 74 y.o. female who presents for Atrial Flutter.     This is a Follow Up appointment.     Referring Provider: Malinda Posada, APRN-CNP  PCP: Marcelle Vitale MD     Subjective     ATRIAL FLUTTER  Does patient follow with cardiology? Yes  Last cardiology appointment: 11/21/24    Current atrial fibrillations medications include:  Rate Control: Amiodarone 200 mg daily   Anticoagulation: Eliquis 5 mg BID     Adverse Effects: None    Screening for dose adjustment:  No dose change recommended at this time  Age 75 yo, weight 94.1 kg, Scr 1.45 mg/dL  (4/21/25)    Diagnosis:  Patient is projected to be on anticoagulation indefinitely     [id number] Score: [8]  Age: [<65 (0)] [65-74 (+1)] [> 75 (+2)]: 2  Sex: [Male/Female (+1)]: 1  CHF history: [No/Yes(+1)]: 1  Hypertension history: [No/Yes(+1)]: 1  Stroke/TIA/thromboembolism history: [No/Yes(+2)]: 2  Vascular disease history (prior MI, peripheral artery disease, aortic plaque): [No/Yes(+1)]: 0  Diabetes history: [No/Yes(+1)]: 1    Monitoring:  Date of last BMP: 4/21/25  Recent Hospitalizations: yes - for fall - 4/21/25  Recent Falls/Trauma: Yes - 4/21/25 - no injury  Changes in Tobacco or Alcohol Intake: No changes     Medication Reconciliation:  No changes    Drug Interactions  No relevant drug interactions were noted.    Medication System Management  Patient's preferred pharmacy: [facility]   Adherence/Organization: No issues   Affordability/Accessibility:    PAP: Yes  Medication: Eliquis   Expiration: 3/7/25    Objective   Allergies   Allergen Reactions    Tetracycline Rash     Social History     Social History Narrative    Not on file      Medication Review  Current Outpatient Medications   Medication Instructions    albuterol 90 mcg/actuation inhaler 2 puffs, inhalation, Every 6 hours PRN    amiodarone (PACERONE) 200 mg, oral, Daily    aspirin 81 mg, oral, Daily    atorvastatin (LIPITOR) 80 mg,  "oral, Nightly    cholecalciferol (Vitamin D-3) 25 MCG (1000 UT) tablet 1 tablet, Daily    Eliquis 5 mg, oral, 2 times daily    ezetimibe (ZETIA) 10 mg, Daily    furosemide (LASIX) 40 mg, oral, Daily    Gemtesa 75 mg, oral, Nightly    ibuprofen 600 mg, oral, As needed    metFORMIN (GLUCOPHAGE) 500 mg, Every morning    methocarbamol (ROBAXIN) 500 mg, oral, Every 8 hours scheduled    metoprolol succinate XL (TOPROL-XL) 12.5 mg, oral, Daily, Do not crush or chew.    PARoxetine (PAXIL) 20 mg, Nightly    pioglitazone (ACTOS) 30 mg, Every 24 hours    sacubitriL-valsartan (Entresto) 24-26 mg tablet 0.5 tablets, oral, Daily before breakfast    spironolactone (ALDACTONE) 25 mg, oral, Daily    traMADol (ULTRAM) 50 mg, oral, Every 6 hours PRN      Vitals  BP Readings from Last 2 Encounters:   04/21/25 141/55   01/17/25 98/58     BMI Readings from Last 1 Encounters:   04/21/25 36.75 kg/m²      Labs  A1C  Lab Results   Component Value Date    HGBA1C 6.4 (H) 01/15/2025    HGBA1C 7.0 (H) 10/20/2023    HGBA1C 6.2 08/02/2018     BMP  Lab Results   Component Value Date    CALCIUM 8.7 04/21/2025     04/21/2025    K 3.9 04/21/2025    CO2 29 04/21/2025     04/21/2025    BUN 26 (H) 04/21/2025    CREATININE 1.45 (H) 04/21/2025    EGFR 37 (L) 04/21/2025     LFTs  Lab Results   Component Value Date    ALT 6 (L) 04/21/2025    AST 8 (L) 04/21/2025    ALKPHOS 55 04/21/2025    BILITOT 0.3 04/21/2025     FLP  Lab Results   Component Value Date    TRIG 66 10/20/2023    CHOL 113 10/20/2023    LDLCALC 60 10/20/2023    HDL 40.1 10/20/2023     Urine Microalbumin  No results found for: \"MICROALBCREA\"  Weight Management  Wt Readings from Last 3 Encounters:   04/21/25 94.1 kg (207 lb 7.3 oz)   01/17/25 104 kg (228 lb 2.8 oz)   12/16/24 104 kg (228 lb 9.6 oz)      There is no height or weight on file to calculate BMI.     Assessment/Plan   Problem List Items Addressed This Visit       Atrial flutter, unspecified type (Multi)    Relevant Orders "    Referral to Clinical Pharmacy     ASSESSMENT OF ATRIAL FIBRILLATION    Rationale for plan: Patient is to be on anticoagulation indefinitely. Anticoagulation with Eliquis is appropriate given Aflutter diagnosis and [id number] of 8. At this time, patient denies any side effects or concerns with current anticoagulation regimen. Patient's lab work last completed on 4/21/25. Fluctuations of renal function noted, although no dose adjustments for anticoagulation therapy needed at this time. Will continue to monitor. Patient is enrolled in  PAP for Eliquis (expiration on 3/7/25). Discussed renewal at prior visit although financials not received. Discussed again today. Emphasized that enrollment has lapsed and refills will be usual copay if financials are not received. Patient to fax financials to pharmacist at [phone]. Will continue to monitor and follow up in 1 month to ensure successful renewal.     Medication Changes: None   Continue Eliquis 5 mg BID     Monitoring and Education Discussed:  Counseled patient of side effects that are indicative of bleeding such as dark tarry stool, unexplainable bruising, or vomiting up a coffee ground like substance  Reminded patient of importance of anticoagulation and rate control therapy to prevent risk of heart attack or stroke  Counseled patient on MOA, expectations, duration of therapy, contraindications, administration, and monitoring parameters   Answered all patient questions and concerns    Clinical Pharmacist follow-up: 6/4/25 @ 1040 am, Telehealth visit    Continue all meds under the continuation of care with the referring provider and clinical pharmacy team.    Thank you,  Milagro Mckeon, PharmD  Clinical Pharmacist  [phone]    Verbal consent to manage patient's drug therapy was obtained from the patient. They were informed they may decline to participate or withdraw from participation in pharmacy services at any time.

## 2025-05-15 ENCOUNTER — OFFICE VISIT (OUTPATIENT)
Dept: CARDIOLOGY | Facility: HOSPITAL | Age: 75
End: 2025-05-15
Payer: MEDICARE

## 2025-05-15 VITALS — DIASTOLIC BLOOD PRESSURE: 77 MMHG | SYSTOLIC BLOOD PRESSURE: 125 MMHG | OXYGEN SATURATION: 95 % | HEART RATE: 63 BPM

## 2025-05-15 DIAGNOSIS — R94.31 ABNORMAL ELECTROCARDIOGRAM (ECG) (EKG): ICD-10-CM

## 2025-05-15 DIAGNOSIS — I48.92 ATRIAL FLUTTER, UNSPECIFIED TYPE (MULTI): ICD-10-CM

## 2025-05-15 DIAGNOSIS — I63.20 CEREBROVASCULAR ACCIDENT (CVA) DUE TO OCCLUSION OF PRECEREBRAL ARTERY (MULTI): ICD-10-CM

## 2025-05-15 DIAGNOSIS — I50.9 HEART FAILURE, UNSPECIFIED HF CHRONICITY, UNSPECIFIED HEART FAILURE TYPE: ICD-10-CM

## 2025-05-15 DIAGNOSIS — I50.9 ACUTE EXACERBATION OF CHRONIC HEART FAILURE: ICD-10-CM

## 2025-05-15 DIAGNOSIS — I48.0 PAROXYSMAL ATRIAL FIBRILLATION (MULTI): ICD-10-CM

## 2025-05-15 PROCEDURE — 99214 OFFICE O/P EST MOD 30 MIN: CPT | Performed by: NURSE PRACTITIONER

## 2025-05-15 PROCEDURE — 3074F SYST BP LT 130 MM HG: CPT | Performed by: NURSE PRACTITIONER

## 2025-05-15 PROCEDURE — 3078F DIAST BP <80 MM HG: CPT | Performed by: NURSE PRACTITIONER

## 2025-05-15 PROCEDURE — 1159F MED LIST DOCD IN RCRD: CPT | Performed by: NURSE PRACTITIONER

## 2025-05-15 PROCEDURE — 1123F ACP DISCUSS/DSCN MKR DOCD: CPT | Performed by: NURSE PRACTITIONER

## 2025-05-15 PROCEDURE — 1157F ADVNC CARE PLAN IN RCRD: CPT | Performed by: NURSE PRACTITIONER

## 2025-05-15 RX ORDER — FUROSEMIDE 40 MG/1
40 TABLET ORAL DAILY PRN
Start: 2025-05-15 | End: 2025-06-14

## 2025-05-15 RX ORDER — EZETIMIBE 10 MG/1
10 TABLET ORAL DAILY
Qty: 90 TABLET | Refills: 3 | Status: SHIPPED | OUTPATIENT
Start: 2025-05-15 | End: 2026-05-15

## 2025-05-15 RX ORDER — BISOPROLOL FUMARATE 5 MG/1
2.5 TABLET, FILM COATED ORAL DAILY
Qty: 45 TABLET | Refills: 3 | Status: SHIPPED | OUTPATIENT
Start: 2025-05-15 | End: 2026-05-15

## 2025-05-15 RX ORDER — ATORVASTATIN CALCIUM 80 MG/1
80 TABLET, FILM COATED ORAL NIGHTLY
Qty: 90 TABLET | Refills: 3 | Status: SHIPPED | OUTPATIENT
Start: 2025-05-15 | End: 2026-05-15

## 2025-05-15 RX ORDER — AMIODARONE HYDROCHLORIDE 200 MG/1
200 TABLET ORAL DAILY
Qty: 90 TABLET | Refills: 3 | Status: SHIPPED | OUTPATIENT
Start: 2025-05-15 | End: 2026-05-15

## 2025-05-15 RX ORDER — SPIRONOLACTONE 25 MG/1
25 TABLET ORAL DAILY
Qty: 90 TABLET | Refills: 3 | Status: SHIPPED | OUTPATIENT
Start: 2025-05-15 | End: 2026-05-15

## 2025-05-15 NOTE — PROGRESS NOTES
Chief Complaint:   Follow up      History Of Present Illness:    Elsa Biggs is a 74 y.o. 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     Admitted 12/11/2024-12/16/2024 with acute decompensated diastolic HF, PAF-- converted to NSR on amiodarone.     Admitted 1/13/2025-1/17/2025 with acute systolic HF (EF 25-30%)-- started on Entresto, Toprol XL resumed (did not tolerate in the past).     4/21/2025 ER evaluation s/p mechanical fall from chair.     Today patient states she is feeling very well.  Denies chest pain or pressure. Denies feeling SOB.  No recurrent LE edema noted.  She stopped metoprolol due to side effect of fatigue-- has not been able to tolerate in the past as well.  Is taking Entresto, however, concerned with monthly cost.      Last Recorded Vitals:  There were no vitals filed for this visit.    Past Medical History:  She has a past medical history of A-fib (Multi), Anxiety, Breast cancer, Cancer (Multi), Depression, Diabetes mellitus (Multi), GERD (gastroesophageal reflux disease), Hyperlipidemia, Hypertension, and Stroke (Multi).    Past Surgical History:  She has a past surgical history that includes MR angio neck w and wo IV contrast (01/14/2022); Joint replacement (Left); Fracture surgery; Breast surgery (Left); MR angio head wo IV contrast (12/26/2023); MR angio neck wo IV contrast (12/26/2023); Cardiac catheterization (N/A, 01/29/2024); Revision total hip arthroplasty (Left); Cholecystectomy; Tubal ligation; and Other surgical history (Right).      Social History:  She reports that she quit smoking about 42 years ago. Her smoking use  included cigarettes. She has never used smokeless tobacco. She reports that she does not drink alcohol and does not use drugs.    Family History:  Family History[1]     Allergies:  Tetracycline    Outpatient Medications:  Current Outpatient Medications   Medication Instructions    albuterol 90 mcg/actuation inhaler 2 puffs, inhalation, Every 6 hours PRN    amiodarone (PACERONE) 200 mg, oral, Daily    aspirin 81 mg, oral, Daily    atorvastatin (LIPITOR) 80 mg, oral, Nightly    cholecalciferol (Vitamin D-3) 25 MCG (1000 UT) tablet 1 tablet, Daily    Eliquis 5 mg, oral, 2 times daily    ezetimibe (ZETIA) 10 mg, Daily    furosemide (LASIX) 40 mg, oral, Daily    Gemtesa 75 mg, oral, Nightly    ibuprofen 600 mg, oral, As needed    metFORMIN (GLUCOPHAGE) 500 mg, Every morning    methocarbamol (ROBAXIN) 500 mg, oral, Every 8 hours scheduled    metoprolol succinate XL (TOPROL-XL) 12.5 mg, oral, Daily, Do not crush or chew.    PARoxetine (PAXIL) 20 mg, Nightly    pioglitazone (ACTOS) 30 mg, Every 24 hours    sacubitriL-valsartan (Entresto) 24-26 mg tablet 0.5 tablets, oral, Daily before breakfast    spironolactone (ALDACTONE) 25 mg, oral, Daily    traMADol (ULTRAM) 50 mg, oral, Every 6 hours PRN       Physical Exam:  Constitutional: Pleasant, Awake/Alert/Oriented to person place and time. No distress  Head: Atraumatic, Normocephalic  Eyes: EOMI. RIP  Neck: No JVD  Cardiovascular: Regular rate and rhythm, S1, S2. No extra heart sounds or murmurs  Respiratory: Clear to auscultation bilaterally. No wheezing, rales or rhonchi.   Abdomen: Soft, Nontender, Obese. Bowel sounds appreciated  Musculoskeletal: ROM intact. Muscle strength grossly intact upper and lower extremities 5/5.   Neurological: CNII-XII intact. Sensation grossly intact  Extremities: Warm and dry. No acute rashes and lesions  Psychiatric: Appropriate mood and affect  e     Last Labs:  CBC -  Lab Results   Component Value Date    WBC 7.6 04/21/2025    HGB 8.1 (L)  04/21/2025    HCT 26.6 (L) 04/21/2025    MCV 89 04/21/2025     04/21/2025       CMP -  Lab Results   Component Value Date    CALCIUM 8.7 04/21/2025    PHOS 3.7 03/05/2024    PROT 6.8 04/21/2025    ALBUMIN 3.1 (L) 04/21/2025    AST 8 (L) 04/21/2025    ALT 6 (L) 04/21/2025    ALKPHOS 55 04/21/2025    BILITOT 0.3 04/21/2025       LIPID PANEL -   Lab Results   Component Value Date    CHOL 113 10/20/2023    TRIG 66 10/20/2023    HDL 40.1 10/20/2023    CHHDL 2.8 10/20/2023    VLDL 13 10/20/2023    NHDL 73 10/20/2023       RENAL FUNCTION PANEL -   Lab Results   Component Value Date    GLUCOSE 115 (H) 04/21/2025     04/21/2025    K 3.9 04/21/2025     04/21/2025    CO2 29 04/21/2025    ANIONGAP 11 04/21/2025    BUN 26 (H) 04/21/2025    CREATININE 1.45 (H) 04/21/2025    CALCIUM 8.7 04/21/2025    PHOS 3.7 03/05/2024    ALBUMIN 3.1 (L) 04/21/2025        Lab Results   Component Value Date    BNP 1,818 (H) 01/13/2025    HGBA1C 6.4 (H) 01/15/2025       Last Cardiology Tests:    Transthoracic Echo (TTE) Complete 03/06/2024  PHYSICIAN INTERPRETATION:  Left Ventricle: Left ventricular systolic function is low normal, with an estimated ejection fraction of 50-55%. There are no regional wall motion abnormalities. The left ventricular cavity size is normal. Spectral Doppler shows a normal pattern of left ventricular diastolic filling.  Left Atrium: The left atrium is mildly dilated.  Right Ventricle: The right ventricle is upper limits of normal in size. There is normal right ventricular global systolic function.  Right Atrium: The right atrium is normal in size.  Aortic Valve: The aortic valve is trileaflet. There is minimal aortic valve cusp calcification. There is trivial aortic valve regurgitation. The peak instantaneous gradient of the aortic valve is 9.6 mmHg. The mean gradient of the aortic valve is 6.0 mmHg.  Mitral Valve: The mitral valve is normal in structure. There is no evidence of mitral valve  regurgitation.  Tricuspid Valve: The tricuspid valve is structurally normal. There is trace tricuspid regurgitation.  Pulmonic Valve: The pulmonic valve is not well visualized. There is no indication of pulmonic valve regurgitation.  Pericardium: There is no pericardial effusion noted.  Aorta: The aortic root was not well visualized.     10/20/2023 Echo:  CONCLUSIONS:   1. Left ventricular systolic function is mildly decreased with a 45-50% estimated ejection fraction.   2. Spectral Doppler shows an impaired relaxation pattern of left ventricular diastolic filling.   3. There is mild tricuspid regurgitation.   4. Technically difficult bubble study unable to interpret.     Cath:  Cardiac catheterization 01/29/2024  CONCLUSIONS:   1. Left main: no significant angiographic disease.   2. LAD: diffuse 40% prox-mid disease.   3. LCx: 60% mid-vessel disease.   4. RCA: mild diffuse irregularities.   5. LVEDP 26mmHg, no aortic stenosis on LV-Ao gradient.     Stress Test:  Nuclear Stress Test 12/26/2023  IMPRESSION:  Mild attentuation of the anterior wall seen on the stress images.  This is either soft tissue attenuation or ischemia in LAD territory.  Unfortunately there are no prone images available. Dilated left  ventricle with mild left ventricular systolic dysfunction on post  stress gated imaging.  Summary:   1. No clinical or electrocardiographic evidence for ischemia at maximal infusion.   2. Correlate with myocardial perfusion imaging results.   3. Nuclear image results are reported separately.    Lab review: I have personally reviewed the laboratory result(s)   Diagnostic review: I have personally reviewed the result(s) of the Echocardiogram, LHC, stress test     Assessment/Plan   Very pleasant 74 y.o. female from home with h/o obesity, hypertension, dyslipidemia, depression, type 2 DM, stroke in the left thalamus territory, and incidentally found MALATHI 85% s/p TCAR 3/4/2024, PAF on apixaban, PVCs, HFmrEF-- with  improved EF 50-55% 3/6/2024, coronary angiography 1/29/2024 which was negative for obstructive CAD (LM mild disease, p-mLAD 40, Lcx mid 60, RCA mild diffuse disease) presenting today for follow up-- hospitalized in December as well as January with ADHF-- Echo in January reviewed for severely reduced EF 20-25%.     Plan:  -Intolerant of metoprolol due to fatigue-- will start bisoprolol 2.5mg daily  -Continue Entresto 24-26mg (half tab) BID-- clinical pharmacy referral for cost assistance  -Continue amiodarone 200mg daily  -Continue apixaban 5mg BID  -Resume aspirin 81mg daily (moderate non-obstructive CAD/carotid stenosis)  -Continue spironolactone 25mg daily   -Continue atorvastatin 80mg daily/zetia 10mg daily   -Recommend obtaining an echocardiogram in 3 months after resuming GDMT for reassessment of EF-- NICM (?tachycardia induced due to PAF vs. High PVC burden)-- now on amiodarone.  Will arrange for further cardiac ambulatory monitoring for reassessment of a.fib/PVC burden and consider EP follow up pending results of zio as well as echo.   -Follow up in 3 months       Malinda Posada, APRN-CNP         [1]   Family History  Problem Relation Name Age of Onset    Heart attack Father  65        blood clot    Coronary artery disease Sister

## 2025-06-04 ENCOUNTER — APPOINTMENT (OUTPATIENT)
Dept: PHARMACY | Facility: HOSPITAL | Age: 75
End: 2025-06-04
Payer: MEDICARE

## 2025-06-04 DIAGNOSIS — I48.92 ATRIAL FLUTTER, UNSPECIFIED TYPE (MULTI): ICD-10-CM

## 2025-06-04 NOTE — PROGRESS NOTES
Clinical Pharmacy Appointment    Patient ID: Elsa Biggs is a 74 y.o. female who presents for Atrial Flutter.     This is a Follow Up appointment.     Referring Provider: Malinda Posada, APRN-CNP  PCP: Marcelle Vitale MD     Subjective     ATRIAL FLUTTER  Does patient follow with cardiology? Yes  Last cardiology appointment: 5/15/25    Current atrial fibrillations medications include:  Rate Control: Amiodarone 200 mg daily   Anticoagulation: Eliquis 5 mg BID     Adverse Effects: None    Screening for dose adjustment:  No dose change recommended at this time  Age 75 yo, weight 94.1 kg, Scr 1.45 mg/dL  (4/21/25)    Diagnosis:  Patient is projected to be on anticoagulation indefinitely     [id number] Score: [8]  Age: [<65 (0)] [65-74 (+1)] [> 75 (+2)]: 2  Sex: [Male/Female (+1)]: 1  CHF history: [No/Yes(+1)]: 1  Hypertension history: [No/Yes(+1)]: 1  Stroke/TIA/thromboembolism history: [No/Yes(+2)]: 2  Vascular disease history (prior MI, peripheral artery disease, aortic plaque): [No/Yes(+1)]: 0  Diabetes history: [No/Yes(+1)]: 1    Monitoring:  Date of last BMP: 4/21/25  Recent Hospitalizations: yes - for fall - 4/21/25  Recent Falls/Trauma: Yes - 4/21/25 - no injury  Changes in Tobacco or Alcohol Intake: No changes     Medication Reconciliation:  No changes    Drug Interactions  No relevant drug interactions were noted.    Medication System Management  Patient's preferred pharmacy: [facility]   Adherence/Organization: No issues   Affordability/Accessibility:    PAP: Yes  Medication: Eliquis   Expiration: 3/7/25    Objective   Allergies   Allergen Reactions    Tetracycline Rash     Social History     Social History Narrative    Not on file      Medication Review  Current Outpatient Medications   Medication Instructions    albuterol 90 mcg/actuation inhaler 2 puffs, Every 6 hours PRN    amiodarone (PACERONE) 200 mg, oral, Daily    aspirin 81 mg, oral, Daily    atorvastatin (LIPITOR) 80 mg, oral, Nightly  "   bisoprolol (ZEBETA) 2.5 mg, oral, Daily    cholecalciferol (Vitamin D-3) 25 MCG (1000 UT) tablet 1 tablet, Daily    Eliquis 5 mg, oral, 2 times daily    ezetimibe (ZETIA) 10 mg, oral, Daily    furosemide (LASIX) 40 mg, oral, Daily PRN    Gemtesa 75 mg, oral, Nightly    ibuprofen 600 mg, oral, As needed    methocarbamol (ROBAXIN) 500 mg, oral, Every 8 hours scheduled    PARoxetine (PAXIL) 20 mg, Nightly    pioglitazone (ACTOS) 30 mg, Every 24 hours    sacubitriL-valsartan (Entresto) 24-26 mg tablet 0.5 tablets, oral, Daily before breakfast    spironolactone (ALDACTONE) 25 mg, oral, Daily    traMADol (ULTRAM) 50 mg, oral, Every 6 hours PRN      Vitals  BP Readings from Last 2 Encounters:   05/15/25 125/77   04/21/25 141/55     BMI Readings from Last 1 Encounters:   04/21/25 36.75 kg/m²      Labs  A1C  Lab Results   Component Value Date    HGBA1C 6.4 (H) 01/15/2025    HGBA1C 7.0 (H) 10/20/2023    HGBA1C 6.2 08/02/2018     BMP  Lab Results   Component Value Date    CALCIUM 8.7 04/21/2025     04/21/2025    K 3.9 04/21/2025    CO2 29 04/21/2025     04/21/2025    BUN 26 (H) 04/21/2025    CREATININE 1.45 (H) 04/21/2025    EGFR 37 (L) 04/21/2025     LFTs  Lab Results   Component Value Date    ALT 6 (L) 04/21/2025    AST 8 (L) 04/21/2025    ALKPHOS 55 04/21/2025    BILITOT 0.3 04/21/2025     FLP  Lab Results   Component Value Date    TRIG 66 10/20/2023    CHOL 113 10/20/2023    LDLCALC 60 10/20/2023    HDL 40.1 10/20/2023     Urine Microalbumin  No results found for: \"MICROALBCREA\"  Weight Management  Wt Readings from Last 3 Encounters:   04/21/25 94.1 kg (207 lb 7.3 oz)   01/17/25 104 kg (228 lb 2.8 oz)   12/16/24 104 kg (228 lb 9.6 oz)      There is no height or weight on file to calculate BMI.     Assessment/Plan   Problem List Items Addressed This Visit       Atrial flutter, unspecified type (Multi)    Relevant Orders    Referral to Clinical Pharmacy     ASSESSMENT OF ATRIAL FIBRILLATION    Rationale for " plan: Patient is to be on anticoagulation indefinitely. Anticoagulation with Eliquis is appropriate given Aflutter diagnosis and [id number] of 8. At this time, patient denies any side effects or concerns with current anticoagulation regimen. Patient's lab work last completed on 4/21/25. Fluctuations of renal function noted, although no dose adjustments for anticoagulation therapy needed at this time. Will continue to monitor. Patient is enrolled in [facility] for Eliquis (expiration on 3/7/25). Discussed renewal at prior visit although financials not received. Discussed again today. Emphasized that enrollment has lapsed and refills will be usual copay if financials are not received. Patient to fax financials to pharmacist at [phone]. Will continue to monitor and follow up in 2 weeks to ensure successful renewal.     Medication Changes: None   Continue Eliquis 5 mg BID     Monitoring and Education Discussed:  Counseled patient of side effects that are indicative of bleeding such as dark tarry stool, unexplainable bruising, or vomiting up a coffee ground like substance  Reminded patient of importance of anticoagulation and rate control therapy to prevent risk of heart attack or stroke  Counseled patient on MOA, expectations, duration of therapy, contraindications, administration, and monitoring parameters   Answered all patient questions and concerns    Clinical Pharmacist follow-up: 6/18/25 @ 1040 am, Telehealth visit    Continue all meds under the continuation of care with the referring provider and clinical pharmacy team.    Thank you,  Milagro Mckeon, PharmD  Clinical Pharmacist  [phone]    Verbal consent to manage patient's drug therapy was obtained from the patient. They were informed they may decline to participate or withdraw from participation in pharmacy services at any time.

## 2025-06-18 ENCOUNTER — APPOINTMENT (OUTPATIENT)
Dept: PHARMACY | Facility: HOSPITAL | Age: 75
End: 2025-06-18
Payer: MEDICARE

## 2025-06-19 ENCOUNTER — APPOINTMENT (OUTPATIENT)
Dept: PHARMACY | Facility: HOSPITAL | Age: 75
End: 2025-06-19
Payer: MEDICARE

## 2025-06-19 DIAGNOSIS — I48.92 ATRIAL FLUTTER, UNSPECIFIED TYPE (MULTI): ICD-10-CM

## 2025-06-19 NOTE — PROGRESS NOTES
Clinical Pharmacy Appointment    Patient ID: Elsa Biggs is a 74 y.o. female who presents for Atrial Flutter.     This is a Follow Up appointment.     Referring Provider: Malinda Posada, APRN-CNP  PCP: Marcelle Vitale MD     Subjective     ATRIAL FLUTTER  Does patient follow with cardiology? Yes  Last cardiology appointment: 5/15/25    Current atrial fibrillations medications include:  Rate Control: Amiodarone 200 mg daily   Anticoagulation: Eliquis 5 mg BID     Adverse Effects: None    Screening for dose adjustment:  No dose change recommended at this time  Age 73 yo, weight 94.1 kg, Scr 1.45 mg/dL  (4/21/25)    Diagnosis:  Patient is projected to be on anticoagulation indefinitely     [id number] Score: [8]  Age: [<65 (0)] [65-74 (+1)] [> 75 (+2)]: 2  Sex: [Male/Female (+1)]: 1  CHF history: [No/Yes(+1)]: 1  Hypertension history: [No/Yes(+1)]: 1  Stroke/TIA/thromboembolism history: [No/Yes(+2)]: 2  Vascular disease history (prior MI, peripheral artery disease, aortic plaque): [No/Yes(+1)]: 0  Diabetes history: [No/Yes(+1)]: 1    Monitoring:  Date of last BMP: 4/21/25  Recent Hospitalizations: yes - for fall - 4/21/25  Recent Falls/Trauma: Yes - 4/21/25 - no injury  Changes in Tobacco or Alcohol Intake: No changes     Medication Reconciliation:  No changes    Drug Interactions  No relevant drug interactions were noted.    Medication System Management  Patient's preferred pharmacy: [facility]   Adherence/Organization: No issues   Affordability/Accessibility:    PAP: Yes  Medication: Eliquis   Expiration: 3/7/25    Objective   Allergies   Allergen Reactions    Tetracycline Rash     Social History     Social History Narrative    Not on file      Medication Review  Current Outpatient Medications   Medication Instructions    albuterol 90 mcg/actuation inhaler 2 puffs, Every 6 hours PRN    amiodarone (PACERONE) 200 mg, oral, Daily    aspirin 81 mg, oral, Daily    atorvastatin (LIPITOR) 80 mg, oral, Nightly  "   bisoprolol (ZEBETA) 2.5 mg, oral, Daily    cholecalciferol (Vitamin D-3) 25 MCG (1000 UT) tablet 1 tablet, Daily    Eliquis 5 mg, oral, 2 times daily    ezetimibe (ZETIA) 10 mg, oral, Daily    furosemide (LASIX) 40 mg, oral, Daily PRN    Gemtesa 75 mg, oral, Nightly    ibuprofen 600 mg, oral, As needed    methocarbamol (ROBAXIN) 500 mg, oral, Every 8 hours scheduled    PARoxetine (PAXIL) 20 mg, Nightly    pioglitazone (ACTOS) 30 mg, Every 24 hours    sacubitriL-valsartan (Entresto) 24-26 mg tablet 0.5 tablets, oral, Daily before breakfast    spironolactone (ALDACTONE) 25 mg, oral, Daily    traMADol (ULTRAM) 50 mg, oral, Every 6 hours PRN      Vitals  BP Readings from Last 2 Encounters:   05/15/25 125/77   04/21/25 141/55     BMI Readings from Last 1 Encounters:   04/21/25 36.75 kg/m²      Labs  A1C  Lab Results   Component Value Date    HGBA1C 6.4 (H) 01/15/2025    HGBA1C 7.0 (H) 10/20/2023    HGBA1C 6.2 08/02/2018     BMP  Lab Results   Component Value Date    CALCIUM 8.7 04/21/2025     04/21/2025    K 3.9 04/21/2025    CO2 29 04/21/2025     04/21/2025    BUN 26 (H) 04/21/2025    CREATININE 1.45 (H) 04/21/2025    EGFR 37 (L) 04/21/2025     LFTs  Lab Results   Component Value Date    ALT 6 (L) 04/21/2025    AST 8 (L) 04/21/2025    ALKPHOS 55 04/21/2025    BILITOT 0.3 04/21/2025     FLP  Lab Results   Component Value Date    TRIG 66 10/20/2023    CHOL 113 10/20/2023    LDLCALC 60 10/20/2023    HDL 40.1 10/20/2023     Urine Microalbumin  No results found for: \"MICROALBCREA\"  Weight Management  Wt Readings from Last 3 Encounters:   04/21/25 94.1 kg (207 lb 7.3 oz)   01/17/25 104 kg (228 lb 2.8 oz)   12/16/24 104 kg (228 lb 9.6 oz)      There is no height or weight on file to calculate BMI.     Assessment/Plan   Problem List Items Addressed This Visit       Atrial flutter, unspecified type (Multi)       ASSESSMENT OF ATRIAL FIBRILLATION    Rationale for plan: Patient is to be on anticoagulation " indefinitely. Anticoagulation with Eliquis is appropriate given Aflutter diagnosis and [id number] of 8. At this time, patient denies any side effects or concerns with current anticoagulation regimen. Patient's lab work last completed on 4/21/25. Fluctuations of renal function noted, although no dose adjustments for anticoagulation therapy needed at this time. Will continue to monitor.     Patient is enrolled in [facility] for Eliquis (expiration on 3/7/25). Discussed renewal with patient several times although financials not received. Discussed again today and patient reports that she will be faxing tax documents within the next week. Emphasized that enrollment has lapsed and refills will be usual copay if financials are not received. Patient reports that she has about 1 week remaining. Patient to fax financials to pharmacist at [phone]. Will continue to monitor and follow up in 2 weeks to ensure successful renewal.     Medication Changes: None   Continue Eliquis 5 mg BID     Monitoring and Education Discussed:  Counseled patient of side effects that are indicative of bleeding such as dark tarry stool, unexplainable bruising, or vomiting up a coffee ground like substance  Reminded patient of importance of anticoagulation and rate control therapy to prevent risk of heart attack or stroke  Counseled patient on MOA, expectations, duration of therapy, contraindications, administration, and monitoring parameters   Answered all patient questions and concerns    Clinical Pharmacist follow-up: 6/30/25 @ 1040 am, Telehealth visit    Continue all meds under the continuation of care with the referring provider and clinical pharmacy team.    Thank you,  Milagro Mckeon, PharmD  Clinical Pharmacist  [phone]    Verbal consent to manage patient's drug therapy was obtained from the patient. They were informed they may decline to participate or withdraw from participation in pharmacy services at any time.

## 2025-06-30 ENCOUNTER — APPOINTMENT (OUTPATIENT)
Dept: PHARMACY | Facility: HOSPITAL | Age: 75
End: 2025-06-30
Payer: MEDICARE

## 2025-06-30 DIAGNOSIS — I48.92 ATRIAL FLUTTER, UNSPECIFIED TYPE (MULTI): ICD-10-CM

## 2025-06-30 NOTE — PROGRESS NOTES
Clinical Pharmacy Appointment    Patient ID: Elsa Biggs is a 74 y.o. female who presents for Atrial Flutter.     This is a Follow Up appointment.     Referring Provider: Malinda Posada, APRN-CNP  PCP: Marcelle Vitale MD     Subjective     ATRIAL FLUTTER  Does patient follow with cardiology? Yes  Last cardiology appointment: 5/15/25    Current atrial fibrillations medications include:  Rate Control: Amiodarone 200 mg daily   Anticoagulation: Eliquis 5 mg BID     Adverse Effects: None    Screening for dose adjustment:  No dose change recommended at this time  Age 75 yo, weight 94.1 kg, Scr 1.45 mg/dL  (4/21/25)    Diagnosis:  Patient is projected to be on anticoagulation indefinitely     [id number] Score: [8]  Age: [<65 (0)] [65-74 (+1)] [> 75 (+2)]: 2  Sex: [Male/Female (+1)]: 1  CHF history: [No/Yes(+1)]: 1  Hypertension history: [No/Yes(+1)]: 1  Stroke/TIA/thromboembolism history: [No/Yes(+2)]: 2  Vascular disease history (prior MI, peripheral artery disease, aortic plaque): [No/Yes(+1)]: 0  Diabetes history: [No/Yes(+1)]: 1    Monitoring:  Date of last BMP: 4/21/25  Recent Hospitalizations: yes - for fall - 4/21/25  Recent Falls/Trauma: Yes - 4/21/25 - no injury  Changes in Tobacco or Alcohol Intake: No changes     Medication Reconciliation:  No changes    Drug Interactions  No relevant drug interactions were noted.    Medication System Management  Patient's preferred pharmacy: [facility]   Adherence/Organization: No issues   Affordability/Accessibility:    PAP: Yes  Medication: Eliquis   Expiration: 3/7/25    Objective   Allergies   Allergen Reactions    Tetracycline Rash     Social History     Social History Narrative    Not on file      Medication Review  Current Outpatient Medications   Medication Instructions    albuterol 90 mcg/actuation inhaler 2 puffs, Every 6 hours PRN    amiodarone (PACERONE) 200 mg, oral, Daily    aspirin 81 mg, oral, Daily    atorvastatin (LIPITOR) 80 mg, oral, Nightly  "   bisoprolol (ZEBETA) 2.5 mg, oral, Daily    cholecalciferol (Vitamin D-3) 25 MCG (1000 UT) tablet 1 tablet, Daily    Eliquis 5 mg, oral, 2 times daily    ezetimibe (ZETIA) 10 mg, oral, Daily    furosemide (LASIX) 40 mg, oral, Daily PRN    Gemtesa 75 mg, oral, Nightly    ibuprofen 600 mg, oral, As needed    methocarbamol (ROBAXIN) 500 mg, oral, Every 8 hours scheduled    PARoxetine (PAXIL) 20 mg, Nightly    pioglitazone (ACTOS) 30 mg, Every 24 hours    sacubitriL-valsartan (Entresto) 24-26 mg tablet 0.5 tablets, oral, Daily before breakfast    spironolactone (ALDACTONE) 25 mg, oral, Daily    traMADol (ULTRAM) 50 mg, oral, Every 6 hours PRN      Vitals  BP Readings from Last 2 Encounters:   05/15/25 125/77   04/21/25 141/55     BMI Readings from Last 1 Encounters:   04/21/25 36.75 kg/m²      Labs  A1C  Lab Results   Component Value Date    HGBA1C 6.4 (H) 01/15/2025    HGBA1C 7.0 (H) 10/20/2023    HGBA1C 6.2 08/02/2018     BMP  Lab Results   Component Value Date    CALCIUM 8.7 04/21/2025     04/21/2025    K 3.9 04/21/2025    CO2 29 04/21/2025     04/21/2025    BUN 26 (H) 04/21/2025    CREATININE 1.45 (H) 04/21/2025    EGFR 37 (L) 04/21/2025     LFTs  Lab Results   Component Value Date    ALT 6 (L) 04/21/2025    AST 8 (L) 04/21/2025    ALKPHOS 55 04/21/2025    BILITOT 0.3 04/21/2025     FLP  Lab Results   Component Value Date    TRIG 66 10/20/2023    CHOL 113 10/20/2023    LDLCALC 60 10/20/2023    HDL 40.1 10/20/2023     Urine Microalbumin  No results found for: \"MICROALBCREA\"  Weight Management  Wt Readings from Last 3 Encounters:   04/21/25 94.1 kg (207 lb 7.3 oz)   01/17/25 104 kg (228 lb 2.8 oz)   12/16/24 104 kg (228 lb 9.6 oz)      There is no height or weight on file to calculate BMI.     Assessment/Plan   Problem List Items Addressed This Visit       Atrial flutter, unspecified type (Multi)     ASSESSMENT OF ATRIAL FIBRILLATION    Rationale for plan: Patient is to be on anticoagulation " "indefinitely. Anticoagulation with Eliquis is appropriate given Aflutter diagnosis and [id number] of 8. At this time, patient denies any side effects or concerns with current anticoagulation regimen. Patient's lab work last completed on 4/21/25. Fluctuations of renal function noted, although no dose adjustments for anticoagulation therapy needed at this time. Will continue to monitor.     Patient is enrolled in [facility] for Eliquis (expiration on 3/7/25). Discussed renewal with patient several times although financials not received. Discussed again today and patient reports that she is awaiting receipt of her tax documents from her granddaughter. Emphasized that enrollment has lapsed and refills will be usual copay if financials are not received. Patient reports that she has \"very few pills left\" and cannot afford a partial fill. Writer to determine if samples are available at cardiology office. Patient to fax financials to pharmacist at [phone]. Will continue to monitor and follow up in 2 weeks to ensure successful renewal.     Medication Changes: None   Continue Eliquis 5 mg BID     Monitoring and Education Discussed:  Counseled patient of side effects that are indicative of bleeding such as dark tarry stool, unexplainable bruising, or vomiting up a coffee ground like substance  Reminded patient of importance of anticoagulation and rate control therapy to prevent risk of heart attack or stroke  Counseled patient on MOA, expectations, duration of therapy, contraindications, administration, and monitoring parameters   Answered all patient questions and concerns    Clinical Pharmacist follow-up: 7/17/25 @ 0940 am, Telehealth visit    Continue all meds under the continuation of care with the referring provider and clinical pharmacy team.    Thank you,  Milagro Mckeon, PharmD  Clinical Pharmacist  [phone]    Verbal consent to manage patient's drug therapy was obtained from the patient. They were informed " they may decline to participate or withdraw from participation in pharmacy services at any time.

## 2025-07-01 DIAGNOSIS — N32.81 OAB (OVERACTIVE BLADDER): ICD-10-CM

## 2025-07-01 DIAGNOSIS — I48.92 ATRIAL FLUTTER, UNSPECIFIED TYPE (MULTI): ICD-10-CM

## 2025-07-01 DIAGNOSIS — N39.46 MIXED INCONTINENCE URGE AND STRESS: ICD-10-CM

## 2025-07-01 RX ORDER — VIBEGRON 75 MG/1
75 TABLET, FILM COATED ORAL NIGHTLY
Qty: 90 TABLET | Refills: 3 | Status: SHIPPED | OUTPATIENT
Start: 2025-07-01 | End: 2026-07-01

## 2025-07-03 PROCEDURE — RXMED WILLOW AMBULATORY MEDICATION CHARGE

## 2025-07-07 ENCOUNTER — PHARMACY VISIT (OUTPATIENT)
Dept: PHARMACY | Facility: CLINIC | Age: 75
End: 2025-07-07
Payer: COMMERCIAL

## 2025-07-08 ENCOUNTER — TELEPHONE (OUTPATIENT)
Dept: CARDIOLOGY | Facility: HOSPITAL | Age: 75
End: 2025-07-08
Payer: MEDICARE

## 2025-07-08 NOTE — TELEPHONE ENCOUNTER
----- Message from Malinda Posada sent at 7/3/2025  4:06 PM EDT -----  Regarding: FW: [facility] PAP   Amarilis,  Can you call Elsa to ensure she is working on providing documents to the pharmacy.  We may need to switch back to warfarin if apixaban is cost prohibitive.   Thank you,  Malinda  ----- Message -----  From: Clinton Hubbard PharmD  Sent: 7/3/2025   3:17 PM EDT  To: Milagro Mckeon PharmD; Malinda Posada, #  Subject: RE: [facility] PAP                                Hi Malinda,    Milagro is out today, but if she gets her financial documents to [employer], the renewal process can take up to 2 weeks, however, recently I have been getting approval sometimes in 1 day. So, truly it is more dependent on when she gets us the financials as opposed to anything else.    Hopefully that helps,  Clinton  ----- Message -----  From: AMANDA Dhillon  Sent: 7/3/2025   1:38 PM EDT  To: Milagro Mckeon PharmD  Subject: RE: [facility] PAP                                No, we do not have samples in the office unfortunately.  She would need to resume warfarin if apixaban is cost prohibitive.  Can you estimate about how long it will take for re-enrollment?  She may need to resume warfarin.     Thanks,  Malinda  ----- Message -----  From: Milagro Mckeon PharmD  Sent: 2025  11:00 AM EDT  To: AMANDA Dhillon  Subject: [facility] PAP                                    Hi Malinda,     I have been working with Elsa on her renewal for the  patient assistance program. Unfortunately, her enrollment has lapsed and she has been unable to submit her updated financials for renewal. I spoke with her today and she mentioned that she is down to her last few pills of Eliquis. She cannot afford a partial fill and has previously used the free one month voucher. I was wondering if your office has any samples that she could use to get her through the next few weeks while we get her re-enrolled in the copay assistance?      Thank you!  Milagro

## 2025-07-10 ENCOUNTER — SPECIALTY PHARMACY (OUTPATIENT)
Dept: PHARMACY | Facility: CLINIC | Age: 75
End: 2025-07-10

## 2025-07-10 PROCEDURE — RXMED WILLOW AMBULATORY MEDICATION CHARGE

## 2025-07-14 ENCOUNTER — PHARMACY VISIT (OUTPATIENT)
Dept: PHARMACY | Facility: CLINIC | Age: 75
End: 2025-07-14
Payer: COMMERCIAL

## 2025-07-17 ENCOUNTER — APPOINTMENT (OUTPATIENT)
Dept: PHARMACY | Facility: HOSPITAL | Age: 75
End: 2025-07-17
Payer: MEDICARE

## 2025-07-17 DIAGNOSIS — I48.92 ATRIAL FLUTTER, UNSPECIFIED TYPE (MULTI): Primary | ICD-10-CM

## 2025-07-17 NOTE — PROGRESS NOTES
Clinical Pharmacy Appointment    Patient ID: Elsa Biggs is a 74 y.o. female who presents for Atrial Flutter.     This is a Follow Up appointment.     Referring Provider: Malinda Posada, APRN-CNP  PCP: Marcelle Vitale MD     Subjective     ATRIAL FLUTTER  Does patient follow with cardiology? Yes  Last cardiology appointment: 5/15/25    Current atrial fibrillations medications include:  Rate Control: Amiodarone 200 mg daily   Anticoagulation: Eliquis 5 mg BID     Adverse Effects: None    Screening for dose adjustment:  No dose change recommended at this time  Age 75 yo, weight 94.1 kg, Scr 1.45 mg/dL  (4/21/25)    Diagnosis:  Patient is projected to be on anticoagulation indefinitely     [id number] Score: [8]  Age: [<65 (0)] [65-74 (+1)] [> 75 (+2)]: 2  Sex: [Male/Female (+1)]: 1  CHF history: [No/Yes(+1)]: 1  Hypertension history: [No/Yes(+1)]: 1  Stroke/TIA/thromboembolism history: [No/Yes(+2)]: 2  Vascular disease history (prior MI, peripheral artery disease, aortic plaque): [No/Yes(+1)]: 0  Diabetes history: [No/Yes(+1)]: 1    Monitoring:  Date of last BMP: 4/21/25  Recent Hospitalizations: None  Recent Falls/Trauma: None   Changes in Tobacco or Alcohol Intake: No changes     Medication Reconciliation:  No changes    Drug Interactions  No relevant drug interactions were noted.    Medication System Management  Patient's preferred pharmacy: [facility]   Adherence/Organization: No issues   Affordability/Accessibility:    PAP: Yes  Medication: Eliquis   Expiration: 7/3/26    Objective   Allergies   Allergen Reactions    Tetracycline Rash     Social History     Social History Narrative    Not on file      Medication Review  Current Outpatient Medications   Medication Instructions    albuterol 90 mcg/actuation inhaler 2 puffs, Every 6 hours PRN    amiodarone (PACERONE) 200 mg, oral, Daily    aspirin 81 mg, oral, Daily    atorvastatin (LIPITOR) 80 mg, oral, Nightly    bisoprolol (ZEBETA) 2.5 mg, oral,  "Daily    cholecalciferol (Vitamin D-3) 25 MCG (1000 UT) tablet 1 tablet, Daily    Eliquis 5 mg, oral, 2 times daily    ezetimibe (ZETIA) 10 mg, oral, Daily    furosemide (LASIX) 40 mg, oral, Daily PRN    Gemtesa 75 mg, oral, Nightly    ibuprofen 600 mg, oral, As needed    methocarbamol (ROBAXIN) 500 mg, oral, Every 8 hours scheduled    PARoxetine (PAXIL) 20 mg, Nightly    pioglitazone (ACTOS) 30 mg, Every 24 hours    sacubitriL-valsartan (Entresto) 24-26 mg tablet 0.5 tablets, oral, Daily before breakfast    spironolactone (ALDACTONE) 25 mg, oral, Daily    traMADol (ULTRAM) 50 mg, oral, Every 6 hours PRN      Vitals  BP Readings from Last 2 Encounters:   05/15/25 125/77   04/21/25 141/55     BMI Readings from Last 1 Encounters:   04/21/25 36.75 kg/m²      Labs  A1C  Lab Results   Component Value Date    HGBA1C 6.4 (H) 01/15/2025    HGBA1C 7.0 (H) 10/20/2023    HGBA1C 6.2 08/02/2018     BMP  Lab Results   Component Value Date    CALCIUM 8.7 04/21/2025     04/21/2025    K 3.9 04/21/2025    CO2 29 04/21/2025     04/21/2025    BUN 26 (H) 04/21/2025    CREATININE 1.45 (H) 04/21/2025    EGFR 37 (L) 04/21/2025     LFTs  Lab Results   Component Value Date    ALT 6 (L) 04/21/2025    AST 8 (L) 04/21/2025    ALKPHOS 55 04/21/2025    BILITOT 0.3 04/21/2025     FLP  Lab Results   Component Value Date    TRIG 66 10/20/2023    CHOL 113 10/20/2023    LDLCALC 60 10/20/2023    HDL 40.1 10/20/2023     Urine Microalbumin  No results found for: \"MICROALBCREA\"  Weight Management  Wt Readings from Last 3 Encounters:   04/21/25 94.1 kg (207 lb 7.3 oz)   01/17/25 104 kg (228 lb 2.8 oz)   12/16/24 104 kg (228 lb 9.6 oz)      There is no height or weight on file to calculate BMI.     Assessment/Plan   Problem List Items Addressed This Visit    None    ASSESSMENT OF ATRIAL FIBRILLATION    Rationale for plan: Patient is to be on anticoagulation indefinitely for Afib. Anticoagulation with Eliquis is appropriate given Aflutter " diagnosis and [id number] of 8. At this time, patient denies any side effects or concerns with current anticoagulation regimen. Patient's lab work last completed on 4/21/25. Fluctuations of renal function noted, although no dose adjustments for anticoagulation therapy needed at this time. Will continue to monitor. Patient is enrolled in  PAP for Eliquis (expiration on 7/3/26). Will continue to monitor and follow up in 3 months.     Medication Changes: None   Continue Eliquis 5 mg BID     Monitoring and Education Discussed:  Counseled patient of side effects that are indicative of bleeding such as dark tarry stool, unexplainable bruising, or vomiting up a coffee ground like substance  Reminded patient of importance of anticoagulation and rate control therapy to prevent risk of heart attack or stroke  Counseled patient on MOA, expectations, duration of therapy, contraindications, administration, and monitoring parameters   Answered all patient questions and concerns    Clinical Pharmacist follow-up: 10/17/25 @ 1000 am, Telehealth visit    Continue all meds under the continuation of care with the referring provider and clinical pharmacy team.    Thank you,  Milargo Mckeon, PharmD  Clinical Pharmacist  [phone]    Verbal consent to manage patient's drug therapy was obtained from the patient. They were informed they may decline to participate or withdraw from participation in pharmacy services at any time.

## 2025-08-14 ENCOUNTER — SPECIALTY PHARMACY (OUTPATIENT)
Dept: PHARMACY | Facility: CLINIC | Age: 75
End: 2025-08-14

## 2025-08-14 PROCEDURE — RXMED WILLOW AMBULATORY MEDICATION CHARGE

## 2025-08-15 ENCOUNTER — PHARMACY VISIT (OUTPATIENT)
Dept: PHARMACY | Facility: CLINIC | Age: 75
End: 2025-08-15
Payer: COMMERCIAL

## 2025-08-19 ENCOUNTER — APPOINTMENT (OUTPATIENT)
Dept: PHARMACY | Facility: HOSPITAL | Age: 75
End: 2025-08-19
Payer: MEDICARE

## 2025-08-19 DIAGNOSIS — N32.81 OAB (OVERACTIVE BLADDER): ICD-10-CM

## 2025-10-17 ENCOUNTER — APPOINTMENT (OUTPATIENT)
Dept: PHARMACY | Facility: HOSPITAL | Age: 75
End: 2025-10-17
Payer: MEDICARE

## 2025-12-08 ENCOUNTER — APPOINTMENT (OUTPATIENT)
Dept: PHARMACY | Facility: HOSPITAL | Age: 75
End: 2025-12-08
Payer: MEDICARE

## (undated) DEVICE — APPLICATOR, CHLORAPREP, W/ORANGE TINT, 26ML

## (undated) DEVICE — SUTURE, SILK, 2-0, 30 IN, SH, BLACK

## (undated) DEVICE — COVER HANDLE LIGHT, STERIS, BLUE, STERILE

## (undated) DEVICE — SYRINGE, 10 CC, LUER LOCK

## (undated) DEVICE — DRAPE, SHEET, MINOR PROCEDURE, T, PEDIATRIC, 100X122X77

## (undated) DEVICE — MANIFOLD KIT, CUSTOM, GEAUGA

## (undated) DEVICE — TOWEL PACK, STERILE, 4/PACK, BLUE

## (undated) DEVICE — SUTURE, PROLENE, 5-0, 36 IN, C-1, CV-11, BLUE

## (undated) DEVICE — NEEDLE, ENTRY, PERCUTANEOUS, 21 G X 2.5 CM

## (undated) DEVICE — FLOSEAL, MATRIX, HEMOSTATIC, FULL STERILE PREP, 5ML

## (undated) DEVICE — DRAPE, C-ARM IMAGE

## (undated) DEVICE — CATHETER, OPTITORQUE, 5FR, TIG1, 1H/100CM

## (undated) DEVICE — DEVICE, ENROUTE, TRANSCAROTID FLOW REVERSAL

## (undated) DEVICE — TR BAND, RADIAL COMPRESSION, STANDARD, 24CM

## (undated) DEVICE — GUIDEWIRE, ANGLE TIP,  .035 DIA, 180 CM, 3 CM TIP"

## (undated) DEVICE — SYRINGE, 30 CC, LUER LOCK

## (undated) DEVICE — GUIDEWIRE, 0.014 X 95CM

## (undated) DEVICE — STRIP, SKIN CLOSURE, STERI STRIP, REINFORCED, 0.5 X 4 IN

## (undated) DEVICE — SUTURE, MONOCRYL, 3-0, PS-1 27IN, UNDYED

## (undated) DEVICE — DRAPE, SHEET, LAPAROTOMY, TRANSVERSE, W/FENESTRATION, 77 X 122 IN, DISPOSABLE, LF, STERILE

## (undated) DEVICE — SOLUTION, IRRIGATION, STERILE WATER, 1000 ML, POUR BOTTLE

## (undated) DEVICE — INTRODUCER SHEATH, GLIDESHEATH, 6FR 10CM

## (undated) DEVICE — ANGIO KIT, LEFT HEART, LF, CUSTOM

## (undated) DEVICE — INTRODUCER KIT, MICROPUNCTURE, 7CM 21GA, SILKROAD

## (undated) DEVICE — ADHESIVE, SKIN, MASTISOL, 2/3 CC VIAL

## (undated) DEVICE — COVER PROBE, SOFT FLEX W/ GEL, 5 X 48 IN (13X122CM)

## (undated) DEVICE — SPONGE, GAUZE, XRAY DECT, 16 PLY, 4 X 4, W/MASTER WDMT,STERILE

## (undated) DEVICE — SYRINGE, 5 CC, LUER LOCK

## (undated) DEVICE — SUTURE, PROLENE, 6-0, 30 IN, BV-1 BV-1

## (undated) DEVICE — SUTURE, SILK, 2-0, 18 IN, BLACK

## (undated) DEVICE — DEVICE, INFLATION, PRESTO ID40ATM 30CC

## (undated) DEVICE — SUTURE, SILK, 4-0, 18 IN, LABYRINTH, BLACK

## (undated) DEVICE — SUTURE, MONOCRYL, 4-0, 18 IN, PS2, UNDYED

## (undated) DEVICE — PAD, GROUNDING, ELECTROSURGICAL, W/9 FT CABLE, POLYHESIVE II, ADULT, LF

## (undated) DEVICE — DRESSING, GAUZE, SPONGE, VERSALON, ALL PURPOSE, 4 X 4 IN, SOFT

## (undated) DEVICE — GLOVE, PROTEXIS PI CLASSIC, SZ-7.0, PF, LF

## (undated) DEVICE — DRESSING, TRANSPARENT, TEGADERM, 4 X 4-3/4 IN

## (undated) DEVICE — NEEDLE, PERCUTANEOUS ENTRY, THINWALL, W/O BASEPLATE, 18 G X 7 CM

## (undated) DEVICE — TAPE, UMBILICAL, 1/8 X 30 IN, COTTON